# Patient Record
Sex: MALE | Race: WHITE | NOT HISPANIC OR LATINO
[De-identification: names, ages, dates, MRNs, and addresses within clinical notes are randomized per-mention and may not be internally consistent; named-entity substitution may affect disease eponyms.]

---

## 2017-01-03 ENCOUNTER — MESSAGE (OUTPATIENT)
Age: 82
End: 2017-01-03

## 2017-01-18 ENCOUNTER — APPOINTMENT (OUTPATIENT)
Dept: INTERNAL MEDICINE | Facility: CLINIC | Age: 82
End: 2017-01-18

## 2017-01-18 ENCOUNTER — APPOINTMENT (OUTPATIENT)
Dept: RADIOLOGY | Facility: CLINIC | Age: 82
End: 2017-01-18

## 2017-01-18 ENCOUNTER — OUTPATIENT (OUTPATIENT)
Dept: OUTPATIENT SERVICES | Facility: HOSPITAL | Age: 82
LOS: 1 days | End: 2017-01-18
Payer: MEDICARE

## 2017-01-18 VITALS
HEIGHT: 69 IN | OXYGEN SATURATION: 76 % | SYSTOLIC BLOOD PRESSURE: 128 MMHG | BODY MASS INDEX: 23.55 KG/M2 | TEMPERATURE: 97.4 F | DIASTOLIC BLOOD PRESSURE: 78 MMHG | WEIGHT: 159 LBS | HEART RATE: 66 BPM

## 2017-01-18 VITALS — HEART RATE: 70 BPM | OXYGEN SATURATION: 96 %

## 2017-01-18 DIAGNOSIS — Z98.89 OTHER SPECIFIED POSTPROCEDURAL STATES: Chronic | ICD-10-CM

## 2017-01-18 DIAGNOSIS — J44.9 CHRONIC OBSTRUCTIVE PULMONARY DISEASE, UNSPECIFIED: ICD-10-CM

## 2017-01-18 DIAGNOSIS — J40 BRONCHITIS, NOT SPECIFIED AS ACUTE OR CHRONIC: ICD-10-CM

## 2017-01-18 DIAGNOSIS — I71.4 ABDOMINAL AORTIC ANEURYSM, WITHOUT RUPTURE: Chronic | ICD-10-CM

## 2017-01-18 DIAGNOSIS — Z96.651 PRESENCE OF RIGHT ARTIFICIAL KNEE JOINT: Chronic | ICD-10-CM

## 2017-01-18 PROCEDURE — 71046 X-RAY EXAM CHEST 2 VIEWS: CPT

## 2017-01-23 ENCOUNTER — MEDICATION RENEWAL (OUTPATIENT)
Age: 82
End: 2017-01-23

## 2017-02-06 ENCOUNTER — APPOINTMENT (OUTPATIENT)
Dept: INTERNAL MEDICINE | Facility: CLINIC | Age: 82
End: 2017-02-06

## 2017-02-08 ENCOUNTER — NON-APPOINTMENT (OUTPATIENT)
Age: 82
End: 2017-02-08

## 2017-02-08 ENCOUNTER — APPOINTMENT (OUTPATIENT)
Dept: CARDIOLOGY | Facility: CLINIC | Age: 82
End: 2017-02-08

## 2017-02-08 VITALS
WEIGHT: 158 LBS | SYSTOLIC BLOOD PRESSURE: 91 MMHG | BODY MASS INDEX: 23.4 KG/M2 | OXYGEN SATURATION: 94 % | HEIGHT: 69 IN | DIASTOLIC BLOOD PRESSURE: 55 MMHG | HEART RATE: 74 BPM

## 2017-02-10 LAB
ALBUMIN SERPL ELPH-MCNC: 3.7 G/DL
ALP BLD-CCNC: 98 U/L
ALT SERPL-CCNC: 12 U/L
ANION GAP SERPL CALC-SCNC: 16 MMOL/L
AST SERPL-CCNC: 16 U/L
BASOPHILS # BLD AUTO: 0.07 K/UL
BASOPHILS NFR BLD AUTO: 1 %
BILIRUB SERPL-MCNC: 0.7 MG/DL
BUN SERPL-MCNC: 28 MG/DL
CALCIUM SERPL-MCNC: 9.2 MG/DL
CHLORIDE SERPL-SCNC: 103 MMOL/L
CO2 SERPL-SCNC: 23 MMOL/L
CREAT SERPL-MCNC: 1.23 MG/DL
DIGOXIN SERPL-MCNC: 1.3 NG/ML
EOSINOPHIL # BLD AUTO: 0.39 K/UL
EOSINOPHIL NFR BLD AUTO: 5.8 %
GLUCOSE SERPL-MCNC: 113 MG/DL
HCT VFR BLD CALC: 37.3 %
HGB BLD-MCNC: 11.7 G/DL
IMM GRANULOCYTES NFR BLD AUTO: 0.1 %
LYMPHOCYTES # BLD AUTO: 1.06 K/UL
LYMPHOCYTES NFR BLD AUTO: 15.7 %
MAN DIFF?: NORMAL
MCHC RBC-ENTMCNC: 30.6 PG
MCHC RBC-ENTMCNC: 31.4 GM/DL
MCV RBC AUTO: 97.6 FL
MONOCYTES # BLD AUTO: 0.52 K/UL
MONOCYTES NFR BLD AUTO: 7.7 %
NEUTROPHILS # BLD AUTO: 4.69 K/UL
NEUTROPHILS NFR BLD AUTO: 69.7 %
NT-PROBNP SERPL-MCNC: 4023 PG/ML
PLATELET # BLD AUTO: 128 K/UL
POTASSIUM SERPL-SCNC: 4.2 MMOL/L
PROT SERPL-MCNC: 6.6 G/DL
RBC # BLD: 3.82 M/UL
RBC # FLD: 18.9 %
SODIUM SERPL-SCNC: 142 MMOL/L
TSH SERPL-ACNC: 2.3 UIU/ML
WBC # FLD AUTO: 6.74 K/UL

## 2017-02-27 ENCOUNTER — MEDICATION RENEWAL (OUTPATIENT)
Age: 82
End: 2017-02-27

## 2017-03-06 ENCOUNTER — APPOINTMENT (OUTPATIENT)
Dept: INTERNAL MEDICINE | Facility: CLINIC | Age: 82
End: 2017-03-06

## 2017-03-06 VITALS
HEART RATE: 84 BPM | DIASTOLIC BLOOD PRESSURE: 70 MMHG | SYSTOLIC BLOOD PRESSURE: 120 MMHG | HEIGHT: 67.5 IN | TEMPERATURE: 97.8 F | BODY MASS INDEX: 25.29 KG/M2 | OXYGEN SATURATION: 98 % | WEIGHT: 163 LBS

## 2017-03-06 DIAGNOSIS — D47.2 MONOCLONAL GAMMOPATHY: ICD-10-CM

## 2017-03-06 LAB — HBA1C MFR BLD HPLC: 5.7

## 2017-03-06 RX ORDER — METHYLPREDNISOLONE 4 MG/1
4 TABLET ORAL
Qty: 1 | Refills: 1 | Status: DISCONTINUED | COMMUNITY
Start: 2017-01-18 | End: 2017-03-06

## 2017-03-07 LAB
APPEARANCE: CLEAR
BILIRUBIN URINE: NEGATIVE
BLOOD URINE: NEGATIVE
COLOR: YELLOW
GLUCOSE QUALITATIVE U: NORMAL MG/DL
KETONES URINE: NEGATIVE
LEUKOCYTE ESTERASE URINE: NEGATIVE
NITRITE URINE: NEGATIVE
PH URINE: 6
PROTEIN URINE: NEGATIVE MG/DL
SPECIFIC GRAVITY URINE: 1.01
UROBILINOGEN URINE: NORMAL MG/DL

## 2017-03-08 ENCOUNTER — APPOINTMENT (OUTPATIENT)
Dept: CARDIOLOGY | Facility: CLINIC | Age: 82
End: 2017-03-08

## 2017-03-13 ENCOUNTER — MEDICATION RENEWAL (OUTPATIENT)
Age: 82
End: 2017-03-13

## 2017-03-21 ENCOUNTER — MEDICATION RENEWAL (OUTPATIENT)
Age: 82
End: 2017-03-21

## 2017-03-23 DIAGNOSIS — M47.812 SPONDYLOSIS W/OUT MYELOPATHY OR RADICULOPATHY, CERVICAL REGION: ICD-10-CM

## 2017-04-04 ENCOUNTER — NON-APPOINTMENT (OUTPATIENT)
Age: 82
End: 2017-04-04

## 2017-04-04 ENCOUNTER — APPOINTMENT (OUTPATIENT)
Dept: CARDIOLOGY | Facility: CLINIC | Age: 82
End: 2017-04-04

## 2017-04-04 VITALS
HEIGHT: 67.5 IN | SYSTOLIC BLOOD PRESSURE: 107 MMHG | OXYGEN SATURATION: 98 % | WEIGHT: 155 LBS | HEART RATE: 75 BPM | DIASTOLIC BLOOD PRESSURE: 61 MMHG | BODY MASS INDEX: 24.05 KG/M2 | TEMPERATURE: 97.4 F

## 2017-04-06 LAB
ALBUMIN SERPL ELPH-MCNC: 4.3 G/DL
ALP BLD-CCNC: 83 U/L
ALT SERPL-CCNC: 9 U/L
ANION GAP SERPL CALC-SCNC: 21 MMOL/L
AST SERPL-CCNC: 20 U/L
BASOPHILS # BLD AUTO: 0.05 K/UL
BASOPHILS NFR BLD AUTO: 0.7 %
BILIRUB SERPL-MCNC: 0.6 MG/DL
BUN SERPL-MCNC: 34 MG/DL
CALCIUM SERPL-MCNC: 9.6 MG/DL
CHLORIDE SERPL-SCNC: 99 MMOL/L
CHOLEST SERPL-MCNC: 114 MG/DL
CHOLEST/HDLC SERPL: 2.4 RATIO
CO2 SERPL-SCNC: 23 MMOL/L
CREAT SERPL-MCNC: 1.38 MG/DL
DIGOXIN SERPL-MCNC: 1.3 NG/ML
EOSINOPHIL # BLD AUTO: 0.31 K/UL
EOSINOPHIL NFR BLD AUTO: 4.3 %
GLUCOSE SERPL-MCNC: 64 MG/DL
HCT VFR BLD CALC: 37.8 %
HDLC SERPL-MCNC: 47 MG/DL
HGB BLD-MCNC: 12.1 G/DL
IMM GRANULOCYTES NFR BLD AUTO: 0.3 %
LDLC SERPL CALC-MCNC: 51 MG/DL
LYMPHOCYTES # BLD AUTO: 1.61 K/UL
LYMPHOCYTES NFR BLD AUTO: 22.3 %
MAN DIFF?: NORMAL
MCHC RBC-ENTMCNC: 31.3 PG
MCHC RBC-ENTMCNC: 32 GM/DL
MCV RBC AUTO: 97.7 FL
MONOCYTES # BLD AUTO: 0.38 K/UL
MONOCYTES NFR BLD AUTO: 5.3 %
NEUTROPHILS # BLD AUTO: 4.84 K/UL
NEUTROPHILS NFR BLD AUTO: 67.1 %
NT-PROBNP SERPL-MCNC: 2609 PG/ML
PLATELET # BLD AUTO: 123 K/UL
POTASSIUM SERPL-SCNC: 4.6 MMOL/L
PROT SERPL-MCNC: 7.6 G/DL
RBC # BLD: 3.87 M/UL
RBC # FLD: 17.9 %
SODIUM SERPL-SCNC: 143 MMOL/L
TRIGL SERPL-MCNC: 78 MG/DL
TSH SERPL-ACNC: 1.62 UIU/ML
WBC # FLD AUTO: 7.21 K/UL

## 2017-04-19 ENCOUNTER — RX RENEWAL (OUTPATIENT)
Age: 82
End: 2017-04-19

## 2017-05-01 ENCOUNTER — MEDICATION RENEWAL (OUTPATIENT)
Age: 82
End: 2017-05-01

## 2017-05-09 ENCOUNTER — APPOINTMENT (OUTPATIENT)
Dept: VASCULAR SURGERY | Facility: CLINIC | Age: 82
End: 2017-05-09

## 2017-05-09 VITALS
TEMPERATURE: 97.6 F | WEIGHT: 160 LBS | SYSTOLIC BLOOD PRESSURE: 141 MMHG | DIASTOLIC BLOOD PRESSURE: 71 MMHG | BODY MASS INDEX: 24.82 KG/M2 | HEIGHT: 67.5 IN | HEART RATE: 81 BPM

## 2017-05-15 ENCOUNTER — RX RENEWAL (OUTPATIENT)
Age: 82
End: 2017-05-15

## 2017-05-18 ENCOUNTER — RX RENEWAL (OUTPATIENT)
Age: 82
End: 2017-05-18

## 2017-05-23 ENCOUNTER — MEDICATION RENEWAL (OUTPATIENT)
Age: 82
End: 2017-05-23

## 2017-06-09 ENCOUNTER — APPOINTMENT (OUTPATIENT)
Dept: INTERNAL MEDICINE | Facility: CLINIC | Age: 82
End: 2017-06-09

## 2017-06-09 VITALS
WEIGHT: 159 LBS | SYSTOLIC BLOOD PRESSURE: 124 MMHG | HEIGHT: 68 IN | RESPIRATION RATE: 20 BRPM | OXYGEN SATURATION: 96 % | TEMPERATURE: 97.8 F | HEART RATE: 59 BPM | DIASTOLIC BLOOD PRESSURE: 70 MMHG | BODY MASS INDEX: 24.1 KG/M2

## 2017-06-09 RX ORDER — METHYLPREDNISOLONE 4 MG/1
4 TABLET ORAL
Qty: 21 | Refills: 0 | Status: COMPLETED | COMMUNITY
Start: 2017-01-18

## 2017-06-12 ENCOUNTER — RESULT CHARGE (OUTPATIENT)
Age: 82
End: 2017-06-12

## 2017-06-13 ENCOUNTER — APPOINTMENT (OUTPATIENT)
Dept: INTERNAL MEDICINE | Facility: CLINIC | Age: 82
End: 2017-06-13

## 2017-06-13 ENCOUNTER — NON-APPOINTMENT (OUTPATIENT)
Age: 82
End: 2017-06-13

## 2017-06-13 ENCOUNTER — APPOINTMENT (OUTPATIENT)
Dept: CARDIOLOGY | Facility: CLINIC | Age: 82
End: 2017-06-13

## 2017-06-13 ENCOUNTER — OTHER (OUTPATIENT)
Age: 82
End: 2017-06-13

## 2017-06-13 VITALS
TEMPERATURE: 97.4 F | DIASTOLIC BLOOD PRESSURE: 71 MMHG | OXYGEN SATURATION: 98 % | BODY MASS INDEX: 24.1 KG/M2 | SYSTOLIC BLOOD PRESSURE: 122 MMHG | HEART RATE: 72 BPM | HEIGHT: 68 IN | WEIGHT: 159 LBS

## 2017-06-14 LAB
ALBUMIN SERPL ELPH-MCNC: 4 G/DL
ALP BLD-CCNC: 83 U/L
ALT SERPL-CCNC: 7 U/L
ANION GAP SERPL CALC-SCNC: 22 MMOL/L
AST SERPL-CCNC: 15 U/L
BASOPHILS # BLD AUTO: 0.04 K/UL
BASOPHILS NFR BLD AUTO: 0.7 %
BILIRUB SERPL-MCNC: 0.7 MG/DL
BUN SERPL-MCNC: 31 MG/DL
CALCIUM SERPL-MCNC: 9.5 MG/DL
CHLORIDE SERPL-SCNC: 95 MMOL/L
CHOLEST SERPL-MCNC: 104 MG/DL
CHOLEST/HDLC SERPL: 2.5 RATIO
CO2 SERPL-SCNC: 23 MMOL/L
CREAT SERPL-MCNC: 1.44 MG/DL
DIGOXIN SERPL-MCNC: 1.1 NG/ML
EOSINOPHIL # BLD AUTO: 0.2 K/UL
EOSINOPHIL NFR BLD AUTO: 3.3 %
GLUCOSE SERPL-MCNC: 85 MG/DL
HCT VFR BLD CALC: 35.8 %
HDLC SERPL-MCNC: 41 MG/DL
HGB BLD-MCNC: 11.6 G/DL
IMM GRANULOCYTES NFR BLD AUTO: 0.2 %
LDLC SERPL CALC-MCNC: 51 MG/DL
LYMPHOCYTES # BLD AUTO: 1.36 K/UL
LYMPHOCYTES NFR BLD AUTO: 22.2 %
MAN DIFF?: NORMAL
MCHC RBC-ENTMCNC: 30.9 PG
MCHC RBC-ENTMCNC: 32.4 GM/DL
MCV RBC AUTO: 95.5 FL
MONOCYTES # BLD AUTO: 0.52 K/UL
MONOCYTES NFR BLD AUTO: 8.5 %
NEUTROPHILS # BLD AUTO: 4 K/UL
NEUTROPHILS NFR BLD AUTO: 65.1 %
NT-PROBNP SERPL-MCNC: 2687 PG/ML
PLATELET # BLD AUTO: 134 K/UL
POTASSIUM SERPL-SCNC: 4.7 MMOL/L
PROT SERPL-MCNC: 7.2 G/DL
RBC # BLD: 3.75 M/UL
RBC # FLD: 17.1 %
SODIUM SERPL-SCNC: 140 MMOL/L
TRIGL SERPL-MCNC: 60 MG/DL
TSH SERPL-ACNC: 2.71 UIU/ML
WBC # FLD AUTO: 6.13 K/UL

## 2017-06-26 ENCOUNTER — APPOINTMENT (OUTPATIENT)
Dept: INTERNAL MEDICINE | Facility: CLINIC | Age: 82
End: 2017-06-26

## 2017-06-26 VITALS
HEIGHT: 68 IN | SYSTOLIC BLOOD PRESSURE: 120 MMHG | WEIGHT: 159 LBS | OXYGEN SATURATION: 91 % | HEART RATE: 75 BPM | TEMPERATURE: 97.9 F | DIASTOLIC BLOOD PRESSURE: 70 MMHG | BODY MASS INDEX: 24.1 KG/M2

## 2017-06-26 VITALS — OXYGEN SATURATION: 98 %

## 2017-06-26 RX ORDER — AMOXICILLIN AND CLAVULANATE POTASSIUM 875; 125 MG/1; MG/1
875-125 TABLET, COATED ORAL
Qty: 14 | Refills: 0 | Status: DISCONTINUED | COMMUNITY
Start: 2017-01-03 | End: 2017-06-26

## 2017-07-03 ENCOUNTER — MEDICATION RENEWAL (OUTPATIENT)
Age: 82
End: 2017-07-03

## 2017-07-11 ENCOUNTER — APPOINTMENT (OUTPATIENT)
Dept: INTERNAL MEDICINE | Facility: CLINIC | Age: 82
End: 2017-07-11

## 2017-07-11 VITALS
HEART RATE: 74 BPM | OXYGEN SATURATION: 96 % | DIASTOLIC BLOOD PRESSURE: 70 MMHG | HEIGHT: 68 IN | BODY MASS INDEX: 24.1 KG/M2 | TEMPERATURE: 97.3 F | SYSTOLIC BLOOD PRESSURE: 126 MMHG | WEIGHT: 159 LBS

## 2017-07-18 ENCOUNTER — APPOINTMENT (OUTPATIENT)
Dept: INTERNAL MEDICINE | Facility: CLINIC | Age: 82
End: 2017-07-18

## 2017-07-18 VITALS
SYSTOLIC BLOOD PRESSURE: 130 MMHG | WEIGHT: 161 LBS | DIASTOLIC BLOOD PRESSURE: 70 MMHG | TEMPERATURE: 97.4 F | BODY MASS INDEX: 24.4 KG/M2 | OXYGEN SATURATION: 95 % | HEIGHT: 68 IN | HEART RATE: 75 BPM

## 2017-08-04 ENCOUNTER — MEDICATION RENEWAL (OUTPATIENT)
Age: 82
End: 2017-08-04

## 2017-08-08 ENCOUNTER — APPOINTMENT (OUTPATIENT)
Dept: INTERNAL MEDICINE | Facility: CLINIC | Age: 82
End: 2017-08-08
Payer: MEDICARE

## 2017-08-08 VITALS
HEART RATE: 72 BPM | DIASTOLIC BLOOD PRESSURE: 64 MMHG | HEIGHT: 68 IN | BODY MASS INDEX: 24.4 KG/M2 | SYSTOLIC BLOOD PRESSURE: 108 MMHG | WEIGHT: 161 LBS | OXYGEN SATURATION: 98 % | TEMPERATURE: 97.4 F

## 2017-08-08 PROCEDURE — 99214 OFFICE O/P EST MOD 30 MIN: CPT

## 2017-08-14 ENCOUNTER — MEDICATION RENEWAL (OUTPATIENT)
Age: 82
End: 2017-08-14

## 2017-08-25 ENCOUNTER — CLINICAL ADVICE (OUTPATIENT)
Age: 82
End: 2017-08-25

## 2017-09-10 ENCOUNTER — RX RENEWAL (OUTPATIENT)
Age: 82
End: 2017-09-10

## 2017-09-12 ENCOUNTER — NON-APPOINTMENT (OUTPATIENT)
Age: 82
End: 2017-09-12

## 2017-09-12 ENCOUNTER — APPOINTMENT (OUTPATIENT)
Dept: CARDIOLOGY | Facility: CLINIC | Age: 82
End: 2017-09-12
Payer: MEDICARE

## 2017-09-12 VITALS
SYSTOLIC BLOOD PRESSURE: 120 MMHG | OXYGEN SATURATION: 96 % | HEART RATE: 69 BPM | WEIGHT: 163 LBS | DIASTOLIC BLOOD PRESSURE: 64 MMHG | BODY MASS INDEX: 24.71 KG/M2 | HEIGHT: 68 IN

## 2017-09-12 PROCEDURE — 93284 PRGRMG EVAL IMPLANTABLE DFB: CPT

## 2017-09-12 PROCEDURE — 36415 COLL VENOUS BLD VENIPUNCTURE: CPT

## 2017-09-12 PROCEDURE — 99215 OFFICE O/P EST HI 40 MIN: CPT

## 2017-09-12 PROCEDURE — 93000 ELECTROCARDIOGRAM COMPLETE: CPT

## 2017-09-13 LAB
ALBUMIN SERPL ELPH-MCNC: 3.6 G/DL
ALP BLD-CCNC: 90 U/L
ALT SERPL-CCNC: 8 U/L
ANION GAP SERPL CALC-SCNC: 16 MMOL/L
AST SERPL-CCNC: 11 U/L
BASOPHILS # BLD AUTO: 0.04 K/UL
BASOPHILS NFR BLD AUTO: 0.5 %
BILIRUB SERPL-MCNC: 0.3 MG/DL
BUN SERPL-MCNC: 29 MG/DL
CALCIUM SERPL-MCNC: 8.9 MG/DL
CHLORIDE SERPL-SCNC: 103 MMOL/L
CHOLEST SERPL-MCNC: 96 MG/DL
CHOLEST/HDLC SERPL: 2.8 RATIO
CO2 SERPL-SCNC: 23 MMOL/L
CREAT SERPL-MCNC: 1.4 MG/DL
DIGOXIN SERPL-MCNC: 1.2 NG/ML
EOSINOPHIL # BLD AUTO: 0.39 K/UL
EOSINOPHIL NFR BLD AUTO: 5.1 %
GLUCOSE SERPL-MCNC: 103 MG/DL
HBA1C MFR BLD HPLC: 5.6 %
HCT VFR BLD CALC: 33.6 %
HDLC SERPL-MCNC: 34 MG/DL
HGB BLD-MCNC: 11 G/DL
IMM GRANULOCYTES NFR BLD AUTO: 0.3 %
LDLC SERPL CALC-MCNC: 42 MG/DL
LYMPHOCYTES # BLD AUTO: 1.34 K/UL
LYMPHOCYTES NFR BLD AUTO: 17.6 %
MAN DIFF?: NORMAL
MCHC RBC-ENTMCNC: 31 PG
MCHC RBC-ENTMCNC: 32.7 GM/DL
MCV RBC AUTO: 94.6 FL
MONOCYTES # BLD AUTO: 0.46 K/UL
MONOCYTES NFR BLD AUTO: 6 %
NEUTROPHILS # BLD AUTO: 5.36 K/UL
NEUTROPHILS NFR BLD AUTO: 70.5 %
NT-PROBNP SERPL-MCNC: 1352 PG/ML
PLATELET # BLD AUTO: 119 K/UL
POTASSIUM SERPL-SCNC: 4.6 MMOL/L
PROT SERPL-MCNC: 7 G/DL
RBC # BLD: 3.55 M/UL
RBC # FLD: 17.5 %
SODIUM SERPL-SCNC: 142 MMOL/L
TRIGL SERPL-MCNC: 100 MG/DL
TSH SERPL-ACNC: 9.85 UIU/ML
WBC # FLD AUTO: 7.61 K/UL

## 2017-09-26 ENCOUNTER — RX RENEWAL (OUTPATIENT)
Age: 82
End: 2017-09-26

## 2017-10-03 ENCOUNTER — APPOINTMENT (OUTPATIENT)
Dept: INTERNAL MEDICINE | Facility: CLINIC | Age: 82
End: 2017-10-03
Payer: MEDICARE

## 2017-10-03 VITALS
OXYGEN SATURATION: 96 % | SYSTOLIC BLOOD PRESSURE: 104 MMHG | BODY MASS INDEX: 24.55 KG/M2 | TEMPERATURE: 97.4 F | HEIGHT: 68 IN | WEIGHT: 162 LBS | DIASTOLIC BLOOD PRESSURE: 60 MMHG | HEART RATE: 70 BPM

## 2017-10-03 PROCEDURE — 94010 BREATHING CAPACITY TEST: CPT

## 2017-10-03 PROCEDURE — 36415 COLL VENOUS BLD VENIPUNCTURE: CPT

## 2017-10-03 PROCEDURE — 99214 OFFICE O/P EST MOD 30 MIN: CPT | Mod: 25

## 2017-10-04 ENCOUNTER — TRANSCRIPTION ENCOUNTER (OUTPATIENT)
Age: 82
End: 2017-10-04

## 2017-10-06 ENCOUNTER — MEDICATION RENEWAL (OUTPATIENT)
Age: 82
End: 2017-10-06

## 2017-10-06 LAB
ALBUMIN SERPL ELPH-MCNC: 3.8 G/DL
ALP BLD-CCNC: 89 U/L
ALT SERPL-CCNC: 11 U/L
ANION GAP SERPL CALC-SCNC: 20 MMOL/L
AST SERPL-CCNC: 15 U/L
BASOPHILS # BLD AUTO: 0.05 K/UL
BASOPHILS NFR BLD AUTO: 0.5 %
BILIRUB SERPL-MCNC: 0.8 MG/DL
BUN SERPL-MCNC: 27 MG/DL
CALCIUM SERPL-MCNC: 9.6 MG/DL
CHLORIDE SERPL-SCNC: 100 MMOL/L
CO2 SERPL-SCNC: 22 MMOL/L
CREAT SERPL-MCNC: 1.51 MG/DL
EOSINOPHIL # BLD AUTO: 0.27 K/UL
EOSINOPHIL NFR BLD AUTO: 2.8 %
GLUCOSE SERPL-MCNC: 113 MG/DL
HCT VFR BLD CALC: 35.8 %
HGB BLD-MCNC: 11.2 G/DL
IMM GRANULOCYTES NFR BLD AUTO: 0.2 %
LYMPHOCYTES # BLD AUTO: 0.71 K/UL
LYMPHOCYTES NFR BLD AUTO: 7.5 %
MAN DIFF?: NORMAL
MCHC RBC-ENTMCNC: 30.6 PG
MCHC RBC-ENTMCNC: 31.3 GM/DL
MCV RBC AUTO: 97.8 FL
MONOCYTES # BLD AUTO: 0.75 K/UL
MONOCYTES NFR BLD AUTO: 7.9 %
NEUTROPHILS # BLD AUTO: 7.72 K/UL
NEUTROPHILS NFR BLD AUTO: 81.1 %
PLATELET # BLD AUTO: 116 K/UL
POTASSIUM SERPL-SCNC: 4.4 MMOL/L
PROT SERPL-MCNC: 7.3 G/DL
RBC # BLD: 3.66 M/UL
RBC # FLD: 18.2 %
SODIUM SERPL-SCNC: 142 MMOL/L
TSH SERPL-ACNC: 8.84 UIU/ML
WBC # FLD AUTO: 9.52 K/UL

## 2017-10-10 ENCOUNTER — APPOINTMENT (OUTPATIENT)
Dept: CARDIOLOGY | Facility: CLINIC | Age: 82
End: 2017-10-10

## 2017-10-10 ENCOUNTER — APPOINTMENT (OUTPATIENT)
Dept: INTERNAL MEDICINE | Facility: CLINIC | Age: 82
End: 2017-10-10
Payer: MEDICARE

## 2017-10-10 VITALS
OXYGEN SATURATION: 93 % | WEIGHT: 160 LBS | HEIGHT: 68 IN | TEMPERATURE: 97.2 F | HEART RATE: 65 BPM | SYSTOLIC BLOOD PRESSURE: 110 MMHG | DIASTOLIC BLOOD PRESSURE: 60 MMHG | BODY MASS INDEX: 24.25 KG/M2

## 2017-10-10 PROCEDURE — 99214 OFFICE O/P EST MOD 30 MIN: CPT

## 2017-10-12 ENCOUNTER — APPOINTMENT (OUTPATIENT)
Dept: WOUND CARE | Facility: CLINIC | Age: 82
End: 2017-10-12
Payer: MEDICARE

## 2017-10-12 VITALS
HEART RATE: 73 BPM | SYSTOLIC BLOOD PRESSURE: 134 MMHG | TEMPERATURE: 98.3 F | RESPIRATION RATE: 16 BRPM | DIASTOLIC BLOOD PRESSURE: 74 MMHG

## 2017-10-12 DIAGNOSIS — A40.0: ICD-10-CM
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
## 2017-10-12 DIAGNOSIS — J34.89 OTHER SPECIFIED DISORDERS OF NOSE AND NASAL SINUSES: ICD-10-CM

## 2017-10-12 DIAGNOSIS — Z84.1 FAMILY HISTORY OF DISORDERS OF KIDNEY AND URETER: ICD-10-CM

## 2017-10-12 PROCEDURE — 99203 OFFICE O/P NEW LOW 30 MIN: CPT

## 2017-10-17 ENCOUNTER — FORM ENCOUNTER (OUTPATIENT)
Age: 82
End: 2017-10-17

## 2017-10-17 ENCOUNTER — APPOINTMENT (OUTPATIENT)
Dept: INTERNAL MEDICINE | Facility: CLINIC | Age: 82
End: 2017-10-17
Payer: MEDICARE

## 2017-10-17 VITALS
DIASTOLIC BLOOD PRESSURE: 72 MMHG | HEART RATE: 83 BPM | BODY MASS INDEX: 24.25 KG/M2 | WEIGHT: 160 LBS | HEIGHT: 68 IN | TEMPERATURE: 97.4 F | OXYGEN SATURATION: 92 % | SYSTOLIC BLOOD PRESSURE: 122 MMHG

## 2017-10-17 DIAGNOSIS — S81.812A LACERATION W/OUT FOREIGN BODY, LEFT LOWER LEG, INITIAL ENCOUNTER: ICD-10-CM

## 2017-10-17 PROCEDURE — 36415 COLL VENOUS BLD VENIPUNCTURE: CPT

## 2017-10-17 PROCEDURE — 94010 BREATHING CAPACITY TEST: CPT

## 2017-10-17 PROCEDURE — 99214 OFFICE O/P EST MOD 30 MIN: CPT | Mod: 25

## 2017-10-17 RX ORDER — IPRATROPIUM BROMIDE AND ALBUTEROL 20; 100 UG/1; UG/1
20-100 SPRAY, METERED RESPIRATORY (INHALATION)
Qty: 3 | Refills: 3 | Status: DISCONTINUED | COMMUNITY
Start: 2017-10-03 | End: 2017-10-17

## 2017-10-17 RX ORDER — AZITHROMYCIN 250 MG/1
250 TABLET, FILM COATED ORAL
Qty: 1 | Refills: 0 | Status: DISCONTINUED | COMMUNITY
Start: 2017-10-03 | End: 2017-10-17

## 2017-10-17 RX ORDER — BENZONATATE 100 MG/1
100 CAPSULE ORAL
Qty: 15 | Refills: 1 | Status: DISCONTINUED | COMMUNITY
Start: 2017-06-26 | End: 2017-10-17

## 2017-10-17 RX ORDER — BUDESONIDE 0.5 MG/2ML
0.5 INHALANT ORAL TWICE DAILY
Qty: 60 | Refills: 11 | Status: DISCONTINUED | COMMUNITY
Start: 2017-10-10 | End: 2017-10-17

## 2017-10-17 RX ORDER — PREDNISONE 20 MG/1
20 TABLET ORAL
Qty: 10 | Refills: 0 | Status: DISCONTINUED | COMMUNITY
Start: 2017-10-03 | End: 2017-10-17

## 2017-10-17 RX ORDER — PREDNISONE 20 MG/1
20 TABLET ORAL
Qty: 5 | Refills: 0 | Status: DISCONTINUED | COMMUNITY
Start: 2017-06-26 | End: 2017-10-17

## 2017-10-18 ENCOUNTER — APPOINTMENT (OUTPATIENT)
Dept: RADIOLOGY | Facility: CLINIC | Age: 82
End: 2017-10-18
Payer: MEDICARE

## 2017-10-18 ENCOUNTER — OUTPATIENT (OUTPATIENT)
Dept: OUTPATIENT SERVICES | Facility: HOSPITAL | Age: 82
LOS: 1 days | End: 2017-10-18
Payer: MEDICARE

## 2017-10-18 DIAGNOSIS — Z96.651 PRESENCE OF RIGHT ARTIFICIAL KNEE JOINT: Chronic | ICD-10-CM

## 2017-10-18 DIAGNOSIS — Z98.89 OTHER SPECIFIED POSTPROCEDURAL STATES: Chronic | ICD-10-CM

## 2017-10-18 DIAGNOSIS — I71.4 ABDOMINAL AORTIC ANEURYSM, WITHOUT RUPTURE: Chronic | ICD-10-CM

## 2017-10-18 DIAGNOSIS — Z00.8 ENCOUNTER FOR OTHER GENERAL EXAMINATION: ICD-10-CM

## 2017-10-18 LAB
ALBUMIN SERPL ELPH-MCNC: 3.7 G/DL
ALP BLD-CCNC: 81 U/L
ALT SERPL-CCNC: 13 U/L
ANION GAP SERPL CALC-SCNC: 12 MMOL/L
AST SERPL-CCNC: 17 U/L
BASOPHILS # BLD AUTO: 0.02 K/UL
BASOPHILS NFR BLD AUTO: 0.2 %
BILIRUB SERPL-MCNC: 0.5 MG/DL
BUN SERPL-MCNC: 29 MG/DL
CALCIUM SERPL-MCNC: 9 MG/DL
CHLORIDE SERPL-SCNC: 100 MMOL/L
CO2 SERPL-SCNC: 28 MMOL/L
CREAT SERPL-MCNC: 1.39 MG/DL
EOSINOPHIL # BLD AUTO: 0.29 K/UL
EOSINOPHIL NFR BLD AUTO: 3.2 %
GLUCOSE SERPL-MCNC: 93 MG/DL
HCT VFR BLD CALC: 37.2 %
HGB BLD-MCNC: 12.1 G/DL
IMM GRANULOCYTES NFR BLD AUTO: 0.3 %
LYMPHOCYTES # BLD AUTO: 1.32 K/UL
LYMPHOCYTES NFR BLD AUTO: 14.4 %
MAN DIFF?: NORMAL
MCHC RBC-ENTMCNC: 31.2 PG
MCHC RBC-ENTMCNC: 32.5 GM/DL
MCV RBC AUTO: 95.9 FL
MONOCYTES # BLD AUTO: 0.5 K/UL
MONOCYTES NFR BLD AUTO: 5.4 %
NEUTROPHILS # BLD AUTO: 7.02 K/UL
NEUTROPHILS NFR BLD AUTO: 76.5 %
NT-PROBNP SERPL-MCNC: 2447 PG/ML
PLATELET # BLD AUTO: 136 K/UL
POTASSIUM SERPL-SCNC: 4.6 MMOL/L
PROT SERPL-MCNC: 7.5 G/DL
RBC # BLD: 3.88 M/UL
RBC # FLD: 17.3 %
SODIUM SERPL-SCNC: 140 MMOL/L
WBC # FLD AUTO: 9.18 K/UL

## 2017-10-18 PROCEDURE — 71020: CPT | Mod: 26

## 2017-10-18 PROCEDURE — 71046 X-RAY EXAM CHEST 2 VIEWS: CPT

## 2017-10-19 ENCOUNTER — APPOINTMENT (OUTPATIENT)
Dept: WOUND CARE | Facility: CLINIC | Age: 82
End: 2017-10-19
Payer: MEDICARE

## 2017-10-19 VITALS
DIASTOLIC BLOOD PRESSURE: 76 MMHG | HEART RATE: 74 BPM | SYSTOLIC BLOOD PRESSURE: 142 MMHG | TEMPERATURE: 97.3 F | HEIGHT: 68 IN | WEIGHT: 160 LBS | RESPIRATION RATE: 18 BRPM | BODY MASS INDEX: 24.25 KG/M2

## 2017-10-19 DIAGNOSIS — R63.4 ABNORMAL WEIGHT LOSS: ICD-10-CM

## 2017-10-19 PROBLEM — S81.812A SKIN TEAR OF LEFT LOWER LEG WITHOUT COMPLICATION: Status: ACTIVE | Noted: 2017-10-10

## 2017-10-19 PROCEDURE — 99213 OFFICE O/P EST LOW 20 MIN: CPT

## 2017-10-26 ENCOUNTER — APPOINTMENT (OUTPATIENT)
Dept: INTERNAL MEDICINE | Facility: CLINIC | Age: 82
End: 2017-10-26
Payer: MEDICARE

## 2017-10-26 VITALS
OXYGEN SATURATION: 95 % | HEIGHT: 68 IN | HEART RATE: 86 BPM | SYSTOLIC BLOOD PRESSURE: 124 MMHG | DIASTOLIC BLOOD PRESSURE: 70 MMHG | WEIGHT: 160 LBS | TEMPERATURE: 97.5 F | BODY MASS INDEX: 24.25 KG/M2

## 2017-10-26 PROCEDURE — 99214 OFFICE O/P EST MOD 30 MIN: CPT

## 2017-10-31 ENCOUNTER — RX RENEWAL (OUTPATIENT)
Age: 82
End: 2017-10-31

## 2017-11-14 ENCOUNTER — APPOINTMENT (OUTPATIENT)
Dept: WOUND CARE | Facility: CLINIC | Age: 82
End: 2017-11-14
Payer: MEDICARE

## 2017-11-14 DIAGNOSIS — S81.802S UNSPECIFIED OPEN WOUND, LEFT LOWER LEG, SEQUELA: ICD-10-CM

## 2017-11-14 DIAGNOSIS — L03.119 CELLULITIS OF UNSPECIFIED PART OF LIMB: ICD-10-CM

## 2017-11-14 DIAGNOSIS — I25.2 OLD MYOCARDIAL INFARCTION: ICD-10-CM

## 2017-11-14 DIAGNOSIS — Z87.891 PERSONAL HISTORY OF NICOTINE DEPENDENCE: ICD-10-CM

## 2017-11-14 DIAGNOSIS — Z96.651 PRESENCE OF RIGHT ARTIFICIAL KNEE JOINT: ICD-10-CM

## 2017-11-14 DIAGNOSIS — M17.10 UNILATERAL PRIMARY OSTEOARTHRITIS, UNSPECIFIED KNEE: ICD-10-CM

## 2017-11-14 PROCEDURE — 99213 OFFICE O/P EST LOW 20 MIN: CPT

## 2017-11-17 ENCOUNTER — RX RENEWAL (OUTPATIENT)
Age: 82
End: 2017-11-17

## 2017-11-21 ENCOUNTER — RX RENEWAL (OUTPATIENT)
Age: 82
End: 2017-11-21

## 2017-12-01 ENCOUNTER — APPOINTMENT (OUTPATIENT)
Dept: INTERNAL MEDICINE | Facility: CLINIC | Age: 82
End: 2017-12-01
Payer: MEDICARE

## 2017-12-01 PROCEDURE — G0008: CPT

## 2017-12-01 PROCEDURE — 90662 IIV NO PRSV INCREASED AG IM: CPT

## 2017-12-12 ENCOUNTER — APPOINTMENT (OUTPATIENT)
Dept: WOUND CARE | Facility: CLINIC | Age: 82
End: 2017-12-12
Payer: MEDICARE

## 2017-12-12 VITALS
HEIGHT: 68 IN | TEMPERATURE: 97 F | BODY MASS INDEX: 24.25 KG/M2 | SYSTOLIC BLOOD PRESSURE: 113 MMHG | HEART RATE: 73 BPM | RESPIRATION RATE: 18 BRPM | DIASTOLIC BLOOD PRESSURE: 61 MMHG | WEIGHT: 160 LBS

## 2017-12-12 PROCEDURE — 99213 OFFICE O/P EST LOW 20 MIN: CPT

## 2017-12-26 ENCOUNTER — APPOINTMENT (OUTPATIENT)
Dept: CARDIOLOGY | Facility: CLINIC | Age: 82
End: 2017-12-26
Payer: MEDICARE

## 2017-12-26 ENCOUNTER — NON-APPOINTMENT (OUTPATIENT)
Age: 82
End: 2017-12-26

## 2017-12-26 ENCOUNTER — LABORATORY RESULT (OUTPATIENT)
Age: 82
End: 2017-12-26

## 2017-12-26 VITALS
OXYGEN SATURATION: 94 % | HEART RATE: 76 BPM | BODY MASS INDEX: 24.55 KG/M2 | DIASTOLIC BLOOD PRESSURE: 63 MMHG | WEIGHT: 162 LBS | HEIGHT: 68 IN | TEMPERATURE: 97.3 F | SYSTOLIC BLOOD PRESSURE: 103 MMHG

## 2017-12-26 DIAGNOSIS — R73.09 OTHER ABNORMAL GLUCOSE: ICD-10-CM

## 2017-12-26 PROCEDURE — 93000 ELECTROCARDIOGRAM COMPLETE: CPT | Mod: 59

## 2017-12-26 PROCEDURE — 99215 OFFICE O/P EST HI 40 MIN: CPT

## 2017-12-26 PROCEDURE — 93284 PRGRMG EVAL IMPLANTABLE DFB: CPT

## 2017-12-26 PROCEDURE — 36415 COLL VENOUS BLD VENIPUNCTURE: CPT

## 2017-12-27 LAB
ALBUMIN SERPL ELPH-MCNC: 3.7 G/DL
ALP BLD-CCNC: 80 U/L
ALT SERPL-CCNC: 20 U/L
ANION GAP SERPL CALC-SCNC: 20 MMOL/L
AST SERPL-CCNC: 28 U/L
BASOPHILS # BLD AUTO: 0.04 K/UL
BASOPHILS NFR BLD AUTO: 0.5 %
BILIRUB SERPL-MCNC: 0.6 MG/DL
BUN SERPL-MCNC: 28 MG/DL
CALCIUM SERPL-MCNC: 9.3 MG/DL
CHLORIDE SERPL-SCNC: 97 MMOL/L
CHOLEST SERPL-MCNC: 91 MG/DL
CHOLEST/HDLC SERPL: 3 RATIO
CO2 SERPL-SCNC: 21 MMOL/L
CREAT SERPL-MCNC: 1.4 MG/DL
EOSINOPHIL # BLD AUTO: 0.28 K/UL
EOSINOPHIL NFR BLD AUTO: 3.4 %
GLUCOSE SERPL-MCNC: 104 MG/DL
HBA1C MFR BLD HPLC: 5.5 %
HCT VFR BLD CALC: 34.9 %
HDLC SERPL-MCNC: 30 MG/DL
HGB BLD-MCNC: 11.2 G/DL
IMM GRANULOCYTES NFR BLD AUTO: 0.5 %
LDLC SERPL CALC-MCNC: 45 MG/DL
LYMPHOCYTES # BLD AUTO: 1.43 K/UL
LYMPHOCYTES NFR BLD AUTO: 17.4 %
MAN DIFF?: NORMAL
MCHC RBC-ENTMCNC: 30.6 PG
MCHC RBC-ENTMCNC: 32.1 GM/DL
MCV RBC AUTO: 95.4 FL
MONOCYTES # BLD AUTO: 0.48 K/UL
MONOCYTES NFR BLD AUTO: 5.8 %
NEUTROPHILS # BLD AUTO: 5.95 K/UL
NEUTROPHILS NFR BLD AUTO: 72.4 %
NT-PROBNP SERPL-MCNC: 4795 PG/ML
PLATELET # BLD AUTO: 182 K/UL
POTASSIUM SERPL-SCNC: 4.7 MMOL/L
PROT SERPL-MCNC: 7.3 G/DL
RBC # BLD: 3.66 M/UL
RBC # FLD: 17.1 %
SODIUM SERPL-SCNC: 138 MMOL/L
TRIGL SERPL-MCNC: 82 MG/DL
TSH SERPL-ACNC: 6 UIU/ML
WBC # FLD AUTO: 8.22 K/UL

## 2017-12-28 ENCOUNTER — APPOINTMENT (OUTPATIENT)
Dept: WOUND CARE | Facility: CLINIC | Age: 82
End: 2017-12-28
Payer: MEDICARE

## 2017-12-28 VITALS
HEART RATE: 71 BPM | BODY MASS INDEX: 24.55 KG/M2 | RESPIRATION RATE: 16 BRPM | TEMPERATURE: 97.8 F | SYSTOLIC BLOOD PRESSURE: 135 MMHG | WEIGHT: 162 LBS | DIASTOLIC BLOOD PRESSURE: 72 MMHG | HEIGHT: 68 IN

## 2017-12-28 DIAGNOSIS — Z95.0 PRESENCE OF CARDIAC PACEMAKER: ICD-10-CM

## 2017-12-28 DIAGNOSIS — S89.92XA UNSPECIFIED INJURY OF LEFT LOWER LEG, INITIAL ENCOUNTER: ICD-10-CM

## 2017-12-28 PROCEDURE — 99213 OFFICE O/P EST LOW 20 MIN: CPT

## 2017-12-29 ENCOUNTER — RX RENEWAL (OUTPATIENT)
Age: 82
End: 2017-12-29

## 2018-01-08 ENCOUNTER — RX RENEWAL (OUTPATIENT)
Age: 83
End: 2018-01-08

## 2018-01-11 ENCOUNTER — APPOINTMENT (OUTPATIENT)
Dept: CARDIOLOGY | Facility: CLINIC | Age: 83
End: 2018-01-11
Payer: MEDICARE

## 2018-01-11 VITALS
OXYGEN SATURATION: 98 % | DIASTOLIC BLOOD PRESSURE: 74 MMHG | HEART RATE: 76 BPM | TEMPERATURE: 97.5 F | SYSTOLIC BLOOD PRESSURE: 125 MMHG | WEIGHT: 161.8 LBS | BODY MASS INDEX: 24.52 KG/M2 | HEIGHT: 68 IN

## 2018-01-11 PROCEDURE — 99214 OFFICE O/P EST MOD 30 MIN: CPT

## 2018-01-11 PROCEDURE — 36415 COLL VENOUS BLD VENIPUNCTURE: CPT

## 2018-01-16 ENCOUNTER — MEDICATION RENEWAL (OUTPATIENT)
Age: 83
End: 2018-01-16

## 2018-01-16 LAB
ALBUMIN SERPL ELPH-MCNC: 3.8 G/DL
ALP BLD-CCNC: 74 U/L
ALT SERPL-CCNC: 11 U/L
ANION GAP SERPL CALC-SCNC: 15 MMOL/L
AST SERPL-CCNC: 20 U/L
BASOPHILS # BLD AUTO: 0.06 K/UL
BASOPHILS NFR BLD AUTO: 0.9 %
BILIRUB SERPL-MCNC: 0.5 MG/DL
BUN SERPL-MCNC: 43 MG/DL
CALCIUM SERPL-MCNC: 9 MG/DL
CHLORIDE SERPL-SCNC: 105 MMOL/L
CO2 SERPL-SCNC: 24 MMOL/L
CREAT SERPL-MCNC: 1.55 MG/DL
DIGOXIN SERPL-MCNC: 1 NG/ML
EOSINOPHIL # BLD AUTO: 0.37 K/UL
EOSINOPHIL NFR BLD AUTO: 5.7 %
GLUCOSE SERPL-MCNC: 62 MG/DL
HCT VFR BLD CALC: 34.6 %
HGB BLD-MCNC: 11 G/DL
IMM GRANULOCYTES NFR BLD AUTO: 0.3 %
LYMPHOCYTES # BLD AUTO: 1.74 K/UL
LYMPHOCYTES NFR BLD AUTO: 27 %
MAN DIFF?: NORMAL
MCHC RBC-ENTMCNC: 30.8 PG
MCHC RBC-ENTMCNC: 31.8 GM/DL
MCV RBC AUTO: 96.9 FL
MONOCYTES # BLD AUTO: 0.56 K/UL
MONOCYTES NFR BLD AUTO: 8.7 %
NEUTROPHILS # BLD AUTO: 3.7 K/UL
NEUTROPHILS NFR BLD AUTO: 57.4 %
NT-PROBNP SERPL-MCNC: 1786 PG/ML
PLATELET # BLD AUTO: 145 K/UL
POTASSIUM SERPL-SCNC: 4.6 MMOL/L
PROT SERPL-MCNC: 7 G/DL
RBC # BLD: 3.57 M/UL
RBC # FLD: 18.3 %
SODIUM SERPL-SCNC: 144 MMOL/L
TSH SERPL-ACNC: 4.84 UIU/ML
WBC # FLD AUTO: 6.45 K/UL

## 2018-01-17 ENCOUNTER — RX RENEWAL (OUTPATIENT)
Age: 83
End: 2018-01-17

## 2018-01-22 DIAGNOSIS — M25.569 PAIN IN UNSPECIFIED KNEE: ICD-10-CM

## 2018-01-24 ENCOUNTER — APPOINTMENT (OUTPATIENT)
Dept: INTERNAL MEDICINE | Facility: CLINIC | Age: 83
End: 2018-01-24
Payer: MEDICARE

## 2018-01-24 PROCEDURE — 36415 COLL VENOUS BLD VENIPUNCTURE: CPT

## 2018-01-26 LAB
CRP SERPL-MCNC: 1.6 MG/DL
ERYTHROCYTE [SEDIMENTATION RATE] IN BLOOD BY WESTERGREN METHOD: 11 MM/HR

## 2018-02-20 ENCOUNTER — APPOINTMENT (OUTPATIENT)
Dept: INTERNAL MEDICINE | Facility: CLINIC | Age: 83
End: 2018-02-20
Payer: MEDICARE

## 2018-02-20 VITALS
WEIGHT: 169 LBS | HEART RATE: 75 BPM | TEMPERATURE: 97.2 F | BODY MASS INDEX: 26.22 KG/M2 | OXYGEN SATURATION: 92 % | DIASTOLIC BLOOD PRESSURE: 70 MMHG | SYSTOLIC BLOOD PRESSURE: 104 MMHG | HEIGHT: 67.5 IN

## 2018-02-20 PROCEDURE — 94010 BREATHING CAPACITY TEST: CPT

## 2018-02-20 PROCEDURE — 99214 OFFICE O/P EST MOD 30 MIN: CPT | Mod: 25

## 2018-02-26 ENCOUNTER — APPOINTMENT (OUTPATIENT)
Dept: INTERNAL MEDICINE | Facility: CLINIC | Age: 83
End: 2018-02-26
Payer: MEDICARE

## 2018-02-26 ENCOUNTER — RESULT CHARGE (OUTPATIENT)
Age: 83
End: 2018-02-26

## 2018-02-26 VITALS
DIASTOLIC BLOOD PRESSURE: 60 MMHG | WEIGHT: 172 LBS | HEIGHT: 67.5 IN | BODY MASS INDEX: 26.68 KG/M2 | TEMPERATURE: 97.3 F | HEART RATE: 77 BPM | OXYGEN SATURATION: 94 % | SYSTOLIC BLOOD PRESSURE: 90 MMHG

## 2018-02-26 PROCEDURE — 94010 BREATHING CAPACITY TEST: CPT

## 2018-02-26 PROCEDURE — 99214 OFFICE O/P EST MOD 30 MIN: CPT | Mod: 25

## 2018-03-04 ENCOUNTER — RX RENEWAL (OUTPATIENT)
Age: 83
End: 2018-03-04

## 2018-03-05 ENCOUNTER — RX RENEWAL (OUTPATIENT)
Age: 83
End: 2018-03-05

## 2018-03-19 ENCOUNTER — APPOINTMENT (OUTPATIENT)
Dept: INTERNAL MEDICINE | Facility: CLINIC | Age: 83
End: 2018-03-19
Payer: MEDICARE

## 2018-03-19 VITALS
DIASTOLIC BLOOD PRESSURE: 60 MMHG | HEIGHT: 66.5 IN | BODY MASS INDEX: 25.73 KG/M2 | SYSTOLIC BLOOD PRESSURE: 100 MMHG | WEIGHT: 162 LBS | TEMPERATURE: 97.4 F | OXYGEN SATURATION: 94 % | HEART RATE: 71 BPM

## 2018-03-19 DIAGNOSIS — Z87.09 PERSONAL HISTORY OF OTHER DISEASES OF THE RESPIRATORY SYSTEM: ICD-10-CM

## 2018-03-19 DIAGNOSIS — Z92.29 PERSONAL HISTORY OF OTHER DRUG THERAPY: ICD-10-CM

## 2018-03-19 DIAGNOSIS — R79.9 ABNORMAL FINDING OF BLOOD CHEMISTRY, UNSPECIFIED: ICD-10-CM

## 2018-03-19 DIAGNOSIS — Z78.9 OTHER SPECIFIED HEALTH STATUS: ICD-10-CM

## 2018-03-19 DIAGNOSIS — J06.9 ACUTE UPPER RESPIRATORY INFECTION, UNSPECIFIED: ICD-10-CM

## 2018-03-19 DIAGNOSIS — T14.8XXA OTHER INJURY OF UNSPECIFIED BODY REGION, INITIAL ENCOUNTER: ICD-10-CM

## 2018-03-19 PROCEDURE — 94010 BREATHING CAPACITY TEST: CPT

## 2018-03-19 PROCEDURE — G0439: CPT

## 2018-03-19 PROCEDURE — 36415 COLL VENOUS BLD VENIPUNCTURE: CPT

## 2018-03-19 RX ORDER — METHYLPREDNISOLONE 4 MG/1
4 TABLET ORAL
Qty: 1 | Refills: 1 | Status: DISCONTINUED | COMMUNITY
Start: 2017-10-17 | End: 2018-03-19

## 2018-03-19 RX ORDER — HYDROCODONE BITARTRATE AND HOMATROPINE METHYLBROMIDE 5; 1.5 MG/5ML; MG/5ML
5-1.5 SOLUTION ORAL EVERY 6 HOURS
Qty: 240 | Refills: 0 | Status: DISCONTINUED | COMMUNITY
Start: 2017-07-11 | End: 2018-03-19

## 2018-03-19 RX ORDER — PREDNISONE 20 MG/1
20 TABLET ORAL
Qty: 10 | Refills: 0 | Status: DISCONTINUED | COMMUNITY
Start: 2018-02-20 | End: 2018-03-19

## 2018-03-20 PROBLEM — Z92.29 HISTORY OF PNEUMOCOCCAL VACCINATION: Status: RESOLVED | Noted: 2017-03-06 | Resolved: 2018-03-20

## 2018-03-20 PROBLEM — Z87.09 HISTORY OF BRONCHITIS: Status: RESOLVED | Noted: 2017-01-03 | Resolved: 2018-03-20

## 2018-03-21 ENCOUNTER — MEDICATION RENEWAL (OUTPATIENT)
Age: 83
End: 2018-03-21

## 2018-03-21 LAB
25(OH)D3 SERPL-MCNC: 70.5 NG/ML
ALBUMIN SERPL ELPH-MCNC: 3.4 G/DL
ALP BLD-CCNC: 63 U/L
ALT SERPL-CCNC: 12 U/L
ANION GAP SERPL CALC-SCNC: 12 MMOL/L
APPEARANCE: CLEAR
AST SERPL-CCNC: 16 U/L
BACTERIA: NEGATIVE
BASOPHILS # BLD AUTO: 0.04 K/UL
BASOPHILS NFR BLD AUTO: 0.7 %
BILIRUB SERPL-MCNC: 0.4 MG/DL
BILIRUBIN URINE: NEGATIVE
BLOOD URINE: NEGATIVE
BUN SERPL-MCNC: 37 MG/DL
CALCIUM SERPL-MCNC: 9.2 MG/DL
CHLORIDE SERPL-SCNC: 102 MMOL/L
CHOLEST SERPL-MCNC: 93 MG/DL
CHOLEST/HDLC SERPL: 2.4 RATIO
CO2 SERPL-SCNC: 25 MMOL/L
COLOR: ABNORMAL
CREAT SERPL-MCNC: 1.55 MG/DL
EOSINOPHIL # BLD AUTO: 0.54 K/UL
EOSINOPHIL NFR BLD AUTO: 9 %
FOLATE SERPL-MCNC: 19.6 NG/ML
GLUCOSE QUALITATIVE U: NEGATIVE MG/DL
GLUCOSE SERPL-MCNC: 90 MG/DL
HBA1C MFR BLD HPLC: 5.6 %
HCT VFR BLD CALC: 32.6 %
HDLC SERPL-MCNC: 39 MG/DL
HGB BLD-MCNC: 10.4 G/DL
HYALINE CASTS: 1 /LPF
IMM GRANULOCYTES NFR BLD AUTO: 0.3 %
IRON SERPL-MCNC: 34 UG/DL
KETONES URINE: NEGATIVE
LDLC SERPL CALC-MCNC: 47 MG/DL
LEUKOCYTE ESTERASE URINE: NEGATIVE
LYMPHOCYTES # BLD AUTO: 0.96 K/UL
LYMPHOCYTES NFR BLD AUTO: 16 %
MAN DIFF?: NORMAL
MCHC RBC-ENTMCNC: 29.6 PG
MCHC RBC-ENTMCNC: 31.9 GM/DL
MCV RBC AUTO: 92.9 FL
MICROSCOPIC-UA: NORMAL
MONOCYTES # BLD AUTO: 0.53 K/UL
MONOCYTES NFR BLD AUTO: 8.8 %
NEUTROPHILS # BLD AUTO: 3.92 K/UL
NEUTROPHILS NFR BLD AUTO: 65.2 %
NITRITE URINE: NEGATIVE
NT-PROBNP SERPL-MCNC: 5361 PG/ML
PH URINE: 6.5
PLATELET # BLD AUTO: 143 K/UL
POTASSIUM SERPL-SCNC: 4 MMOL/L
PROT SERPL-MCNC: 7.2 G/DL
PROTEIN URINE: NEGATIVE MG/DL
PSA SERPL-MCNC: 4.46 NG/ML
RBC # BLD: 3.51 M/UL
RBC # FLD: 18.2 %
RED BLOOD CELLS URINE: 17 /HPF
SODIUM SERPL-SCNC: 139 MMOL/L
SPECIFIC GRAVITY URINE: 1.02
SQUAMOUS EPITHELIAL CELLS: 0 /HPF
TRIGL SERPL-MCNC: 37 MG/DL
TSH SERPL-ACNC: 10.95 UIU/ML
UROBILINOGEN URINE: 1 MG/DL
VIT B12 SERPL-MCNC: 461 PG/ML
WBC # FLD AUTO: 6.01 K/UL
WHITE BLOOD CELLS URINE: 3 /HPF

## 2018-03-22 ENCOUNTER — CLINICAL ADVICE (OUTPATIENT)
Age: 83
End: 2018-03-22

## 2018-03-22 ENCOUNTER — RX RENEWAL (OUTPATIENT)
Age: 83
End: 2018-03-22

## 2018-03-26 ENCOUNTER — APPOINTMENT (OUTPATIENT)
Dept: UROLOGY | Facility: CLINIC | Age: 83
End: 2018-03-26
Payer: MEDICARE

## 2018-03-26 VITALS — SYSTOLIC BLOOD PRESSURE: 98 MMHG | HEART RATE: 120 BPM | DIASTOLIC BLOOD PRESSURE: 60 MMHG | OXYGEN SATURATION: 91 %

## 2018-03-26 VITALS — HEIGHT: 66 IN | BODY MASS INDEX: 25.71 KG/M2 | WEIGHT: 160 LBS

## 2018-03-26 DIAGNOSIS — R31.29 OTHER MICROSCOPIC HEMATURIA: ICD-10-CM

## 2018-03-26 PROCEDURE — 99204 OFFICE O/P NEW MOD 45 MIN: CPT

## 2018-03-27 ENCOUNTER — APPOINTMENT (OUTPATIENT)
Dept: CARDIOLOGY | Facility: CLINIC | Age: 83
End: 2018-03-27
Payer: MEDICARE

## 2018-03-27 ENCOUNTER — NON-APPOINTMENT (OUTPATIENT)
Age: 83
End: 2018-03-27

## 2018-03-27 VITALS
WEIGHT: 164 LBS | HEART RATE: 74 BPM | DIASTOLIC BLOOD PRESSURE: 60 MMHG | SYSTOLIC BLOOD PRESSURE: 109 MMHG | BODY MASS INDEX: 26.47 KG/M2

## 2018-03-27 PROCEDURE — 93284 PRGRMG EVAL IMPLANTABLE DFB: CPT

## 2018-03-27 PROCEDURE — 93000 ELECTROCARDIOGRAM COMPLETE: CPT | Mod: 59

## 2018-03-27 PROCEDURE — 99214 OFFICE O/P EST MOD 30 MIN: CPT

## 2018-03-27 PROCEDURE — 36415 COLL VENOUS BLD VENIPUNCTURE: CPT

## 2018-03-28 LAB
ALBUMIN SERPL ELPH-MCNC: 3.5 G/DL
ALP BLD-CCNC: 66 U/L
ALT SERPL-CCNC: 10 U/L
ANION GAP SERPL CALC-SCNC: 15 MMOL/L
AST SERPL-CCNC: 12 U/L
BASOPHILS # BLD AUTO: 0.04 K/UL
BASOPHILS NFR BLD AUTO: 0.6 %
BILIRUB SERPL-MCNC: 0.3 MG/DL
BUN SERPL-MCNC: 29 MG/DL
CALCIUM SERPL-MCNC: 9.2 MG/DL
CHLORIDE SERPL-SCNC: 101 MMOL/L
CO2 SERPL-SCNC: 25 MMOL/L
CREAT SERPL-MCNC: 1.33 MG/DL
DIGOXIN SERPL-MCNC: 1 NG/ML
EOSINOPHIL # BLD AUTO: 0.34 K/UL
EOSINOPHIL NFR BLD AUTO: 5 %
GLUCOSE SERPL-MCNC: 88 MG/DL
HCT VFR BLD CALC: 31.8 %
HGB BLD-MCNC: 10.4 G/DL
IMM GRANULOCYTES NFR BLD AUTO: 0.3 %
LYMPHOCYTES # BLD AUTO: 1.01 K/UL
LYMPHOCYTES NFR BLD AUTO: 14.8 %
MAN DIFF?: NORMAL
MCHC RBC-ENTMCNC: 30.3 PG
MCHC RBC-ENTMCNC: 32.7 GM/DL
MCV RBC AUTO: 92.7 FL
MONOCYTES # BLD AUTO: 0.75 K/UL
MONOCYTES NFR BLD AUTO: 11 %
NEUTROPHILS # BLD AUTO: 4.68 K/UL
NEUTROPHILS NFR BLD AUTO: 68.3 %
NT-PROBNP SERPL-MCNC: 3065 PG/ML
PLATELET # BLD AUTO: 214 K/UL
POTASSIUM SERPL-SCNC: 4.6 MMOL/L
PROT SERPL-MCNC: 6.8 G/DL
RBC # BLD: 3.43 M/UL
RBC # FLD: 18.7 %
SODIUM SERPL-SCNC: 141 MMOL/L
WBC # FLD AUTO: 6.84 K/UL

## 2018-04-02 LAB — HEMOCCULT STL QL IA: NEGATIVE

## 2018-04-04 ENCOUNTER — APPOINTMENT (OUTPATIENT)
Dept: ULTRASOUND IMAGING | Facility: CLINIC | Age: 83
End: 2018-04-04
Payer: MEDICARE

## 2018-04-04 ENCOUNTER — OUTPATIENT (OUTPATIENT)
Dept: OUTPATIENT SERVICES | Facility: HOSPITAL | Age: 83
LOS: 1 days | End: 2018-04-04
Payer: MEDICARE

## 2018-04-04 DIAGNOSIS — Z98.89 OTHER SPECIFIED POSTPROCEDURAL STATES: Chronic | ICD-10-CM

## 2018-04-04 DIAGNOSIS — I71.4 ABDOMINAL AORTIC ANEURYSM, WITHOUT RUPTURE: Chronic | ICD-10-CM

## 2018-04-04 DIAGNOSIS — R31.29 OTHER MICROSCOPIC HEMATURIA: ICD-10-CM

## 2018-04-04 DIAGNOSIS — Z96.651 PRESENCE OF RIGHT ARTIFICIAL KNEE JOINT: Chronic | ICD-10-CM

## 2018-04-04 PROCEDURE — 76770 US EXAM ABDO BACK WALL COMP: CPT

## 2018-04-04 PROCEDURE — 76770 US EXAM ABDO BACK WALL COMP: CPT | Mod: 26

## 2018-04-09 ENCOUNTER — RESULT REVIEW (OUTPATIENT)
Age: 83
End: 2018-04-09

## 2018-04-09 ENCOUNTER — RX RENEWAL (OUTPATIENT)
Age: 83
End: 2018-04-09

## 2018-05-14 ENCOUNTER — INPATIENT (INPATIENT)
Facility: HOSPITAL | Age: 83
LOS: 1 days | Discharge: HOME | DRG: 190 | End: 2018-05-16
Attending: STUDENT IN AN ORGANIZED HEALTH CARE EDUCATION/TRAINING PROGRAM | Admitting: INTERNAL MEDICINE
Payer: MEDICARE

## 2018-05-14 VITALS
DIASTOLIC BLOOD PRESSURE: 85 MMHG | SYSTOLIC BLOOD PRESSURE: 131 MMHG | HEART RATE: 78 BPM | RESPIRATION RATE: 16 BRPM | OXYGEN SATURATION: 91 %

## 2018-05-14 DIAGNOSIS — R55 SYNCOPE AND COLLAPSE: ICD-10-CM

## 2018-05-14 DIAGNOSIS — I71.4 ABDOMINAL AORTIC ANEURYSM, WITHOUT RUPTURE: Chronic | ICD-10-CM

## 2018-05-14 DIAGNOSIS — Z96.651 PRESENCE OF RIGHT ARTIFICIAL KNEE JOINT: Chronic | ICD-10-CM

## 2018-05-14 DIAGNOSIS — Z98.89 OTHER SPECIFIED POSTPROCEDURAL STATES: Chronic | ICD-10-CM

## 2018-05-14 LAB
ALBUMIN SERPL ELPH-MCNC: 3.8 G/DL — SIGNIFICANT CHANGE UP (ref 3.3–5)
ALP SERPL-CCNC: 90 U/L — SIGNIFICANT CHANGE UP (ref 40–120)
ALT FLD-CCNC: 56 U/L — HIGH (ref 10–45)
ANION GAP SERPL CALC-SCNC: 14 MMOL/L — SIGNIFICANT CHANGE UP (ref 5–17)
ANISOCYTOSIS BLD QL: SIGNIFICANT CHANGE UP
APTT BLD: 29.2 SEC — SIGNIFICANT CHANGE UP (ref 27.5–37.4)
AST SERPL-CCNC: 56 U/L — HIGH (ref 10–40)
BASE EXCESS BLDV CALC-SCNC: 1.6 MMOL/L — SIGNIFICANT CHANGE UP (ref -2–2)
BASOPHILS # BLD AUTO: 0 K/UL — SIGNIFICANT CHANGE UP (ref 0–0.2)
BASOPHILS NFR BLD AUTO: 0.4 % — SIGNIFICANT CHANGE UP (ref 0–2)
BILIRUB SERPL-MCNC: 0.6 MG/DL — SIGNIFICANT CHANGE UP (ref 0.2–1.2)
BUN SERPL-MCNC: 30 MG/DL — HIGH (ref 7–23)
CA-I SERPL-SCNC: 1.13 MMOL/L — SIGNIFICANT CHANGE UP (ref 1.12–1.3)
CALCIUM SERPL-MCNC: 8.7 MG/DL — SIGNIFICANT CHANGE UP (ref 8.4–10.5)
CHLORIDE BLDV-SCNC: 104 MMOL/L — SIGNIFICANT CHANGE UP (ref 96–108)
CHLORIDE SERPL-SCNC: 99 MMOL/L — SIGNIFICANT CHANGE UP (ref 96–108)
CK MB CFR SERPL CALC: 4.5 NG/ML — SIGNIFICANT CHANGE UP (ref 0–6.7)
CK SERPL-CCNC: 81 U/L — SIGNIFICANT CHANGE UP (ref 30–200)
CO2 BLDV-SCNC: 29 MMOL/L — SIGNIFICANT CHANGE UP (ref 22–30)
CO2 SERPL-SCNC: 22 MMOL/L — SIGNIFICANT CHANGE UP (ref 22–31)
CREAT SERPL-MCNC: 1.49 MG/DL — HIGH (ref 0.5–1.3)
EOSINOPHIL # BLD AUTO: 0.1 K/UL — SIGNIFICANT CHANGE UP (ref 0–0.5)
EOSINOPHIL NFR BLD AUTO: 2.6 % — SIGNIFICANT CHANGE UP (ref 0–6)
GAS PNL BLDV: 133 MMOL/L — LOW (ref 136–145)
GAS PNL BLDV: SIGNIFICANT CHANGE UP
GLUCOSE BLDV-MCNC: 111 MG/DL — HIGH (ref 70–99)
GLUCOSE SERPL-MCNC: 103 MG/DL — HIGH (ref 70–99)
HCO3 BLDV-SCNC: 28 MMOL/L — SIGNIFICANT CHANGE UP (ref 21–29)
HCT VFR BLD CALC: 34.5 % — LOW (ref 39–50)
HCT VFR BLDA CALC: 34 % — LOW (ref 39–50)
HGB BLD CALC-MCNC: 10.9 G/DL — LOW (ref 13–17)
HGB BLD-MCNC: 11.3 G/DL — LOW (ref 13–17)
INR BLD: 1.22 RATIO — HIGH (ref 0.88–1.16)
LACTATE BLDV-MCNC: 1.2 MMOL/L — SIGNIFICANT CHANGE UP (ref 0.7–2)
LYMPHOCYTES # BLD AUTO: 0.8 K/UL — LOW (ref 1–3.3)
LYMPHOCYTES # BLD AUTO: 15 % — SIGNIFICANT CHANGE UP (ref 13–44)
MACROCYTES BLD QL: SLIGHT — SIGNIFICANT CHANGE UP
MCHC RBC-ENTMCNC: 32.7 GM/DL — SIGNIFICANT CHANGE UP (ref 32–36)
MCHC RBC-ENTMCNC: 32.9 PG — SIGNIFICANT CHANGE UP (ref 27–34)
MCV RBC AUTO: 101 FL — HIGH (ref 80–100)
MONOCYTES # BLD AUTO: 0.5 K/UL — SIGNIFICANT CHANGE UP (ref 0–0.9)
MONOCYTES NFR BLD AUTO: 9 % — SIGNIFICANT CHANGE UP (ref 2–14)
NEUTROPHILS # BLD AUTO: 3.9 K/UL — SIGNIFICANT CHANGE UP (ref 1.8–7.4)
NEUTROPHILS NFR BLD AUTO: 73 % — SIGNIFICANT CHANGE UP (ref 43–77)
NT-PROBNP SERPL-SCNC: 8473 PG/ML — HIGH (ref 0–300)
OVALOCYTES BLD QL SMEAR: SLIGHT — SIGNIFICANT CHANGE UP
PCO2 BLDV: 54 MMHG — HIGH (ref 35–50)
PH BLDV: 7.33 — LOW (ref 7.35–7.45)
PLAT MORPH BLD: NORMAL — SIGNIFICANT CHANGE UP
PLATELET # BLD AUTO: 101 K/UL — LOW (ref 150–400)
PO2 BLDV: 20 MMHG — LOW (ref 25–45)
POIKILOCYTOSIS BLD QL AUTO: SLIGHT — SIGNIFICANT CHANGE UP
POLYCHROMASIA BLD QL SMEAR: SLIGHT — SIGNIFICANT CHANGE UP
POTASSIUM BLDV-SCNC: 4.6 MMOL/L — SIGNIFICANT CHANGE UP (ref 3.5–5)
POTASSIUM SERPL-MCNC: 4.8 MMOL/L — SIGNIFICANT CHANGE UP (ref 3.5–5.3)
POTASSIUM SERPL-SCNC: 4.8 MMOL/L — SIGNIFICANT CHANGE UP (ref 3.5–5.3)
PROT SERPL-MCNC: 7.4 G/DL — SIGNIFICANT CHANGE UP (ref 6–8.3)
PROTHROM AB SERPL-ACNC: 13.3 SEC — HIGH (ref 9.8–12.7)
RBC # BLD: 3.42 M/UL — LOW (ref 4.2–5.8)
RBC # FLD: 18.7 % — HIGH (ref 10.3–14.5)
RBC BLD AUTO: ABNORMAL
SAO2 % BLDV: 28 % — LOW (ref 67–88)
SODIUM SERPL-SCNC: 135 MMOL/L — SIGNIFICANT CHANGE UP (ref 135–145)
TROPONIN T SERPL-MCNC: 0.03 NG/ML — SIGNIFICANT CHANGE UP (ref 0–0.06)
WBC # BLD: 5.4 K/UL — SIGNIFICANT CHANGE UP (ref 3.8–10.5)
WBC # FLD AUTO: 5.4 K/UL — SIGNIFICANT CHANGE UP (ref 3.8–10.5)

## 2018-05-14 PROCEDURE — 71045 X-RAY EXAM CHEST 1 VIEW: CPT | Mod: 26

## 2018-05-14 PROCEDURE — 99285 EMERGENCY DEPT VISIT HI MDM: CPT | Mod: GC

## 2018-05-14 RX ORDER — IPRATROPIUM/ALBUTEROL SULFATE 18-103MCG
3 AEROSOL WITH ADAPTER (GRAM) INHALATION ONCE
Qty: 0 | Refills: 0 | Status: COMPLETED | OUTPATIENT
Start: 2018-05-14 | End: 2018-05-14

## 2018-05-14 RX ORDER — CALCIUM GLUCONATE 100 MG/ML
1 VIAL (ML) INTRAVENOUS ONCE
Qty: 0 | Refills: 0 | Status: DISCONTINUED | OUTPATIENT
Start: 2018-05-14 | End: 2018-05-14

## 2018-05-14 RX ADMIN — Medication 60 MILLIGRAM(S): at 19:56

## 2018-05-14 RX ADMIN — Medication 3 MILLILITER(S): at 19:56

## 2018-05-14 RX ADMIN — Medication 3 MILLILITER(S): at 19:58

## 2018-05-14 RX ADMIN — Medication 3 MILLILITER(S): at 20:02

## 2018-05-14 NOTE — ED PROVIDER NOTE - PSH
AAA (abdominal aortic aneurysm)    Achilles rupture    Cataract    H/O repair of rotator cuff    Hernia, inguinal, bilateral    S/P angioplasty    S/P knee replacement, right

## 2018-05-14 NOTE — ED PROVIDER NOTE - PMH
BPH (Benign Prostatic Hyperplasia)    CAD (coronary artery disease)  S/P angioplasty 2000  CHF (congestive heart failure)    COPD (chronic obstructive pulmonary disease)    GERD (gastroesophageal reflux disease)    Gout    HLD (hyperlipidemia)    HTN (hypertension)    Hypothyroid    MI (myocardial infarction)  2000

## 2018-05-14 NOTE — ED ADULT NURSE NOTE - OBJECTIVE STATEMENT
89 y/o male with PMH of CHF, COPD, atrial pacemaker, HTN, and HDL presenting to ED complaining of syncopal episode lasting 5 minutes, SOB x 3 days, increased lower extremity swelling. "We were about 3 hours into the flight from [place], he went to the bathroom, didn't answer me, I called for help, they took him out of the seat and laid him down. They gave him oxygen. He was out for at least 5 minutes. He was SOB this morning when he got up" as per wife. Pt has not taken lasix for 2 days while on vacation. Denies head trauma. Upon exam, pt has 2+ pitting edema, audible wheezing, wheezing heard bilateral lobes. Pt is breathing spontaneously, abdominal breathing. Pt is AOx3. EKG done & given to MD. Placed on cardiac monitor. Seen by MD. Labs drawn & sent. 89 y/o male with PMH of CHF, COPD, atrial pacemaker, HTN, and HDL presenting to ED complaining of syncopal episode lasting 5 minutes, SOB x 3 days, increased lower extremity swelling. "We were about 3 hours into the flight from [place], he went to the bathroom, didn't answer me, I called for help, they took him out of the seat and laid him down. They gave him oxygen. He was out for at least 5 minutes. He was SOB this morning when he got up" as per wife. Pt has not taken lasix for 2 days while on vacation. Denies head trauma. Upon exam, pt has 2+ pitting edema, audible wheezing, wheezing heard bilateral lobes. Pt is breathing spontaneously, abdominal breathing. Pt is AOx3. Pt has productive cough with yellow sputum. EKG done & given to MD. Placed on cardiac monitor.  Seen by MD. Labs drawn & sent.

## 2018-05-14 NOTE — ED PROVIDER NOTE - OBJECTIVE STATEMENT
89yo male history of PPM/defib p/w sob and syncope. PMD: Dr. Wolf Paiz told patient to come to ED for shortness of breath for 3 days. Pt. recently on cruise for 27 days. Pt. reports increasing lower extremity edema. Flight from [place] to [place] today after going to bathroom, syncopized for few minutes while sitting in his seat, given oxygen on flight. Pt. was incontinent, no postictal or confusion upon waking. Pt. reports cough, productive of white sputum. Denies fevers, cp, n/v, dysuria/hematuria, abdominal pain, diarrhea. PMD: Hanny; Cards: Wolf Paiz. Pt. takin furosemide, but intermittently noncompliant while on cruise. Pt. received 2 20mg lasix on plane.

## 2018-05-14 NOTE — ED PROVIDER NOTE - CARE PLAN
Principal Discharge DX:	Syncope  Secondary Diagnosis:	COPD (chronic obstructive pulmonary disease)  Secondary Diagnosis:	CHF (congestive heart failure)

## 2018-05-14 NOTE — ED ADULT NURSE REASSESSMENT NOTE - NS ED NURSE REASSESS COMMENT FT1
Received report from ED RN Melissa; patient reassessed- A&Ox3- 19 g IV in L AC patent and site WNL. Diffuse wheezes on ascultation- duoneb treatments started immediately. SPO2 now 97%. Cardiac monitoring initiated and HR 70 and AV paced. Will continue to monitor and patient safety maintained.

## 2018-05-14 NOTE — ED ADULT NURSE REASSESSMENT NOTE - NS ED NURSE REASSESS COMMENT FT1
Patient reassessed after duonebs- wheezing still audible on ascultation but improved. Dr Godinez aware. Patient spo2 95% on RA.

## 2018-05-15 DIAGNOSIS — I50.23 ACUTE ON CHRONIC SYSTOLIC (CONGESTIVE) HEART FAILURE: ICD-10-CM

## 2018-05-15 DIAGNOSIS — J44.9 CHRONIC OBSTRUCTIVE PULMONARY DISEASE, UNSPECIFIED: ICD-10-CM

## 2018-05-15 DIAGNOSIS — E03.9 HYPOTHYROIDISM, UNSPECIFIED: ICD-10-CM

## 2018-05-15 DIAGNOSIS — N40.0 BENIGN PROSTATIC HYPERPLASIA WITHOUT LOWER URINARY TRACT SYMPTOMS: ICD-10-CM

## 2018-05-15 DIAGNOSIS — I25.10 ATHEROSCLEROTIC HEART DISEASE OF NATIVE CORONARY ARTERY WITHOUT ANGINA PECTORIS: ICD-10-CM

## 2018-05-15 DIAGNOSIS — E78.5 HYPERLIPIDEMIA, UNSPECIFIED: ICD-10-CM

## 2018-05-15 DIAGNOSIS — Z29.9 ENCOUNTER FOR PROPHYLACTIC MEASURES, UNSPECIFIED: ICD-10-CM

## 2018-05-15 DIAGNOSIS — R55 SYNCOPE AND COLLAPSE: ICD-10-CM

## 2018-05-15 LAB
ALBUMIN SERPL ELPH-MCNC: 3.8 G/DL — SIGNIFICANT CHANGE UP (ref 3.3–5)
ALP SERPL-CCNC: 87 U/L — SIGNIFICANT CHANGE UP (ref 40–120)
ALT FLD-CCNC: 51 U/L — HIGH (ref 10–45)
ANION GAP SERPL CALC-SCNC: 15 MMOL/L — SIGNIFICANT CHANGE UP (ref 5–17)
AST SERPL-CCNC: 37 U/L — SIGNIFICANT CHANGE UP (ref 10–40)
BILIRUB SERPL-MCNC: 0.5 MG/DL — SIGNIFICANT CHANGE UP (ref 0.2–1.2)
BUN SERPL-MCNC: 29 MG/DL — HIGH (ref 7–23)
CALCIUM SERPL-MCNC: 8.6 MG/DL — SIGNIFICANT CHANGE UP (ref 8.4–10.5)
CHLORIDE SERPL-SCNC: 100 MMOL/L — SIGNIFICANT CHANGE UP (ref 96–108)
CK MB CFR SERPL CALC: 3.8 NG/ML — SIGNIFICANT CHANGE UP (ref 0–6.7)
CK SERPL-CCNC: 56 U/L — SIGNIFICANT CHANGE UP (ref 30–200)
CO2 SERPL-SCNC: 23 MMOL/L — SIGNIFICANT CHANGE UP (ref 22–31)
CREAT SERPL-MCNC: 1.42 MG/DL — HIGH (ref 0.5–1.3)
GLUCOSE SERPL-MCNC: 177 MG/DL — HIGH (ref 70–99)
HBA1C BLD-MCNC: 5.2 % — SIGNIFICANT CHANGE UP (ref 4–5.6)
HCT VFR BLD CALC: 36.3 % — LOW (ref 39–50)
HGB BLD-MCNC: 12.1 G/DL — LOW (ref 13–17)
MAGNESIUM SERPL-MCNC: 2.1 MG/DL — SIGNIFICANT CHANGE UP (ref 1.6–2.6)
MCHC RBC-ENTMCNC: 33.4 GM/DL — SIGNIFICANT CHANGE UP (ref 32–36)
MCHC RBC-ENTMCNC: 33.6 PG — SIGNIFICANT CHANGE UP (ref 27–34)
MCV RBC AUTO: 101 FL — HIGH (ref 80–100)
PHOSPHATE SERPL-MCNC: 3.2 MG/DL — SIGNIFICANT CHANGE UP (ref 2.5–4.5)
PLATELET # BLD AUTO: 88 K/UL — LOW (ref 150–400)
POTASSIUM SERPL-MCNC: 4.3 MMOL/L — SIGNIFICANT CHANGE UP (ref 3.5–5.3)
POTASSIUM SERPL-SCNC: 4.3 MMOL/L — SIGNIFICANT CHANGE UP (ref 3.5–5.3)
PROT SERPL-MCNC: 7.1 G/DL — SIGNIFICANT CHANGE UP (ref 6–8.3)
RBC # BLD: 3.61 M/UL — LOW (ref 4.2–5.8)
RBC # FLD: 18.9 % — HIGH (ref 10.3–14.5)
SODIUM SERPL-SCNC: 138 MMOL/L — SIGNIFICANT CHANGE UP (ref 135–145)
TROPONIN T SERPL-MCNC: 0.02 NG/ML — SIGNIFICANT CHANGE UP (ref 0–0.06)
TSH SERPL-MCNC: 1.66 UIU/ML — SIGNIFICANT CHANGE UP (ref 0.27–4.2)
WBC # BLD: 3.8 K/UL — SIGNIFICANT CHANGE UP (ref 3.8–10.5)
WBC # FLD AUTO: 3.8 K/UL — SIGNIFICANT CHANGE UP (ref 3.8–10.5)

## 2018-05-15 PROCEDURE — 12345: CPT | Mod: NC

## 2018-05-15 PROCEDURE — 99223 1ST HOSP IP/OBS HIGH 75: CPT | Mod: GC

## 2018-05-15 PROCEDURE — 99223 1ST HOSP IP/OBS HIGH 75: CPT

## 2018-05-15 PROCEDURE — 93306 TTE W/DOPPLER COMPLETE: CPT | Mod: 26

## 2018-05-15 RX ORDER — DEXTROSE 50 % IN WATER 50 %
25 SYRINGE (ML) INTRAVENOUS ONCE
Qty: 0 | Refills: 0 | Status: DISCONTINUED | OUTPATIENT
Start: 2018-05-15 | End: 2018-05-16

## 2018-05-15 RX ORDER — TAMSULOSIN HYDROCHLORIDE 0.4 MG/1
0.8 CAPSULE ORAL DAILY
Qty: 0 | Refills: 0 | Status: DISCONTINUED | OUTPATIENT
Start: 2018-05-15 | End: 2018-05-16

## 2018-05-15 RX ORDER — CEFTRIAXONE 500 MG/1
1 INJECTION, POWDER, FOR SOLUTION INTRAMUSCULAR; INTRAVENOUS ONCE
Qty: 0 | Refills: 0 | Status: COMPLETED | OUTPATIENT
Start: 2018-05-15 | End: 2018-05-15

## 2018-05-15 RX ORDER — AZITHROMYCIN 500 MG/1
500 TABLET, FILM COATED ORAL DAILY
Qty: 0 | Refills: 0 | Status: DISCONTINUED | OUTPATIENT
Start: 2018-05-15 | End: 2018-05-16

## 2018-05-15 RX ORDER — LEVOTHYROXINE SODIUM 125 MCG
125 TABLET ORAL DAILY
Qty: 0 | Refills: 0 | Status: DISCONTINUED | OUTPATIENT
Start: 2018-05-15 | End: 2018-05-16

## 2018-05-15 RX ORDER — SACUBITRIL AND VALSARTAN 24; 26 MG/1; MG/1
1 TABLET, FILM COATED ORAL
Qty: 0 | Refills: 0 | Status: DISCONTINUED | OUTPATIENT
Start: 2018-05-15 | End: 2018-05-16

## 2018-05-15 RX ORDER — FUROSEMIDE 40 MG
20 TABLET ORAL DAILY
Qty: 0 | Refills: 0 | Status: DISCONTINUED | OUTPATIENT
Start: 2018-05-15 | End: 2018-05-16

## 2018-05-15 RX ORDER — ALLOPURINOL 300 MG
300 TABLET ORAL DAILY
Qty: 0 | Refills: 0 | Status: DISCONTINUED | OUTPATIENT
Start: 2018-05-15 | End: 2018-05-16

## 2018-05-15 RX ORDER — IPRATROPIUM/ALBUTEROL SULFATE 18-103MCG
3 AEROSOL WITH ADAPTER (GRAM) INHALATION EVERY 6 HOURS
Qty: 0 | Refills: 0 | Status: DISCONTINUED | OUTPATIENT
Start: 2018-05-15 | End: 2018-05-16

## 2018-05-15 RX ORDER — FUROSEMIDE 40 MG
40 TABLET ORAL DAILY
Qty: 0 | Refills: 0 | Status: DISCONTINUED | OUTPATIENT
Start: 2018-05-15 | End: 2018-05-15

## 2018-05-15 RX ORDER — COLCHICINE 0.6 MG
0.6 TABLET ORAL DAILY
Qty: 0 | Refills: 0 | Status: DISCONTINUED | OUTPATIENT
Start: 2018-05-15 | End: 2018-05-16

## 2018-05-15 RX ORDER — CEFTRIAXONE 500 MG/1
1 INJECTION, POWDER, FOR SOLUTION INTRAMUSCULAR; INTRAVENOUS EVERY 24 HOURS
Qty: 0 | Refills: 0 | Status: DISCONTINUED | OUTPATIENT
Start: 2018-05-16 | End: 2018-05-16

## 2018-05-15 RX ORDER — DEXTROSE 50 % IN WATER 50 %
15 SYRINGE (ML) INTRAVENOUS ONCE
Qty: 0 | Refills: 0 | Status: DISCONTINUED | OUTPATIENT
Start: 2018-05-15 | End: 2018-05-16

## 2018-05-15 RX ORDER — GLUCAGON INJECTION, SOLUTION 0.5 MG/.1ML
1 INJECTION, SOLUTION SUBCUTANEOUS ONCE
Qty: 0 | Refills: 0 | Status: DISCONTINUED | OUTPATIENT
Start: 2018-05-15 | End: 2018-05-16

## 2018-05-15 RX ORDER — DEXTROSE 50 % IN WATER 50 %
12.5 SYRINGE (ML) INTRAVENOUS ONCE
Qty: 0 | Refills: 0 | Status: DISCONTINUED | OUTPATIENT
Start: 2018-05-15 | End: 2018-05-16

## 2018-05-15 RX ORDER — CHOLECALCIFEROL (VITAMIN D3) 125 MCG
1000 CAPSULE ORAL DAILY
Qty: 0 | Refills: 0 | Status: DISCONTINUED | OUTPATIENT
Start: 2018-05-15 | End: 2018-05-16

## 2018-05-15 RX ORDER — PANTOPRAZOLE SODIUM 20 MG/1
40 TABLET, DELAYED RELEASE ORAL
Qty: 0 | Refills: 0 | Status: DISCONTINUED | OUTPATIENT
Start: 2018-05-15 | End: 2018-05-16

## 2018-05-15 RX ORDER — SIMVASTATIN 20 MG/1
10 TABLET, FILM COATED ORAL AT BEDTIME
Qty: 0 | Refills: 0 | Status: DISCONTINUED | OUTPATIENT
Start: 2018-05-15 | End: 2018-05-16

## 2018-05-15 RX ORDER — LACTOBACILLUS ACIDOPHILUS 100MM CELL
1 CAPSULE ORAL DAILY
Qty: 0 | Refills: 0 | Status: DISCONTINUED | OUTPATIENT
Start: 2018-05-15 | End: 2018-05-16

## 2018-05-15 RX ORDER — INSULIN LISPRO 100/ML
VIAL (ML) SUBCUTANEOUS AT BEDTIME
Qty: 0 | Refills: 0 | Status: DISCONTINUED | OUTPATIENT
Start: 2018-05-15 | End: 2018-05-16

## 2018-05-15 RX ORDER — CARVEDILOL PHOSPHATE 80 MG/1
12.5 CAPSULE, EXTENDED RELEASE ORAL EVERY 12 HOURS
Qty: 0 | Refills: 0 | Status: DISCONTINUED | OUTPATIENT
Start: 2018-05-15 | End: 2018-05-16

## 2018-05-15 RX ORDER — CEFTRIAXONE 500 MG/1
INJECTION, POWDER, FOR SOLUTION INTRAMUSCULAR; INTRAVENOUS
Qty: 0 | Refills: 0 | Status: DISCONTINUED | OUTPATIENT
Start: 2018-05-15 | End: 2018-05-15

## 2018-05-15 RX ORDER — LEVOTHYROXINE SODIUM 125 MCG
150 TABLET ORAL DAILY
Qty: 0 | Refills: 0 | Status: DISCONTINUED | OUTPATIENT
Start: 2018-05-15 | End: 2018-05-15

## 2018-05-15 RX ORDER — HEPARIN SODIUM 5000 [USP'U]/ML
5000 INJECTION INTRAVENOUS; SUBCUTANEOUS EVERY 8 HOURS
Qty: 0 | Refills: 0 | Status: DISCONTINUED | OUTPATIENT
Start: 2018-05-15 | End: 2018-05-16

## 2018-05-15 RX ORDER — SODIUM CHLORIDE 9 MG/ML
1000 INJECTION, SOLUTION INTRAVENOUS
Qty: 0 | Refills: 0 | Status: DISCONTINUED | OUTPATIENT
Start: 2018-05-15 | End: 2018-05-16

## 2018-05-15 RX ORDER — INSULIN LISPRO 100/ML
VIAL (ML) SUBCUTANEOUS
Qty: 0 | Refills: 0 | Status: DISCONTINUED | OUTPATIENT
Start: 2018-05-15 | End: 2018-05-16

## 2018-05-15 RX ADMIN — AZITHROMYCIN 500 MILLIGRAM(S): 500 TABLET, FILM COATED ORAL at 18:09

## 2018-05-15 RX ADMIN — Medication 1000 UNIT(S): at 11:46

## 2018-05-15 RX ADMIN — Medication 3 MILLILITER(S): at 11:45

## 2018-05-15 RX ADMIN — CARVEDILOL PHOSPHATE 12.5 MILLIGRAM(S): 80 CAPSULE, EXTENDED RELEASE ORAL at 18:09

## 2018-05-15 RX ADMIN — Medication 3 MILLILITER(S): at 23:52

## 2018-05-15 RX ADMIN — Medication 20 MILLIGRAM(S): at 05:03

## 2018-05-15 RX ADMIN — SACUBITRIL AND VALSARTAN 1 TABLET(S): 24; 26 TABLET, FILM COATED ORAL at 18:09

## 2018-05-15 RX ADMIN — SIMVASTATIN 10 MILLIGRAM(S): 20 TABLET, FILM COATED ORAL at 21:23

## 2018-05-15 RX ADMIN — Medication 3 MILLILITER(S): at 18:08

## 2018-05-15 RX ADMIN — Medication 0.6 MILLIGRAM(S): at 11:46

## 2018-05-15 RX ADMIN — Medication 125 MICROGRAM(S): at 05:03

## 2018-05-15 RX ADMIN — HEPARIN SODIUM 5000 UNIT(S): 5000 INJECTION INTRAVENOUS; SUBCUTANEOUS at 13:26

## 2018-05-15 RX ADMIN — PANTOPRAZOLE SODIUM 40 MILLIGRAM(S): 20 TABLET, DELAYED RELEASE ORAL at 05:03

## 2018-05-15 RX ADMIN — Medication 2: at 12:07

## 2018-05-15 RX ADMIN — CARVEDILOL PHOSPHATE 12.5 MILLIGRAM(S): 80 CAPSULE, EXTENDED RELEASE ORAL at 05:03

## 2018-05-15 RX ADMIN — SACUBITRIL AND VALSARTAN 1 TABLET(S): 24; 26 TABLET, FILM COATED ORAL at 05:03

## 2018-05-15 RX ADMIN — HEPARIN SODIUM 5000 UNIT(S): 5000 INJECTION INTRAVENOUS; SUBCUTANEOUS at 05:03

## 2018-05-15 RX ADMIN — Medication 1 TABLET(S): at 11:46

## 2018-05-15 RX ADMIN — CEFTRIAXONE 100 GRAM(S): 500 INJECTION, POWDER, FOR SOLUTION INTRAMUSCULAR; INTRAVENOUS at 05:03

## 2018-05-15 RX ADMIN — TAMSULOSIN HYDROCHLORIDE 0.8 MILLIGRAM(S): 0.4 CAPSULE ORAL at 11:46

## 2018-05-15 RX ADMIN — Medication 300 MILLIGRAM(S): at 11:45

## 2018-05-15 RX ADMIN — Medication 1: at 18:09

## 2018-05-15 RX ADMIN — Medication 3 MILLILITER(S): at 05:03

## 2018-05-15 RX ADMIN — Medication 60 MILLIGRAM(S): at 05:03

## 2018-05-15 RX ADMIN — HEPARIN SODIUM 5000 UNIT(S): 5000 INJECTION INTRAVENOUS; SUBCUTANEOUS at 21:23

## 2018-05-15 NOTE — CHART NOTE - NSCHARTNOTEFT_GEN_A_CORE
Pt with syncopal episode in the airplane with LOC 5 minutes as well as dyspnea and LE swelling found to be in acute on chronic sCHF exacerbation as well as copd exacerbation  Even though pt with recent airplane ride, no tachypnea, tachycardia, hypoxia, no s/s LE tenderness - unlikely PE at this time  - appreciate cardiology recs  - continue lasix 20mg IV daily  - continue prednisone 60mg and ceftriaxone/azithro for copd exacerbation  - plan for PPM interrogation  - DVT ppx      Radha Weldon MD  Division of Hospital Medicine  Pager: [phone]  Office: [phone]

## 2018-05-15 NOTE — H&P ADULT - HISTORY OF PRESENT ILLNESS
89 yo M PMH COPD (not O2 dependent), CAD s/p stent 2015, AAA repair with stent, HTN, HLD, Chronic systolic HF (EF 20%), GERD, hypothyroidism who presents for syncope, ANA and SOB. The patient states he was on a cruise for the last month Originated in [place], went to [place] and ended in [place], then flew back to [place]) and during the last 2.5 weeks of the cruise he noticed b/l ANA, L>R. The patient states he was intermittently compliant with his medications while on [facility] cruise, because he would go on excursions and not take his medicines for a couple days, but would then double up on the lasix the following day. The patient also states he was having SOB which was waxing and waning while on the cruise, and having a productive cough that is yellow for the last 3 days. While returning to [place] on 5/14, the patient was sitting in his chair in the plane and apparently LOC for 5 minutes. The patient then awoke, without any confusion on waking. Denies any seizure like activity or history. The patient is unsure if he urinated himself or if the water on the trash fell on him. The patient was seen by a doctor on the plane who advised he take 40 mg PO lasix, and go to the ED. The patient denies any F NVD, chills, rhinorrhea, dysuria, abd pain, CP, HA, neurological changes. SOB has now resolved.

## 2018-05-15 NOTE — H&P ADULT - NSHPLABSRESULTS_GEN_ALL_CORE
11.3   5.4   )-----------( 101      ( 14 May 2018 19:48 )             34.5       05-14    135  |  99  |  30<H>  ----------------------------<  103<H>  4.8   |  22  |  1.49<H>    Ca    8.7      14 May 2018 19:48    TPro  7.4  /  Alb  3.8  /  TBili  0.6  /  DBili  x   /  AST  56<H>  /  ALT  56<H>  /  AlkPhos  90  05-14      < from: Xray Chest 1 View AP/PA (05.14.18 @ 22:29) >    INTERPRETATION:  no emergent findings    < end of copied text >    Serum Pro-Brain Natriuretic Peptide (05.14.18 @ 19:48)    Serum Pro-Brain Natriuretic Peptide: 8473 pg/mL      EKG unchanged from previous, eelctronic PPM

## 2018-05-15 NOTE — H&P ADULT - NSHPREVIEWOFSYSTEMS_GEN_ALL_CORE
REVIEW OF SYSTEMS    CONSTITUTIONAL:  Denies f nvd chills  HEENT:  Eyes:  Denies visual loss, blurred vision, double vision or scleral icterus. Ears, Nose, Throat:  Denies hearing loss, sneezing, congestion, runny nose or sore throat.  SKIN:  Denies rash or itching.  CARDIOVASCULAR:  see hpi  RESPIRATORY:  see hpi  GASTROINTESTINAL:  Denies anorexia, nausea, vomiting or diarrhea. No abdominal pain or blood.  GENITOURINARY:  Denies any hematuria, dysuria  NEUROLOGICAL:  Denies headache, dizziness, paralysis, ataxia, numbness or tingling in the extremities.   MUSCULOSKELETAL:  Denies muscle, back pain, joint pain or stiffness.  HEMATOLOGIC:  Denies anemia, bleeding or bruising.  LYMPHATICS:  Denies enlarged nodes.   PSYCHIATRIC:  Denies history of depression or anxiety.  ENDOCRINOLOGIC:  Denies reports of sweating, cold or heat intolerance. No polyuria or polydipsia.  ALLERGIES:  Denies history of asthma, hives, eczema or rhinitis.

## 2018-05-15 NOTE — H&P ADULT - PROBLEM SELECTOR PLAN 3
Strict IO  TTE  Cards c/s in AM  Increase LAsix 40 to daily dosing instead of every other day. Strict IO  TTE  Cards c/s in AM  Increase Lasix 40 (20 IV lasix) to daily dosing instead of every other day. Avoid PO for possible gut edema. Strict IO  TTE  Cards c/s in AM  Increase Lasix w/20mg IV lasix (home 40mg po) to daily dosing instead of every other day. Avoid PO for possible gut edema.

## 2018-05-15 NOTE — CONSULT NOTE ADULT - ASSESSMENT
Suspect VT. Patient to have AICD interrogated by EPS. Discussed with Dr. Russell Suspect VT. Patient to have AICD interrogated by EPS. Discussed with Dr. Russell. Suspect VT. Patient to have AICD interrogated by EPS. Discussed with Dr. Russell.    Addendum: No arrhythmia. Possibly over diuresed, and was hypotensive for a brief period on the plane. Echocardiogram done and noted. We'll review the findings with patient and wife as an outpatient, but feel he is okay to be discharged this evening

## 2018-05-15 NOTE — H&P ADULT - PROBLEM SELECTOR PLAN 2
C/w duonebs standing.  No leukocytosis or fever.  No need for ABx at this time. C/w duonebs standing.  Prednisone 60 daily.  Ceftriaxone  Lactobacillus for prophylaxis

## 2018-05-15 NOTE — CONSULT NOTE ADULT - SUBJECTIVE AND OBJECTIVE BOX
Patient seen and evaluated @   Chief Complaint: Patient is a 89y old  Male who presents with a chief complaint of SOB, Syncope (15 May 2018 03:17)      HPI:  89 yo M PMH COPD (not O2 dependent), CAD s/p stent , AAA repair with stent, HTN, HLD, Chronic systolic HF (EF 20%), GERD, hypothyroidism who presents for syncope, ANA and SOB. The patient states he was on a cruise for the last month Originated in [place], went to [place] and ended in [place], then flew back to [place]) and during the last 2.5 weeks of the cruise he noticed b/l ANA, L>R. The patient states he was intermittently compliant with his medications while on [facility] cruise, because he would go on excursions and not take his medicines for a couple days, but would then double up on the lasix the following day. The patient also states he was having SOB which was waxing and waning while on the cruise, and having a productive cough that is yellow for the last 3 days. While returning to [place] on , the patient was sitting in his chair in the plane and apparently LOC for 5 minutes. The patient then awoke, without any confusion on waking. Denies any seizure like activity or history. The patient is unsure if he urinated himself or if the water on the trash fell on him. The patient was seen by a doctor on the plane who advised he take 40 mg PO lasix, and go to the ED. The patient denies any F NVD, chills, rhinorrhea, dysuria, abd pain, CP, HA, neurological changes. SOB has now resolved. (15 May 2018 03:17)    PMH:   MI (myocardial infarction)  COPD (chronic obstructive pulmonary disease)  Gout  GERD (gastroesophageal reflux disease)  Hypothyroid  CHF (congestive heart failure)  BPH (Benign Prostatic Hyperplasia)  CAD (coronary artery disease)  HLD (hyperlipidemia)  HTN (hypertension)    PSH:   S/P angioplasty  H/O repair of rotator cuff  AAA (abdominal aortic aneurysm)  S/P knee replacement, right  Achilles rupture  Hernia, inguinal, bilateral  Cataract    Medications:   ALBUTerol/ipratropium for Nebulization 3 milliLiter(s) Nebulizer every 6 hours  allopurinol 300 milliGRAM(s) Oral daily  carvedilol 12.5 milliGRAM(s) Oral every 12 hours  cefTRIAXone   IVPB      cholecalciferol 1000 Unit(s) Oral daily  colchicine 0.6 milliGRAM(s) Oral daily  furosemide   Injectable 20 milliGRAM(s) IV Push daily  heparin  Injectable 5000 Unit(s) SubCutaneous every 8 hours  lactobacillus acidophilus 1 Tablet(s) Oral daily  levothyroxine 125 MICROGram(s) Oral daily  pantoprazole    Tablet 40 milliGRAM(s) Oral before breakfast  predniSONE   Tablet 60 milliGRAM(s) Oral daily  sacubitril 97 mG/valsartan 103 mG 1 Tablet(s) Oral two times a day  simvastatin 10 milliGRAM(s) Oral at bedtime  tamsulosin 0.8 milliGRAM(s) Oral daily    Allergies:  No Known Allergies    FAMILY HISTORY:  Family history of CHF (congestive heart failure) (Father)  Family history of hemolytic uremic syndrome (Father)    Social History:  Smoking:  Alcohol:  Drugs:    Review of Systems:  Constitutional: no recent weight loss  HEENT: no scleral icterus. Normal mucosa.  Respiratory: no shortness of breath. No history of asthma. No COPD  Cardiovascular: No Chest Pain, no Palpitations, no PERRIN, PND, or Orthopnea. no Syncope. No history of rheumatic fever.   Gastrointestinal: No Abdominal Pain, no Diarrhea, no Constipation, no Nausea or Vomiting  Genitourinary: No Nocturia,Dysuria, or Incontinence. No hematuria  Extremities: no edema. No cyanosis.  Neurologic: No Focal deficit. No Paresthesias. No Syncope. No seizures  Lymphatic: No Swelling. No Lymphadenopathy   Skin: No Rash,  Ecchymoses, Wounds, or Lesions  Psychiatry: No Depression. No Anxiety.    10 point review of systems is otherwise negative except as mentioned above            [ ]Unable to obtain    Physical Exam:  T(C): 36.4 (05-15-18 @ 05:04), Max: 36.5 (18 @ 18:58)  HR: 73 (05-15-18 @ 05:04) (69 - 78)  BP: 143/97 (05-15-18 @ 05:04) (124/76 - 156/83)  RR: 17 (05-15-18 @ 05:04) (16 - 24)  SpO2: 100% (05-15-18 @ 05:04) (91% - 100%)  Wt(kg): --     @ 07:01  -  05-15 @ 07:00  --------------------------------------------------------  IN: 0 mL / OUT: 300 mL / NET: -300 mL    05-15 @ 07:01  -  05-15 @ 08:45  --------------------------------------------------------  IN: 210 mL / OUT: 0 mL / NET: 210 mL      Daily Height in cm: 175.26 (15 May 2018 01:35)    Daily Weight in k.8 (15 May 2018 02:30)    Appearance: WD, WN, NAD  Eyes:  No scleral icterus. PERRL, EOMI  HENT: Normal oral mucosa.]NC/AT  Neck: No JVD at 90 degrees. Normal carotid upstrokes without bruits or murmurs.  Cardiovascular: Normal PMI. Normal S1, S2 physiologically split. No S3 or S4. No murmurs.  Respiratory: Clear to auscultation bilaterally. No wheezes. No rales.  Gastrointestinal: Soft.  Non-tender. No hepatosplenomegally.  Extremities: No clubbing. No edema. Pulses 2+ bilaterally symmetric including pedal.  Musculoskeletal:  No joint deformity   Neurologic: Non-focal  Lymphatic:  No lymphadenopathy  Psychiatry: [+ ] AAOx3 [ +] Mood & affect appropriate  Skin: No rashes. No ecchymoses. No cyanosis    Cardiovascular Diagnostic Testing:  ECG:    Echo:    Stress Testing:    Cath:    Interpretation of Telemetry:    Imaging:    Labs:                        12.1   3.8   )-----------( 88       ( 15 May 2018 05:11 )             36.3     05-15    138  |  100  |  29<H>  ----------------------------<  177<H>  4.3   |  23  |  1.42<H>    Ca    8.6      15 May 2018 05:11  Phos  3.2     05-15  Mg     2.1     05-15    TPro  7.1  /  Alb  3.8  /  TBili  0.5  /  DBili  x   /  AST  37  /  ALT  51<H>  /  AlkPhos  87  05-15    PT/INR - ( 14 May 2018 19:48 )   PT: 13.3 sec;   INR: 1.22 ratio         PTT - ( 14 May 2018 19:48 )  PTT:29.2 sec  CARDIAC MARKERS ( 15 May 2018 05:11 )  x     / 0.02 ng/mL / 56 U/L / x     / 3.8 ng/mL  CARDIAC MARKERS ( 14 May 2018 19:48 )  x     / 0.03 ng/mL / 81 U/L / x     / 4.5 ng/mL      Serum Pro-Brain Natriuretic Peptide: 8473 pg/mL ( @ 19:48) Patient seen and evaluated @ 815  Chief Complaint: Patient is a 89y old  Male who presents with a chief complaint of SOB, Syncope (15 May 2018 03:17)      HPI:  89 yo M PMH COPD (not O2 dependent), CAD s/p stent , AAA repair with stent, HTN, HLD, Chronic systolic HF (EF 20%), GERD, hypothyroidism who presents for syncope, ANA and SOB. The patient states he was on a cruise for the last month Originated in [place], went to [place] and ended in [place], then flew back to [place]) and during the last 2.5 weeks of the cruise he noticed b/l ANA, L>R. The patient states he was intermittently compliant with his medications while on [facility] cruise, because he would go on excursions and not take his medicines for a couple days, but would then double up on the lasix the following day. The patient also states he was having SOB which was waxing and waning while on the cruise, and having a productive cough that is yellow for the last 3 days. While returning to [place] on , the patient was sitting in his chair in the plane and apparently LOC for 5 minutes. The patient then awoke, without any confusion on waking. Denies any seizure like activity or history. The patient is unsure if he urinated himself or if the water on the trash fell on him. The patient was seen by a doctor on the plane who advised he take 40 mg PO lasix, and go to the ED. The patient denies any F NVD, chills, rhinorrhea, dysuria, abd pain, CP, HA, neurological changes. SOB has now resolved. (15 May 2018 03:17)    Cardiology history: Patient with a dilated cardiomyopathy and implantable defibrillator with one previous episode of syncope from VT and a defibrillator shock on 2017. He was returning from vacation and while on the plane was unresponsive for 5 minutes. AICD was interrogated this morning    PMH:   MI (myocardial infarction)  COPD (chronic obstructive pulmonary disease)  Gout  GERD (gastroesophageal reflux disease)  Hypothyroid  CHF (congestive heart failure)  BPH (Benign Prostatic Hyperplasia)  CAD (coronary artery disease)  HLD (hyperlipidemia)  HTN (hypertension)    PSH:   S/P angioplasty  H/O repair of rotator cuff  AAA (abdominal aortic aneurysm)  S/P knee replacement, right  Achilles rupture  Hernia, inguinal, bilateral  Cataract    Medications: OUTPATIENT MEDS: ALLOPURINOL 300. ASPIRIN 81. CARVEDILOL 12.5 B.I.D. COLCHICINE 0.6, DAILY. DIGOXIN 0.125 Q.D. ENTRESTO  Q.12 H. FUROSEMIDE 80 Q.D. SYNTHROID 0.125 Q.D. SIMVASTATIN 10. OMEPERZOLE 20. TAMSULOSIN 0.8  Meds: ALBUTerol/ipratropium for Nebulization 3 milliLiter(s) Nebulizer every 6 hours  allopurinol 300 milliGRAM(s) Oral daily  carvedilol 12.5 milliGRAM(s) Oral every 12 hours  cefTRIAXone   IVPB      cholecalciferol 1000 Unit(s) Oral daily  colchicine 0.6 milliGRAM(s) Oral daily  furosemide   Injectable 20 milliGRAM(s) IV Push daily  heparin  Injectable 5000 Unit(s) SubCutaneous every 8 hours  lactobacillus acidophilus 1 Tablet(s) Oral daily  levothyroxine 125 MICROGram(s) Oral daily  pantoprazole    Tablet 40 milliGRAM(s) Oral before breakfast  predniSONE   Tablet 60 milliGRAM(s) Oral daily  sacubitril 97 mG/valsartan 103 mG 1 Tablet(s) Oral two times a day  simvastatin 10 milliGRAM(s) Oral at bedtime  tamsulosin 0.8 milliGRAM(s) Oral daily    Allergies:  No Known Allergies    FAMILY HISTORY:  Family history of CHF (congestive heart failure) (Father)  Family history of hemolytic uremic syndrome (Father)    Social History:   Smoking: None  Alcohol:  Drugs:    Review of Systems:  Constitutional:  recent weight gain  HEENT: no scleral icterus. Normal mucosa.  Respiratory: mild shortness of breath. No history of asthma. No COPD. (+) cough  Cardiovascular: No Chest Pain, no Palpitations, (+) PERRIN, No PND, or Orthopnea. (+) Syncope. No history of rheumatic fever.   Gastrointestinal: No Abdominal Pain, no Diarrhea, no Constipation, no Nausea or Vomiting  Genitourinary: No Nocturia, Dysuria, or Incontinence. No hematuria  Extremities: no edema. No cyanosis.  Neurologic: No Focal deficit. No Paresthesias. No Syncope. No seizures  Lymphatic: No Swelling. No Lymphadenopathy   Skin: No Rash,  Ecchymoses, Wounds, or Lesions  Psychiatry: No Depression. No Anxiety.    10 point review of systems is otherwise negative except as mentioned above            [ ]Unable to obtain    Physical Exam:  T(C): 36.4 (05-15-18 @ 05:04), Max: 36.5 (18 @ 18:58)  HR: 73 (05-15-18 @ 05:04) (69 - 78)  BP: 143/97 (05-15-18 @ 05:04) (124/76 - 156/83)  RR: 17 (05-15-18 @ 05:04) (16 - 24)  SpO2: 100% (05-15-18 @ 05:04) (91% - 100%)  Wt(kg): --     @ 07:01  -  05-15 @ 07:00  --------------------------------------------------------  IN: 0 mL / OUT: 300 mL / NET: -300 mL    05-15 @ 07:01  -  05-15 @ 08:45  --------------------------------------------------------  IN: 210 mL / OUT: 0 mL / NET: 210 mL      Daily Height in cm: 175.26 (15 May 2018 01:35)    Daily Weight in k.8 (15 May 2018 02:30)    Appearance: WD, WN, NAD  Eyes:  No scleral icterus. PERRL, EOMI  HENT: Normal oral mucosa.]NC/AT  Neck: Mild JVD at 90 degrees. Normal carotid upstrokes without bruits or murmurs.  Cardiovascular: PMI 5th ICS, AAL. Normal S1, S2 physiologically split. No S3  (+)S4. 2/6 harsh systolic murmur left sternal border. 2-3/6 aortic insufficiency murmur at the pulmonic area.  Respiratory: Clear to auscultation bilaterally. No wheezes. No rales.  Gastrointestinal: Soft.  Non-tender. No hepatosplenomegally.  Extremities: No clubbing. (+) edema. Pulses 2+ bilaterally symmetric including right pedal. ? left.  Musculoskeletal:  No joint deformity   Neurologic: Non-focal  Lymphatic:  No lymphadenopathy  Psychiatry: [+ ] AAOx3 [ +] Mood & affect appropriate  Skin: No rashes. No ecchymoses. No cyanosis    Cardiovascular Diagnostic Testing:  ECG: AV sequential biventricular pacing    Echo: 2015 at [facility]. LVEF 20%. Akinesis, inferior, inferoseptal, inferolateral. Severe lateral wall hypokinesis. Hypokinesis remaining segments. Severe diastolic dysfunction. RVE with decreased RV systolic function. Moderate TR with RVSP 55. Moderate MR, mild AS, mild AI    Stress Testing:    Cath: 2015 Normal LAD and circumflex. 95% RCA treated with stent. LVEF 35%    Interpretation of Telemetry: AV pacing    Imaging:< from: Xray Chest 1 View AP/PA (18 @ 22:29) >  PROCEDURE DATE:  2018            INTERPRETATION:  CLINICAL INFORMATION: CHF, COPD.    TECHNIQUE: AP view of the chest.    COMPARISON: Chest x-ray 10/18/2017.    FINDINGS:     The lungs are clear.  A left chest pacemaker is in place.  Suture anchors noted at the humeral heads.    IMPRESSION:   Clear lungs.    < end of copied text >      Labs:                        12.1   3.8   )-----------( 88       ( 15 May 2018 05:11 )             36.3     05-15    138  |  100  |  29<H>  ----------------------------<  177<H>  4.3   |  23  |  1.42<H>    Ca    8.6      15 May 2018 05:11  Phos  3.2     05-15  Mg     2.1     05-15    TPro  7.1  /  Alb  3.8  /  TBili  0.5  /  DBili  x   /  AST  37  /  ALT  51<H>  /  AlkPhos  87  05-15    PT/INR - ( 14 May 2018 19:48 )   PT: 13.3 sec;   INR: 1.22 ratio         PTT - ( 14 May 2018 19:48 )  PTT:29.2 sec  CARDIAC MARKERS ( 15 May 2018 05:11 )  x     / 0.02 ng/mL / 56 U/L / x     / 3.8 ng/mL  CARDIAC MARKERS ( 14 May 2018 19:48 )  x     / 0.03 ng/mL / 81 U/L / x     / 4.5 ng/mL      Serum Pro-Brain Natriuretic Peptide: 8473 pg/mL ( @ 19:48) Patient seen and evaluated @ 815  Chief Complaint: Patient is a 89y old  Male who presents with a chief complaint of SOB, Syncope (15 May 2018 03:17)      HPI:  89 yo M PMH COPD (not O2 dependent), CAD s/p stent , AAA repair with stent, HTN, HLD, Chronic systolic HF (EF 20%), GERD, hypothyroidism who presents for syncope, ANA and SOB. The patient states he was on a cruise for the last month Originated in [place], went to [place] and ended in [place], then flew back to [place]) and during the last 2.5 weeks of the cruise he noticed b/l ANA, L>R. The patient states he was intermittently compliant with his medications while on [facility] cruise, because he would go on excursions and not take his medicines for a couple days, but would then double up on the lasix the following day. The patient also states he was having SOB which was waxing and waning while on the cruise, and having a productive cough that is yellow for the last 3 days. While returning to [place] on , the patient was sitting in his chair in the plane and apparently LOC for 5 minutes. The patient then awoke, without any confusion on waking. Denies any seizure like activity or history. The patient is unsure if he urinated himself or if the water on the trash fell on him. The patient was seen by a doctor on the plane who advised he take 40 mg PO lasix, and go to the ED. The patient denies any F NVD, chills, rhinorrhea, dysuria, abd pain, CP, HA, neurological changes. SOB has now resolved. (15 May 2018 03:17)    Cardiology history: Patient with a dilated cardiomyopathy and implantable defibrillator with one previous episode of syncope from VT and a defibrillator shock on 2017. He was returning from vacation and while on the plane was unresponsive for 5 minutes. AICD was to be interrogated this morning    PMH:   MI (myocardial infarction)  COPD (chronic obstructive pulmonary disease)  Gout  GERD (gastroesophageal reflux disease)  Hypothyroid  CHF (congestive heart failure)  BPH (Benign Prostatic Hyperplasia)  CAD (coronary artery disease)  HLD (hyperlipidemia)  HTN (hypertension)    PSH:   S/P angioplasty  H/O repair of rotator cuff  AAA (abdominal aortic aneurysm)  S/P knee replacement, right  Achilles rupture  Hernia, inguinal, bilateral  Cataract    Medications: OUTPATIENT MEDS: ALLOPURINOL 300. ASPIRIN 81. CARVEDILOL 12.5 B.I.D. COLCHICINE 0.6, DAILY. DIGOXIN 0.125 Q.D. ENTRESTO  Q.12 H. FUROSEMIDE 80 Q.D. SYNTHROID 0.125 Q.D. SIMVASTATIN 10. OMEPERZOLE 20. TAMSULOSIN 0.8  Meds: ALBUTerol/ipratropium for Nebulization 3 milliLiter(s) Nebulizer every 6 hours  allopurinol 300 milliGRAM(s) Oral daily  carvedilol 12.5 milliGRAM(s) Oral every 12 hours  cefTRIAXone   IVPB      cholecalciferol 1000 Unit(s) Oral daily  colchicine 0.6 milliGRAM(s) Oral daily  furosemide   Injectable 20 milliGRAM(s) IV Push daily  heparin  Injectable 5000 Unit(s) SubCutaneous every 8 hours  lactobacillus acidophilus 1 Tablet(s) Oral daily  levothyroxine 125 MICROGram(s) Oral daily  pantoprazole    Tablet 40 milliGRAM(s) Oral before breakfast  predniSONE   Tablet 60 milliGRAM(s) Oral daily  sacubitril 97 mG/valsartan 103 mG 1 Tablet(s) Oral two times a day  simvastatin 10 milliGRAM(s) Oral at bedtime  tamsulosin 0.8 milliGRAM(s) Oral daily    Allergies:  No Known Allergies    FAMILY HISTORY:  Family history of CHF (congestive heart failure) (Father)  Family history of hemolytic uremic syndrome (Father)    Social History:   Smoking: None  Alcohol:  Drugs:    Review of Systems:  Constitutional:  recent weight gain  HEENT: no scleral icterus. Normal mucosa.  Respiratory: mild shortness of breath. No history of asthma. No COPD. (+) cough  Cardiovascular: No Chest Pain, no Palpitations, (+) PERRIN, No PND, or Orthopnea. (+) Syncope. No history of rheumatic fever.   Gastrointestinal: No Abdominal Pain, no Diarrhea, no Constipation, no Nausea or Vomiting  Genitourinary: No Nocturia, Dysuria, or Incontinence. No hematuria  Extremities: no edema. No cyanosis.  Neurologic: No Focal deficit. No Paresthesias. No Syncope. No seizures  Lymphatic: No Swelling. No Lymphadenopathy   Skin: No Rash,  Ecchymoses, Wounds, or Lesions  Psychiatry: No Depression. No Anxiety.    10 point review of systems is otherwise negative except as mentioned above            [ ]Unable to obtain    Physical Exam:  T(C): 36.4 (05-15-18 @ 05:04), Max: 36.5 (18 @ 18:58)  HR: 73 (05-15-18 @ 05:04) (69 - 78)  BP: 143/97 (05-15-18 @ 05:04) (124/76 - 156/83)  RR: 17 (05-15-18 @ 05:04) (16 - 24)  SpO2: 100% (05-15-18 @ 05:04) (91% - 100%)  Wt(kg): --     @ 07:01  -  05-15 @ 07:00  --------------------------------------------------------  IN: 0 mL / OUT: 300 mL / NET: -300 mL    05-15 @ 07:01  -  05-15 @ 08:45  --------------------------------------------------------  IN: 210 mL / OUT: 0 mL / NET: 210 mL      Daily Height in cm: 175.26 (15 May 2018 01:35)    Daily Weight in k.8 (15 May 2018 02:30)    Appearance: WD, WN, NAD  Eyes:  No scleral icterus. PERRL, EOMI  HENT: Normal oral mucosa.]NC/AT  Neck: Mild JVD at 90 degrees. Normal carotid upstrokes without bruits or murmurs.  Cardiovascular: PMI 5th ICS, AAL. Normal S1, S2 physiologically split. No S3  (+)S4. 2/6 harsh systolic murmur left sternal border. 2-3/6 aortic insufficiency murmur at the pulmonic area.  Respiratory: Clear to auscultation bilaterally. No wheezes. No rales.  Gastrointestinal: Soft.  Non-tender. No hepatosplenomegally.  Extremities: No clubbing. (+) edema. Pulses 2+ bilaterally symmetric including right pedal. ? left.  Musculoskeletal:  No joint deformity   Neurologic: Non-focal  Lymphatic:  No lymphadenopathy  Psychiatry: [+ ] AAOx3 [ +] Mood & affect appropriate  Skin: No rashes. No ecchymoses. No cyanosis    Cardiovascular Diagnostic Testing:  ECG: AV sequential biventricular pacing    Echo: 2015 at [facility]. LVEF 20%. Akinesis, inferior, inferoseptal, inferolateral. Severe lateral wall hypokinesis. Hypokinesis remaining segments. Severe diastolic dysfunction. RVE with decreased RV systolic function. Moderate TR with RVSP 55. Moderate MR, mild AS, mild AI    Stress Testing:    Cath: 2015 Normal LAD and circumflex. 95% RCA treated with stent. LVEF 35%    Interpretation of Telemetry: AV pacing    Imaging:< from: Xray Chest 1 View AP/PA (18 @ 22:29) >  PROCEDURE DATE:  2018            INTERPRETATION:  CLINICAL INFORMATION: CHF, COPD.    TECHNIQUE: AP view of the chest.    COMPARISON: Chest x-ray 10/18/2017.    FINDINGS:     The lungs are clear.  A left chest pacemaker is in place.  Suture anchors noted at the humeral heads.    IMPRESSION:   Clear lungs.    < end of copied text >      Labs:                        12.1   3.8   )-----------( 88       ( 15 May 2018 05:11 )             36.3     -15    138  |  100  |  29<H>  ----------------------------<  177<H>  4.3   |  23  |  1.42<H>    Ca    8.6      15 May 2018 05:11  Phos  3.2     -15  Mg     2.1     05-15    TPro  7.1  /  Alb  3.8  /  TBili  0.5  /  DBili  x   /  AST  37  /  ALT  51<H>  /  AlkPhos  87  05-15    PT/INR - ( 14 May 2018 19:48 )   PT: 13.3 sec;   INR: 1.22 ratio         PTT - ( 14 May 2018 19:48 )  PTT:29.2 sec  CARDIAC MARKERS ( 15 May 2018 05:11 )  x     / 0.02 ng/mL / 56 U/L / x     / 3.8 ng/mL  CARDIAC MARKERS ( 14 May 2018 19:48 )  x     / 0.03 ng/mL / 81 U/L / x     / 4.5 ng/mL      Serum Pro-Brain Natriuretic Peptide: 8473 pg/mL ( @ 19:48)

## 2018-05-15 NOTE — H&P ADULT - NSHPPHYSICALEXAM_GEN_ALL_CORE
Vital Signs Last 24 Hrs  T(C): 36.4 (15 May 2018 02:30), Max: 36.5 (14 May 2018 18:58)  T(F): 97.6 (15 May 2018 02:30), Max: 97.7 (14 May 2018 18:58)  HR: 77 (15 May 2018 02:30) (69 - 78)  BP: 156/83 (15 May 2018 02:30) (124/76 - 156/83)  BP(mean): --  RR: 17 (15 May 2018 02:30) (16 - 24)  SpO2: 100% (15 May 2018 02:30) (91% - 100%)    PHYSICAL EXAM:    GENERAL: Comfortable, no acute distress   HEAD:  Normocephalic, atraumatic  EYES: EOMI, PERRLA  HEENT: Moist mucous membranes  NECK: Supple, No JVD  NERVOUS SYSTEM:  CN 2-12 intact b/l. Alert & Oriented X3, Motor Strength 5/5 B/L upper and lower extremities. Sensation intact B/L  CHEST/LUNG: wheezes b/l, coughing.  HEART: Regular rate and rhythm, no murmur   ABDOMEN: Soft, Nontender, Nondistended, Bowel sounds present  EXTREMITIES:   +2 pitting edema b/l, L>R.   MUSCULOSKELETAL: No muscle tenderness, no joint tenderness  SKIN: warm and dry, no rash

## 2018-05-15 NOTE — H&P ADULT - PROBLEM SELECTOR PLAN 1
Unclear etiology, possibly vasovagal vs PE?  No neurological findings or head trauma.  Elderly patient with CKD. Can consider US DVT study and VQ scan.  Check TSH, A1c Unclear etiology, possibly vasovagal vs PE? vs hypoxia  No neurological findings or head trauma.  Elderly patient with CKD. Can consider US DVT study and VQ scan.  Check TSH, A1c Unclear etiology, possibly vasovagal vs PE? vs hypoxia vs arrythmia  No neurological findings or head trauma.  Elderly patient with CKD. US DVT study and VQ scan.  Check TSH, A1c

## 2018-05-15 NOTE — H&P ADULT - FAMILY HISTORY
Father  Still living? Unknown  Family history of hemolytic uremic syndrome, Age at diagnosis: Age Unknown  Family history of CHF (congestive heart failure), Age at diagnosis: Age Unknown

## 2018-05-16 ENCOUNTER — TRANSCRIPTION ENCOUNTER (OUTPATIENT)
Age: 83
End: 2018-05-16

## 2018-05-16 VITALS
OXYGEN SATURATION: 95 % | DIASTOLIC BLOOD PRESSURE: 76 MMHG | HEART RATE: 74 BPM | TEMPERATURE: 98 F | SYSTOLIC BLOOD PRESSURE: 125 MMHG | RESPIRATION RATE: 19 BRPM

## 2018-05-16 DIAGNOSIS — I35.0 NONRHEUMATIC AORTIC (VALVE) STENOSIS: ICD-10-CM

## 2018-05-16 DIAGNOSIS — I10 ESSENTIAL (PRIMARY) HYPERTENSION: ICD-10-CM

## 2018-05-16 DIAGNOSIS — J44.1 CHRONIC OBSTRUCTIVE PULMONARY DISEASE WITH (ACUTE) EXACERBATION: ICD-10-CM

## 2018-05-16 LAB
ANION GAP SERPL CALC-SCNC: 14 MMOL/L — SIGNIFICANT CHANGE UP (ref 5–17)
BUN SERPL-MCNC: 36 MG/DL — HIGH (ref 7–23)
CALCIUM SERPL-MCNC: 8.6 MG/DL — SIGNIFICANT CHANGE UP (ref 8.4–10.5)
CHLORIDE SERPL-SCNC: 99 MMOL/L — SIGNIFICANT CHANGE UP (ref 96–108)
CO2 SERPL-SCNC: 28 MMOL/L — SIGNIFICANT CHANGE UP (ref 22–31)
CREAT SERPL-MCNC: 1.54 MG/DL — HIGH (ref 0.5–1.3)
GLUCOSE SERPL-MCNC: 146 MG/DL — HIGH (ref 70–99)
HCT VFR BLD CALC: 34 % — LOW (ref 39–50)
HGB BLD-MCNC: 11.2 G/DL — LOW (ref 13–17)
MCHC RBC-ENTMCNC: 32.9 GM/DL — SIGNIFICANT CHANGE UP (ref 32–36)
MCHC RBC-ENTMCNC: 33 PG — SIGNIFICANT CHANGE UP (ref 27–34)
MCV RBC AUTO: 100 FL — SIGNIFICANT CHANGE UP (ref 80–100)
PLATELET # BLD AUTO: 104 K/UL — LOW (ref 150–400)
POTASSIUM SERPL-MCNC: 4.4 MMOL/L — SIGNIFICANT CHANGE UP (ref 3.5–5.3)
POTASSIUM SERPL-SCNC: 4.4 MMOL/L — SIGNIFICANT CHANGE UP (ref 3.5–5.3)
RBC # BLD: 3.38 M/UL — LOW (ref 4.2–5.8)
RBC # FLD: 19 % — HIGH (ref 10.3–14.5)
SODIUM SERPL-SCNC: 141 MMOL/L — SIGNIFICANT CHANGE UP (ref 135–145)
WBC # BLD: 7.4 K/UL — SIGNIFICANT CHANGE UP (ref 3.8–10.5)
WBC # FLD AUTO: 7.4 K/UL — SIGNIFICANT CHANGE UP (ref 3.8–10.5)

## 2018-05-16 PROCEDURE — 93970 EXTREMITY STUDY: CPT | Mod: 26

## 2018-05-16 PROCEDURE — 99233 SBSQ HOSP IP/OBS HIGH 50: CPT

## 2018-05-16 RX ORDER — BUDESONIDE, MICRONIZED 100 %
2 POWDER (GRAM) MISCELLANEOUS
Qty: 50 | Refills: 0 | OUTPATIENT
Start: 2018-05-16 | End: 2018-05-30

## 2018-05-16 RX ORDER — IPRATROPIUM/ALBUTEROL SULFATE 18-103MCG
3 AEROSOL WITH ADAPTER (GRAM) INHALATION
Qty: 360 | Refills: 0 | OUTPATIENT
Start: 2018-05-16 | End: 2018-06-14

## 2018-05-16 RX ORDER — AZITHROMYCIN 500 MG/1
1 TABLET, FILM COATED ORAL
Qty: 3 | Refills: 0 | OUTPATIENT
Start: 2018-05-16 | End: 2018-05-18

## 2018-05-16 RX ORDER — IPRATROPIUM/ALBUTEROL SULFATE 18-103MCG
3 AEROSOL WITH ADAPTER (GRAM) INHALATION
Qty: 3 | Refills: 0 | OUTPATIENT
Start: 2018-05-16 | End: 2018-06-14

## 2018-05-16 RX ORDER — IPRATROPIUM/ALBUTEROL SULFATE 18-103MCG
3 AEROSOL WITH ADAPTER (GRAM) INHALATION
Qty: 0 | Refills: 0 | COMMUNITY
Start: 2018-05-16

## 2018-05-16 RX ADMIN — Medication 300 MILLIGRAM(S): at 11:39

## 2018-05-16 RX ADMIN — Medication 60 MILLIGRAM(S): at 05:08

## 2018-05-16 RX ADMIN — TAMSULOSIN HYDROCHLORIDE 0.8 MILLIGRAM(S): 0.4 CAPSULE ORAL at 11:39

## 2018-05-16 RX ADMIN — PANTOPRAZOLE SODIUM 40 MILLIGRAM(S): 20 TABLET, DELAYED RELEASE ORAL at 05:08

## 2018-05-16 RX ADMIN — Medication 1000 UNIT(S): at 11:39

## 2018-05-16 RX ADMIN — Medication 20 MILLIGRAM(S): at 05:08

## 2018-05-16 RX ADMIN — AZITHROMYCIN 500 MILLIGRAM(S): 500 TABLET, FILM COATED ORAL at 11:39

## 2018-05-16 RX ADMIN — Medication 3 MILLILITER(S): at 11:38

## 2018-05-16 RX ADMIN — Medication 125 MICROGRAM(S): at 05:08

## 2018-05-16 RX ADMIN — CEFTRIAXONE 100 GRAM(S): 500 INJECTION, POWDER, FOR SOLUTION INTRAMUSCULAR; INTRAVENOUS at 05:07

## 2018-05-16 RX ADMIN — Medication 0.6 MILLIGRAM(S): at 11:39

## 2018-05-16 RX ADMIN — Medication 3 MILLILITER(S): at 05:08

## 2018-05-16 RX ADMIN — CARVEDILOL PHOSPHATE 12.5 MILLIGRAM(S): 80 CAPSULE, EXTENDED RELEASE ORAL at 05:08

## 2018-05-16 RX ADMIN — HEPARIN SODIUM 5000 UNIT(S): 5000 INJECTION INTRAVENOUS; SUBCUTANEOUS at 05:08

## 2018-05-16 RX ADMIN — Medication 1 TABLET(S): at 11:39

## 2018-05-16 RX ADMIN — SACUBITRIL AND VALSARTAN 1 TABLET(S): 24; 26 TABLET, FILM COATED ORAL at 05:08

## 2018-05-16 RX ADMIN — Medication 1: at 11:38

## 2018-05-16 NOTE — PROGRESS NOTE ADULT - ASSESSMENT
1. Unresponsiveness: No arrhythmia. Possibly over diuresed, and was hypotensive for a brief period on the plane. Echocardiogram done and noted. Reviewed the findings with patient.  Until the airplane ride home the patient was doing great feeling much better than usual, so I am not convinced his aortic stenosis is symptomatic yet. Will probably reassess as an outpatient and eventually have him evaluated by the structural heart team.  2. COPD.  Followed by Dr. Morris as an outpatient. Coughing a lot since admission despite clear Chest x-ray, and lungs sound rhonchorous. Have asked pulmonary to see him this morning to help determine if he needs a change in medication and if it is okay for him to be discharged.  3. Congestive cardiomyopathy, no change in patient's outpatient regimen

## 2018-05-16 NOTE — DISCHARGE NOTE ADULT - HOSPITAL COURSE
89 yo M PMH COPD (not O2 dependent), CAD s/p stent 2015, AAA repair with stent, HTN, HLD, Chronic systolic HF (EF 20%), GERD, hypothyroidism who presents for syncope, ANA and SOB. Patient has a dilated cardiomyopathy and implantable defibrillator with one previous episode of syncope from VT and a defibrillator shock on August 25, 2017. He was returning from vacation and while on the plane was unresponsive for 5 minutes. AICD was interrogated and no arrhythmia. TTE with severe aortic stenosis. He was also treated for COPD exacerbation with prednisone and course of antibiotics. PT recommended home PT.

## 2018-05-16 NOTE — PROGRESS NOTE ADULT - SUBJECTIVE AND OBJECTIVE BOX
Patient is a 89y old  Male who presents with a chief complaint of SOB, Syncope (16 May 2018 11:01)      SUBJECTIVE / OVERNIGHT EVENTS:  no acute events overnight. vss, afebrile  Pt reports that he has been coughing more since last night  Feels weak, participating in PT this morning    ROS:  14 point ROS negative in detail except stated as above    MEDICATIONS  (STANDING):  ALBUTerol/ipratropium for Nebulization 3 milliLiter(s) Nebulizer every 6 hours  allopurinol 300 milliGRAM(s) Oral daily  azithromycin   Tablet 500 milliGRAM(s) Oral daily  carvedilol 12.5 milliGRAM(s) Oral every 12 hours  cefTRIAXone   IVPB 1 Gram(s) IV Intermittent every 24 hours  cholecalciferol 1000 Unit(s) Oral daily  colchicine 0.6 milliGRAM(s) Oral daily  dextrose 5%. 1000 milliLiter(s) (50 mL/Hr) IV Continuous <Continuous>  dextrose 50% Injectable 12.5 Gram(s) IV Push once  dextrose 50% Injectable 25 Gram(s) IV Push once  dextrose 50% Injectable 25 Gram(s) IV Push once  furosemide   Injectable 20 milliGRAM(s) IV Push daily  heparin  Injectable 5000 Unit(s) SubCutaneous every 8 hours  insulin lispro (HumaLOG) corrective regimen sliding scale   SubCutaneous three times a day before meals  insulin lispro (HumaLOG) corrective regimen sliding scale   SubCutaneous at bedtime  lactobacillus acidophilus 1 Tablet(s) Oral daily  levothyroxine 125 MICROGram(s) Oral daily  pantoprazole    Tablet 40 milliGRAM(s) Oral before breakfast  predniSONE   Tablet 60 milliGRAM(s) Oral daily  sacubitril 97 mG/valsartan 103 mG 1 Tablet(s) Oral two times a day  simvastatin 10 milliGRAM(s) Oral at bedtime  tamsulosin 0.8 milliGRAM(s) Oral daily    MEDICATIONS  (PRN):  dextrose 40% Gel 15 Gram(s) Oral once PRN Blood Glucose LESS THAN 70 milliGRAM(s)/deciliter  glucagon  Injectable 1 milliGRAM(s) IntraMuscular once PRN Glucose LESS THAN 70 milligrams/deciliter      CAPILLARY BLOOD GLUCOSE      POCT Blood Glucose.: 162 mg/dL (16 May 2018 10:51)  POCT Blood Glucose.: 114 mg/dL (16 May 2018 07:26)  POCT Blood Glucose.: 142 mg/dL (15 May 2018 20:40)  POCT Blood Glucose.: 162 mg/dL (15 May 2018 18:08)    I&O's Summary    15 May 2018 07:01  -  16 May 2018 07:00  --------------------------------------------------------  IN: 965 mL / OUT: 1000 mL / NET: -35 mL    16 May 2018 07:01  -  16 May 2018 12:00  --------------------------------------------------------  IN: 0 mL / OUT: 200 mL / NET: -200 mL        PHYSICAL EXAM:  Vital Signs Last 24 Hrs  T(C): 36.4 (16 May 2018 05:42), Max: 36.6 (15 May 2018 16:53)  T(F): 97.6 (16 May 2018 05:42), Max: 97.9 (15 May 2018 20:54)  HR: 72 (16 May 2018 05:42) (72 - 94)  BP: 111/60 (16 May 2018 05:42) (111/60 - 119/65)  BP(mean): --  RR: 18 (16 May 2018 05:42) (18 - 18)  SpO2: 96% (16 May 2018 05:42) (95% - 96%)    GENERAL: NAD, well-developed  HEAD:  Atraumatic, Normocephalic  EYES: EOMI, PERRLA, conjunctiva and sclera clear  NECK: Supple, No JVD  CHEST/LUNG: diffuse rhonchi  HEART: Regular rate and rhythm; No murmurs, rubs, or gallops  ABDOMEN: Soft, Nontender, Nondistended; Bowel sounds present  EXTREMITIES:  2+ Peripheral Pulses, No clubbing, cyanosis, or edema  NEUROLOGY: AAOx3; non-focal  SKIN: No rashes or lesions    LABS:  personally reviewed                        11.2   7.4   )-----------( 104      ( 16 May 2018 02:26 )             34.0     05-16    141  |  99  |  36<H>  ----------------------------<  146<H>  4.4   |  28  |  1.54<H>    Ca    8.6      16 May 2018 02:26  Phos  3.2     05-15  Mg     2.1     05-15    TPro  7.1  /  Alb  3.8  /  TBili  0.5  /  DBili  x   /  AST  37  /  ALT  51<H>  /  AlkPhos  87  05-15    PT/INR - ( 14 May 2018 19:48 )   PT: 13.3 sec;   INR: 1.22 ratio         PTT - ( 14 May 2018 19:48 )  PTT:29.2 sec  CARDIAC MARKERS ( 15 May 2018 05:11 )  x     / 0.02 ng/mL / 56 U/L / x     / 3.8 ng/mL  CARDIAC MARKERS ( 14 May 2018 19:48 )  x     / 0.03 ng/mL / 81 U/L / x     / 4.5 ng/mL          RADIOLOGY & ADDITIONAL TESTS:    Imaging Personally Reviewed:  - tele: JOANN, HENRRY 4.6 secs  - LE doppler: No evidence of bilateral lower extremity deep venous thrombosis  - TTE:   1. Calcified aortic valve with decreased opening. Peak  transaortic valve gradient equals 31 mm Hg, mean  transaortic valve gradient equals 17 mm Hg, estimated  aortic valve area equals 0.8 sqcm (by continuity equation),  aortic valve velocity time integral equals 56 cm,  consistent with severe aortic stenosis. Mild aortic  regurgitation.  2. Severe segmental left ventricular systolic dysfunction.  The mid anterior septum, the apical inferior wall, the  apical anterior wall, the apical septum, the apical lateral  wall, the basal anterior wall, the basal anterior septum,  the basal inferior wall, the basal anterolateral wall, the  mid anterior wall, the mid inferoseptum, and the apical cap  are hypokinetic. The mid inferolateral wall, the basal  inferolateral wall, the mid inferior wall, the mid  anterolateral wall, and the basal inferoseptum are  akinetic.  3. Moderate diastolic dysfunction (Stage II).  4. Right ventricular enlargement with normal right  ventricular systolic function. A device wire is noted in  the right heart.      Consultant(s) Notes Reviewed:  cardiology, EP, pulm    Care Discussed with Consultants/Other Providers:

## 2018-05-16 NOTE — PHYSICAL THERAPY INITIAL EVALUATION ADULT - ADDITIONAL COMMENTS
as per pt, resides in a PH with spouse, 1 step to enter, +chairlift to 2nd floor, PTA, pt (I) with amb with RW, required some assist for ADL from spouse.

## 2018-05-16 NOTE — DISCHARGE NOTE ADULT - ADDITIONAL INSTRUCTIONS
continue on oral antibiotics for 7 more days, prednisone 7 days and nebulizers till you see your pulmonologist in 1 week.    Use Pulmicort (budesonide) nebs after Albuterol/atrovent (duonebs) and rinse your mouth with water after use.

## 2018-05-16 NOTE — PROGRESS NOTE ADULT - SUBJECTIVE AND OBJECTIVE BOX
ELECTROPHYSIOLOGY  Device Interrogation Performed          Biventricular ICD                      Date/Time: 5/15/16        :      Alkymos                   Model:                                    Mode:                             Rate:               Atrial Lead:  P wave amplitude:                          mv          Impedence:                   Ohms      Threshold:                V@             ms          Ventricular Lead(s):  RV Lead: R wave amplitude:                          mv          Impedence:                   Ohms      Threshold:                V@             ms   LV Lead:  R wave amplitude:                          mv          Impedence:                   Ohms      Threshold:                V@             ms         Battery Status:                     Good                BONNIE                     EOL          Underlying Rhythm:   SR        Events/Observation:    NO VT or SVT     Impression/Plan:  Normal Biventricular  ICD function. Normal sensing and pacing testing. Good battery status. Excellent threshold capture.  No reprogramming.

## 2018-05-16 NOTE — DISCHARGE NOTE ADULT - PATIENT PORTAL LINK FT
You can access the [facility] Portal, offered by [facility], by registering with the following website: [email or web address]

## 2018-05-16 NOTE — PROGRESS NOTE ADULT - ATTENDING COMMENTS
Radha Weldon MD  Division of Hospital Medicine  Pager: [phone]  Office: [phone] Pt hemodynamically stable to be discharged home. PT declines [facility] placement. Pt to complete a course of steroids/abx for copd exacerbation    51 minutes spent on discharge process    Radha Weldon MD  Division of Hospital Medicine  Pager: [phone]  Office: [phone]

## 2018-05-16 NOTE — PROGRESS NOTE ADULT - SUBJECTIVE AND OBJECTIVE BOX
Patient seen and evaluated @ 815  Chief Complaint: Patient is a 89y old  Male who presents with a chief complaint of SOB, Syncope (15 May 2018 03:17)      HPI:  89 yo M PMH COPD (not O2 dependent), CAD s/p stent , AAA repair with stent, HTN, HLD, Chronic systolic HF (EF 20%), GERD, hypothyroidism who presents for syncope, ANA and SOB. The patient states he was on a cruise for the last month Originated in [place], went to [place] and ended in [place], then flew back to [place]) and during the last 2.5 weeks of the cruise he noticed b/l ANA, L>R. The patient states he was intermittently compliant with his medications while on [facility] cruise, because he would go on excursions and not take his medicines for a couple days, but would then double up on the lasix the following day. The patient also states he was having SOB which was waxing and waning while on the cruise, and having a productive cough that is yellow for the last 3 days. While returning to [place] on , the patient was sitting in his chair in the plane and apparently LOC for 5 minutes. The patient then awoke, without any confusion on waking. Denies any seizure like activity or history. The patient is unsure if he urinated himself or if the water on the trash fell on him. The patient was seen by a doctor on the plane who advised he take 40 mg PO lasix, and go to the ED. The patient denies any F NVD, chills, rhinorrhea, dysuria, abd pain, CP, HA, neurological changes. SOB has now resolved. (15 May 2018 03:17)    Cardiology history: Patient with a dilated cardiomyopathy and implantable defibrillator with one previous episode of syncope from VT and a defibrillator shock on 2017. He was returning from vacation and while on the plane was unresponsive for 5 minutes. AICD was interrogated and no arrhythmia. Echo with aortic stenosis.    PMH:   MI (myocardial infarction)  COPD (chronic obstructive pulmonary disease)  Gout  GERD (gastroesophageal reflux disease)  Hypothyroid  CHF (congestive heart failure)  BPH (Benign Prostatic Hyperplasia)  CAD (coronary artery disease)  HLD (hyperlipidemia)  HTN (hypertension)    PSH:   S/P angioplasty  H/O repair of rotator cuff  AAA (abdominal aortic aneurysm)  S/P knee replacement, right  Achilles rupture  Hernia, inguinal, bilateral  Cataract    Medications: OUTPATIENT MEDS: ALLOPURINOL 300. ASPIRIN 81. CARVEDILOL 12.5 B.I.D. COLCHICINE 0.6, DAILY. DIGOXIN 0.125 Q.D. ENTRESTO  Q.12 H. FUROSEMIDE 80 Q.D. SYNTHROID 0.125 Q.D. SIMVASTATIN 10. OMEPERZOLE 20. TAMSULOSIN 0.8  Meds: ALBUTerol/ipratropium for Nebulization 3 milliLiter(s) Nebulizer every 6 hours  allopurinol 300 milliGRAM(s) Oral daily  azithromycin   Tablet 500 milliGRAM(s) Oral daily  carvedilol 12.5 milliGRAM(s) Oral every 12 hours  cefTRIAXone   IVPB 1 Gram(s) IV Intermittent every 24 hours  cholecalciferol 1000 Unit(s) Oral daily  colchicine 0.6 milliGRAM(s) Oral daily  dextrose 40% Gel 15 Gram(s) Oral once PRN  dextrose 5%. 1000 milliLiter(s) IV Continuous <Continuous>  dextrose 50% Injectable 12.5 Gram(s) IV Push once  dextrose 50% Injectable 25 Gram(s) IV Push once  dextrose 50% Injectable 25 Gram(s) IV Push once  furosemide   Injectable 20 milliGRAM(s) IV Push daily  glucagon  Injectable 1 milliGRAM(s) IntraMuscular once PRN  heparin  Injectable 5000 Unit(s) SubCutaneous every 8 hours  insulin lispro (HumaLOG) corrective regimen sliding scale   SubCutaneous three times a day before meals  insulin lispro (HumaLOG) corrective regimen sliding scale   SubCutaneous at bedtime  lactobacillus acidophilus 1 Tablet(s) Oral daily  levothyroxine 125 MICROGram(s) Oral daily  pantoprazole    Tablet 40 milliGRAM(s) Oral before breakfast  predniSONE   Tablet 60 milliGRAM(s) Oral daily  sacubitril 97 mG/valsartan 103 mG 1 Tablet(s) Oral two times a day  simvastatin 10 milliGRAM(s) Oral at bedtime  tamsulosin 0.8 milliGRAM(s) Oral daily    Allergies:  No Known Allergies    FAMILY HISTORY:  Family history of CHF (congestive heart failure) (Father)  Family history of hemolytic uremic syndrome (Father)    Social History:   Smoking: None  Alcohol:  Drugs:    Physical Exam:  Vital Signs Last 24 Hrs  T(C): 36.4 (16 May 2018 05:42), Max: 36.6 (15 May 2018 16:53)  T(F): 97.6 (16 May 2018 05:42), Max: 97.9 (15 May 2018 20:54)  HR: 72 (16 May 2018 05:42) (72 - 94)  BP: 111/60 (16 May 2018 05:42) (111/60 - 119/65)  BP(mean): --  RR: 18 (16 May 2018 05:42) (18 - 18)  SpO2: 96% (16 May 2018 05:42) (95% - 96%)    Daily Height in cm: 175.26 (15 May 2018 01:35)    Daily Weight in k.8 (15 May 2018 02:30)    Appearance: WD, WN, NAD  Eyes:  No scleral icterus. PERRL, EOMI  HENT: Normal oral mucosa.]NC/AT  Neck: Mild JVD at 90 degrees. Normal carotid upstrokes without bruits or murmurs.  Cardiovascular: PMI 5th ICS, AAL. Normal S1, S2 physiologically split. No S3  (+)S4. 2/6 harsh systolic murmur left sternal border. 2/6 aortic insufficiency murmur at the pulmonic area.  Respiratory: Coarse ronchi bilaterally  Gastrointestinal: Soft.  Non-tender. No hepatosplenomegally.  Extremities: No clubbing. (+) edema. Pulses 2+ bilaterally symmetric including right pedal. ? left.  Musculoskeletal:  No joint deformity   Neurologic: Non-focal  Lymphatic:  No lymphadenopathy  Psychiatry: [+ ] AAOx3 [ +] Mood & affect appropriate  Skin: No rashes. No ecchymoses. No cyanosis    Cardiovascular Diagnostic Testing:  ECG: AV sequential biventricular pacing    Echo: 2015 at [facility]. LVEF 20%. Akinesis, inferior, inferoseptal, inferolateral. Severe lateral wall hypokinesis. Hypokinesis remaining segments. Severe diastolic dysfunction. RVE with decreased RV systolic function. Moderate TR with RVSP 55. Moderate MR, mild AS, mild AI  < from: Transthoracic Echocardiogram (05.15.18 @ 13:24) >  Patient name: LUBNA VALLE  YOB: 1925   Age: 89 (M)   MR#: [id number]  Study Date: 5/15/2018  [facility] RDCS  Study quality: Technically good  Referring Physician: Daniel M Paget, MD  Blood Pressure: 127/72 mmHg  Height: 175 cm  Weight: 73 kg  BSA: 1.9 m2  Heart Rate: 67 mmHg  ------------------------------------------------------------------------  PROCEDURE: Transthoracic echocardiogram with 2-D, M-Mode  and complete spectral and color flow Doppler.  INDICATION: Syncope and collapse (R55)  ------------------------------------------------------------------------  Dimensions:    Normal Values:  LA:     4.2    2.0 - 4.0 cm  Ao:     3.6    2.0 - 3.8 cm  SEPTUM: 1.0    0.6 - 1.2 cm  PWT:   0.9    0.6 - 1.1 cm  LVIDd:  5.3    3.0 - 5.6 cm  LVIDs:  4.6    1.8 - 4.0 cm  Derived variables:  LVMI: 99 g/m2  RWT: 0.33  Fractional short: 13 %  EF (Visual Estimate): 25-30 %  Doppler Peak Velocity (m/sec): AoV=2.8  ------------------------------------------------------------------------  Observations:  Mitral Valve: Mitral annular calcification and calcified  mitral leaflets with decreased diastolic opening. Mild  mitral regurgitation.  Aortic Valve/Aorta: Calcified aortic valve with decreased  opening. Peak transaortic valve gradient equals 31 mm Hg,  mean transaortic valve gradient equals 17 mm Hg, estimated  aortic valve area equals 0.8 sqcm (by continuity equation),  aortic valve velocity time integral equals 56 cm,  consistent with severe aortic stenosis. Mild aortic  regurgitation.  Peak left ventricular outflow tract  gradient equals 2 mm Hg, mean gradient is equal to 1 mm Hg,  LVOT velocity time integral equals 12 cm.  Aortic Root: 3.6 cm.  Left Atrium: Normal left atrium. LA volume index = 32  cc/m2.  Left Ventricle: Severe segmental left ventricular systolic  dysfunction. The mid anterior septum, the apical inferior  wall, the apical anterior wall, the apical septum, the  apical lateral wall, the basal anterior wall, the basal  anterior septum, the basal inferior wall, the basal  anterolateral wall, the mid anterior wall, the mid  inferoseptum, and the apical cap are hypokinetic. The mid  inferolateral wall, the basal  inferolateral wall, the mid  inferior wall,the mid anterolateral wall, and the basal  inferoseptum are akinetic.  Normal left ventricular  internal dimensions and wall thicknesses. Moderate  diastolic dysfunction (Stage II).  Right Heart: Mild right atrial enlargement. Right  ventricular enlargement with normal right ventricular  systolic function. A device wire is noted in the right  heart. Normal tricuspid valve. Mild tricuspid  regurgitation. Pulmonic valve not well visualized, probably  normal.  Pericardium/Pleura: Normal pericardium with no pericardial  effusion.  Hemodynamic: Estimated right atrial pressure is 10 - 15 mm  Hg. Estimated right ventricular systolic pressure equals  33.04 - 38.04 mm Hg, assuming right atrial pressure equals  10 - 15 mm Hg, consistent with normal pulmonary  hypertension.  ------------------------------------------------------------------------  Conclusions:  1. Calcified aortic valve with decreased opening. Peak  transaortic valve gradient equals 31 mm Hg, mean  transaortic valve gradient equals 17 mm Hg, estimated  aortic valve area equals 0.8 sqcm (by continuity equation),  aortic valve velocity time integral equals 56 cm,  consistent with severe aortic stenosis. Mild aortic  regurgitation.  2. Severe segmental left ventricular systolic dysfunction.  The mid anterior septum, the apical inferior wall, the  apical anterior wall, the apical septum, the apical lateral  wall, the basal anterior wall, the basal anterior septum,  the basal inferior wall, the basal anterolateral wall, the  mid anterior wall, the mid inferoseptum, and the apical cap  are hypokinetic. The mid inferolateral wall, the basal  inferolateral wall, the mid inferior wall, the mid  anterolateral wall, and the basal inferoseptum are  akinetic.  3. Moderate diastolic dysfunction (Stage II).  4. Right ventricular enlargement with normal right  ventricular systolic function. A device wire is noted in  the right heart.  *** Compared with echocardiogram of 2015, there has  been an interval increase in gradients and velocities  accross the aortic valve.  ------------------------------------------------------------------------  Confirmed on  5/15/2018 - 15:55:06 by Sunil De León M.D.  ---------------    < end of copied text >    Stress Testing:    Cath: 2015 Normal LAD and circumflex. 95% RCA treated with stent. LVEF 35%    Interpretation of Telemetry: AV pacing    Imaging:< from: Xray Chest 1 View AP/PA (18 @ 22:29) >  PROCEDURE DATE:  2018            INTERPRETATION:  CLINICAL INFORMATION: CHF, COPD.    TECHNIQUE: AP view of the chest.    COMPARISON: Chest x-ray 10/18/2017.    FINDINGS:     The lungs are clear.  A left chest pacemaker is in place.  Suture anchors noted at the humeral heads.    IMPRESSION:   Clear lungs.    < end of copied text >      Labs:                      11.2   7.4   )-----------( 104      ( 16 May 2018 02:26 )             34.0         141  |  99  |  36<H>  ----------------------------<  146<H>  4.4   |  28  |  1.54<H>    Ca    8.6      16 May 2018 02:26  Phos  3.2     05-15  Mg     2.1     -15    TPro  7.1  /  Alb  3.8  /  TBili  0.5  /  DBili  x   /  AST  37  /  ALT  51<H>  /  AlkPhos  87  05-15    CARDIAC MARKERS ( 15 May 2018 05:11 )  x     / 0.02 ng/mL / 56 U/L / x     / 3.8 ng/mL  CARDIAC MARKERS ( 14 May 2018 19:48 )  x     / 0.03 ng/mL / 81 U/L / x     / 4.5 ng/mL      PT/INR - ( 14 May 2018 19:48 )   PT: 13.3 sec;   INR: 1.22 ratio         PTT - ( 14 May 2018 19:48 )  PTT:29.2 sec                          12.1 CARDIAC MARKERS ( 15 May 2018 05:11 )  x     / 0.02 ng/mL / 56 U/L / x     / 3.8 ng/mL  CARDIAC MARKERS ( 14 May 2018 19:48 )  x     / 0.03 ng/mL / 81 U/L / x     / 4.5 ng/mL      Serum Pro-Brain Natriuretic Peptide: 8473 pg/mL ( @ 19:48)

## 2018-05-16 NOTE — PROGRESS NOTE ADULT - ASSESSMENT
89 yo M PMH COPD (not O2 dependent), CAD s/p stent 2015, AAA repair with stent, HTN, HLD, Chronic systolic HF (EF 20%), GERD, hypothyroidism who presents for syncope, ANA and SOB in the setting of COPD exacerbation as well as sCHF exacerbation

## 2018-05-16 NOTE — CONSULT NOTE ADULT - ASSESSMENT
ASSESSMENT:     cough due to a COPD exacerbation likely related to a viral infection "picked up" on his recent cruise - no evidence of pulmonary edema, pleural effusions or pneumonia on CXR    syncope likely related to dehydration with hypotension - no evidence of arrhythmia, seizure, etc    ischemic cardiomyopathy with history of syncope/VT s/p AICD implantation    valvular heart disease - severe aortic stenosis/mild aortic regurgitation    moderate diastolic dysfunction    PLAN/RECOMMENDATIONS:    stable oxygenation on room air  prednisone 50mg daily x 7 days - need to follow glucose on steroids  albuterol/atrovent nebs 4 times daily  pulmicort 0.5mg nebs q12h - use after duoneb and rinse mouth after use  augmentin 875mg 2 times daily for "bronchitis"  cardiac meds: coreg/lasix/entresto/zocor  GI/DVT prophylaxis - protonix/SQ heparin    Thank you for the courtesy of this referral  No pulmonary objection to discharge. He should follow-up with Dr. Morris early next week  Plan of care discussed with the patient and his wife at bedside and the [facility] NP    Hema Reyes MD, [facility] - [phone]  Pulmonary Medicine

## 2018-05-16 NOTE — PROGRESS NOTE ADULT - PROBLEM SELECTOR PLAN 4
Likely in setting of dehydration vs. arrythmia vs. infection  - tele with [facility] briefly but pt remains asymptomatic

## 2018-05-16 NOTE — PHYSICAL THERAPY INITIAL EVALUATION ADULT - DISCHARGE DISPOSITION, PT EVAL
rehabilitation facility/Subacute Rehab, pt refused and preferred home, if home, Home with Home PT for strengthening, bed mob, transfer, gait and balance training, home with assist for IADLs, amb with RW

## 2018-05-16 NOTE — PROGRESS NOTE ADULT - PROBLEM SELECTOR PLAN 2
Diffuse rhonchi/wheeze on exam. Currently on prednisone and ceftriaxone/azithromycin.   - appreciate Dr. Reyes recommendation  - continue prednisone and augmentin

## 2018-05-16 NOTE — DISCHARGE NOTE ADULT - CARE PROVIDER_API CALL
Ning Morris), Critical Care Medicine; Internal Medicine; Pulmonary Disease  [address]  [place] [address]  Phone: [phone]  Fax: [phone]    Wolf Paiz), Cardiovascular Disease; Internal Medicine  [address]  [place] [address]  Phone: [phone]  Fax: [phone]

## 2018-05-16 NOTE — DISCHARGE NOTE ADULT - MEDICATION SUMMARY - MEDICATIONS TO TAKE
I will START or STAY ON the medications listed below when I get home from the hospital:    calcium  -- 1 tab(s) by mouth once a day  -- Indication: For Supplement    Lutein  -- 1 tab(s) by mouth 2 times a day  -- Indication: For Supplement    predniSONE 50 mg oral tablet  -- 1 tab(s) by mouth once a day  -- Indication: For Bronchitis    budesonide 0.5 mg/2 mL inhalation suspension  -- 2 milliliter(s) by nebulizer every 12 hours MDD:4ml  -- For inhalation only.  Rinse mouth thoroughly after use.  Shake well before use.    -- Indication: For Bronchitis    acetaminophen 325 mg oral tablet  -- 2 tab(s) by mouth every 6 hours, As needed, Mild Pain  -- Indication: For pain    aspirin 81 mg oral delayed release tablet  -- 1 tab(s) by mouth once a day  -- Indication: For CAD (coronary artery disease)    Entresto 97 mg-103 mg oral tablet  -- 1 tab(s) by mouth 2 times a day  -- Indication: For CHF (congestive heart failure)    Flomax 0.4 mg oral capsule  -- 2 cap(s) by mouth once a day  -- Indication: For BPH (Benign Prostatic Hyperplasia)    allopurinol 300 mg oral tablet  -- 1 tab(s) by mouth once a day  -- Indication: For gout    colchicine 0.6 mg oral tablet  -- 1 tab(s) by mouth once a day  -- Indication: For gout    simvastatin 10 mg oral tablet  -- 1 tab(s) by mouth once a day (at bedtime)  -- Indication: For HLD (hyperlipidemia)    carvedilol 12.5 mg oral tablet  -- 1 tab(s) by mouth 2 times a day  -- Indication: For CAD (coronary artery disease)    albuterol 90 mcg/inh inhalation aerosol  -- 2 puff(s) inhaled 4 times a day, As Needed  -- Indication: For COPD (chronic obstructive pulmonary disease)    ipratropium-albuterol 0.5 mg-2.5 mg/3 mLinhalation solution  -- 3 milliliter(s) inhaled every 6 hours MDD:12ml  -- Indication: For COPD (chronic obstructive pulmonary disease)    Lasix 40 mg oral tablet  -- 1 tab(s) by mouth every other day  -- Indication: For diuresis    ferrous sulfate  -- 1 tab(s) by mouth once a day  -- Indication: For Supplement    azithromycin 500 mg oral tablet  -- 1 tab(s) by mouth once a day  -- Indication: For Bronchitis    amoxicillin-clavulanate 875 mg-125 mg oral tablet  -- 1 tab(s) by mouth 2 times a day MDD:2  -- Finish all this medication unless otherwise directed by prescriber.  Take with food or milk.    -- Indication: For Bronchitis    omeprazole 20 mg oral delayed release capsule  -- 1 cap(s) by mouth once a day  -- Indication: For stomach ulcer prevention    levothyroxine 125 mcg (0.125 mg) oral tablet  -- 1 tab(s) by mouth once a day  -- Indication: For Hypothyroidism    Vitamin D3  -- 3000 unit(s) by mouth once a day  -- Indication: For Supplement

## 2018-05-16 NOTE — PROGRESS NOTE ADULT - PROBLEM SELECTOR PLAN 3
TTE with Peak transaortic valve gradient equals 31 mm Hg, mean transaortic valve gradient equals 17 mm Hg, estimated aortic valve area equals 0.8 sqcm. Unclear if this as an etiology for his symptoms.   - appreciate cardiology recs: will have evaluation by structural heart team as outpatient

## 2018-05-16 NOTE — DISCHARGE NOTE ADULT - CARE PLAN
Principal Discharge DX:	Syncope  Goal:	you will no longer have more syncopal episodes on current cardiac medications  Assessment and plan of treatment:	no events of arrhythmia noted on the interrogation of your AICD  continue on current cardiac medications  Secondary Diagnosis:	COPD (chronic obstructive pulmonary disease)  Goal:	your breathing will be improved on the medications prescribed.  You are being treated for bronchitis.  Assessment and plan of treatment:	continue on antibiotics, prednisone and nebulizers that were started in the hospital and follow up with your pulmonologist in 1 week.  Use Pulmicort (budesonide) nebs after Albuterol/atrovent (duonebs) and rinse mouth with water after use.  Secondary Diagnosis:	CHF (congestive heart failure)  Goal:	Low salt, low fat diet.   Weight management.   Take medications as prescribed.    No smoking.  Follow up appointments with your doctor(s)  as instructed.

## 2018-05-16 NOTE — CONSULT NOTE ADULT - SUBJECTIVE AND OBJECTIVE BOX
[facility] - [facility]  CONSULT NOTE    HPI: 89 year old gentleman, former smoker, with history of COPD followed by Dr. Morris of [facility] who is unavailable to see the patient. The patient is followed by Dr. Wolf Paiz for a severe ischemic cardiomyopathy with LVEF ~ 25 - 30% complicated by syncope due to VT requiring placement of an AICD. The patient has been on a cruise for the last month sailing from [place] to [place] during which time he noted shortness of breath associated with a productive cough and lower extremity swelling. He was only intermittently compliant with his medication regimen due to excursions off the ship. While flying back to [place], the patient lost consciousness on the plane for ~ 5 minutes. There was no seizure activity and the patient awoke without confusion. He was advised to take lasix and to the ER upon landing. The patient has no fevers, chills or sweats. No chest pain/pressure or palpitations. Interrogation of the ICD revealed no arrhythmias. Syncope has been attributed to dehydration with hypotension on the plane. Asked to evaluate.    PMHX:  MI (myocardial infarction)  COPD (chronic obstructive pulmonary disease)  Gout  GERD (gastroesophageal reflux disease)  Hypothyroid  CHF (congestive heart failure)  BPH (Benign Prostatic Hyperplasia)  CAD (coronary artery disease)  HLD (hyperlipidemia)  HTN (hypertension)      PSHX:  S/P angioplasty  H/O repair of rotator cuff  AAA (abdominal aortic aneurysm)  S/P knee replacement, right  Achilles rupture  Hernia, inguinal, bilateral  Cataract      FAMILY HISTORY:  Family history of CHF (congestive heart failure) (Father)  Family history of hemolytic uremic syndrome (Father)      SOCIAL HISTORY:    Pulmonary Medications:   ALBUTerol/ipratropium for Nebulization 3 milliLiter(s) Nebulizer every 6 hours      Antimicrobials:  azithromycin   Tablet 500 milliGRAM(s) Oral daily  cefTRIAXone   IVPB 1 Gram(s) IV Intermittent every 24 hours      Cardiology:  carvedilol 12.5 milliGRAM(s) Oral every 12 hours  furosemide   Injectable 20 milliGRAM(s) IV Push daily  sacubitril 97 mG/valsartan 103 mG 1 Tablet(s) Oral two times a day  tamsulosin 0.8 milliGRAM(s) Oral daily      Other:  allopurinol 300 milliGRAM(s) Oral daily  cholecalciferol 1000 Unit(s) Oral daily  colchicine 0.6 milliGRAM(s) Oral daily  dextrose 40% Gel 15 Gram(s) Oral once PRN  dextrose 5%. 1000 milliLiter(s) IV Continuous <Continuous>  dextrose 50% Injectable 12.5 Gram(s) IV Push once  dextrose 50% Injectable 25 Gram(s) IV Push once  dextrose 50% Injectable 25 Gram(s) IV Push once  glucagon  Injectable 1 milliGRAM(s) IntraMuscular once PRN  heparin  Injectable 5000 Unit(s) SubCutaneous every 8 hours  insulin lispro (HumaLOG) corrective regimen sliding scale   SubCutaneous three times a day before meals  insulin lispro (HumaLOG) corrective regimen sliding scale   SubCutaneous at bedtime  lactobacillus acidophilus 1 Tablet(s) Oral daily  levothyroxine 125 MICROGram(s) Oral daily  pantoprazole    Tablet 40 milliGRAM(s) Oral before breakfast  predniSONE   Tablet 60 milliGRAM(s) Oral daily  simvastatin 10 milliGRAM(s) Oral at bedtime      Allergies    No Known Allergies    Intolerances        HOME MEDICATIONS: see  H & P    REVIEW OF SYSTEMS:  Constitutional: As per HPI  HEENT: Within normal limits  CV: As per HPI  Resp: As per HPI  GI: Within normal limits   : Within normal limits  Musculoskeletal: Within normal limits  Skin: Within normal limits  Neurological: Within normal limits  Psychiatric: Within normal limits  Endocrine: Within normal limits  Hematologic/Lymphatic: Within normal limits  Allergic/Immunologic: Within normal limits    [x] All other systems negative                                                                                                                                                                  OBJECTIVE:      I&O's Detail    15 May 2018 07:  -  16 May 2018 07:00  --------------------------------------------------------  IN:    Oral Fluid: 965 mL  Total IN: 965 mL    OUT:    Voided: 1000 mL  Total OUT: 1000 mL    Total NET: -35 mL      16 May 2018 07:  -  16 May 2018 10:42  --------------------------------------------------------  IN:  Total IN: 0 mL    OUT:    Voided: 200 mL  Total OUT: 200 mL    Total NET: -200 mL    Daily Weight in k.3 (16 May 2018 05:42)    POCT Blood Glucose.: 114 mg/dL (16 May 2018 07:26)  POCT Blood Glucose.: 142 mg/dL (15 May 2018 20:40)  POCT Blood Glucose.: 162 mg/dL (15 May 2018 18:08)  POCT Blood Glucose.: 222 mg/dL (15 May 2018 11:17)    PHYSICAL EXAM:  ICU Vital Signs Last 24 Hrs  T(C): 36.4 (16 May 2018 05:42), Max: 36.6 (15 May 2018 16:53)  T(F): 97.6 (16 May 2018 05:42), Max: 97.9 (15 May 2018 20:54)  HR: 72 (16 May 2018 05:42) (72 - 94)  BP: 111/60 (16 May 2018 05:42) (111/60 - 119/65)  BP(mean): --  ABP: --  ABP(mean): --  RR: 18 (16 May 2018 05:42) (18 - 18)  SpO2: 96% (16 May 2018 05:42) (95% - 96%)    General: Awake. Alert. Cooperative. No distress. Appears stated age 	  HEENT:   Atraumatic. Normocephalic. Anicteric. Normal oral mucosa, PERRL, EOMI  Neck: Supple. Trachea midline. Thyroid without enlargement/tenderness/nodules. No carotid bruit. No JVD	  Cardiovascular: Regular rate and rhythm. S1 S2 normal. No murmurs, rubs or gallops  Respiratory: Respirations unlabored. Clear to auscultation and percussion bilaterally  Abdomen: Soft. Non-tender. Non-distended. No organomegaly. No masses. Normal bowel sounds	  Extremities: Warm to touch. No clubbing or cyanosis. No pedal edema.  Pulses: 2+ peripheral pulses all extremities	  Skin: Normal skin color. No rashes or lesions. No ecchymoses, No cyanosis. Warm to touch  Lymph Nodes: Cervical, supraclavicular and axillary nodes normal  Neurological: Motor and sensory examination equal and normal. A and O x 3  Psychiatry: Appropriate mood and affect.      LABS:                          11.2   7.4   )-----------( 104      ( 16 May 2018 02:26 )             34.0                         12.1   3.8   )-----------( 88       ( 15 May 2018 05:11 )             36.3     05-16    141  |  99  |  36<H>  ----------------------------<  146<H>  4.4   |  28  |  1.54<H>  05-15    138  |  100  |  29<H>  ----------------------------<  177<H>  4.3   |  23  |  1.42<H>    Ca      8.6          Ca      8.6      05-15    Phos    3.2     05-15      Mg       2.1     05-15    TPro  7.1  /  Alb  3.8  /  TBili  0.5  /  DBili  x   /  AST  37  /  ALT  51<H>  /  AlkPhos  87  05-15    TPro  7.4  /  Alb  3.8  /  TBili  0.6  /  DBili  x   /  AST  56<H>  /  ALT  56<H>  /  AlkPhos  90      PT/INR - ( 14 May 2018 19:48 )   PT: 13.3 sec;   INR: 1.22 ratio       PTT - ( 14 May 2018 19:48 )  PTT:29.2 sec    Venous Blood Gas:   @ 19:39  7.33/54/20/28/28  VBG Lactate: 1.2    Serum Pro-Brain Natriuretic Peptide: 8473 pg/mL ( @ 19:48)    CARDIAC MARKERS ( 15 May 2018 05:11 )  x     / 0.02 ng/mL / 56 U/L / x     / 3.8 ng/mL  CARDIAC MARKERS ( 14 May 2018 19:48 )  x     / 0.03 ng/mL / 81 U/L / x     / 4.5 ng/mL    < from: Transthoracic Echocardiogram (05.15.18 @ 13:24) >    Patient name: LUBNA VALLE  YOB: 1925   Age: 89 (M)   MR#: [id number]  Study Date: 5/15/2018  [facility] RDCS  Study quality: Technically good  Referring Physician: Daniel M Paget, MD  Blood Pressure: 127/72 mmHg  Height: 175 cm  Weight: 73 kg  BSA: 1.9 m2  Heart Rate: 67 mmHg  ------------------------------------------------------------------------  PROCEDURE: Transthoracic echocardiogram with 2-D, M-Mode  and complete spectral and color flow Doppler.  INDICATION: Syncope and collapse (R55)  ------------------------------------------------------------------------  Dimensions:    Normal Values:  LA:     4.2    2.0 - 4.0 cm  Ao:     3.6    2.0 - 3.8 cm  SEPTUM: 1.0    0.6 - 1.2 cm  PWT:   0.9    0.6 - 1.1 cm  LVIDd:  5.3    3.0 - 5.6 cm  LVIDs:  4.6    1.8 - 4.0 cm  Derived variables:  LVMI: 99 g/m2  RWT: 0.33  Fractional short: 13 %  EF (Visual Estimate): 25-30 %  Doppler Peak Velocity (m/sec): AoV=2.8  ------------------------------------------------------------------------  Observations:  Mitral Valve: Mitral annular calcification and calcified  mitral leaflets with decreased diastolic opening. Mild  mitral regurgitation.  Aortic Valve/Aorta: Calcified aortic valve with decreased  opening. Peak transaortic valve gradient equals 31 mm Hg,  mean transaortic valve gradient equals 17 mm Hg, estimated  aortic valve area equals 0.8 sqcm (by continuity equation),  aortic valve velocity time integral equals 56 cm,  consistent with severe aortic stenosis. Mild aortic  regurgitation.  Peak left ventricular outflow tract  gradient equals 2 mm Hg, mean gradient is equal to 1 mm Hg,  LVOT velocity time integral equals 12 cm.  Aortic Root: 3.6 cm.  Left Atrium: Normal left atrium. LA volume index = 32  cc/m2.  Left Ventricle: Severe segmental left ventricular systolic  dysfunction. The mid anterior septum, the apical inferior  wall, the apical anterior wall, the apical septum, the  apical lateral wall, the basal anterior wall, the basal  anterior septum, the basal inferior wall, the basal  anterolateral wall, the mid anterior wall, the mid  inferoseptum, and the apical cap are hypokinetic. The mid  inferolateral wall, the basal  inferolateral wall, the mid  inferior wall,the mid anterolateral wall, and the basal  inferoseptum are akinetic.  Normal left ventricular  internal dimensions and wall thicknesses. Moderate  diastolic dysfunction (Stage II).  Right Heart: Mild right atrial enlargement. Right  ventricular enlargement with normal right ventricular  systolic function. A device wire is noted in the right  heart. Normal tricuspid valve. Mild tricuspid  regurgitation. Pulmonic valve not well visualized, probably  normal.  Pericardium/Pleura: Normal pericardium with no pericardial  effusion.  Hemodynamic: Estimated right atrial pressure is 10 - 15 mm  Hg. Estimated right ventricular systolic pressure equals  33.04 - 38.04 mm Hg, assuming right atrial pressure equals  10 - 15 mm Hg, consistent with normal pulmonary  hypertension.  ------------------------------------------------------------------------  Conclusions:  1. Calcified aortic valve with decreased opening. Peak  transaortic valve gradient equals 31 mm Hg, mean  transaortic valve gradient equals 17 mm Hg, estimated  aortic valve area equals 0.8 sqcm (by continuity equation),  aortic valve velocity time integral equals 56 cm,  consistent with severe aortic stenosis. Mild aortic  regurgitation.  2. Severe segmental left ventricular systolic dysfunction.  The mid anterior septum, the apical inferior wall, the  apical anterior wall, the apical septum, the apical lateral  wall, the basal anterior wall, the basal anterior septum,  the basal inferior wall, the basal anterolateral wall, the  mid anterior wall, the mid inferoseptum, and the apical cap  are hypokinetic. The mid inferolateral wall, the basal  inferolateral wall, the mid inferior wall, the mid  anterolateral wall, and the basal inferoseptum are  akinetic.  3. Moderate diastolic dysfunction (Stage II).  4. Right ventricular enlargement with normal right  ventricular systolic function. A device wire is noted in  the right heart.  *** Compared with echocardiogram of 2015, there has  been an interval increase in gradients and velocities  across the aortic valve.  ------------------------------------------------------------------------  Confirmed on  5/15/2018 - 15:55:06 by Sunil De León M.D.  ------------------------------------------------------------------------    < end of copied text >  ---------------------------------------------------------------------------------------------------------    MICROBIOLOGY:       RADIOLOGY:  [x ] Chest radiographs reviewed and interpreted by me    < from: Xray Chest 1 View AP/PA (18 @ 22:29) >    EXAM:  XR CHEST AP OR PA 1V                            PROCEDURE DATE:  2018      INTERPRETATION:  CLINICAL INFORMATION: CHF, COPD.    TECHNIQUE: AP view of the chest.    COMPARISON: Chest x-ray 10/18/2017.    FINDINGS:     The lungs are clear.  A left chest pacemaker is in place.  Suture anchors noted at the humeral heads.    IMPRESSION:   Clear lungs.    KENDRICK BLISS M.D., RADIOLOGY RESIDENT  This document has been electronically signed.  SAM HALEY M.D., ATTENDINGRADIOLOGIST  This document has been electronically signed. May 15 2018  9:56AM     < end of copied text >  ---------------------------------------------------------------------------------------------------------  < from: [facility] Duplex Lower Ext Vein Scan, Chandana (05.16.18 @ 08:14) >    EXAM:  DUPLEX SCAN EXT VEINS LOWER BI                            PROCEDURE DATE:  2018      INTERPRETATION:  CLINICAL INFORMATION: Lower extremity swelling, rule out   DVT.    COMPARISON: A prior examination, dated 2013, showed no DVT..    TECHNIQUE: Duplex sonography of the BILATERAL LOWER extremities with   color and spectral Doppler, with and without compression.      FINDINGS: There is edema affecting the right and left lower extremities,   more severe on the left.    There is normal compressibility of the bilateral common femoral, femoral   and popliteal veins. No calf vein thrombosis is detected.    Doppler examination shows normal spontaneous and phasic flow.    IMPRESSION:     No evidence of bilateral lower extremitydeep venous thrombosis.    JUAN JOSE PACK M.D., ATTENDING RADIOLOGIST  This document has been electronically signed. May 15 2018  4:31PM     < end of copied text >  --------------------------------------------------------------------------------------------------------- [facility]  CONSULT NOTE    HPI: 89 year old gentleman, former smoker, with history of COPD followed by Dr. Morris of [facility] who is unavailable to see the patient. The patient is also followed by Dr. Wolf Paiz for a severe ischemic cardiomyopathy with LVEF ~ 25 - 30% complicated by syncope due to VT requiring placement of an AICD. The patient had been on a cruise over the last month sailing from [place] to [place]. Multiple passengers on the ship developed cold like symptoms with severe cough. At the end of the trip, the patient developed shortness of breath associated with a non-productive cough, chest congestion and wheeze. He developed lower extremity swelling as he was only intermittently compliant with his medication regimen (including diuretics) due to excursions off the ship. While flying back to [place], the patient lost consciousness on the plane for ~ 5 minutes. There was no seizure activity and the patient awoke without confusion. He was advised to take lasix and to go to the ER upon landing. The patient has no fevers, chills or sweats. No chest pain/pressure or palpitations. Interrogation of the ICD revealed no arrhythmias. Syncope has been attributed to dehydration with hypotension on the plane. Asked to evaluate.    PMHX:  CAD (coronary artery disease)/MI (myocardial infarction)/ischemic cardiomyopathy/CHF (congestive heart failure)  COPD (chronic obstructive pulmonary disease)  Gout  GERD (gastroesophageal reflux disease)  Hypothyroid  BPH (Benign Prostatic Hyperplasia)  HLD (hyperlipidemia)  HTN (hypertension)  VT    PSHX:  AICD implantation  S/P angioplasty  H/O repair of rotator cuff  AAA (abdominal aortic aneurysm)  S/P knee replacement, right  Achilles rupture  Hernia, inguinal, bilateral  Cataract      FAMILY HISTORY:  Family history of CHF (congestive heart failure) (Father)  Family history of hemolytic uremic syndrome (Father)      SOCIAL HISTORY:  ; retired; lives with wife; social EtOH; remote smoker    Pulmonary Medications:   ALBUTerol/ipratropium for Nebulization 3 milliLiter(s) Nebulizer every 6 hours      Antimicrobials:  azithromycin   Tablet 500 milliGRAM(s) Oral daily  cefTRIAXone   IVPB 1 Gram(s) IV Intermittent every 24 hours      Cardiology:  carvedilol 12.5 milliGRAM(s) Oral every 12 hours  furosemide   Injectable 20 milliGRAM(s) IV Push daily  sacubitril 97 mG/valsartan 103 mG 1 Tablet(s) Oral two times a day  tamsulosin 0.8 milliGRAM(s) Oral daily      Other:  allopurinol 300 milliGRAM(s) Oral daily  cholecalciferol 1000 Unit(s) Oral daily  colchicine 0.6 milliGRAM(s) Oral daily  dextrose 40% Gel 15 Gram(s) Oral once PRN  dextrose 5%. 1000 milliLiter(s) IV Continuous <Continuous>  dextrose 50% Injectable 12.5 Gram(s) IV Push once  dextrose 50% Injectable 25 Gram(s) IV Push once  dextrose 50% Injectable 25 Gram(s) IV Push once  glucagon  Injectable 1 milliGRAM(s) IntraMuscular once PRN  heparin  Injectable 5000 Unit(s) SubCutaneous every 8 hours  insulin lispro (HumaLOG) corrective regimen sliding scale   SubCutaneous three times a day before meals  insulin lispro (HumaLOG) corrective regimen sliding scale   SubCutaneous at bedtime  lactobacillus acidophilus 1 Tablet(s) Oral daily  levothyroxine 125 MICROGram(s) Oral daily  pantoprazole    Tablet 40 milliGRAM(s) Oral before breakfast  predniSONE   Tablet 60 milliGRAM(s) Oral daily  simvastatin 10 milliGRAM(s) Oral at bedtime      Allergies    No Known Allergies    HOME MEDICATIONS: see  H & P    REVIEW OF SYSTEMS:  Constitutional: As per HPI  HEENT: Within normal limits  CV: As per HPI  Resp: As per HPI  GI: Within normal limits   : Within normal limits  Musculoskeletal: Within normal limits  Skin: Within normal limits  Neurological: Within normal limits  Psychiatric: Within normal limits  Endocrine: Within normal limits  Hematologic/Lymphatic: Within normal limits  Allergic/Immunologic: Within normal limits    [x] All other systems negative                                                                                                                                                                  OBJECTIVE:      I&O's Detail    15 May 2018 07:  -  16 May 2018 07:00  --------------------------------------------------------  IN:    Oral Fluid: 965 mL  Total IN: 965 mL    OUT:    Voided: 1000 mL  Total OUT: 1000 mL    Total NET: -35 mL      16 May 2018 07:  -  16 May 2018 10:42  --------------------------------------------------------  IN:  Total IN: 0 mL    OUT:    Voided: 200 mL  Total OUT: 200 mL    Total NET: -200 mL    Daily Weight in k.3 (16 May 2018 05:42)    POCT Blood Glucose.: 114 mg/dL (16 May 2018 07:26)  POCT Blood Glucose.: 142 mg/dL (15 May 2018 20:40)  POCT Blood Glucose.: 162 mg/dL (15 May 2018 18:08)  POCT Blood Glucose.: 222 mg/dL (15 May 2018 11:17)    PHYSICAL EXAM:  ICU Vital Signs Last 24 Hrs  T(C): 36.4 (16 May 2018 05:42), Max: 36.6 (15 May 2018 16:53)  T(F): 97.6 (16 May 2018 05:42), Max: 97.9 (15 May 2018 20:54)  HR: 72 (16 May 2018 05:42) (72 - 94)  BP: 111/60 (16 May 2018 05:42) (111/60 - 119/65)  BP(mean): --  ABP: --  ABP(mean): --  RR: 18 (16 May 2018 05:42) (18 - 18)  SpO2: 96% (16 May 2018 05:42) (95% - 96%)    General: Awake. Alert. Cooperative. No distress. Appears stated age 	  HEENT:   Atraumatic. Normocephalic. Anicteric. Normal oral mucosa, PERRL, EOMI  Neck: Supple. Trachea midline. Thyroid without enlargement/tenderness/nodules. No carotid bruit. (+) JVD  Cardiovascular: Regular rate and rhythm. S1 S2 normal. II/VI harsh systolic murmur; II/VI diastolic murmur c/w AI  Respiratory: Respirations unlabored. Cough with deep inspiration. Diffuse rhonchi and wheeze  Abdomen: Soft. Non-tender. Non-distended. No organomegaly. No masses. Normal bowel sounds	  Extremities: Warm to touch. No clubbing or cyanosis. Decreased lower extremity edema  Pulses: 2+ peripheral pulses all extremities	  Skin: Normal skin color. No rashes or lesions. No ecchymoses, No cyanosis. Warm to touch  Lymph Nodes: Cervical, supraclavicular and axillary nodes normal  Neurological: Motor and sensory examination equal and normal. A and O x 3  Psychiatry: Appropriate mood and affect.      LABS:                          11.2   7.4   )-----------( 104      ( 16 May 2018 02:26 )             34.0                         12.1   3.8   )-----------( 88       ( 15 May 2018 05:11 )             36.3         141  |  99  |  36<H>  ----------------------------<  146<H>  4.4   |  28  |  1.54<H>    05-15    138  |  100  |  29<H>  ----------------------------<  177<H>  4.3   |  23  |  1.42<H>    Ca      8.6          Ca      8.6      05-15    Phos    3.2     05-15      Mg       2.1     05-15    TPro  7.1  /  Alb  3.8  /  TBili  0.5  /  DBili  x   /  AST  37  /  ALT  51<H>  /  AlkPhos  87  05-15    TPro  7.4  /  Alb  3.8  /  TBili  0.6  /  DBili  x   /  AST  56<H>  /  ALT  56<H>  /  AlkPhos  90      PT/INR - ( 14 May 2018 19:48 )   PT: 13.3 sec;   INR: 1.22 ratio       PTT - ( 14 May 2018 19:48 )  PTT:29.2 sec    Venous Blood Gas:   @ 19:39  7.33/54/20//28  VBG Lactate: 1.2    Serum Pro-Brain Natriuretic Peptide: 8473 pg/mL ( @ 19:48)    CARDIAC MARKERS ( 15 May 2018 05:11 )  x     / 0.02 ng/mL / 56 U/L / x     / 3.8 ng/mL  CARDIAC MARKERS ( 14 May 2018 19:48 )  x     / 0.03 ng/mL / 81 U/L / x     / 4.5 ng/mL    < from: Transthoracic Echocardiogram (05.15.18 @ 13:24) >    Patient name: LUBNA VALLE  YOB: 1925   Age: 89 (M)   MR#: [id number]  Study Date: 5/15/2018  [facility]  Study quality: Technically good  Referring Physician: Daniel M Paget, MD  Blood Pressure: 127/72 mmHg  Height: 175 cm  Weight: 73 kg  BSA: 1.9 m2  Heart Rate: 67 mmHg  ------------------------------------------------------------------------  PROCEDURE: Transthoracic echocardiogram with 2-D, M-Mode  and complete spectral and color flow Doppler.  INDICATION: Syncope and collapse (R55)  ------------------------------------------------------------------------  Dimensions:    Normal Values:  LA:     4.2    2.0 - 4.0 cm  Ao:     3.6    2.0 - 3.8 cm  SEPTUM: 1.0    0.6 - 1.2 cm  PWT:   0.9    0.6 - 1.1 cm  LVIDd:  5.3    3.0 - 5.6 cm  LVIDs:  4.6    1.8 - 4.0 cm  Derived variables:  LVMI: 99 g/m2  RWT: 0.33  Fractional short: 13 %  EF (Visual Estimate): 25-30 %  Doppler Peak Velocity (m/sec): AoV=2.8  ------------------------------------------------------------------------  Observations:  Mitral Valve: Mitral annular calcification and calcified  mitral leaflets with decreased diastolic opening. Mild  mitral regurgitation.  Aortic Valve/Aorta: Calcified aortic valve with decreased  opening. Peak transaortic valve gradient equals 31 mm Hg,  mean transaortic valve gradient equals 17 mm Hg, estimated  aortic valve area equals 0.8 sqcm (by continuity equation),  aortic valve velocity time integral equals 56 cm,  consistent with severe aortic stenosis. Mild aortic  regurgitation.  Peak left ventricular outflow tract  gradient equals 2 mm Hg, mean gradient is equal to 1 mm Hg,  LVOT velocity time integral equals 12 cm.  Aortic Root: 3.6 cm.  Left Atrium: Normal left atrium. LA volume index = 32  cc/m2.  Left Ventricle: Severe segmental left ventricular systolic  dysfunction. The mid anterior septum, the apical inferior  wall, the apical anterior wall, the apical septum, the  apical lateral wall, the basal anterior wall, the basal  anterior septum, the basal inferior wall, the basal  anterolateral wall, the mid anterior wall, the mid  inferoseptum, and the apical cap are hypokinetic. The mid  inferolateral wall, the basal  inferolateral wall, the mid  inferior wall,the mid anterolateral wall, and the basal  inferoseptum are akinetic.  Normal left ventricular  internal dimensions and wall thicknesses. Moderate  diastolic dysfunction (Stage II).  Right Heart: Mild right atrial enlargement. Right  ventricular enlargement with normal right ventricular  systolic function. A device wire is noted in the right  heart. Normal tricuspid valve. Mild tricuspid  regurgitation. Pulmonic valve not well visualized, probably  normal.  Pericardium/Pleura: Normal pericardium with no pericardial  effusion.  Hemodynamic: Estimated right atrial pressure is 10 - 15 mm  Hg. Estimated right ventricular systolic pressure equals  33.04 - 38.04 mm Hg, assuming right atrial pressure equals  10 - 15 mm Hg, consistent with normal pulmonary  hypertension.  ------------------------------------------------------------------------  Conclusions:  1. Calcified aortic valve with decreased opening. Peak  transaortic valve gradient equals 31 mm Hg, mean  transaortic valve gradient equals 17 mm Hg, estimated  aortic valve area equals 0.8 sqcm (by continuity equation),  aortic valve velocity time integral equals 56 cm,  consistent with severe aortic stenosis. Mild aortic  regurgitation.  2. Severe segmental left ventricular systolic dysfunction.  The mid anterior septum, the apical inferior wall, the  apical anterior wall, the apical septum, the apical lateral  wall, the basal anterior wall, the basal anterior septum,  the basal inferior wall, the basal anterolateral wall, the  mid anterior wall, the mid inferoseptum, and the apical cap  are hypokinetic. The mid inferolateral wall, the basal  inferolateral wall, the mid inferior wall, the mid  anterolateral wall, and the basal inferoseptum are  akinetic.  3. Moderate diastolic dysfunction (Stage II).  4. Right ventricular enlargement with normal right  ventricular systolic function. A device wire is noted in  the right heart.  *** Compared with echocardiogram of 2015, there has  been an interval increase in gradients and velocities  across the aortic valve.  ------------------------------------------------------------------------  Confirmed on  5/15/2018 - 15:55:06 by Sunil De León M.D.  ------------------------------------------------------------------------    < end of copied text >  ---------------------------------------------------------------------------------------------------------    MICROBIOLOGY:       RADIOLOGY:  [x ] Chest radiographs reviewed and interpreted by me    < from: Xray Chest 1 View AP/PA (18 @ 22:29) >    EXAM:  XR CHEST AP OR PA 1V                            PROCEDURE DATE:  2018      INTERPRETATION:  CLINICAL INFORMATION: CHF, COPD.    TECHNIQUE: AP view of the chest.    COMPARISON: Chest x-ray 10/18/2017.    FINDINGS:     The lungs are clear.  A left chest pacemaker is in place.  Suture anchors noted at the humeral heads.    IMPRESSION:   Clear lungs.    KENDRICK BLISS M.D., RADIOLOGY RESIDENT  This document has been electronically signed.  SAM HALEY M.D., ATTENDINGRADIOLOGIST  This document has been electronically signed. May 15 2018  9:56AM     < end of copied text >  ---------------------------------------------------------------------------------------------------------  < from: [facility] Duplex Lower Ext Vein Scan, Chandana (.18 @ 08:14) >    EXAM:  DUPLEX SCAN EXT VEINS LOWER BI                            PROCEDURE DATE:  2018      INTERPRETATION:  CLINICAL INFORMATION: Lower extremity swelling, rule out   DVT.    COMPARISON: A prior examination, dated 2013, showed no DVT..    TECHNIQUE: Duplex sonography of the BILATERAL LOWER extremities with   color and spectral Doppler, with and without compression.      FINDINGS: There is edema affecting the right and left lower extremities,   more severe on the left.    There is normal compressibility of the bilateral common femoral, femoral   and popliteal veins. No calf vein thrombosis is detected.    Doppler examination shows normal spontaneous and phasic flow.    IMPRESSION:     No evidence of bilateral lower extremitydeep venous thrombosis.    JUAN JOSE PACK M.D., ATTENDING RADIOLOGIST  This document has been electronically signed. May 15 2018  4:31PM     < end of copied text >  ---------------------------------------------------------------------------------------------------------

## 2018-05-16 NOTE — PHYSICAL THERAPY INITIAL EVALUATION ADULT - PERTINENT HX OF CURRENT PROBLEM, REHAB EVAL
89yoM with COPD, CAD, AAA, HTN, CHF, GERD, p/w syncope and SOB, (-) DVT for b/l lEs, CXR (-), ECG nonsignificant.

## 2018-05-16 NOTE — DISCHARGE NOTE ADULT - PLAN OF CARE
no events of arrhythmia noted on the interrogation of your AICD  continue on current cardiac medications your breathing will be improved on the medications prescribed.  You are being treated for bronchitis. continue on antibiotics, prednisone and nebulizers that were started in the hospital and follow up with your pulmonologist in 1 week.  Use Pulmicort (budesonide) nebs after Albuterol/atrovent (duonebs) and rinse mouth with water after use. Low salt, low fat diet.   Weight management.   Take medications as prescribed.    No smoking.  Follow up appointments with your doctor(s)  as instructed. you will no longer have more syncopal episodes on current cardiac medications

## 2018-05-16 NOTE — PROGRESS NOTE ADULT - PROBLEM SELECTOR PLAN 1
Pt with dyspnea, LE swelling in setting of inconsistent lasix intake on cruise ship. s/p IV lasix with clinical improvement. TTE demonstrated severely reduced LV function (EF 25-30%). Pt currently euvolemic s/p IV lasix  - resume home lasix 40mg regimen  - daily weight, I/O's  - appreciate Dr. Paiz's recs

## 2018-05-22 ENCOUNTER — LABORATORY RESULT (OUTPATIENT)
Age: 83
End: 2018-05-22

## 2018-05-22 ENCOUNTER — APPOINTMENT (OUTPATIENT)
Dept: CARDIOLOGY | Facility: CLINIC | Age: 83
End: 2018-05-22
Payer: MEDICARE

## 2018-05-22 ENCOUNTER — APPOINTMENT (OUTPATIENT)
Dept: INTERNAL MEDICINE | Facility: CLINIC | Age: 83
End: 2018-05-22
Payer: MEDICARE

## 2018-05-22 VITALS
TEMPERATURE: 97.8 F | BODY MASS INDEX: 28.12 KG/M2 | HEIGHT: 66 IN | SYSTOLIC BLOOD PRESSURE: 128 MMHG | HEART RATE: 79 BPM | DIASTOLIC BLOOD PRESSURE: 74 MMHG | WEIGHT: 175 LBS

## 2018-05-22 VITALS
TEMPERATURE: 97.4 F | BODY MASS INDEX: 28.25 KG/M2 | HEART RATE: 73 BPM | OXYGEN SATURATION: 92 % | SYSTOLIC BLOOD PRESSURE: 124 MMHG | WEIGHT: 175 LBS | DIASTOLIC BLOOD PRESSURE: 70 MMHG

## 2018-05-22 DIAGNOSIS — R55 SYNCOPE AND COLLAPSE: ICD-10-CM

## 2018-05-22 PROCEDURE — 94010 BREATHING CAPACITY TEST: CPT

## 2018-05-22 PROCEDURE — 99495 TRANSJ CARE MGMT MOD F2F 14D: CPT | Mod: 25

## 2018-05-22 PROCEDURE — 99215 OFFICE O/P EST HI 40 MIN: CPT

## 2018-05-22 PROCEDURE — 36415 COLL VENOUS BLD VENIPUNCTURE: CPT

## 2018-05-22 RX ORDER — SACUBITRIL AND VALSARTAN 49; 51 MG/1; MG/1
49-51 TABLET, FILM COATED ORAL
Qty: 60 | Refills: 1 | Status: DISCONTINUED | COMMUNITY
Start: 2018-01-16 | End: 2018-05-22

## 2018-05-22 NOTE — ASSESSMENT
[FreeTextEntry1] : Cough\par Likely multifactorial-\par ? related to fluid overload\par ? related to GERD\par ? related to postnasal drip with allergic rhinitis\par ? related to recent URI triggering acute bronchitis and COPD exacerbation\par ? related to drug effect\par R/O tracheomalacia\par \par He refused EKG\par Seeing Dr. Paiz today\par Diuretics as per Cardiology\par Low salt diet\par Daily weight\par Fluid restriction\par CBC, CMP, BNP sent\par Can discuss if cardiac meds triggering cough with Dr. Paiz\par GERD diet/precautions\par Daily PPI\par GI f/u\par Flonase and Allegra daily\par Allergy/ENT f/u\par See scanned FVL report - reviewed with patient/wife\par Complete course of prednisone\par On budesonide nebs bid and Duoneb nebs qid\par Complete course of Augmentin and Zithromax\par Add Benzonatate 100 mg tid to control cough\par Can use Hydromet at hs for severe cough\par To do dynamic CT of chest \par \par RTC 1-2 weeks and as needed\par To call for any medical issues\par To call if worse or if not improving\par RX's renewed at his request\par D/W patient and wife in detail and all questions answered

## 2018-05-22 NOTE — HISTORY OF PRESENT ILLNESS
[Post-hospitalization from ___ Hospital] : Post-hospitalization from [unfilled] Hospital [Admitted on: ___] : The patient was admitted on [unfilled] [Discharged on ___] : discharged on [unfilled] [Discharge Summary] : discharge summary [Pertinent Labs] : pertinent labs [Radiology Findings] : radiology findings [Discharge Med List] : discharge medication list [Med Reconciliation] : medication reconciliation has been completed [Patient Contacted By: ____] : and contacted by [unfilled] [FreeTextEntry2] : Admitted with syncope.\par Seen by cardiology.\par Had cough and sputum.\par Seen by pulmonary and treated with nebs and steroids and antibiotics for COPD and AECB.\par Feels lousy.\par Weight up 10 pounds.\par Has edema.\par No chest pain.\par Chest congestion.\par Mild SOB.\par No f/c/s.\par No palpitations, diaphoresis, calf pain, n/v.\par Cough - mostly dry with no hemoptysis.

## 2018-05-22 NOTE — HEALTH RISK ASSESSMENT
[Intercurrent ED visits] : went to ED [Intercurrent hospitalizations] : was admitted to the hospital  [No falls in past year] : Patient reported no falls in the past year [0] : 2) Feeling down, depressed, or hopeless: Not at all (0) [Patient refused colonoscopy] : Patient refused colonoscopy [Reviewed and Updated] :  reviewed and updated [None] : None [With Family] : lives with family [# of Members in Household ___] :  household currently consist of [unfilled] member(s) [Retired] : retired [College] : College [] :  [Feels Safe at Home] : Feels safe at home [Fully functional (bathing, dressing, toileting, transferring, walking, feeding)] : Fully functional (bathing, dressing, toileting, transferring, walking, feeding) [Fully functional (using the telephone, shopping, preparing meals, housekeeping, doing laundry, using] : Fully functional and needs no help or supervision to perform IADLs (using the telephone, shopping, preparing meals, housekeeping, doing laundry, using transportation, managing medications and managing finances) [Smoke Detector] : smoke detector [Carbon Monoxide Detector] : carbon monoxide detector [Seat Belt] :  uses seat belt [Sunscreen] : uses sunscreen [Discussed at today's visit] : Advance Directives Discussed at today's visit [No changes since last discussed ___] : No changes since last discussed  [unfilled] [Designated Healthcare Proxy] : Designated healthcare proxy [Name: ___] : Health Care Proxy's Name: [unfilled]  [Relationship: ___] : Relationship: [unfilled] [] : No [de-identified] : urology/cardiology [[id number]] : 0 [Change in mental status noted] : No change in mental status noted [Language] : denies difficulty with language [Behavior] : denies difficulty with behavior [Learning/Retaining New Information] : denies difficulty learning/retaining new information [Handling Complex Tasks] : denies difficulty handling complex tasks [Reasoning] : denies difficulty with reasoning [Spatial Ability and Orientation] : denies difficulty with spatial ability and orientation [Sexually Active] : not sexually active [Reports changes in hearing] : Reports no changes in hearing [Reports changes in vision] : Reports no changes in vision [Reports changes in dental health] : Reports no changes in dental health [Guns at Home] : no guns at home [Travel to Developing Areas] : does not  travel to developing areas [TB Exposure] : is not being exposed to tuberculosis [Caregiver Concerns] : does not have caregiver concerns [MammogramDate] : NA [PapSmearDate] : NA [BoneDensityDate] : NA

## 2018-05-22 NOTE — PHYSICAL EXAM
[No Acute Distress] : no acute distress [Well Nourished] : well nourished [Well Developed] : well developed [Well-Appearing] : well-appearing [Normal Voice/Communication] : normal voice/communication [Normal Sclera/Conjunctiva] : normal sclera/conjunctiva [PERRL] : pupils equal round and reactive to light [EOMI] : extraocular movements intact [Normal Outer Ear/Nose] : the outer ears and nose were normal in appearance [Normal Oropharynx] : the oropharynx was normal [No JVD] : no jugular venous distention [Supple] : supple [No Respiratory Distress] : no respiratory distress  [No Accessory Muscle Use] : no accessory muscle use [Normal Rate] : normal rate  [Regular Rhythm] : with a regular rhythm [Normal S1, S2] : normal S1 and S2 [No Carotid Bruits] : no carotid bruits [No Extremity Clubbing/Cyanosis] : no extremity clubbing/cyanosis [Soft] : abdomen soft [Non Tender] : non-tender [Normal Bowel Sounds] : normal bowel sounds [No CVA Tenderness] : no CVA  tenderness [No Spinal Tenderness] : no spinal tenderness [No Rash] : no rash [Normal Gait] : normal gait [Coordination Grossly Intact] : coordination grossly intact [No Focal Deficits] : no focal deficits [Speech Grossly Normal] : speech grossly normal [Normal Affect] : the affect was normal [Alert and Oriented x3] : oriented to person, place, and time [Moderate Complexity requires multiple possible diagnoses and/or the management options, moderate complexity of the medical data (tests, etc.) to be reviewed, and moderate risk of significant complications, morbidity, and/or mortality as well as co-morbidi] : Moderate Complexity [de-identified] : No ST; wears right HA [de-identified] : No TM; no stridor [de-identified] : R=16; good air entry; no wheezing [de-identified] : murmur unchanged [de-identified] : normal radial pulses; b/l LE pitting edema; no cords

## 2018-05-22 NOTE — REVIEW OF SYSTEMS
[Recent Change In Weight] : ~T recent weight change [Vision Problems] : vision problems [Hearing Loss] : hearing loss [Lower Ext Edema] : lower extremity edema [Cough] : cough [Dyspnea on Exertion] : dyspnea on exertion [Joint Pain] : joint pain [Back Pain] : back pain [Memory Loss] : memory loss [Negative] : Heme/Lymph

## 2018-05-22 NOTE — COUNSELING
[Weight management counseling provided] : Weight management [Healthy eating counseling provided] : healthy eating [Activity counseling provided] : activity [Weight Self Once Weekly] : Weight self once weekly [Low Fat Diet] : Low fat diet [Low Salt Diet] : Low salt diet [Walking] : Walking [None] : None [Good understanding] : Patient has a good understanding of lifestyle changes and the steps needed to achieve self management goals

## 2018-05-23 PROCEDURE — 85027 COMPLETE CBC AUTOMATED: CPT

## 2018-05-23 PROCEDURE — 85014 HEMATOCRIT: CPT

## 2018-05-23 PROCEDURE — 82803 BLOOD GASES ANY COMBINATION: CPT

## 2018-05-23 PROCEDURE — 82330 ASSAY OF CALCIUM: CPT

## 2018-05-23 PROCEDURE — 85610 PROTHROMBIN TIME: CPT

## 2018-05-23 PROCEDURE — 83036 HEMOGLOBIN GLYCOSYLATED A1C: CPT

## 2018-05-23 PROCEDURE — 84484 ASSAY OF TROPONIN QUANT: CPT

## 2018-05-23 PROCEDURE — 93306 TTE W/DOPPLER COMPLETE: CPT

## 2018-05-23 PROCEDURE — 85730 THROMBOPLASTIN TIME PARTIAL: CPT

## 2018-05-23 PROCEDURE — 82553 CREATINE MB FRACTION: CPT

## 2018-05-23 PROCEDURE — 96374 THER/PROPH/DIAG INJ IV PUSH: CPT

## 2018-05-23 PROCEDURE — 84132 ASSAY OF SERUM POTASSIUM: CPT

## 2018-05-23 PROCEDURE — 82962 GLUCOSE BLOOD TEST: CPT

## 2018-05-23 PROCEDURE — 83735 ASSAY OF MAGNESIUM: CPT

## 2018-05-23 PROCEDURE — 97162 PT EVAL MOD COMPLEX 30 MIN: CPT

## 2018-05-23 PROCEDURE — 83605 ASSAY OF LACTIC ACID: CPT

## 2018-05-23 PROCEDURE — 93970 EXTREMITY STUDY: CPT

## 2018-05-23 PROCEDURE — 94640 AIRWAY INHALATION TREATMENT: CPT

## 2018-05-23 PROCEDURE — 82550 ASSAY OF CK (CPK): CPT

## 2018-05-23 PROCEDURE — 99285 EMERGENCY DEPT VISIT HI MDM: CPT | Mod: 25

## 2018-05-23 PROCEDURE — 83880 ASSAY OF NATRIURETIC PEPTIDE: CPT

## 2018-05-23 PROCEDURE — 93005 ELECTROCARDIOGRAM TRACING: CPT

## 2018-05-23 PROCEDURE — 82435 ASSAY OF BLOOD CHLORIDE: CPT

## 2018-05-23 PROCEDURE — 80053 COMPREHEN METABOLIC PANEL: CPT

## 2018-05-23 PROCEDURE — 84295 ASSAY OF SERUM SODIUM: CPT

## 2018-05-23 PROCEDURE — 80048 BASIC METABOLIC PNL TOTAL CA: CPT

## 2018-05-23 PROCEDURE — 82947 ASSAY GLUCOSE BLOOD QUANT: CPT

## 2018-05-23 PROCEDURE — 71045 X-RAY EXAM CHEST 1 VIEW: CPT

## 2018-05-23 PROCEDURE — 84100 ASSAY OF PHOSPHORUS: CPT

## 2018-05-23 PROCEDURE — 84443 ASSAY THYROID STIM HORMONE: CPT

## 2018-05-25 ENCOUNTER — CLINICAL ADVICE (OUTPATIENT)
Age: 83
End: 2018-05-25

## 2018-05-25 LAB
ALBUMIN SERPL ELPH-MCNC: 3.9 G/DL
ALP BLD-CCNC: 66 U/L
ALT SERPL-CCNC: 29 U/L
ANION GAP SERPL CALC-SCNC: 12 MMOL/L
AST SERPL-CCNC: 18 U/L
BASOPHILS # BLD AUTO: 0 K/UL
BASOPHILS NFR BLD AUTO: 0 %
BILIRUB SERPL-MCNC: 0.4 MG/DL
BUN SERPL-MCNC: 45 MG/DL
CALCIUM SERPL-MCNC: 8.6 MG/DL
CHLORIDE SERPL-SCNC: 95 MMOL/L
CO2 SERPL-SCNC: 31 MMOL/L
CREAT SERPL-MCNC: 1.48 MG/DL
EOSINOPHIL # BLD AUTO: 0.02 K/UL
EOSINOPHIL NFR BLD AUTO: 0.2 %
GLUCOSE SERPL-MCNC: 83 MG/DL
HCT VFR BLD CALC: 37.9 %
HGB BLD-MCNC: 11.9 G/DL
IMM GRANULOCYTES NFR BLD AUTO: 0.5 %
LYMPHOCYTES # BLD AUTO: 1.22 K/UL
LYMPHOCYTES NFR BLD AUTO: 11 %
MAN DIFF?: NORMAL
MCHC RBC-ENTMCNC: 30.6 PG
MCHC RBC-ENTMCNC: 31.4 GM/DL
MCV RBC AUTO: 97.4 FL
MONOCYTES # BLD AUTO: 1.18 K/UL
MONOCYTES NFR BLD AUTO: 10.6 %
NEUTROPHILS # BLD AUTO: 8.66 K/UL
NEUTROPHILS NFR BLD AUTO: 77.7 %
NT-PROBNP SERPL-MCNC: 9720 PG/ML
PLATELET # BLD AUTO: 136 K/UL
POTASSIUM SERPL-SCNC: 4.2 MMOL/L
PROT SERPL-MCNC: 6.7 G/DL
RBC # BLD: 3.89 M/UL
RBC # FLD: 20.7 %
SODIUM SERPL-SCNC: 138 MMOL/L
WBC # FLD AUTO: 11.14 K/UL

## 2018-05-30 ENCOUNTER — CLINICAL ADVICE (OUTPATIENT)
Age: 83
End: 2018-05-30

## 2018-06-05 ENCOUNTER — APPOINTMENT (OUTPATIENT)
Dept: INTERNAL MEDICINE | Facility: CLINIC | Age: 83
End: 2018-06-05
Payer: MEDICARE

## 2018-06-05 VITALS
DIASTOLIC BLOOD PRESSURE: 62 MMHG | HEART RATE: 77 BPM | SYSTOLIC BLOOD PRESSURE: 110 MMHG | TEMPERATURE: 97.4 F | OXYGEN SATURATION: 92 % | BODY MASS INDEX: 28.08 KG/M2 | WEIGHT: 174 LBS

## 2018-06-05 PROCEDURE — 99214 OFFICE O/P EST MOD 30 MIN: CPT | Mod: 25

## 2018-06-05 PROCEDURE — 94010 BREATHING CAPACITY TEST: CPT

## 2018-06-07 NOTE — ASSESSMENT
[FreeTextEntry1] : Cough\par Likely multifactorial-\par ? related to fluid overload\par ? related to GERD\par ? related to postnasal drip with allergic rhinitis\par ? related to recent URI triggering acute bronchitis and COPD exacerbation\par ? related to drug effect\par R/O tracheomalacia\par \par Cardiology f/u with Dr. Paiz - patient being monitored for possible TAVR\par Diuretics as per Cardiology\par Low salt diet\par Daily weight\par Fluid restriction\par Can discuss if cardiac meds triggering cough with Dr. Paiz\par GERD diet/precautions\par Daily PPI\par GI f/u\par Flonase and Allegra daily\par Allergy/ENT f/u\par See scanned FVL report - reviewed with patient/wife\par Completed course of prednisone\par Can use budesonide nebs &  Duoneb nebs bid\par Completed course of Augmentin and Zithromax\par Benzonatate 100 mg tid as needed to control cough\par Can use Hydromet at hs for severe cough\par To do dynamic CT of chest \par \par RTC 4-6 weeks and as needed\par To call for any medical issues\par To call if worse or if not improving\par RX's renewed at his request\par RX for compression hose given\par D/W patient and wife in detail and all questions answered

## 2018-06-07 NOTE — PHYSICAL EXAM
[No Acute Distress] : no acute distress [Well Nourished] : well nourished [Well Developed] : well developed [Well-Appearing] : well-appearing [Normal Voice/Communication] : normal voice/communication [Normal Sclera/Conjunctiva] : normal sclera/conjunctiva [PERRL] : pupils equal round and reactive to light [EOMI] : extraocular movements intact [Normal Outer Ear/Nose] : the outer ears and nose were normal in appearance [Normal Oropharynx] : the oropharynx was normal [No JVD] : no jugular venous distention [Supple] : supple [No Respiratory Distress] : no respiratory distress  [No Accessory Muscle Use] : no accessory muscle use [Normal Rate] : normal rate  [Regular Rhythm] : with a regular rhythm [Normal S1, S2] : normal S1 and S2 [No Carotid Bruits] : no carotid bruits [No Extremity Clubbing/Cyanosis] : no extremity clubbing/cyanosis [Soft] : abdomen soft [Non Tender] : non-tender [Normal Bowel Sounds] : normal bowel sounds [No CVA Tenderness] : no CVA  tenderness [No Spinal Tenderness] : no spinal tenderness [No Rash] : no rash [Normal Gait] : normal gait [Coordination Grossly Intact] : coordination grossly intact [No Focal Deficits] : no focal deficits [Speech Grossly Normal] : speech grossly normal [Normal Affect] : the affect was normal [Alert and Oriented x3] : oriented to person, place, and time [de-identified] : No ST; wears right HA [de-identified] : No TM; no stridor [de-identified] : R=16; good air entry; no wheezing; b/l rales [de-identified] : murmur unchanged [de-identified] : normal radial pulses; b/l LE pitting edema; no cords

## 2018-06-07 NOTE — REVIEW OF SYSTEMS
[Vision Problems] : vision problems [Hearing Loss] : hearing loss [Lower Ext Edema] : lower extremity edema [Cough] : cough [Dyspnea on Exertion] : dyspnea on exertion [Joint Pain] : joint pain [Back Pain] : back pain [Memory Loss] : memory loss [Easy Bruising] : easy bruising [Negative] : Heme/Lymph

## 2018-06-07 NOTE — HISTORY OF PRESENT ILLNESS
[Spouse] : spouse [FreeTextEntry1] : Comes in for f/u medical visit. [de-identified] : Doing a bit better.\par Weight stable.\par BP better.\par Still with b/l LE/pedal edema.\par Moving bowels.\par Good UO.\par No abdominal pain or n/v/d.\par Good appetite.\par Denies chest pain.\par Cough much improved.\par No wheezing.\par Has chronic PERRIN.\par No fevers or hemoptysis.\par No syncope.

## 2018-06-07 NOTE — HEALTH RISK ASSESSMENT
[No falls in past year] : Patient reported no falls in the past year [0] : 2) Feeling down, depressed, or hopeless: Not at all (0) [] : No [de-identified] : none [de-identified] : cardiology [[id number]] : 0

## 2018-06-18 ENCOUNTER — APPOINTMENT (OUTPATIENT)
Dept: UROLOGY | Facility: CLINIC | Age: 83
End: 2018-06-18
Payer: MEDICARE

## 2018-06-18 VITALS
HEART RATE: 76 BPM | OXYGEN SATURATION: 91 % | SYSTOLIC BLOOD PRESSURE: 112 MMHG | TEMPERATURE: 97.1 F | WEIGHT: 165 LBS | DIASTOLIC BLOOD PRESSURE: 70 MMHG | HEIGHT: 69 IN | RESPIRATION RATE: 16 BRPM | BODY MASS INDEX: 24.44 KG/M2

## 2018-06-18 DIAGNOSIS — R35.0 FREQUENCY OF MICTURITION: ICD-10-CM

## 2018-06-18 DIAGNOSIS — R97.20 ELEVATED PROSTATE, SPECIFIC ANTIGEN [PSA]: ICD-10-CM

## 2018-06-18 DIAGNOSIS — N40.1 BENIGN PROSTATIC HYPERPLASIA WITH LOWER URINARY TRACT SYMPMS: ICD-10-CM

## 2018-06-18 DIAGNOSIS — N13.8 BENIGN PROSTATIC HYPERPLASIA WITH LOWER URINARY TRACT SYMPMS: ICD-10-CM

## 2018-06-18 PROCEDURE — 99213 OFFICE O/P EST LOW 20 MIN: CPT

## 2018-06-25 PROBLEM — R35.0 URINE FREQUENCY: Status: ACTIVE | Noted: 2018-06-25

## 2018-06-27 ENCOUNTER — APPOINTMENT (OUTPATIENT)
Dept: CARDIOLOGY | Facility: CLINIC | Age: 83
End: 2018-06-27
Payer: MEDICARE

## 2018-06-27 VITALS
HEART RATE: 75 BPM | WEIGHT: 70 LBS | BODY MASS INDEX: 25.1 KG/M2 | DIASTOLIC BLOOD PRESSURE: 70 MMHG | SYSTOLIC BLOOD PRESSURE: 128 MMHG | OXYGEN SATURATION: 95 %

## 2018-06-27 DIAGNOSIS — R60.0 LOCALIZED EDEMA: ICD-10-CM

## 2018-06-27 PROCEDURE — 99214 OFFICE O/P EST MOD 30 MIN: CPT

## 2018-06-27 PROCEDURE — 36415 COLL VENOUS BLD VENIPUNCTURE: CPT

## 2018-06-28 PROBLEM — R60.0 BILATERAL LOWER EXTREMITY EDEMA: Status: ACTIVE | Noted: 2018-06-05

## 2018-07-08 ENCOUNTER — RX RENEWAL (OUTPATIENT)
Age: 83
End: 2018-07-08

## 2018-07-10 ENCOUNTER — NON-APPOINTMENT (OUTPATIENT)
Age: 83
End: 2018-07-10

## 2018-07-10 ENCOUNTER — APPOINTMENT (OUTPATIENT)
Dept: CARDIOLOGY | Facility: CLINIC | Age: 83
End: 2018-07-10
Payer: MEDICARE

## 2018-07-10 VITALS — HEART RATE: 61 BPM | DIASTOLIC BLOOD PRESSURE: 70 MMHG | OXYGEN SATURATION: 99 % | SYSTOLIC BLOOD PRESSURE: 121 MMHG

## 2018-07-10 VITALS — BODY MASS INDEX: 23.92 KG/M2 | WEIGHT: 162 LBS

## 2018-07-10 DIAGNOSIS — H57.8 OTHER SPECIFIED DISORDERS OF EYE AND ADNEXA: ICD-10-CM

## 2018-07-10 PROCEDURE — 36415 COLL VENOUS BLD VENIPUNCTURE: CPT

## 2018-07-10 PROCEDURE — 93000 ELECTROCARDIOGRAM COMPLETE: CPT

## 2018-07-11 LAB
ALBUMIN SERPL ELPH-MCNC: 4 G/DL
ALP BLD-CCNC: 72 U/L
ALT SERPL-CCNC: 11 U/L
ANION GAP SERPL CALC-SCNC: 21 MMOL/L
AST SERPL-CCNC: 20 U/L
BASOPHILS # BLD AUTO: 0.09 K/UL
BASOPHILS NFR BLD AUTO: 1.5 %
BILIRUB SERPL-MCNC: 0.8 MG/DL
BUN SERPL-MCNC: 36 MG/DL
CALCIUM SERPL-MCNC: 9.1 MG/DL
CHLORIDE SERPL-SCNC: 96 MMOL/L
CO2 SERPL-SCNC: 22 MMOL/L
CREAT SERPL-MCNC: 1.48 MG/DL
DIGOXIN SERPL-MCNC: 1.3 NG/ML
EOSINOPHIL # BLD AUTO: 0.24 K/UL
EOSINOPHIL NFR BLD AUTO: 4.1 %
GLUCOSE SERPL-MCNC: 83 MG/DL
HCT VFR BLD CALC: 33 %
HGB BLD-MCNC: 10.7 G/DL
IMM GRANULOCYTES NFR BLD AUTO: 0.2 %
LYMPHOCYTES # BLD AUTO: 1.36 K/UL
LYMPHOCYTES NFR BLD AUTO: 23.2 %
MAN DIFF?: NORMAL
MCHC RBC-ENTMCNC: 32.4 GM/DL
MCHC RBC-ENTMCNC: 32.5 PG
MCV RBC AUTO: 100.3 FL
MONOCYTES # BLD AUTO: 0.35 K/UL
MONOCYTES NFR BLD AUTO: 6 %
NEUTROPHILS # BLD AUTO: 3.8 K/UL
NEUTROPHILS NFR BLD AUTO: 65 %
NT-PROBNP SERPL-MCNC: 5119 PG/ML
PLATELET # BLD AUTO: 125 K/UL
POTASSIUM SERPL-SCNC: 4.9 MMOL/L
PROT SERPL-MCNC: 7.1 G/DL
RBC # BLD: 3.29 M/UL
RBC # FLD: 17.7 %
SODIUM SERPL-SCNC: 139 MMOL/L
WBC # FLD AUTO: 5.85 K/UL

## 2018-07-17 ENCOUNTER — APPOINTMENT (OUTPATIENT)
Dept: INTERNAL MEDICINE | Facility: CLINIC | Age: 83
End: 2018-07-17
Payer: MEDICARE

## 2018-07-17 VITALS
WEIGHT: 161 LBS | HEIGHT: 68.25 IN | BODY MASS INDEX: 24.4 KG/M2 | SYSTOLIC BLOOD PRESSURE: 110 MMHG | RESPIRATION RATE: 16 BRPM | DIASTOLIC BLOOD PRESSURE: 70 MMHG | TEMPERATURE: 97.3 F | HEART RATE: 72 BPM | OXYGEN SATURATION: 95 %

## 2018-07-17 DIAGNOSIS — K21.9 GASTRO-ESOPHAGEAL REFLUX DISEASE W/OUT ESOPHAGITIS: ICD-10-CM

## 2018-07-17 DIAGNOSIS — R26.89 OTHER ABNORMALITIES OF GAIT AND MOBILITY: ICD-10-CM

## 2018-07-17 DIAGNOSIS — R09.82 POSTNASAL DRIP: ICD-10-CM

## 2018-07-17 PROCEDURE — 99214 OFFICE O/P EST MOD 30 MIN: CPT | Mod: 25

## 2018-07-17 PROCEDURE — 36415 COLL VENOUS BLD VENIPUNCTURE: CPT

## 2018-07-17 RX ORDER — BENZONATATE 100 MG/1
100 CAPSULE ORAL EVERY 8 HOURS
Qty: 30 | Refills: 2 | Status: DISCONTINUED | COMMUNITY
Start: 2018-05-22 | End: 2018-07-17

## 2018-07-17 RX ORDER — IPRATROPIUM BROMIDE AND ALBUTEROL SULFATE 2.5; .5 MG/3ML; MG/3ML
0.5-2.5 (3) SOLUTION RESPIRATORY (INHALATION) TWICE DAILY
Qty: 2 | Refills: 1 | Status: DISCONTINUED | COMMUNITY
Start: 2017-10-10 | End: 2018-07-17

## 2018-07-17 RX ORDER — HYDROCODONE BITARTRATE AND HOMATROPINE METHYLBROMIDE 5; 1.5 MG/5ML; MG/5ML
5-1.5 SOLUTION ORAL EVERY 6 HOURS
Qty: 240 | Refills: 0 | Status: DISCONTINUED | COMMUNITY
Start: 2018-05-22 | End: 2018-07-17

## 2018-07-17 RX ORDER — BUDESONIDE 0.5 MG/2ML
0.5 INHALANT ORAL TWICE DAILY
Qty: 60 | Refills: 11 | Status: DISCONTINUED | COMMUNITY
Start: 2017-07-11 | End: 2018-07-17

## 2018-07-17 RX ORDER — LEVOTHYROXINE SODIUM 0.11 MG/1
112 TABLET ORAL
Qty: 90 | Refills: 0 | Status: DISCONTINUED | COMMUNITY
Start: 2017-10-06 | End: 2018-07-17

## 2018-07-17 NOTE — REVIEW OF SYSTEMS
[Vision Problems] : vision problems [Hearing Loss] : hearing loss [Lower Ext Edema] : lower extremity edema [Dyspnea on Exertion] : dyspnea on exertion [Joint Pain] : joint pain [Back Pain] : back pain [Memory Loss] : memory loss [Easy Bruising] : easy bruising [Negative] : Heme/Lymph

## 2018-07-18 LAB — TSH SERPL-ACNC: 1.25 UIU/ML

## 2018-07-19 PROBLEM — R09.82 POSTNASAL DRIP: Status: ACTIVE | Noted: 2017-06-26

## 2018-07-19 NOTE — PHYSICAL EXAM
[No Acute Distress] : no acute distress [Well Nourished] : well nourished [Well Developed] : well developed [Well-Appearing] : well-appearing [Normal Voice/Communication] : normal voice/communication [Normal Sclera/Conjunctiva] : normal sclera/conjunctiva [PERRL] : pupils equal round and reactive to light [EOMI] : extraocular movements intact [Normal Outer Ear/Nose] : the outer ears and nose were normal in appearance [Normal Oropharynx] : the oropharynx was normal [No JVD] : no jugular venous distention [Supple] : supple [No Respiratory Distress] : no respiratory distress  [No Accessory Muscle Use] : no accessory muscle use [Normal Rate] : normal rate  [Regular Rhythm] : with a regular rhythm [Normal S1, S2] : normal S1 and S2 [No Carotid Bruits] : no carotid bruits [No Extremity Clubbing/Cyanosis] : no extremity clubbing/cyanosis [Soft] : abdomen soft [Non Tender] : non-tender [Normal Bowel Sounds] : normal bowel sounds [No CVA Tenderness] : no CVA  tenderness [No Spinal Tenderness] : no spinal tenderness [No Rash] : no rash [Normal Gait] : normal gait [Coordination Grossly Intact] : coordination grossly intact [No Focal Deficits] : no focal deficits [Speech Grossly Normal] : speech grossly normal [Normal Affect] : the affect was normal [Alert and Oriented x3] : oriented to person, place, and time [Clear to Auscultation] : lungs were clear to auscultation bilaterally [de-identified] : No ST; wears right HA [de-identified] : No stridor [de-identified] : R=16; good air entry; no wheezing [de-identified] : murmur unchanged [de-identified] : normal radial pulses; much less edema; no cords

## 2018-07-19 NOTE — HISTORY OF PRESENT ILLNESS
[Spouse] : spouse [FreeTextEntry1] : Comes in for f/u medical visit. [de-identified] : Doing a bit better.\par Weight down and stable.\par BP better.\par Edema improved.\par Moving bowels.\par Good UO.\par No abdominal pain or n/v/d.\par Good appetite.\par Denies chest pain.\par Cough resolved.\par No wheezing.\par Has chronic PERRIN.\par No fevers or hemoptysis.\par No syncope.

## 2018-07-19 NOTE — HEALTH RISK ASSESSMENT
[No falls in past year] : Patient reported no falls in the past year [0] : 2) Feeling down, depressed, or hopeless: Not at all (0) [] : No [de-identified] : none [de-identified] : cardiology [[id number]] : 0

## 2018-07-24 ENCOUNTER — RX RENEWAL (OUTPATIENT)
Age: 83
End: 2018-07-24

## 2018-08-20 ENCOUNTER — RX RENEWAL (OUTPATIENT)
Age: 83
End: 2018-08-20

## 2018-08-27 ENCOUNTER — RX RENEWAL (OUTPATIENT)
Age: 83
End: 2018-08-27

## 2018-08-27 RX ORDER — LEVOTHYROXINE SODIUM 0.12 MG/1
125 TABLET ORAL DAILY
Qty: 90 | Refills: 0 | Status: ACTIVE | COMMUNITY
Start: 2018-03-21 | End: 1900-01-01

## 2018-09-12 ENCOUNTER — NON-APPOINTMENT (OUTPATIENT)
Age: 83
End: 2018-09-12

## 2018-09-12 ENCOUNTER — APPOINTMENT (OUTPATIENT)
Dept: CARDIOLOGY | Facility: CLINIC | Age: 83
End: 2018-09-12
Payer: MEDICARE

## 2018-09-12 VITALS
SYSTOLIC BLOOD PRESSURE: 95 MMHG | OXYGEN SATURATION: 95 % | HEART RATE: 75 BPM | BODY MASS INDEX: 24.15 KG/M2 | DIASTOLIC BLOOD PRESSURE: 58 MMHG | WEIGHT: 160 LBS

## 2018-09-12 PROCEDURE — 99214 OFFICE O/P EST MOD 30 MIN: CPT | Mod: 25

## 2018-09-12 PROCEDURE — 36415 COLL VENOUS BLD VENIPUNCTURE: CPT

## 2018-09-12 PROCEDURE — 93306 TTE W/DOPPLER COMPLETE: CPT

## 2018-09-12 PROCEDURE — 93284 PRGRMG EVAL IMPLANTABLE DFB: CPT

## 2018-09-12 PROCEDURE — 93000 ELECTROCARDIOGRAM COMPLETE: CPT | Mod: 59

## 2018-09-13 LAB
ALBUMIN SERPL ELPH-MCNC: 4 G/DL
ALP BLD-CCNC: 72 U/L
ALT SERPL-CCNC: 9 U/L
ANION GAP SERPL CALC-SCNC: 17 MMOL/L
AST SERPL-CCNC: 16 U/L
BASOPHILS # BLD AUTO: 0.05 K/UL
BASOPHILS NFR BLD AUTO: 0.8 %
BILIRUB SERPL-MCNC: 0.6 MG/DL
BUN SERPL-MCNC: 43 MG/DL
CALCIUM SERPL-MCNC: 9.3 MG/DL
CHLORIDE SERPL-SCNC: 100 MMOL/L
CHOLEST SERPL-MCNC: 104 MG/DL
CHOLEST/HDLC SERPL: 2.1 RATIO
CO2 SERPL-SCNC: 24 MMOL/L
CREAT SERPL-MCNC: 1.46 MG/DL
DIGOXIN SERPL-MCNC: 0.7 NG/ML
EOSINOPHIL # BLD AUTO: 0.21 K/UL
EOSINOPHIL NFR BLD AUTO: 3.6 %
GLUCOSE SERPL-MCNC: 88 MG/DL
HBA1C MFR BLD HPLC: 5.5 %
HCT VFR BLD CALC: 35.2 %
HDLC SERPL-MCNC: 49 MG/DL
HGB BLD-MCNC: 11.3 G/DL
IMM GRANULOCYTES NFR BLD AUTO: 0.2 %
LDLC SERPL CALC-MCNC: 47 MG/DL
LYMPHOCYTES # BLD AUTO: 1.31 K/UL
LYMPHOCYTES NFR BLD AUTO: 22.2 %
MAN DIFF?: NORMAL
MCHC RBC-ENTMCNC: 32.1 GM/DL
MCHC RBC-ENTMCNC: 32.2 PG
MCV RBC AUTO: 100.3 FL
MONOCYTES # BLD AUTO: 0.52 K/UL
MONOCYTES NFR BLD AUTO: 8.8 %
NEUTROPHILS # BLD AUTO: 3.79 K/UL
NEUTROPHILS NFR BLD AUTO: 64.4 %
NT-PROBNP SERPL-MCNC: 3261 PG/ML
PLATELET # BLD AUTO: 110 K/UL
POTASSIUM SERPL-SCNC: 4.2 MMOL/L
PROT SERPL-MCNC: 7.1 G/DL
RBC # BLD: 3.51 M/UL
RBC # FLD: 17 %
SODIUM SERPL-SCNC: 141 MMOL/L
TRIGL SERPL-MCNC: 40 MG/DL
TSH SERPL-ACNC: 1.43 UIU/ML
WBC # FLD AUTO: 5.89 K/UL

## 2018-09-24 ENCOUNTER — RX RENEWAL (OUTPATIENT)
Age: 83
End: 2018-09-24

## 2018-10-02 ENCOUNTER — APPOINTMENT (OUTPATIENT)
Dept: CARDIOTHORACIC SURGERY | Facility: CLINIC | Age: 83
End: 2018-10-02
Payer: MEDICARE

## 2018-10-02 ENCOUNTER — RX RENEWAL (OUTPATIENT)
Age: 83
End: 2018-10-02

## 2018-10-02 ENCOUNTER — NON-APPOINTMENT (OUTPATIENT)
Age: 83
End: 2018-10-02

## 2018-10-02 VITALS
DIASTOLIC BLOOD PRESSURE: 68 MMHG | BODY MASS INDEX: 23.7 KG/M2 | SYSTOLIC BLOOD PRESSURE: 108 MMHG | OXYGEN SATURATION: 100 % | TEMPERATURE: 97.5 F | WEIGHT: 160 LBS | HEART RATE: 68 BPM | HEIGHT: 69 IN | RESPIRATION RATE: 15 BRPM

## 2018-10-02 VITALS — DIASTOLIC BLOOD PRESSURE: 71 MMHG | SYSTOLIC BLOOD PRESSURE: 108 MMHG

## 2018-10-02 PROCEDURE — 94010 BREATHING CAPACITY TEST: CPT

## 2018-10-02 PROCEDURE — 99205 OFFICE O/P NEW HI 60 MIN: CPT

## 2018-10-02 PROCEDURE — 99204 OFFICE O/P NEW MOD 45 MIN: CPT

## 2018-10-02 PROCEDURE — 93000 ELECTROCARDIOGRAM COMPLETE: CPT

## 2018-10-03 ENCOUNTER — RX RENEWAL (OUTPATIENT)
Age: 83
End: 2018-10-03

## 2018-10-08 ENCOUNTER — OUTPATIENT (OUTPATIENT)
Dept: OUTPATIENT SERVICES | Facility: HOSPITAL | Age: 83
LOS: 1 days | End: 2018-10-08
Payer: MEDICARE

## 2018-10-08 VITALS
DIASTOLIC BLOOD PRESSURE: 62 MMHG | HEIGHT: 69 IN | RESPIRATION RATE: 16 BRPM | TEMPERATURE: 98 F | SYSTOLIC BLOOD PRESSURE: 102 MMHG | WEIGHT: 160.06 LBS | OXYGEN SATURATION: 98 % | HEART RATE: 73 BPM

## 2018-10-08 DIAGNOSIS — Z96.651 PRESENCE OF RIGHT ARTIFICIAL KNEE JOINT: Chronic | ICD-10-CM

## 2018-10-08 DIAGNOSIS — Z98.89 OTHER SPECIFIED POSTPROCEDURAL STATES: Chronic | ICD-10-CM

## 2018-10-08 DIAGNOSIS — I35.9 NONRHEUMATIC AORTIC VALVE DISORDER, UNSPECIFIED: ICD-10-CM

## 2018-10-08 DIAGNOSIS — I71.4 ABDOMINAL AORTIC ANEURYSM, WITHOUT RUPTURE: Chronic | ICD-10-CM

## 2018-10-08 LAB
ALBUMIN SERPL ELPH-MCNC: 3.9 G/DL — SIGNIFICANT CHANGE UP (ref 3.3–5)
ALP SERPL-CCNC: 76 U/L — SIGNIFICANT CHANGE UP (ref 40–120)
ALT FLD-CCNC: 13 U/L — SIGNIFICANT CHANGE UP (ref 10–45)
ANION GAP SERPL CALC-SCNC: 8 MMOL/L — SIGNIFICANT CHANGE UP (ref 5–17)
AST SERPL-CCNC: 12 U/L — SIGNIFICANT CHANGE UP (ref 10–40)
BILIRUB SERPL-MCNC: 0.6 MG/DL — SIGNIFICANT CHANGE UP (ref 0.2–1.2)
BUN SERPL-MCNC: 40 MG/DL — HIGH (ref 7–23)
CALCIUM SERPL-MCNC: 8.4 MG/DL — SIGNIFICANT CHANGE UP (ref 8.4–10.5)
CHLORIDE SERPL-SCNC: 99 MMOL/L — SIGNIFICANT CHANGE UP (ref 96–108)
CO2 SERPL-SCNC: 29 MMOL/L — SIGNIFICANT CHANGE UP (ref 22–31)
CREAT SERPL-MCNC: 1.68 MG/DL — HIGH (ref 0.5–1.3)
GLUCOSE SERPL-MCNC: 95 MG/DL — SIGNIFICANT CHANGE UP (ref 70–99)
HCT VFR BLD CALC: 34 % — LOW (ref 39–50)
HGB BLD-MCNC: 11.3 G/DL — LOW (ref 13–17)
MCHC RBC-ENTMCNC: 33.3 GM/DL — SIGNIFICANT CHANGE UP (ref 32–36)
MCHC RBC-ENTMCNC: 33.5 PG — SIGNIFICANT CHANGE UP (ref 27–34)
MCV RBC AUTO: 100 FL — SIGNIFICANT CHANGE UP (ref 80–100)
PLATELET # BLD AUTO: 94 K/UL — LOW (ref 150–400)
POTASSIUM SERPL-MCNC: 4.2 MMOL/L — SIGNIFICANT CHANGE UP (ref 3.5–5.3)
POTASSIUM SERPL-SCNC: 4.2 MMOL/L — SIGNIFICANT CHANGE UP (ref 3.5–5.3)
PROT SERPL-MCNC: 7.2 G/DL — SIGNIFICANT CHANGE UP (ref 6–8.3)
RBC # BLD: 3.39 M/UL — LOW (ref 4.2–5.8)
RBC # FLD: 15.9 % — HIGH (ref 10.3–14.5)
SODIUM SERPL-SCNC: 136 MMOL/L — SIGNIFICANT CHANGE UP (ref 135–145)
WBC # BLD: 5.9 K/UL — SIGNIFICANT CHANGE UP (ref 3.8–10.5)
WBC # FLD AUTO: 5.9 K/UL — SIGNIFICANT CHANGE UP (ref 3.8–10.5)

## 2018-10-08 RX ORDER — PANTOPRAZOLE SODIUM 20 MG/1
40 TABLET, DELAYED RELEASE ORAL
Qty: 0 | Refills: 0 | Status: DISCONTINUED | OUTPATIENT
Start: 2018-10-08 | End: 2018-10-09

## 2018-10-08 RX ORDER — ALLOPURINOL 300 MG
300 TABLET ORAL DAILY
Qty: 0 | Refills: 0 | Status: DISCONTINUED | OUTPATIENT
Start: 2018-10-08 | End: 2018-10-09

## 2018-10-08 RX ORDER — FUROSEMIDE 40 MG
1 TABLET ORAL
Qty: 0 | Refills: 0 | COMMUNITY

## 2018-10-08 RX ORDER — SACUBITRIL AND VALSARTAN 24; 26 MG/1; MG/1
1 TABLET, FILM COATED ORAL
Qty: 0 | Refills: 0 | Status: DISCONTINUED | OUTPATIENT
Start: 2018-10-08 | End: 2018-10-09

## 2018-10-08 RX ORDER — SIMVASTATIN 20 MG/1
10 TABLET, FILM COATED ORAL AT BEDTIME
Qty: 0 | Refills: 0 | Status: DISCONTINUED | OUTPATIENT
Start: 2018-10-08 | End: 2018-10-09

## 2018-10-08 RX ORDER — LEVOTHYROXINE SODIUM 125 MCG
150 TABLET ORAL DAILY
Qty: 0 | Refills: 0 | Status: DISCONTINUED | OUTPATIENT
Start: 2018-10-08 | End: 2018-10-09

## 2018-10-08 RX ORDER — TAMSULOSIN HYDROCHLORIDE 0.4 MG/1
0.8 CAPSULE ORAL AT BEDTIME
Qty: 0 | Refills: 0 | Status: DISCONTINUED | OUTPATIENT
Start: 2018-10-08 | End: 2018-10-09

## 2018-10-08 RX ORDER — FUROSEMIDE 40 MG
40 TABLET ORAL DAILY
Qty: 0 | Refills: 0 | Status: DISCONTINUED | OUTPATIENT
Start: 2018-10-08 | End: 2018-10-09

## 2018-10-08 RX ORDER — LEVOTHYROXINE SODIUM 125 MCG
1 TABLET ORAL
Qty: 0 | Refills: 0 | COMMUNITY

## 2018-10-08 RX ORDER — INFLUENZA VIRUS VACCINE 15; 15; 15; 15 UG/.5ML; UG/.5ML; UG/.5ML; UG/.5ML
0.5 SUSPENSION INTRAMUSCULAR ONCE
Qty: 0 | Refills: 0 | Status: DISCONTINUED | OUTPATIENT
Start: 2018-10-08 | End: 2018-10-09

## 2018-10-08 RX ORDER — ALBUTEROL 90 UG/1
2 AEROSOL, METERED ORAL EVERY 6 HOURS
Qty: 0 | Refills: 0 | Status: DISCONTINUED | OUTPATIENT
Start: 2018-10-08 | End: 2018-10-09

## 2018-10-08 RX ORDER — COLCHICINE 0.6 MG
0.6 TABLET ORAL DAILY
Qty: 0 | Refills: 0 | Status: DISCONTINUED | OUTPATIENT
Start: 2018-10-08 | End: 2018-10-09

## 2018-10-08 RX ORDER — ACETAMINOPHEN 500 MG
650 TABLET ORAL EVERY 6 HOURS
Qty: 0 | Refills: 0 | Status: DISCONTINUED | OUTPATIENT
Start: 2018-10-08 | End: 2018-10-09

## 2018-10-08 RX ORDER — ASPIRIN/CALCIUM CARB/MAGNESIUM 324 MG
1 TABLET ORAL
Qty: 0 | Refills: 0 | COMMUNITY

## 2018-10-08 RX ORDER — FERROUS SULFATE 325(65) MG
325 TABLET ORAL DAILY
Qty: 0 | Refills: 0 | Status: DISCONTINUED | OUTPATIENT
Start: 2018-10-08 | End: 2018-10-09

## 2018-10-08 RX ORDER — CARVEDILOL PHOSPHATE 80 MG/1
12.5 CAPSULE, EXTENDED RELEASE ORAL EVERY 12 HOURS
Qty: 0 | Refills: 0 | Status: DISCONTINUED | OUTPATIENT
Start: 2018-10-08 | End: 2018-10-09

## 2018-10-08 RX ORDER — ASPIRIN/CALCIUM CARB/MAGNESIUM 324 MG
81 TABLET ORAL DAILY
Qty: 0 | Refills: 0 | Status: DISCONTINUED | OUTPATIENT
Start: 2018-10-08 | End: 2018-10-09

## 2018-10-08 RX ADMIN — SACUBITRIL AND VALSARTAN 1 TABLET(S): 24; 26 TABLET, FILM COATED ORAL at 11:44

## 2018-10-08 RX ADMIN — CARVEDILOL PHOSPHATE 12.5 MILLIGRAM(S): 80 CAPSULE, EXTENDED RELEASE ORAL at 21:43

## 2018-10-08 RX ADMIN — TAMSULOSIN HYDROCHLORIDE 0.8 MILLIGRAM(S): 0.4 CAPSULE ORAL at 21:42

## 2018-10-08 RX ADMIN — Medication 650 MILLIGRAM(S): at 21:30

## 2018-10-08 RX ADMIN — SACUBITRIL AND VALSARTAN 1 TABLET(S): 24; 26 TABLET, FILM COATED ORAL at 23:35

## 2018-10-08 RX ADMIN — Medication 650 MILLIGRAM(S): at 20:20

## 2018-10-08 RX ADMIN — SIMVASTATIN 10 MILLIGRAM(S): 20 TABLET, FILM COATED ORAL at 21:42

## 2018-10-08 NOTE — H&P CARDIOLOGY - HISTORY OF PRESENT ILLNESS
89 yo M PMH COPD (not O2 dependent), CAD s/p stent 2015, AAA repair with stent, HTN, HLD, Chronic systolic HF (EF 20%), GERD, hypothyroidism who presents for syncope, ANA and SOB. The patient states he was on a cruise for the last month Originated in [place], went to [place] and ended in [place], then flew back to [place]) and during the last 2.5 weeks of the cruise he noticed b/l ANA, L>R. The patient states he was intermittently compliant with his medications while on [facility] cruise, because he would go on excursions and not take his medicines for a couple days, but would then double up on the lasix the following day. The patient also states he was having SOB which was waxing and waning while on the cruise, and having a productive cough that is yellow for the last 3 days. While returning to [place] on 5/14, the patient was sitting in his chair in the plane and apparently LOC for 5 minutes. The patient then awoke, without any confusion on waking. Denies any seizure like activity or history. The patient is unsure if he urinated himself or if the water on the trash fell on him. The patient was seen by a doctor on the plane who advised he take 40 mg PO lasix, and go to the ED. The patient denies any F NVD, chills, rhinorrhea, dysuria, abd pain, CP, HA, neurological changes. SOB has now resolved. 89 yo M PMH COPD (not O2 dependent), CAD s/p stent 2015, AAA repair with stent, HTN, HLD, Chronic systolic HF (EF 20%), GERD, hypothyroidism who presents for syncope, ANA and SOB. Pt had recent hospitalization after a brief syncopal episode and dyspnea and worsening LE edema. ICD interrogated without events at that time, symptoms attributed to aortic stenosis revealed on TTE and was referred for left and right heart cath by his cardiologist.  Pt reports feeling less dyspneic but remains with LE pitting edema  L>R.        < from: Transthoracic Echocardiogram (05.15.18 @ 13:24) >  PROCEDURE: Transthoracic echocardiogram with 2-D, M-Mode  and complete spectral and color flow Doppler.  INDICATION: Syncope and collapse (R55)  ------------------------------------------------------------------------  Dimensions:    Normal Values:  LA:     4.2    2.0 - 4.0 cm  Ao:     3.6    2.0 - 3.8 cm  SEPTUM: 1.0    0.6 - 1.2 cm  PWT:   0.9    0.6 - 1.1 cm  LVIDd:  5.3    3.0 - 5.6 cm  LVIDs:  4.6    1.8 - 4.0 cm  Derived variables:  LVMI: 99 g/m2  RWT: 0.33  Fractional short: 13 %  EF (Visual Estimate): 25-30 %  Doppler Peak Velocity (m/sec): AoV=2.8  ------------------------------------------------------------------------  Observations:  Mitral Valve: Mitral annular calcification and calcified  mitral leaflets with decreased diastolic opening. Mild  mitral regurgitation.  Aortic Valve/Aorta: Calcified aortic valve with decreased  opening. Peak transaortic valve gradient equals 31 mm Hg,  mean transaortic valve gradient equals 17 mm Hg, estimated  aortic valve area equals 0.8 sqcm (by continuity equation),  aortic valve velocity time integral equals 56 cm,  consistent with severe aortic stenosis. Mild aortic  regurgitation.  Peak left ventricular outflow tract  gradient equals 2 mm Hg, mean gradient is equal to 1 mm Hg,  LVOT velocity time integral equals 12 cm.  Aortic Root: 3.6 cm.  Left Atrium: Normal left atrium. LA volume index = 32  cc/m2.  Left Ventricle: Severe segmental left ventricular systolic  dysfunction. The mid anterior septum, the apical inferior  wall, the apical anterior wall, the apical septum, the  apical lateral wall, the basal anterior wall, the basal  anterior septum, the basal inferior wall, the basal  anterolateral wall, the mid anterior wall, the mid  inferoseptum, and the apical cap are hypokinetic. The mid  inferolateral wall, the basal  inferolateral wall, the mid  inferior wall,the mid anterolateral wall, and the basal  inferoseptum are akinetic.  Normal left ventricular  internal dimensions and wall thicknesses. Moderate  diastolic dysfunction (Stage II).  Right Heart: Mild right atrial enlargement. Right  ventricular enlargement with normal right ventricular  systolic function. A device wire is noted in the right  heart. Normal tricuspid valve. Mild tricuspid  regurgitation. Pulmonic valve not well visualized, probably  normal.  Pericardium/Pleura: Normal pericardium with no pericardial  effusion.  Hemodynamic: Estimated right atrial pressure is 10 - 15 mm  Hg. Estimated right ventricular systolic pressure equals  33.04 - 38.04 mm Hg, assuming right atrial pressure equals  10 - 15 mm Hg, consistent with normal pulmonary  hypertension.  ------------------------------------------------------------------------  Conclusions:  1. Calcified aortic valve with decreased opening. Peak  transaortic valve gradient equals 31 mm Hg, mean  transaortic valve gradient equals 17 mm Hg, estimated  aortic valve area equals 0.8 sqcm (by continuity equation),  aortic valve velocity time integral equals 56 cm,  consistent with severe aortic stenosis. Mild aortic  regurgitation.  2. Severe segmental left ventricular systolic dysfunction.  The mid anterior septum, the apical inferior wall, the  apical anterior wall, the apical septum, the apical lateral  wall, the basal anterior wall, the basal anterior septum,  the basal inferior wall, the basal anterolateral wall, the  mid anterior wall, the mid inferoseptum, and the apical cap  are hypokinetic. The mid inferolateral wall, the basal  inferolateral wall, the mid inferior wall, the mid  anterolateral wall, and the basal inferoseptum are  akinetic.  3. Moderate diastolic dysfunction (Stage II).  4. Right ventricular enlargement with normal right  ventricular systolic function. A device wire is noted in  the right heart.  *** Compared with echocardiogram of 12/30/2015, there has  been an interval increase in gradients and velocities  accross the aortic valve.  ------------------------------------------------------------------------  Confirmed on  5/15/2018 - 15:55:06 by Sunil De León M.D.  ------------------------------------------------------------------------    < end of copied text >

## 2018-10-09 ENCOUNTER — TRANSCRIPTION ENCOUNTER (OUTPATIENT)
Age: 83
End: 2018-10-09

## 2018-10-09 VITALS — WEIGHT: 161.6 LBS

## 2018-10-09 DIAGNOSIS — N18.9 CHRONIC KIDNEY DISEASE, UNSPECIFIED: ICD-10-CM

## 2018-10-09 DIAGNOSIS — I10 ESSENTIAL (PRIMARY) HYPERTENSION: ICD-10-CM

## 2018-10-09 LAB
ANION GAP SERPL CALC-SCNC: 10 MMOL/L — SIGNIFICANT CHANGE UP (ref 5–17)
APPEARANCE UR: CLEAR — SIGNIFICANT CHANGE UP
BACTERIA # UR AUTO: NEGATIVE — SIGNIFICANT CHANGE UP
BILIRUB UR-MCNC: NEGATIVE — SIGNIFICANT CHANGE UP
BUN SERPL-MCNC: 32 MG/DL — HIGH (ref 7–23)
CALCIUM SERPL-MCNC: 8.4 MG/DL — SIGNIFICANT CHANGE UP (ref 8.4–10.5)
CHLORIDE SERPL-SCNC: 104 MMOL/L — SIGNIFICANT CHANGE UP (ref 96–108)
CO2 SERPL-SCNC: 25 MMOL/L — SIGNIFICANT CHANGE UP (ref 22–31)
COLOR SPEC: YELLOW — SIGNIFICANT CHANGE UP
CREAT SERPL-MCNC: 1.49 MG/DL — HIGH (ref 0.5–1.3)
DIFF PNL FLD: ABNORMAL
EPI CELLS # UR: 0 /HPF — SIGNIFICANT CHANGE UP (ref 0–5)
GLUCOSE SERPL-MCNC: 104 MG/DL — HIGH (ref 70–99)
GLUCOSE UR QL: NEGATIVE MG/DL — SIGNIFICANT CHANGE UP
HCT VFR BLD CALC: 32.7 % — LOW (ref 39–50)
HGB BLD-MCNC: 10.9 G/DL — LOW (ref 13–17)
HYALINE CASTS # UR AUTO: 1 /LPF — SIGNIFICANT CHANGE UP (ref 0–7)
KETONES UR-MCNC: NEGATIVE — SIGNIFICANT CHANGE UP
LEUKOCYTE ESTERASE UR-ACNC: NEGATIVE — SIGNIFICANT CHANGE UP
MCHC RBC-ENTMCNC: 33.5 GM/DL — SIGNIFICANT CHANGE UP (ref 32–36)
MCHC RBC-ENTMCNC: 33.6 PG — SIGNIFICANT CHANGE UP (ref 27–34)
MCV RBC AUTO: 100 FL — SIGNIFICANT CHANGE UP (ref 80–100)
NITRITE UR-MCNC: NEGATIVE — SIGNIFICANT CHANGE UP
PH UR: 6 — SIGNIFICANT CHANGE UP (ref 5–8)
PLATELET # BLD AUTO: 76 K/UL — LOW (ref 150–400)
POTASSIUM SERPL-MCNC: 3.8 MMOL/L — SIGNIFICANT CHANGE UP (ref 3.5–5.3)
POTASSIUM SERPL-SCNC: 3.8 MMOL/L — SIGNIFICANT CHANGE UP (ref 3.5–5.3)
PROT UR-MCNC: NEGATIVE MG/DL — SIGNIFICANT CHANGE UP
RBC # BLD: 3.26 M/UL — LOW (ref 4.2–5.8)
RBC # FLD: 16.1 % — HIGH (ref 10.3–14.5)
RBC CASTS # UR COMP ASSIST: 2 /HPF — SIGNIFICANT CHANGE UP (ref 0–4)
SODIUM SERPL-SCNC: 139 MMOL/L — SIGNIFICANT CHANGE UP (ref 135–145)
SP GR SPEC: 1.01 — SIGNIFICANT CHANGE UP (ref 1.01–1.02)
UROBILINOGEN FLD QL: NEGATIVE MG/DL — SIGNIFICANT CHANGE UP
WBC # BLD: 4.9 K/UL — SIGNIFICANT CHANGE UP (ref 3.8–10.5)
WBC # FLD AUTO: 4.9 K/UL — SIGNIFICANT CHANGE UP (ref 3.8–10.5)
WBC UR QL: 0 /HPF — SIGNIFICANT CHANGE UP (ref 0–5)

## 2018-10-09 PROCEDURE — 93320 DOPPLER ECHO COMPLETE: CPT | Mod: 26

## 2018-10-09 PROCEDURE — 93325 DOPPLER ECHO COLOR FLOW MAPG: CPT | Mod: 26

## 2018-10-09 PROCEDURE — 85027 COMPLETE CBC AUTOMATED: CPT

## 2018-10-09 PROCEDURE — 93456 R HRT CORONARY ARTERY ANGIO: CPT

## 2018-10-09 PROCEDURE — 93005 ELECTROCARDIOGRAM TRACING: CPT

## 2018-10-09 PROCEDURE — C1894: CPT

## 2018-10-09 PROCEDURE — C1887: CPT

## 2018-10-09 PROCEDURE — 93016 CV STRESS TEST SUPVJ ONLY: CPT

## 2018-10-09 PROCEDURE — 93320 DOPPLER ECHO COMPLETE: CPT

## 2018-10-09 PROCEDURE — 80053 COMPREHEN METABOLIC PANEL: CPT

## 2018-10-09 PROCEDURE — C1769: CPT

## 2018-10-09 PROCEDURE — 76937 US GUIDE VASCULAR ACCESS: CPT

## 2018-10-09 PROCEDURE — 93018 CV STRESS TEST I&R ONLY: CPT

## 2018-10-09 PROCEDURE — 93350 STRESS TTE ONLY: CPT | Mod: 26

## 2018-10-09 PROCEDURE — 80048 BASIC METABOLIC PNL TOTAL CA: CPT

## 2018-10-09 PROCEDURE — 93351 STRESS TTE COMPLETE: CPT

## 2018-10-09 PROCEDURE — 93325 DOPPLER ECHO COLOR FLOW MAPG: CPT

## 2018-10-09 PROCEDURE — 81001 URINALYSIS AUTO W/SCOPE: CPT

## 2018-10-09 RX ORDER — FERROUS SULFATE 325(65) MG
1 TABLET ORAL
Qty: 0 | Refills: 0 | COMMUNITY

## 2018-10-09 RX ADMIN — CARVEDILOL PHOSPHATE 12.5 MILLIGRAM(S): 80 CAPSULE, EXTENDED RELEASE ORAL at 05:21

## 2018-10-09 RX ADMIN — Medication 325 MILLIGRAM(S): at 12:14

## 2018-10-09 RX ADMIN — CARVEDILOL PHOSPHATE 12.5 MILLIGRAM(S): 80 CAPSULE, EXTENDED RELEASE ORAL at 17:18

## 2018-10-09 RX ADMIN — Medication 40 MILLIGRAM(S): at 05:22

## 2018-10-09 RX ADMIN — Medication 0.6 MILLIGRAM(S): at 12:13

## 2018-10-09 RX ADMIN — PANTOPRAZOLE SODIUM 40 MILLIGRAM(S): 20 TABLET, DELAYED RELEASE ORAL at 05:22

## 2018-10-09 RX ADMIN — Medication 150 MICROGRAM(S): at 05:21

## 2018-10-09 RX ADMIN — SACUBITRIL AND VALSARTAN 1 TABLET(S): 24; 26 TABLET, FILM COATED ORAL at 12:14

## 2018-10-09 RX ADMIN — Medication 300 MILLIGRAM(S): at 12:13

## 2018-10-09 NOTE — DISCHARGE NOTE ADULT - CARE PLAN
Principal Discharge DX:	Aortic valve stenosis, etiology of cardiac valve disease unspecified  Goal:	Free of SOB  Assessment and plan of treatment:	Continue medications as recommended  Follow up with Dr. Paiz in 2 weeks  Repeat Echocardiogram per Dr. Paiz  Secondary Diagnosis:	Chronic systolic (congestive) heart failure  Assessment and plan of treatment:	Weigh yourself daily.  If you gain 3lbs in 3 days, or 5lbs in a week call your Health Care Provider.  Do not eat or drink foods containing more than 2000mg of salt (sodium) in your diet every day.  Call your Health Care Provider if you have any swelling or increased swelling in your feet, ankles, and/or stomach.  Take all of your medication as directed.  If you become dizzy call your Health Care Provider.  Secondary Diagnosis:	Coronary artery disease involving native coronary artery of native heart without angina pectoris  Assessment and plan of treatment:	Coronary artery disease is a condition where the arteries the supply the heart muscle get clogged with fatty deposits & puts you at risk for a heart attack  Call your doctor if you have any new pain, pressure, or discomfort in the center of your chest, pain, tingling or discomfort in arms, back, neck, jaw, or stomach, shortness of breath, nausea, vomiting, burping or heartburn, sweating, cold and clammy skin, racing or abnormal heartbeat for more than 10 minutes or if they keep coming & going.  Call 911 and do not tr to get to hospital by care  You can help yourself with lifestyle changes eat lots of fruits & vegetables & low fat dairy products, not a lot of meat & fatty foods, walk or some form of physical activity most days of the week, lose weight if you are overweight  Take your cardiac medication as prescribed to lower cholesterol, to lower blood pressure, aspirin to prevent blood clots, and diabetes control  Make sure to keep appointments with doctor for cardiac follow up care  Secondary Diagnosis:	Hypertension, unspecified type  Assessment and plan of treatment:	Take medications for your blood pressure as recommended.  Eat a heart healthy diet that is low in saturated fats and salt, and includes whole grains, fruits, vegetables and lean protein   Exercise regularly (consult with your physician or cardiologist first); maintain a heart healthy weight.   Continue to follow with your primary physician or cardiologist.   Seek medical help for dizziness, Lightheadedness, Blurry vision, Headache, Chest pain, Shortness of breath  Follow up with your medical doctor to establish long term blood pressure treatment goals.  Secondary Diagnosis:	Hyperlipidemia, unspecified hyperlipidemia type  Assessment and plan of treatment:	Continue with your cholesterol medications. Eat a heart healthy diet that is low in saturated fats and salt, and includes whole grains, fruits, vegetables and lean protein; exercise regularly (consult with your physician or cardiologist first); maintain a heart healthy weight;   Continue to follow with your primary physician or cardiologist.  Seek medical help for dizziness, Lightheadedness, Blurry vision, Headache, Chest pain, Shortness of breath  Secondary Diagnosis:	Chronic obstructive pulmonary disease, unspecified COPD type  Assessment and plan of treatment:	Call your Health Care provider upon arrival home to make a follow up appointment within one week.  Take all inhalers as prescribed by your Health Care Provider.  If your cough increases infrequency and severity and/or you have shortness of breath or increased shortness of breath call your Health Care Provider.  If you develop fever, chills, night sweats, malaise, and/or change in mental status call your Health care Provider.  Nutrition is very important.  Eat small frequent meals.  Increase your activity as tolerated.  Do not stay in bed all day Principal Discharge DX:	Aortic valve stenosis, etiology of cardiac valve disease unspecified  Goal:	Free of SOB  Assessment and plan of treatment:	Continue medications as recommended  Follow up with Dr. Paiz in 2 weeks  Repeat Echocardiogram per Dr. Paiz  Secondary Diagnosis:	Chronic systolic (congestive) heart failure  Assessment and plan of treatment:	Weigh yourself daily.  If you gain 3lbs in 3 days, or 5lbs in a week call your Health Care Provider.  Do not eat or drink foods containing more than 2000mg of salt (sodium) in your diet every day.  Call your Health Care Provider if you have any swelling or increased swelling in your feet, ankles, and/or stomach.  Take all of your medication as directed.  If you become dizzy call your Health Care Provider.  Secondary Diagnosis:	Coronary artery disease involving native coronary artery of native heart without angina pectoris  Assessment and plan of treatment:	Coronary artery disease is a condition where the arteries the supply the heart muscle get clogged with fatty deposits & puts you at risk for a heart attack  Call your doctor if you have any new pain, pressure, or discomfort in the center of your chest, pain, tingling or discomfort in arms, back, neck, jaw, or stomach, shortness of breath, nausea, vomiting, burping or heartburn, sweating, cold and clammy skin, racing or abnormal heartbeat for more than 10 minutes or if they keep coming & going.  Call 911 and do not tr to get to hospital by care  You can help yourself with lifestyle changes eat lots of fruits & vegetables & low fat dairy products, not a lot of meat & fatty foods, walk or some form of physical activity most days of the week, lose weight if you are overweight  Take your cardiac medication as prescribed to lower cholesterol, to lower blood pressure, aspirin to prevent blood clots, and diabetes control  Make sure to keep appointments with doctor for cardiac follow up care  Secondary Diagnosis:	Hypertension, unspecified type  Assessment and plan of treatment:	Take medications for your blood pressure as recommended.  Eat a heart healthy diet that is low in saturated fats and salt, and includes whole grains, fruits, vegetables and lean protein   Exercise regularly (consult with your physician or cardiologist first); maintain a heart healthy weight.   Continue to follow with your primary physician or cardiologist.   Seek medical help for dizziness, Lightheadedness, Blurry vision, Headache, Chest pain, Shortness of breath  Follow up with your medical doctor to establish long term blood pressure treatment goals.  Secondary Diagnosis:	Hyperlipidemia, unspecified hyperlipidemia type  Assessment and plan of treatment:	Continue with your cholesterol medications. Eat a heart healthy diet that is low in saturated fats and salt, and includes whole grains, fruits, vegetables and lean protein; exercise regularly (consult with your physician or cardiologist first); maintain a heart healthy weight;   Continue to follow with your primary physician or cardiologist.  Seek medical help for dizziness, Lightheadedness, Blurry vision, Headache, Chest pain, Shortness of breath  Secondary Diagnosis:	Chronic obstructive pulmonary disease, unspecified COPD type  Assessment and plan of treatment:	Call your Health Care provider upon arrival home to make a follow up appointment within one week.  Take all inhalers as prescribed by your Health Care Provider.  If your cough increases infrequency and severity and/or you have shortness of breath or increased shortness of breath call your Health Care Provider.  If you develop fever, chills, night sweats, malaise, and/or change in mental status call your Health care Provider.  Nutrition is very important.  Eat small frequent meals.  Increase your activity as tolerated.  Do not stay in bed all day  Secondary Diagnosis:	Chronic kidney disease, unspecified CKD stage  Assessment and plan of treatment:	Avoid taking (NSAIDs) - (ex: Ibuprofen, Advil, Celebrex, Naprosyn)  Avoid taking any nephrotoxic agents (can harm kidneys) - Intravenous contrast for diagnostic testing, combination cold medications.  Have all medications adjusted for your renal function by your Health Care Provider.  Blood pressure control is important.  Take all medication as prescribed.

## 2018-10-09 NOTE — DISCHARGE NOTE ADULT - PLAN OF CARE
Free of SOB Continue medications as recommended  Follow up with Dr. Paiz in 2 weeks  Repeat Echocardiogram per Dr. Paiz Weigh yourself daily.  If you gain 3lbs in 3 days, or 5lbs in a week call your Health Care Provider.  Do not eat or drink foods containing more than 2000mg of salt (sodium) in your diet every day.  Call your Health Care Provider if you have any swelling or increased swelling in your feet, ankles, and/or stomach.  Take all of your medication as directed.  If you become dizzy call your Health Care Provider. Coronary artery disease is a condition where the arteries the supply the heart muscle get clogged with fatty deposits & puts you at risk for a heart attack  Call your doctor if you have any new pain, pressure, or discomfort in the center of your chest, pain, tingling or discomfort in arms, back, neck, jaw, or stomach, shortness of breath, nausea, vomiting, burping or heartburn, sweating, cold and clammy skin, racing or abnormal heartbeat for more than 10 minutes or if they keep coming & going.  Call 911 and do not tr to get to hospital by care  You can help yourself with lifestyle changes eat lots of fruits & vegetables & low fat dairy products, not a lot of meat & fatty foods, walk or some form of physical activity most days of the week, lose weight if you are overweight  Take your cardiac medication as prescribed to lower cholesterol, to lower blood pressure, aspirin to prevent blood clots, and diabetes control  Make sure to keep appointments with doctor for cardiac follow up care Take medications for your blood pressure as recommended.  Eat a heart healthy diet that is low in saturated fats and salt, and includes whole grains, fruits, vegetables and lean protein   Exercise regularly (consult with your physician or cardiologist first); maintain a heart healthy weight.   Continue to follow with your primary physician or cardiologist.   Seek medical help for dizziness, Lightheadedness, Blurry vision, Headache, Chest pain, Shortness of breath  Follow up with your medical doctor to establish long term blood pressure treatment goals. Continue with your cholesterol medications. Eat a heart healthy diet that is low in saturated fats and salt, and includes whole grains, fruits, vegetables and lean protein; exercise regularly (consult with your physician or cardiologist first); maintain a heart healthy weight;   Continue to follow with your primary physician or cardiologist.  Seek medical help for dizziness, Lightheadedness, Blurry vision, Headache, Chest pain, Shortness of breath Call your Health Care provider upon arrival home to make a follow up appointment within one week.  Take all inhalers as prescribed by your Health Care Provider.  If your cough increases infrequency and severity and/or you have shortness of breath or increased shortness of breath call your Health Care Provider.  If you develop fever, chills, night sweats, malaise, and/or change in mental status call your Health care Provider.  Nutrition is very important.  Eat small frequent meals.  Increase your activity as tolerated.  Do not stay in bed all day Avoid taking (NSAIDs) - (ex: Ibuprofen, Advil, Celebrex, Naprosyn)  Avoid taking any nephrotoxic agents (can harm kidneys) - Intravenous contrast for diagnostic testing, combination cold medications.  Have all medications adjusted for your renal function by your Health Care Provider.  Blood pressure control is important.  Take all medication as prescribed.

## 2018-10-09 NOTE — CONSULT NOTE ADULT - ASSESSMENT
89 yo M PMH COPD (not O2 dependent), CAD s/p stent 2015, AAA repair with stent, HTN, HLD, Chronic systolic HF (EF 20%), GERD, hypothyroidism who presents for syncope, ANA and SOB.
Patient with prior infarct, CHF, and COPD, AICD, with one shock for ventricular tachycardia that caused syncope, biventricular pacer on a heart failure regimen. Progressive calcification and stenosis of aortic valve. Only low gradient on pullback at cardiac catheterization so patient scheduled for dobutamine stress echo to help determine if true severe aortic stenosis or not.    Discussed in detail with Dr. Kay of structural heart. Appreciate renal consult for advice regarding dye procedures if necessary.    Wolf Paiz MD, FACC  (O) [phone]  (C) [phone]

## 2018-10-09 NOTE — CONSULT NOTE ADULT - SUBJECTIVE AND OBJECTIVE BOX
[facility] [place], [place] NEPHROLOGY PRACTICE   MD RADHA BUTLER MD RUORU WONG, PA    TEL:  OFFICE: [phone]  DR CESPEDES CELL: [phone]  MARLYN BONE CELL: [phone]  DR. JUARES CELL: [phone]    -- INITIAL RENAL CONSULT NOTE  --------------------------------------------------------------------------------  HPI:    89 yo M PMH COPD (not O2 dependent), CAD s/p stent 2015, AAA repair with stent, HTN, HLD, Chronic systolic HF (EF 20%), GERD, hypothyroidism who presents for syncope, ANA and SOB.  Underwent cardiac cath on 10/8 and admitted for further evaluation of Aortic stenosis.   Nephrology consulted for elevated serum creatinine and optimization prior to contrast procedure.   Upon review of sunrise, pt with Scr 1.4 in 2016 and 1.5 in May 2018  Pt with Scr 1.68 on admission on 10/8 and improved to 1.4 today.     PAST HISTORY  --------------------------------------------------------------------------------  PAST MEDICAL & SURGICAL HISTORY:  MI (myocardial infarction): 2000  COPD (chronic obstructive pulmonary disease)  Gout  GERD (gastroesophageal reflux disease)  Hypothyroid  CHF (congestive heart failure)  BPH (Benign Prostatic Hyperplasia)  CAD (coronary artery disease): S/P angioplasty 2000  HLD (hyperlipidemia)  HTN (hypertension)  S/P angioplasty  H/O repair of rotator cuff  AAA (abdominal aortic aneurysm)  S/P knee replacement, right  Achilles rupture  Hernia, inguinal, bilateral  Cataract    FAMILY HISTORY:  Family history of CHF (congestive heart failure)  Family history of hemolytic uremic syndrome    PAST SOCIAL HISTORY:    ALLERGIES & MEDICATIONS  --------------------------------------------------------------------------------  Allergies    No Known Allergies    Intolerances      Standing Inpatient Medications  allopurinol 300 milliGRAM(s) Oral daily  aspirin enteric coated 81 milliGRAM(s) Oral daily  carvedilol 12.5 milliGRAM(s) Oral every 12 hours  colchicine 0.6 milliGRAM(s) Oral daily  ferrous    sulfate 325 milliGRAM(s) Oral daily  furosemide    Tablet 40 milliGRAM(s) Oral daily  influenza   Vaccine 0.5 milliLiter(s) IntraMuscular once  levothyroxine 150 MICROGram(s) Oral daily  pantoprazole    Tablet 40 milliGRAM(s) Oral before breakfast  sacubitril 97 mG/valsartan 103 mG 1 Tablet(s) Oral two times a day  simvastatin 10 milliGRAM(s) Oral at bedtime  tamsulosin 0.8 milliGRAM(s) Oral at bedtime    PRN Inpatient Medications  acetaminophen   Tablet .. 650 milliGRAM(s) Oral every 6 hours PRN  ALBUTerol    90 MICROgram(s) HFA Inhaler 2 Puff(s) Inhalation every 6 hours PRN      REVIEW OF SYSTEMS  --------------------------------------------------------------------------------  Gen: No fevers/chills  Skin: No rashes  Head/Eyes/Ears: Normal hearing,  Normal vision   Respiratory: No dyspnea, cough  CV: No chest pain  GI: No abdominal pain, diarrhea, constipation, nausea, vomiting  : No dysuria, hematuria  MSK: No  edema  Heme: No easy bruising or bleeding  Psych: No significant depression    All other systems were reviewed and are negative, except as noted.    VITALS/PHYSICAL EXAM  --------------------------------------------------------------------------------  T(C): 36.6 (10-09-18 @ 03:54), Max: 36.6 (10-09-18 @ 03:54)  HR: 71 (10-09-18 @ 03:54) (64 - 74)  BP: 112/69 (10-09-18 @ 03:54) (112/69 - 143/86)  RR: 18 (10-09-18 @ 03:54) (17 - 18)  SpO2: 96% (10-09-18 @ 03:54) (94% - 98%)  Wt(kg): --  Height (cm): 175.26 (10-08-18 @ 11:01)  Weight (kg): 72.6 (10-08-18 @ 11:01)  BMI (kg/m2): 23.6 (10-08-18 @ 11:01)  BSA (m2): 1.88 (10-08-18 @ 11:01)      10-08-18 @ 07:01  -  10-09-18 @ 07:00  --------------------------------------------------------  IN: 480 mL / OUT: 100 mL / NET: 380 mL    10-09-18 @ 07:01  -  10-09-18 @ 10:50  --------------------------------------------------------  IN: 0 mL / OUT: 300 mL / NET: -300 mL      Physical Exam:  	Gen: NAD  	HEENT: MMM  	Pulm: CTA B/L  	CV: S1S2  	Abd: Soft, +BS   	Ext: No LE edema B/L  	Neuro: Awake  	Skin: Warm and dry  	Vascular access:    LABS/STUDIES  --------------------------------------------------------------------------------              10.9   4.9   >-----------<  76       [10-09-18 @ 05:10]              32.7     139  |  104  |  32  ----------------------------<  104      [10-09-18 @ 05:10]  3.8   |  25  |  1.49        Ca     8.4     [10-09-18 @ 05:10]    TPro  7.2  /  Alb  3.9  /  TBili  0.6  /  DBili  x   /  AST  12  /  ALT  13  /  AlkPhos  76  [10-08-18 @ 10:24]          Creatinine Trend:  SCr 1.49 [10-09 @ 05:10]  SCr 1.68 [10-08 @ 10:24]    Urinalysis - [05-17-16 @ 14:03]      Color Yellow / Appearance Clear / SG 1.014 / pH 7.0      Gluc Negative / Ketone Negative  / Bili Negative / Urobili Negative       Blood Negative / Protein Negative / Leuk Est Negative / Nitrite Negative      RBC  / WBC  / Hyaline  / Gran  / Sq Epi  / Non Sq Epi OCC / Bacteria       HbA1c 5.2      [05-15-18 @ 07:37]  TSH 1.66      [05-15-18 @ 09:35] [facility] [place] NEPHROLOGY PRACTICE   MD RADHA BUTLER MD RUORU WONG, PA    TEL:  OFFICE: [phone]  DR CESPEDES CELL: [phone]  MARLYN BONE CELL: [phone]  DR. JUARES CELL: [phone]    -- INITIAL RENAL CONSULT NOTE  --------------------------------------------------------------------------------  HPI:    89 yo M PMH COPD (not O2 dependent), CAD s/p stent 2015, AAA repair with stent, HTN, HLD, Chronic systolic HF (EF 20%), GERD, hypothyroidism who presents for syncope, ANA and SOB.  Underwent cardiac cath on 10/8 and admitted for further evaluation of Aortic stenosis.   Nephrology consulted for elevated serum creatinine and optimization prior to contrast procedure.   Upon review of sunrise, pt with Scr 1.4 in 2016 and 1.5 in May 2018  Pt with Scr 1.68 on admission on 10/8 and improved to 1.4 today.   Pt reports HTn >10yrs . No DM    PAST HISTORY  --------------------------------------------------------------------------------  PAST MEDICAL & SURGICAL HISTORY:  MI (myocardial infarction): 2000  COPD (chronic obstructive pulmonary disease)  Gout  GERD (gastroesophageal reflux disease)  Hypothyroid  CHF (congestive heart failure)  BPH (Benign Prostatic Hyperplasia)  CAD (coronary artery disease): S/P angioplasty 2000  HLD (hyperlipidemia)  HTN (hypertension)  S/P angioplasty  H/O repair of rotator cuff  AAA (abdominal aortic aneurysm)  S/P knee replacement, right  Achilles rupture  Hernia, inguinal, bilateral  Cataract    FAMILY HISTORY:  Family history of CHF (congestive heart failure)  Family history of hemolytic uremic syndrome    PAST SOCIAL HISTORY:    ALLERGIES & MEDICATIONS  --------------------------------------------------------------------------------  Allergies    No Known Allergies    Intolerances      Standing Inpatient Medications  allopurinol 300 milliGRAM(s) Oral daily  aspirin enteric coated 81 milliGRAM(s) Oral daily  carvedilol 12.5 milliGRAM(s) Oral every 12 hours  colchicine 0.6 milliGRAM(s) Oral daily  ferrous    sulfate 325 milliGRAM(s) Oral daily  furosemide    Tablet 40 milliGRAM(s) Oral daily  influenza   Vaccine 0.5 milliLiter(s) IntraMuscular once  levothyroxine 150 MICROGram(s) Oral daily  pantoprazole    Tablet 40 milliGRAM(s) Oral before breakfast  sacubitril 97 mG/valsartan 103 mG 1 Tablet(s) Oral two times a day  simvastatin 10 milliGRAM(s) Oral at bedtime  tamsulosin 0.8 milliGRAM(s) Oral at bedtime    PRN Inpatient Medications  acetaminophen   Tablet .. 650 milliGRAM(s) Oral every 6 hours PRN  ALBUTerol    90 MICROgram(s) HFA Inhaler 2 Puff(s) Inhalation every 6 hours PRN      REVIEW OF SYSTEMS  --------------------------------------------------------------------------------  Gen: No fevers/chills  Skin: No rashes  Head/Eyes/Ears: Normal hearing,  Normal vision   Respiratory: No dyspnea, cough  CV: No chest pain  GI: No abdominal pain, diarrhea, constipation, nausea, vomiting  : No dysuria, hematuria  MSK: + edema  Heme: No easy bruising or bleeding  Psych: No significant depression    All other systems were reviewed and are negative, except as noted.    VITALS/PHYSICAL EXAM  --------------------------------------------------------------------------------  T(C): 36.6 (10-09-18 @ 03:54), Max: 36.6 (10-09-18 @ 03:54)  HR: 71 (10-09-18 @ 03:54) (64 - 74)  BP: 112/69 (10-09-18 @ 03:54) (112/69 - 143/86)  RR: 18 (10-09-18 @ 03:54) (17 - 18)  SpO2: 96% (10-09-18 @ 03:54) (94% - 98%)  Wt(kg): --  Height (cm): 175.26 (10-08-18 @ 11:01)  Weight (kg): 72.6 (10-08-18 @ 11:01)  BMI (kg/m2): 23.6 (10-08-18 @ 11:01)  BSA (m2): 1.88 (10-08-18 @ 11:01)      10-08-18 @ 07:01  -  10-09-18 @ 07:00  --------------------------------------------------------  IN: 480 mL / OUT: 100 mL / NET: 380 mL    10-09-18 @ 07:01  -  10-09-18 @ 10:50  --------------------------------------------------------  IN: 0 mL / OUT: 300 mL / NET: -300 mL      Physical Exam:  	Gen: NAD  	HEENT: MMM  	Pulm: CTA B/L  	CV: S1S2  	Abd: Soft, +BS   	Ext: + LE edema B/L  	Neuro: Awake  	Skin: Warm and dry  	SANGEETA rios    LABS/STUDIES  --------------------------------------------------------------------------------              10.9   4.9   >-----------<  76       [10-09-18 @ 05:10]              32.7     139  |  104  |  32  ----------------------------<  104      [10-09-18 @ 05:10]  3.8   |  25  |  1.49        Ca     8.4     [10-09-18 @ 05:10]    TPro  7.2  /  Alb  3.9  /  TBili  0.6  /  DBili  x   /  AST  12  /  ALT  13  /  AlkPhos  76  [10-08-18 @ 10:24]          Creatinine Trend:  SCr 1.49 [10-09 @ 05:10]  SCr 1.68 [10-08 @ 10:24]    Urinalysis - [05-17-16 @ 14:03]      Color Yellow / Appearance Clear / SG 1.014 / pH 7.0      Gluc Negative / Ketone Negative  / Bili Negative / Urobili Negative       Blood Negative / Protein Negative / Leuk Est Negative / Nitrite Negative      RBC  / WBC  / Hyaline  / Gran  / Sq Epi  / Non Sq Epi OCC / Bacteria       HbA1c 5.2      [05-15-18 @ 07:37]  TSH 1.66      [05-15-18 @ 09:35]

## 2018-10-09 NOTE — CONSULT NOTE ADULT - PROBLEM SELECTOR RECOMMENDATION 2
BP stable  Continue current meds   Hold lasix and entresto ( has valsartan) prior to contrast procedure  Monitor BP  Low salt diet Review of Systems:  	•	CONSTITUTIONAL : no fever or weight change  	•	SKIN : no rash  	•	HEMATOLOGIC : no petechia, no bruising  	•	EYES : no eye pain, no blurred vision  	•	ENT : no change in hearing, no sore throat  	•	RESPIRATORY : no shortness of breath, no cough  	•	CARDIAC : no chest pain, no palpitations  	•	GI : no abd pain, no nausea, no vomiting, no diarrhea, no constipation, no bleeding   	•	MUSCULOSKELETAL : no joint paint, no swelling, no redness  	•	NEUROLOGIC : no weakness, no headache, no anesthesia, no paresthesias

## 2018-10-09 NOTE — DISCHARGE NOTE ADULT - MEDICATION SUMMARY - MEDICATIONS TO TAKE
I will START or STAY ON the medications listed below when I get home from the hospital:    acetaminophen 325 mg oral tablet  -- 2 tab(s) by mouth every 6 hours, As needed, Mild Pain  -- Indication: For Pain    aspirin 81 mg oral tablet  -- 1 tab(s) by mouth 3 times a week  -- Indication: For Cardiac protection    Entresto 97 mg-103 mg oral tablet  -- 1 tab(s) by mouth 2 times a day  -- Indication: For Heart failure - blood pressure control    Flomax 0.4 mg oral capsule  -- 2 cap(s) by mouth once a day  -- Indication: For BPH    digoxin 125 mcg (0.125 mg) oral tablet  -- 1 tab(s) by mouth once a day  -- Indication: For Heart rate rhythm    allopurinol 300 mg oral tablet  -- 1 tab(s) by mouth once a day  -- Indication: For Gout    simvastatin 10 mg oral tablet  -- 1 tab(s) by mouth once a day (at bedtime)  -- Indication: For Cholesterol    carvedilol 12.5 mg oral tablet  -- 1 tab(s) by mouth 2 times a day  -- Indication: For Heart failure    albuterol 90 mcg/inh inhalation aerosol  -- 2 puff(s) inhaled 4 times a day, As Needed  -- Indication: For shortness of breath    Lasix 40 mg oral tablet  -- 1 tab(s) by mouth once a day   -- Indication: For Systolic heart failure    ferrous sulfate 325 mg (65 mg elemental iron) oral delayed release tablet  -- 1 tab(s) by mouth once a day  -- Indication: For Anemia    Lutein  -- 1 tab(s) by mouth 2 times a day  -- Indication: For supplement    omeprazole 20 mg oral delayed release capsule  -- 1 cap(s) by mouth once a day  -- Indication: For stomach protection    Synthroid 125 mcg (0.125 mg) oral tablet  -- 1 tab(s) by mouth once a day  -- Indication: For Hypothyroid    Vitamin D3  -- 3000 unit(s) by mouth once a day  -- Indication: For supplement

## 2018-10-09 NOTE — DISCHARGE NOTE ADULT - HOSPITAL COURSE
89 yo M PMH COPD (not O2 dependent), MI 1994, CAD s/p stent RCA 2015 (inferior akinesis), AAA repair with stent, HTN, HLD, Chronic systolic HF (EF 20%), GERD, hypothyroidism, AICD shock August, 2017,  who presents for syncope, ANA and SOB. Pt had recent hospitalization after a brief syncopal episode and dyspnea and worsening LE edema. ICD interrogated without events at that time, symptoms attributed to aortic stenosis revealed on TTE and was referred for left and right heart cath by his cardiologist.   Cardiac cath showed Patent RCA stents. The transvalvular AV gradient (on a pullback measurement--the soft J wire inadvertently crossed the AV)was approximately 15mmHg. A dobutamine stress echo was performed and structural heart consulted. Aortic Stenosis is moderately severe and is not a candidate for TAVR at this time. Pt to repeat Echo as outpatient   Medically cleared for discharge with follow up with cardiologist in 2 week

## 2018-10-09 NOTE — PHARMACOTHERAPY INTERVENTION NOTE - COMMENTS
Medication reconciliation was completed by pharmacist. Patient's pharmacy was contacted for updated medications.

## 2018-10-09 NOTE — DISCHARGE NOTE ADULT - MEDICATION SUMMARY - MEDICATIONS TO CHANGE
I will SWITCH the dose or number of times a day I take the medications listed below when I get home from the hospital:    Synthroid 150 mcg (0.15 mg) oral tablet  -- 1 tab(s) by mouth once a day

## 2018-10-09 NOTE — CHART NOTE - NSCHARTNOTEFT_GEN_A_CORE
Request from Dr. Mckoy to facilitate patient discharge. Per Dr. Paiz pt to continue same home meds. Medication reconciliation reviewed, revised, and resolved with Dr. Mckoy who had medically cleared patient for discharge with follow-up as advised. Please refer to discharge note for detailed hospital course. Patient is currently stable for discharge to home at this time.    Contact # [phone]

## 2018-10-09 NOTE — CONSULT NOTE ADULT - SUBJECTIVE AND OBJECTIVE BOX
Patient seen and evaluated @   Chief Complaint: Patient is a 89y old  Male who presents with a chief complaint of     HPI:  89 yo M PMH COPD (not O2 dependent), CAD s/p stent , AAA repair with stent, HTN, HLD, Chronic systolic HF (EF 20%), GERD, hypothyroidism who presents for syncope, ANA and SOB. Pt had recent hospitalization after a brief syncopal episode and dyspnea and worsening LE edema. ICD interrogated without events at that time, symptoms attributed to aortic stenosis revealed on TTE and was referred for left and right heart cath by his cardiologist.  Pt reports feeling less dyspneic but remains with LE pitting edema  L>R.        < from: Transthoracic Echocardiogram (05.15.18 @ 13:24) >  PROCEDURE: Transthoracic echocardiogram with 2-D, M-Mode  and complete spectral and color flow Doppler.  INDICATION: Syncope and collapse (R55)  ------------------------------------------------------------------------  Dimensions:    Normal Values:  LA:     4.2    2.0 - 4.0 cm  Ao:     3.6    2.0 - 3.8 cm  SEPTUM: 1.0    0.6 - 1.2 cm  PWT:   0.9    0.6 - 1.1 cm  LVIDd:  5.3    3.0 - 5.6 cm  LVIDs:  4.6    1.8 - 4.0 cm  Derived variables:  LVMI: 99 g/m2  RWT: 0.33  Fractional short: 13 %  EF (Visual Estimate): 25-30 %  Doppler Peak Velocity (m/sec): AoV=2.8  ------------------------------------------------------------------------  Observations:  Mitral Valve: Mitral annular calcification and calcified  mitral leaflets with decreased diastolic opening. Mild  mitral regurgitation.  Aortic Valve/Aorta: Calcified aortic valve with decreased  opening. Peak transaortic valve gradient equals 31 mm Hg,  mean transaortic valve gradient equals 17 mm Hg, estimated  aortic valve area equals 0.8 sqcm (by continuity equation),  aortic valve velocity time integral equals 56 cm,  consistent with severe aortic stenosis. Mild aortic  regurgitation.  Peak left ventricular outflow tract  gradient equals 2 mm Hg, mean gradient is equal to 1 mm Hg,  LVOT velocity time integral equals 12 cm.  Aortic Root: 3.6 cm.  Left Atrium: Normal left atrium. LA volume index = 32  cc/m2.  Left Ventricle: Severe segmental left ventricular systolic  dysfunction. The mid anterior septum, the apical inferior  wall, the apical anterior wall, the apical septum, the  apical lateral wall, the basal anterior wall, the basal  anterior septum, the basal inferior wall, the basal  anterolateral wall, the mid anterior wall, the mid  inferoseptum, and the apical cap are hypokinetic. The mid  inferolateral wall, the basal  inferolateral wall, the mid  inferior wall,the mid anterolateral wall, and the basal  inferoseptum are akinetic.  Normal left ventricular  internal dimensions and wall thicknesses. Moderate  diastolic dysfunction (Stage II).  Right Heart: Mild right atrial enlargement. Right  ventricular enlargement with normal right ventricular  systolic function. A device wire is noted in the right  heart. Normal tricuspid valve. Mild tricuspid  regurgitation. Pulmonic valve not well visualized, probably  normal.  Pericardium/Pleura: Normal pericardium with no pericardial  effusion.  Hemodynamic: Estimated right atrial pressure is 10 - 15 mm  Hg. Estimated right ventricular systolic pressure equals  33.04 - 38.04 mm Hg, assuming right atrial pressure equals  10 - 15 mm Hg, consistent with normal pulmonary  hypertension.  ------------------------------------------------------------------------  Conclusions:  1. Calcified aortic valve with decreased opening. Peak  transaortic valve gradient equals 31 mm Hg, mean  transaortic valve gradient equals 17 mm Hg, estimated  aortic valve area equals 0.8 sqcm (by continuity equation),  aortic valve velocity time integral equals 56 cm,  consistent with severe aortic stenosis. Mild aortic  regurgitation.  2. Severe segmental left ventricular systolic dysfunction.  The mid anterior septum, the apical inferior wall, the  apical anterior wall, the apical septum, the apical lateral  wall, the basal anterior wall, the basal anterior septum,  the basal inferior wall, the basal anterolateral wall, the  mid anterior wall, the mid inferoseptum, and the apical cap  are hypokinetic. The mid inferolateral wall, the basal  inferolateral wall, the mid inferior wall, the mid  anterolateral wall, and the basal inferoseptum are  akinetic.  3. Moderate diastolic dysfunction (Stage II).  4. Right ventricular enlargement with normal right  ventricular systolic function. A device wire is noted in  the right heart.  *** Compared with echocardiogram of 2015, there has  been an interval increase in gradients and velocities  accross the aortic valve.  ------------------------------------------------------------------------  Confirmed on  5/15/2018 - 15:55:06 by Sunil De León M.D.  ------------------------------------------------------------------------    < end of copied text > (08 Oct 2018 09:58)    PMH:   MI (myocardial infarction)  COPD (chronic obstructive pulmonary disease)  Gout  GERD (gastroesophageal reflux disease)  Hypothyroid  CHF (congestive heart failure)  BPH (Benign Prostatic Hyperplasia)  CAD (coronary artery disease)  HLD (hyperlipidemia)  HTN (hypertension)    PSH:   S/P angioplasty  H/O repair of rotator cuff  AAA (abdominal aortic aneurysm)  S/P knee replacement, right  Achilles rupture  Hernia, inguinal, bilateral  Cataract    Medications:   acetaminophen   Tablet .. 650 milliGRAM(s) Oral every 6 hours PRN  ALBUTerol    90 MICROgram(s) HFA Inhaler 2 Puff(s) Inhalation every 6 hours PRN  allopurinol 300 milliGRAM(s) Oral daily  aspirin enteric coated 81 milliGRAM(s) Oral daily  carvedilol 12.5 milliGRAM(s) Oral every 12 hours  colchicine 0.6 milliGRAM(s) Oral daily  ferrous    sulfate 325 milliGRAM(s) Oral daily  furosemide    Tablet 40 milliGRAM(s) Oral daily  influenza   Vaccine 0.5 milliLiter(s) IntraMuscular once  levothyroxine 150 MICROGram(s) Oral daily  pantoprazole    Tablet 40 milliGRAM(s) Oral before breakfast  sacubitril 97 mG/valsartan 103 mG 1 Tablet(s) Oral two times a day  simvastatin 10 milliGRAM(s) Oral at bedtime  tamsulosin 0.8 milliGRAM(s) Oral at bedtime    Allergies:  No Known Allergies    FAMILY HISTORY:  Family history of CHF (congestive heart failure)  Family history of hemolytic uremic syndrome    Social History:  Smoking:  Alcohol:  Drugs:    Review of Systems:  Constitutional: no recent weight loss  HEENT: no scleral icterus. Normal mucosa.  Respiratory: no shortness of breath. No history of asthma. No COPD  Cardiovascular: No Chest Pain, no Palpitations, no PERRIN, PND, or Orthopnea. no Syncope. No history of rheumatic fever.   Gastrointestinal: No Abdominal Pain, no Diarrhea, no Constipation, no Nausea or Vomiting  Genitourinary: No Nocturia,Dysuria, or Incontinence. No hematuria  Extremities: no edema. No cyanosis.  Neurologic: No Focal deficit. No Paresthesias. No Syncope. No seizures  Lymphatic: No Swelling. No Lymphadenopathy   Skin: No Rash,  Ecchymoses, Wounds, or Lesions  Psychiatry: No Depression. No Anxiety.    10 point review of systems is otherwise negative except as mentioned above            [ ]Unable to obtain    Physical Exam:  T(C): 36.6 (10-09-18 @ 03:54), Max: 36.7 (10-08-18 @ 09:58)  HR: 71 (10-09-18 @ 03:54) (64 - 74)  BP: 112/69 (10-09-18 @ 03:54) (102/62 - 143/86)  RR: 18 (10-09-18 @ 03:54) (16 - 18)  SpO2: 96% (10-09-18 @ 03:54) (94% - 98%)  Wt(kg): --    10-08 @ 07:01  -  10-09 @ 07:00  --------------------------------------------------------  IN: 480 mL / OUT: 100 mL / NET: 380 mL    10-09 @ 07:01  -  10-09 @ 08:26  --------------------------------------------------------  IN: 0 mL / OUT: 300 mL / NET: -300 mL      Daily Height in cm: 175.26 (08 Oct 2018 09:58)    Daily Weight in k.3 (09 Oct 2018 07:25)    Appearance: WD, WN, NAD  Eyes:  No scleral icterus. PERRL, EOMI  HENT: Normal oral mucosa.]NC/AT  Neck: No JVD at 90 degrees. Normal carotid upstrokes without bruits or murmurs.  Cardiovascular: Normal PMI. Normal S1, S2 physiologically split. No S3 or S4. No murmurs.  Respiratory: Clear to auscultation bilaterally. No wheezes. No rales.  Gastrointestinal: Soft.  Non-tender. No hepatosplenomegally.  Extremities: No clubbing. No edema. Pulses 2+ bilaterally symmetric including pedal.  Musculoskeletal:  No joint deformity   Neurologic: Non-focal  Lymphatic:  No lymphadenopathy  Psychiatry: [+ ] AAOx3 [ +] Mood & affect appropriate  Skin: No rashes. No ecchymoses. No cyanosis    Cardiovascular Diagnostic Testing:  ECG:    Echo:    Stress Testing:    Cath:    Interpretation of Telemetry:    Imaging:    Labs:                        10.9   4.9   )-----------( 76       ( 09 Oct 2018 05:10 )             32.7     10-09    139  |  104  |  32<H>  ----------------------------<  104<H>  3.8   |  25  |  1.49<H>    Ca    8.4      09 Oct 2018 05:10    TPro  7.2  /  Alb  3.9  /  TBili  0.6  /  DBili  x   /  AST  12  /  ALT  13  /  AlkPhos  76  10-08 Patient seen and evaluated @ 845  Chief Complaint: Patient is a 89y old  Male who presents with a chief complaint of evaluation for TAVR    HPI:  89 yo M PMH COPD (not O2 dependent), MI , CAD s/p stent RCA  (inferior akinesis), AAA repair with stent, HTN, HLD, Chronic systolic HF (EF 20%), GERD, hypothyroidism, AICD shock ,  who presents for syncope, ANA and SOB. Pt had recent hospitalization after a brief syncopal episode and dyspnea and worsening LE edema. ICD interrogated without events at that time, symptoms attributed to aortic stenosis revealed on TTE and was referred for left and right heart cath by his cardiologist.  Pt reports feeling less dyspneic but remains with LE pitting edema  L>R.        < from: Transthoracic Echocardiogram (05.15.18 @ 13:24) >  PROCEDURE: Transthoracic echocardiogram with 2-D, M-Mode  and complete spectral and color flow Doppler.  INDICATION: Syncope and collapse (R55)  ------------------------------------------------------------------------  Dimensions:    Normal Values:  LA:     4.2    2.0 - 4.0 cm  Ao:     3.6    2.0 - 3.8 cm  SEPTUM: 1.0    0.6 - 1.2 cm  PWT:   0.9    0.6 - 1.1 cm  LVIDd:  5.3    3.0 - 5.6 cm  LVIDs:  4.6    1.8 - 4.0 cm  Derived variables:  LVMI: 99 g/m2  RWT: 0.33  Fractional short: 13 %  EF (Visual Estimate): 25-30 %  Doppler Peak Velocity (m/sec): AoV=2.8  ------------------------------------------------------------------------  Observations:  Mitral Valve: Mitral annular calcification and calcified  mitral leaflets with decreased diastolic opening. Mild  mitral regurgitation.  Aortic Valve/Aorta: Calcified aortic valve with decreased  opening. Peak transaortic valve gradient equals 31 mm Hg,  mean transaortic valve gradient equals 17 mm Hg, estimated  aortic valve area equals 0.8 sqcm (by continuity equation),  aortic valve velocity time integral equals 56 cm,  consistent with severe aortic stenosis. Mild aortic  regurgitation.  Peak left ventricular outflow tract  gradient equals 2 mm Hg, mean gradient is equal to 1 mm Hg,  LVOT velocity time integral equals 12 cm.  Aortic Root: 3.6 cm.  Left Atrium: Normal left atrium. LA volume index = 32  cc/m2.  Left Ventricle: Severe segmental left ventricular systolic  dysfunction. The mid anterior septum, the apical inferior  wall, the apical anterior wall, the apical septum, the  apical lateral wall, the basal anterior wall, the basal  anterior septum, the basal inferior wall, the basal  anterolateral wall, the mid anterior wall, the mid  inferoseptum, and the apical cap are hypokinetic. The mid  inferolateral wall, the basal  inferolateral wall, the mid  inferior wall,the mid anterolateral wall, and the basal  inferoseptum are akinetic.  Normal left ventricular  internal dimensions and wall thicknesses. Moderate  diastolic dysfunction (Stage II).  Right Heart: Mild right atrial enlargement. Right  ventricular enlargement with normal right ventricular  systolic function. A device wire is noted in the right  heart. Normal tricuspid valve. Mild tricuspid  regurgitation. Pulmonic valve not well visualized, probably  normal.  Pericardium/Pleura: Normal pericardium with no pericardial  effusion.  Hemodynamic: Estimated right atrial pressure is 10 - 15 mm  Hg. Estimated right ventricular systolic pressure equals  33.04 - 38.04 mm Hg, assuming right atrial pressure equals  10 - 15 mm Hg, consistent with normal pulmonary  hypertension.  ------------------------------------------------------------------------  Conclusions:  1. Calcified aortic valve with decreased opening. Peak  transaortic valve gradient equals 31 mm Hg, mean  transaortic valve gradient equals 17 mm Hg, estimated  aortic valve area equals 0.8 sqcm (by continuity equation),  aortic valve velocity time integral equals 56 cm,  consistent with severe aortic stenosis. Mild aortic  regurgitation.  2. Severe segmental left ventricular systolic dysfunction.  The mid anterior septum, the apical inferior wall, the  apical anterior wall, the apical septum, the apical lateral  wall, the basal anterior wall, the basal anterior septum,  the basal inferior wall, the basal anterolateral wall, the  mid anterior wall, the mid inferoseptum, and the apical cap  are hypokinetic. The mid inferolateral wall, the basal  inferolateral wall, the mid inferior wall, the mid  anterolateral wall, and the basal inferoseptum are  akinetic.  3. Moderate diastolic dysfunction (Stage II).  4. Right ventricular enlargement with normal right  ventricular systolic function. A device wire is noted in  the right heart.  *** Compared with echocardiogram of 2015, there has  been an interval increase in gradients and velocities  accross the aortic valve.  ------------------------------------------------------------------------  Confirmed on  5/15/2018 - 15:55:06 by Sunil De León M.D.  ------------------------------------------------------------------------    < end of copied text > (08 Oct 2018 09:58)    PMH:   MI (myocardial infarction)  COPD (chronic obstructive pulmonary disease)  Gout  GERD (gastroesophageal reflux disease)  Hypothyroid  CHF (congestive heart failure)  BPH (Benign Prostatic Hyperplasia)  CAD (coronary artery disease)  HLD (hyperlipidemia)  HTN (hypertension)    PSH:   S/P angioplasty  H/O repair of rotator cuff  AAA (abdominal aortic aneurysm)  S/P knee replacement, right  Achilles rupture  Hernia, inguinal, bilateral  Cataract  AICD, BiV-pacer    OUTPATIENT MEDS: Carvedilol 12.5 bid, digoxin 0.125., Entresto , b.i.d., furosemide 80, simvastatin 10 q.d. Synthroid 0.12 Allopurinol 300 q.d. Colchicine 0.6, p.r.n., tamlosin 0.8, q.h.s.    Medications:   acetaminophen   Tablet .. 650 milliGRAM(s) Oral every 6 hours PRN  ALBUTerol    90 MICROgram(s) HFA Inhaler 2 Puff(s) Inhalation every 6 hours PRN  allopurinol 300 milliGRAM(s) Oral daily  aspirin enteric coated 81 milliGRAM(s) Oral daily  carvedilol 12.5 milliGRAM(s) Oral every 12 hours  colchicine 0.6 milliGRAM(s) Oral daily  ferrous    sulfate 325 milliGRAM(s) Oral daily  furosemide    Tablet 40 milliGRAM(s) Oral daily  influenza   Vaccine 0.5 milliLiter(s) IntraMuscular once  levothyroxine 150 MICROGram(s) Oral daily  pantoprazole    Tablet 40 milliGRAM(s) Oral before breakfast  sacubitril 97 mG/valsartan 103 mG 1 Tablet(s) Oral two times a day  simvastatin 10 milliGRAM(s) Oral at bedtime  tamsulosin 0.8 milliGRAM(s) Oral at bedtime    Allergies:  No Known Allergies    FAMILY HISTORY:  Family history of CHF (congestive heart failure)  Family history of hemolytic uremic syndrome    Social History: , active  Smoking: no  Alcohol: no  Drugs: no    Review of Systems:  Constitutional: no recent weight loss  HEENT: no scleral icterus. Normal mucosa.  Respiratory: (+) shortness of breath. (+) COPD  Cardiovascular: No Chest Pain, no Palpitations, no PERRIN, PND, or Orthopnea. (+) Syncope with AICD shock 2017. Another syncope, ?orthostatic on airplane in August. No history of rheumatic fever.   Gastrointestinal: No Abdominal Pain, no Diarrhea, no Constipation, no Nausea or Vomiting  Genitourinary: No Nocturia,Dysuria, or Incontinence. No hematuria  Extremities: no edema. No cyanosis.  Neurologic: No Focal deficit. No Paresthesias. No Syncope. No seizures  Lymphatic: No Swelling. No Lymphadenopathy   Skin: No Rash,  Ecchymoses, Wounds, or Lesions  Psychiatry: No Depression. No Anxiety.    10 point review of systems is otherwise negative except as mentioned above            [ ]Unable to obtain    Physical Exam:  T(C): 36.6 (10-09-18 @ 03:54), Max: 36.7 (10-08-18 @ 09:58)  HR: 71 (10-09-18 @ 03:54) (64 - 74)  BP: 112/69 (10-09-18 @ 03:54) (102/62 - 143/86)  RR: 18 (10-09-18 @ 03:54) (16 - 18)  SpO2: 96% (10-09-18 @ 03:54) (94% - 98%)  Wt(kg): --    10-08 @ 07:01  -  10-09 @ 07:00  --------------------------------------------------------  IN: 480 mL / OUT: 100 mL / NET: 380 mL    10-09 @ 07:01  -  10-09 @ 08:26  --------------------------------------------------------  IN: 0 mL / OUT: 300 mL / NET: -300 mL      Daily Height in cm: 175.26 (08 Oct 2018 09:58)    Daily Weight in k.3 (09 Oct 2018 07:25)    Appearance: Thin, elderly,  NAD  Eyes:  No scleral icterus. PERRL, EOMI  HENT: Normal oral mucosa.]NC/AT  Neck: No JVD at 90 degrees. Normal carotid upstrokes without bruits or murmurs.  Cardiovascular: Normal PMI. Normal S1, S2 physiologically split. No S3 or S4. No murmurs.  Respiratory: Clear to auscultation bilaterally. No wheezes. No rales.  Gastrointestinal: Soft.  Non-tender. No hepatosplenomegally.  Extremities: No clubbing. No edema. Pulses 2+ bilaterally symmetric including pedal.  Musculoskeletal:  No joint deformity   Neurologic: Non-focal  Lymphatic:  No lymphadenopathy  Psychiatry: [+ ] AAOx3 [ +] Mood & affect appropriate  Skin: No rashes. No ecchymoses. No cyanosis    Cardiovascular Diagnostic Testing:  ECG:    Echo:    Stress Testing:    Cath:    Interpretation of Telemetry:    Imaging:    Labs:                        10.9   4.9   )-----------( 76       ( 09 Oct 2018 05:10 )             32.7     10-09    139  |  104  |  32<H>  ----------------------------<  104<H>  3.8   |  25  |  1.49<H>    Ca    8.4      09 Oct 2018 05:10    TPro  7.2  /  Alb  3.9  /  TBili  0.6  /  DBili  x   /  AST  12  /  ALT  13  /  AlkPhos  76  10-08 Patient seen and evaluated @ 845  Chief Complaint: Patient is a 89y old  Male who presents with a chief complaint of evaluation for TAVR    HPI:  89 yo M PMH COPD (not O2 dependent), MI , CAD s/p stent RCA  (inferior akinesis), AAA repair with stent, HTN, HLD, Chronic systolic HF (EF 20%), GERD, hypothyroidism, AICD shock ,  who presents for syncope, ANA and SOB. Pt had recent hospitalization after a brief syncopal episode and dyspnea and worsening LE edema. ICD interrogated without events at that time, symptoms attributed to aortic stenosis revealed on TTE and was referred for left and right heart cath by his cardiologist.  Pt reports feeling less dyspneic but remains with LE pitting edema  L>R.        < from: Transthoracic Echocardiogram (05.15.18 @ 13:24) >  PROCEDURE: Transthoracic echocardiogram with 2-D, M-Mode  and complete spectral and color flow Doppler.  INDICATION: Syncope and collapse (R55)  ------------------------------------------------------------------------  Dimensions:    Normal Values:  LA:     4.2    2.0 - 4.0 cm  Ao:     3.6    2.0 - 3.8 cm  SEPTUM: 1.0    0.6 - 1.2 cm  PWT:   0.9    0.6 - 1.1 cm  LVIDd:  5.3    3.0 - 5.6 cm  LVIDs:  4.6    1.8 - 4.0 cm  Derived variables:  LVMI: 99 g/m2  RWT: 0.33  Fractional short: 13 %  EF (Visual Estimate): 25-30 %  Doppler Peak Velocity (m/sec): AoV=2.8  ------------------------------------------------------------------------  Observations:  Mitral Valve: Mitral annular calcification and calcified  mitral leaflets with decreased diastolic opening. Mild  mitral regurgitation.  Aortic Valve/Aorta: Calcified aortic valve with decreased  opening. Peak transaortic valve gradient equals 31 mm Hg,  mean transaortic valve gradient equals 17 mm Hg, estimated  aortic valve area equals 0.8 sqcm (by continuity equation),  aortic valve velocity time integral equals 56 cm,  consistent with severe aortic stenosis. Mild aortic  regurgitation.  Peak left ventricular outflow tract  gradient equals 2 mm Hg, mean gradient is equal to 1 mm Hg,  LVOT velocity time integral equals 12 cm.  Aortic Root: 3.6 cm.  Left Atrium: Normal left atrium. LA volume index = 32  cc/m2.  Left Ventricle: Severe segmental left ventricular systolic  dysfunction. The mid anterior septum, the apical inferior  wall, the apical anterior wall, the apical septum, the  apical lateral wall, the basal anterior wall, the basal  anterior septum, the basal inferior wall, the basal  anterolateral wall, the mid anterior wall, the mid  inferoseptum, and the apical cap are hypokinetic. The mid  inferolateral wall, the basal  inferolateral wall, the mid  inferior wall,the mid anterolateral wall, and the basal  inferoseptum are akinetic.  Normal left ventricular  internal dimensions and wall thicknesses. Moderate  diastolic dysfunction (Stage II).  Right Heart: Mild right atrial enlargement. Right  ventricular enlargement with normal right ventricular  systolic function. A device wire is noted in the right  heart. Normal tricuspid valve. Mild tricuspid  regurgitation. Pulmonic valve not well visualized, probably  normal.  Pericardium/Pleura: Normal pericardium with no pericardial  effusion.  Hemodynamic: Estimated right atrial pressure is 10 - 15 mm  Hg. Estimated right ventricular systolic pressure equals  33.04 - 38.04 mm Hg, assuming right atrial pressure equals  10 - 15 mm Hg, consistent with normal pulmonary  hypertension.  ------------------------------------------------------------------------  Conclusions:  1. Calcified aortic valve with decreased opening. Peak  transaortic valve gradient equals 31 mm Hg, mean  transaortic valve gradient equals 17 mm Hg, estimated  aortic valve area equals 0.8 sqcm (by continuity equation),  aortic valve velocity time integral equals 56 cm,  consistent with severe aortic stenosis. Mild aortic  regurgitation.  2. Severe segmental left ventricular systolic dysfunction.  The mid anterior septum, the apical inferior wall, the  apical anterior wall, the apical septum, the apical lateral  wall, the basal anterior wall, the basal anterior septum,  the basal inferior wall, the basal anterolateral wall, the  mid anterior wall, the mid inferoseptum, and the apical cap  are hypokinetic. The mid inferolateral wall, the basal  inferolateral wall, the mid inferior wall, the mid  anterolateral wall, and the basal inferoseptum are  akinetic.  3. Moderate diastolic dysfunction (Stage II).  4. Right ventricular enlargement with normal right  ventricular systolic function. A device wire is noted in  the right heart.  *** Compared with echocardiogram of 2015, there has  been an interval increase in gradients and velocities  accross the aortic valve.  ------------------------------------------------------------------------  Confirmed on  5/15/2018 - 15:55:06 by Sunil De León M.D.  ------------------------------------------------------------------------    < end of copied text > (08 Oct 2018 09:58)    PMH:   MI (myocardial infarction)  COPD (chronic obstructive pulmonary disease)  Gout  GERD (gastroesophageal reflux disease)  Hypothyroid  CHF (congestive heart failure)  BPH (Benign Prostatic Hyperplasia)  CAD (coronary artery disease)  HLD (hyperlipidemia)  HTN (hypertension)    PSH:   S/P angioplasty  H/O repair of rotator cuff  AAA (abdominal aortic aneurysm)  S/P knee replacement, right  Achilles rupture  Hernia, inguinal, bilateral  Cataract  AICD, BiV-pacer    OUTPATIENT MEDS: Carvedilol 12.5 bid, digoxin 0.125., Entresto , b.i.d., furosemide 80, simvastatin 10 q.d. Synthroid 0.12 Allopurinol 300 q.d. Colchicine 0.6, p.r.n., tamlosin 0.8, q.h.s.    Medications:   acetaminophen   Tablet .. 650 milliGRAM(s) Oral every 6 hours PRN  ALBUTerol    90 MICROgram(s) HFA Inhaler 2 Puff(s) Inhalation every 6 hours PRN  allopurinol 300 milliGRAM(s) Oral daily  aspirin enteric coated 81 milliGRAM(s) Oral daily  carvedilol 12.5 milliGRAM(s) Oral every 12 hours  colchicine 0.6 milliGRAM(s) Oral daily  ferrous    sulfate 325 milliGRAM(s) Oral daily  furosemide    Tablet 40 milliGRAM(s) Oral daily  influenza   Vaccine 0.5 milliLiter(s) IntraMuscular once  levothyroxine 150 MICROGram(s) Oral daily  pantoprazole    Tablet 40 milliGRAM(s) Oral before breakfast  sacubitril 97 mG/valsartan 103 mG 1 Tablet(s) Oral two times a day  simvastatin 10 milliGRAM(s) Oral at bedtime  tamsulosin 0.8 milliGRAM(s) Oral at bedtime    Allergies:  No Known Allergies    FAMILY HISTORY:  Family history of CHF (congestive heart failure)  Family history of hemolytic uremic syndrome    Social History: , active  Smoking: no  Alcohol: no  Drugs: no    Review of Systems:  Constitutional: no recent weight loss  HEENT: no scleral icterus. Normal mucosa.  Respiratory: (+) shortness of breath. (+) COPD  Cardiovascular: No Chest Pain, no Palpitations, no PERRIN, PND, or Orthopnea. (+) Syncope with AICD shock 2017. Another syncope, ?orthostatic on airplane in August. No history of rheumatic fever.   Gastrointestinal: No Abdominal Pain, no Diarrhea, no Constipation, no Nausea or Vomiting  Genitourinary: No Nocturia,Dysuria, or Incontinence. No hematuria  Extremities: no edema. No cyanosis.  Neurologic: No Focal deficit. No Paresthesias. No Syncope. No seizures  Lymphatic: No Swelling. No Lymphadenopathy   Skin: No Rash,  Ecchymoses, Wounds, or Lesions  Psychiatry: No Depression. No Anxiety.    10 point review of systems is otherwise negative except as mentioned above            [ ]Unable to obtain    Physical Exam:  T(C): 36.6 (10-09-18 @ 03:54), Max: 36.7 (10-08-18 @ 09:58)  HR: 71 (10-09-18 @ 03:54) (64 - 74)  BP: 112/69 (10-09-18 @ 03:54) (102/62 - 143/86)  RR: 18 (10-09-18 @ 03:54) (16 - 18)  SpO2: 96% (10-09-18 @ 03:54) (94% - 98%)  Wt(kg): --    10-08 @ 07:01  -  10-09 @ 07:00  --------------------------------------------------------  IN: 480 mL / OUT: 100 mL / NET: 380 mL    10-09 @ 07:01  -  10-09 @ 08:26  --------------------------------------------------------  IN: 0 mL / OUT: 300 mL / NET: -300 mL      Daily Height in cm: 175.26 (08 Oct 2018 09:58)    Daily Weight in k.3 (09 Oct 2018 07:25)    Appearance: Thin, elderly,  NAD  Eyes:  No scleral icterus. PERRL, EOMI  HENT: Normal oral mucosa.]NC/AT  Neck: Mild JVD at 90 degrees. Normal carotid upstrokes with ? bruits or murmurs.  Cardiovascular: PMI 5th ICS, AAL. Normal S1, S2 physiologically split. ? S3 (+) S4. 2/6 harsh systolic murmur LSB and apex, ? to axilla.  2-3/6 AI pulmonic area..  Respiratory: Clear to auscultation bilaterally. No wheezes. No rales.  Gastrointestinal: Soft.  Non-tender. No hepatosplenomegally.  Extremities: No clubbing. 1+ edema 1/4 up bilaterally. Pulses 2+ bilaterally symmetric including right pedal, ? left pedal..  Musculoskeletal:  No joint deformity   Neurologic: Non-focal  Lymphatic:  No lymphadenopathy  Psychiatry: [+ ] AAOx3 [ +] Mood & affect appropriate  Skin: No rashes. No ecchymoses. No cyanosis    Cardiovascular Diagnostic Testing:  ECG:< from: 12 Lead ECG (10.08.18 @ 10:01) >  Ventricular Rate 71 BPM    Atrial Rate 71 BPM    P-R Interval 146 ms    QRS Duration 154 ms    Q-T Interval 472 ms    QTC Calculation(Bezet) 512 ms    P Axis 77 degrees    R Axis -62 degrees    T Axis 118 degrees    Diagnosis Line AV dual-paced rhythm  Biventricular pacemaker detected  ABNORMAL ECG    Confirmed by JACQUELINE HAYDEN MD ([id number]) on 10/9/2018 7:20:38 AM    < end of copied text >      Echo: outpatient 2018 Thethered mitral leaflets with mild to moderate MR. Heavily calcified, trileaflet aortic valve with decreased opening. Peak gradient 34, mean 24, AV area 0.7 cm² by continuity equation, dimensionless index of 0.23. Mild AI. Severe LAE. Severe global LV systolic dysfunction with LVEF 26%. RVE, with normal RV systolic function. Moderate TR, with mild to moderate PI. RVSP 54    Stress Testing:    Cath:< from: Cardiac Cath Lab - Adult (10.08.18 @ 15:55) >  VENTRICLES: No left ventriculogram was performed.  CORONARY VESSELS: The coronary circulation is right dominant.  LM:   --  Proximal left main: There was a 40 % stenosis. 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  --  Distal left main: There was a stenosis. The lesion was eccentric.  LAD:   --  LAD: Angiography showed moderate atherosclerosis.  CX:   --  Circumflex: Angiography showed moderate atherosclerosis.  RCA:   --  RCA: Angiography showed moderate atherosclerosis.  --  Proximal RCA: There was a diffuse 30 % stenosis at the site of a prior  stent.  LEFT LOWER EXTREMITY VESSELS: Left external iliac: Angiography showed  aneurysmal dilatation. Left common femoral: The vessel was tortuous.  RIGHT LOWER EXTREMITY VESSELS: Right external iliac: Angiography showed  aneurysmal dilatation. Right common femoral: The vessel was excessively  tortuous.  COMPLICATIONS: There were no complications.  DIAGNOSTIC IMPRESSIONS: Patent RCA stents. The transvalvular AV gradient  (on a pullbackmeasurement--the soft J wire inadvertently crossed the AV)  was approximately 15mmHg.  DIAGNOSTIC RECOMMENDATIONS: Admit for further evaluation and management in  the setting of low output / low gradient aortic stenosis. A dobutamine  stress echocardiogram should be considered to distinguish severe from  pseudo-severe AS and to assess for cardiac contractile reserve. If  reasonable to proceed with TAVR, he will need a cardiac structural CT  pending stability of renal function. Nephrology consultation for renal  optimization prior to the cardiac CT. The patient is not a candidate for  transfemoral TAVR, but may be an alternative access candidate. I have  reviewed my findings and recommendations with the patient and his family  (wife and daughter) in extensive detail.  Prepared and signed by  Damián Kay M.D.  Signed 10/08/2018 18:27:55  HEMODYNAMIC TABLES  Pressures:  Baseline  Pressures:  - HR: 69  Pressures:  - Rhythm:  Pressures:  -- Aortic Pressure (S/D/M): 134/75/99  Pressures:  -- Left Ventricle (s/edp): 149/22/--  Pressures:  -- Right Atrium (a/v/M): 21/18/16  O2 Sats:  Baseline  O2 Sats:  - HR: 69  O2 Sats:  - Rhythm:  O2 Sats:  -- AO: 10.4/96.4/13.63  Outputs:  Baseline  Outputs:  -- CALCULATIONS[age]  Outputs:  -- CALCULATIONS: Body Surface Area: 1.88  Outputs:  -- CALCULATIONS: Height in cm: 175.00  Outputs:  -- CALCULATIONS: Sex: Male  Outputs:  -- CALCULATIONS: Weight in k.60  Outputs:  -- OUTPUTS: O2 consumption: 252.00  Outputs:  -- OUTPUTS: Vo2 Indexed: 134.26    < end of copied text >      Interpretation of Telemetry:    Imaging:    Labs:                        10.9   4.9   )-----------( 76       ( 09 Oct 2018 05:10 )             32.7     10-09    139  |  104  |  32<H>  ----------------------------<  104<H>  3.8   |  25  |  1.49<H>    Ca    8.4      09 Oct 2018 05:10    TPro  7.2  /  Alb  3.9  /  TBili  0.6  /  DBili  x   /  AST  12  /  ALT  13  /  AlkPhos  76  10-08

## 2018-10-09 NOTE — DISCHARGE NOTE ADULT - PATIENT PORTAL LINK FT
You can access the [facility], offered by [facility], by registering with the following website: [email or web address]

## 2018-10-09 NOTE — DISCHARGE NOTE ADULT - MEDICATION SUMMARY - MEDICATIONS TO STOP TAKING
I will STOP taking the medications listed below when I get home from the hospital:    colchicine 0.6 mg oral tablet  -- 1 tab(s) by mouth once a day    Lasix 40 mg oral tablet  -- 2 tab(s) by mouth once a day    colchicine 0.6 mg oral tablet  -- use as directed

## 2018-10-09 NOTE — DISCHARGE NOTE ADULT - SECONDARY DIAGNOSIS.
Chronic systolic (congestive) heart failure Coronary artery disease involving native coronary artery of native heart without angina pectoris Hypertension, unspecified type Hyperlipidemia, unspecified hyperlipidemia type Chronic obstructive pulmonary disease, unspecified COPD type Chronic kidney disease, unspecified CKD stage

## 2018-10-09 NOTE — DISCHARGE NOTE ADULT - CARE PROVIDER_API CALL
Wolf Paiz (MD), Cardiovascular Disease; Internal Medicine  [address] [address]  Phone: [phone]  Fax: [phone]

## 2018-10-09 NOTE — CONSULT NOTE ADULT - PROBLEM SELECTOR RECOMMENDATION 9
Pt with CKD   baseline Scr 1.4   Check UA, Renal sonogram   Pt planned for contrast procedure, Pt at 26.1% risk of contrast induced nephropathy and 1.09% risk of needing dialysis   Would avoid IVF in setting of HF (EF 25%- 30% )  Recommend to hold lasix one day prior to contrast procedure  Recommend to hold ENTRESTO ( combination pill with valsartan ) prior to contrast procedure   Monitor renal function closely   Strict I/O  Avoid nephrotoxics, NSAIDS RCA

## 2018-10-10 ENCOUNTER — INBOUND DOCUMENT (OUTPATIENT)
Age: 83
End: 2018-10-10

## 2018-10-11 RX ORDER — COLCHICINE 0.6 MG
0 TABLET ORAL
Qty: 0 | Refills: 0 | COMMUNITY

## 2018-10-11 RX ORDER — SACUBITRIL AND VALSARTAN 24; 26 MG/1; MG/1
1 TABLET, FILM COATED ORAL
Qty: 0 | Refills: 0 | COMMUNITY

## 2018-10-11 RX ORDER — LEVOTHYROXINE SODIUM 125 MCG
1 TABLET ORAL
Qty: 0 | Refills: 0 | COMMUNITY

## 2018-10-11 RX ORDER — FUROSEMIDE 40 MG
1 TABLET ORAL
Qty: 0 | Refills: 0 | COMMUNITY

## 2018-10-11 RX ORDER — FUROSEMIDE 40 MG
2 TABLET ORAL
Qty: 0 | Refills: 0 | COMMUNITY

## 2018-10-23 ENCOUNTER — APPOINTMENT (OUTPATIENT)
Dept: INTERNAL MEDICINE | Facility: CLINIC | Age: 83
End: 2018-10-23
Payer: MEDICARE

## 2018-10-23 VITALS
BODY MASS INDEX: 24.13 KG/M2 | WEIGHT: 163.4 LBS | DIASTOLIC BLOOD PRESSURE: 60 MMHG | OXYGEN SATURATION: 95 % | HEART RATE: 71 BPM | TEMPERATURE: 97.5 F | SYSTOLIC BLOOD PRESSURE: 100 MMHG

## 2018-10-23 DIAGNOSIS — Z23 ENCOUNTER FOR IMMUNIZATION: ICD-10-CM

## 2018-10-23 DIAGNOSIS — Z91.81 HISTORY OF FALLING: ICD-10-CM

## 2018-10-23 PROCEDURE — G0008: CPT

## 2018-10-23 PROCEDURE — 94729 DIFFUSING CAPACITY: CPT

## 2018-10-23 PROCEDURE — 99495 TRANSJ CARE MGMT MOD F2F 14D: CPT | Mod: 25

## 2018-10-23 PROCEDURE — 94726 PLETHYSMOGRAPHY LUNG VOLUMES: CPT

## 2018-10-23 PROCEDURE — 90662 IIV NO PRSV INCREASED AG IM: CPT

## 2018-10-23 PROCEDURE — 94060 EVALUATION OF WHEEZING: CPT

## 2018-10-24 ENCOUNTER — APPOINTMENT (OUTPATIENT)
Dept: CARDIOLOGY | Facility: CLINIC | Age: 83
End: 2018-10-24
Payer: MEDICARE

## 2018-10-24 VITALS
HEART RATE: 80 BPM | OXYGEN SATURATION: 99 % | TEMPERATURE: 97.8 F | BODY MASS INDEX: 24.29 KG/M2 | DIASTOLIC BLOOD PRESSURE: 70 MMHG | WEIGHT: 164 LBS | HEIGHT: 69 IN | SYSTOLIC BLOOD PRESSURE: 111 MMHG

## 2018-10-24 PROCEDURE — 99215 OFFICE O/P EST HI 40 MIN: CPT

## 2018-10-24 PROCEDURE — 36415 COLL VENOUS BLD VENIPUNCTURE: CPT

## 2018-10-25 LAB
ALBUMIN SERPL ELPH-MCNC: 3.9 G/DL
ALP BLD-CCNC: 88 U/L
ALT SERPL-CCNC: 15 U/L
ANION GAP SERPL CALC-SCNC: 15 MMOL/L
AST SERPL-CCNC: 17 U/L
BASOPHILS # BLD AUTO: 0.03 K/UL
BASOPHILS NFR BLD AUTO: 0.5 %
BILIRUB SERPL-MCNC: 0.7 MG/DL
BUN SERPL-MCNC: 40 MG/DL
CALCIUM SERPL-MCNC: 8.9 MG/DL
CHLORIDE SERPL-SCNC: 104 MMOL/L
CHOLEST SERPL-MCNC: 88 MG/DL
CHOLEST/HDLC SERPL: 2 RATIO
CO2 SERPL-SCNC: 24 MMOL/L
CREAT SERPL-MCNC: 1.59 MG/DL
DIGOXIN SERPL-MCNC: <0.3 NG/ML
EOSINOPHIL # BLD AUTO: 0.21 K/UL
EOSINOPHIL NFR BLD AUTO: 3.4 %
GLUCOSE SERPL-MCNC: 79 MG/DL
HCT VFR BLD CALC: 32.9 %
HDLC SERPL-MCNC: 45 MG/DL
HGB BLD-MCNC: 10.6 G/DL
IMM GRANULOCYTES NFR BLD AUTO: 0.2 %
LDLC SERPL CALC-MCNC: 37 MG/DL
LYMPHOCYTES # BLD AUTO: 0.99 K/UL
LYMPHOCYTES NFR BLD AUTO: 16.2 %
MAN DIFF?: NORMAL
MCHC RBC-ENTMCNC: 32.2 GM/DL
MCHC RBC-ENTMCNC: 32.8 PG
MCV RBC AUTO: 101.9 FL
MONOCYTES # BLD AUTO: 0.43 K/UL
MONOCYTES NFR BLD AUTO: 7 %
NEUTROPHILS # BLD AUTO: 4.46 K/UL
NEUTROPHILS NFR BLD AUTO: 72.7 %
NT-PROBNP SERPL-MCNC: 7557 PG/ML
PLATELET # BLD AUTO: 139 K/UL
POTASSIUM SERPL-SCNC: 4.2 MMOL/L
PROT SERPL-MCNC: 7.3 G/DL
RBC # BLD: 3.23 M/UL
RBC # FLD: 17.5 %
SODIUM SERPL-SCNC: 143 MMOL/L
TRIGL SERPL-MCNC: 31 MG/DL
WBC # FLD AUTO: 6.13 K/UL

## 2018-10-27 PROBLEM — Z91.81 STATUS POST FALL: Status: ACTIVE | Noted: 2018-10-27

## 2018-10-27 NOTE — COUNSELING
[Activity counseling provided] : activity [Low Fat Diet] : Low fat diet [Low Salt Diet] : Low salt diet [Walking] : Walking [None] : None [Good understanding] : Patient has a good understanding of lifestyle changes and the steps needed to achieve self management goals [Healthy eating counseling provided] : healthy eating

## 2018-10-27 NOTE — ASSESSMENT
[FreeTextEntry1] : CHF\par Acute on chronic\par AS not as severe as thought after cath\par Cardiology f/u with Dr. Paiz \par Wishes to do labs with Dr. Paiz tomorrow\par Echo being closely monitored\par Diuretics/meds as per Cardiology\par Low salt diet\par Daily weight\par Fluid restriction\par \par COPD\par PFT's reviewed with patient = see scanned report= moderate COPD\par Flu vaccine given\par No smoking\par Consider ME\par Anoro Ellipta 1 puff daily\par \par S/P fall\par Avoid alcohol intake\par Monitor sacral area\par Apply heat\par To call if worse or if not improving\par \par Renal insufficiency - acute\par Monitor CR and lytes\par Renal f/u\par On low potassium diet for now as per Renal\par \par RTC 6-8 weeks and as needed\par To call for any medical issues\par To call if worse \par D/W patient and wife in detail and all questions answered\par Continue meds, diet, exercise as outlined and to f/u with all consulting MD's\par

## 2018-10-27 NOTE — HISTORY OF PRESENT ILLNESS
[Post-hospitalization from ___ Hospital] : Post-hospitalization from [unfilled] Hospital [Admitted on: ___] : The patient was admitted on [unfilled] [Discharged on ___] : discharged on [unfilled] [Discharge Med List] : discharge medication list [Patient Contacted By: ____] : and contacted by [unfilled] [FreeTextEntry2] : H/O AS\par Has had syncope and CHF.\par Seen by Dr. Kay for ?TAVR\par Referred to CTS.\par Admitted for cardiac cath.\par Report reviewed.\par Hospital events reviewed.\par D/C summary and meds reviewed.\par D/W patient and wife.\par He reports that his weight remains fairly stable.\par He denies any calf pain. He has noticed increased lower extremity edema.\par He reports normal bowel movement and urination.\par He denies any abdominal pain. He denies any nausea or vomiting. His appetite is good.\par He denies any chest pain, hemoptysis, palpitations, increased cough, or increased shortness of breath.\par He recently was out to lunch and had a martini. Upon standing to leave the restaurant he fell backwards. He had no head trauma. He did hit his lower back just above his buttocks and now has bruising there.

## 2018-10-27 NOTE — PHYSICAL EXAM
[No Acute Distress] : no acute distress [Well Nourished] : well nourished [Well Developed] : well developed [Well-Appearing] : well-appearing [Normal Voice/Communication] : normal voice/communication [Normal Sclera/Conjunctiva] : normal sclera/conjunctiva [PERRL] : pupils equal round and reactive to light [EOMI] : extraocular movements intact [Normal Outer Ear/Nose] : the outer ears and nose were normal in appearance [Normal Oropharynx] : the oropharynx was normal [No JVD] : no jugular venous distention [Supple] : supple [No Respiratory Distress] : no respiratory distress  [Clear to Auscultation] : lungs were clear to auscultation bilaterally [No Accessory Muscle Use] : no accessory muscle use [Normal Rate] : normal rate  [Regular Rhythm] : with a regular rhythm [Normal S1, S2] : normal S1 and S2 [No Carotid Bruits] : no carotid bruits [No Extremity Clubbing/Cyanosis] : no extremity clubbing/cyanosis [Soft] : abdomen soft [Non Tender] : non-tender [Normal Bowel Sounds] : normal bowel sounds [No CVA Tenderness] : no CVA  tenderness [No Spinal Tenderness] : no spinal tenderness [No Rash] : no rash [Normal Gait] : normal gait [Coordination Grossly Intact] : coordination grossly intact [No Focal Deficits] : no focal deficits [Speech Grossly Normal] : speech grossly normal [Normal Affect] : the affect was normal [Alert and Oriented x3] : oriented to person, place, and time [Moderate Complexity requires multiple possible diagnoses and/or the management options, moderate complexity of the medical data (tests, etc.) to be reviewed, and moderate risk of significant complications, morbidity, and/or mortality as well as co-morbidi] : Moderate Complexity [de-identified] : No ST; wears right HA [de-identified] : No stridor [de-identified] : R=16; good air entry; no wheezing [de-identified] : murmur unchanged [de-identified] : normal radial pulses; much less edema; no cords

## 2018-10-27 NOTE — HEALTH RISK ASSESSMENT
[Intercurrent hospitalizations] : was admitted to the hospital  [Any fall with injury in past year] : Patient reported fall with injury in the past year [0] : 2) Feeling down, depressed, or hopeless: Not at all (0) [Patient declined colonoscopy] : Patient declined colonoscopy [HIV test declined] : HIV test declined [Hepatitis C test declined] : Hepatitis C test declined [None] : None [With Family] : lives with family [# of Members in Household ___] :  household currently consist of [unfilled] member(s) [Retired] : retired [College] : College [] :  [Feels Safe at Home] : Feels safe at home [Fully functional (bathing, dressing, toileting, transferring, walking, feeding)] : Fully functional (bathing, dressing, toileting, transferring, walking, feeding) [Fully functional (using the telephone, shopping, preparing meals, housekeeping, doing laundry, using] : Fully functional and needs no help or supervision to perform IADLs (using the telephone, shopping, preparing meals, housekeeping, doing laundry, using transportation, managing medications and managing finances) [Smoke Detector] : smoke detector [Carbon Monoxide Detector] : carbon monoxide detector [Seat Belt] :  uses seat belt [Sunscreen] : uses sunscreen [Discussed at today's visit] : Advance Directives Discussed at today's visit [Designated Healthcare Proxy] : Designated healthcare proxy [Name: ___] : Health Care Proxy's Name: [unfilled]  [Relationship: ___] : Relationship: [unfilled] [] : No [de-identified] : cardiology, [facility] [[id number]] : 0 [Change in mental status noted] : No change in mental status noted [Sexually Active] : not sexually active [Reports changes in hearing] : Reports no changes in hearing [Reports changes in vision] : Reports no changes in vision [Reports changes in dental health] : Reports no changes in dental health [Guns at Home] : no guns at home [Travel to Developing Areas] : does not  travel to developing areas [TB Exposure] : is not being exposed to tuberculosis [Caregiver Concerns] : does not have caregiver concerns [MammogramDate] : NA [PapSmearDate] : NA [BoneDensityDate] : NA

## 2018-12-03 ENCOUNTER — RX RENEWAL (OUTPATIENT)
Age: 83
End: 2018-12-03

## 2019-01-13 DIAGNOSIS — H91.90 UNSPECIFIED HEARING LOSS, UNSPECIFIED EAR: ICD-10-CM

## 2019-01-23 ENCOUNTER — NON-APPOINTMENT (OUTPATIENT)
Age: 84
End: 2019-01-23

## 2019-01-23 ENCOUNTER — APPOINTMENT (OUTPATIENT)
Dept: CARDIOLOGY | Facility: CLINIC | Age: 84
End: 2019-01-23
Payer: MEDICARE

## 2019-01-23 VITALS
BODY MASS INDEX: 24.29 KG/M2 | WEIGHT: 164 LBS | OXYGEN SATURATION: 97 % | SYSTOLIC BLOOD PRESSURE: 82 MMHG | HEIGHT: 69 IN | DIASTOLIC BLOOD PRESSURE: 44 MMHG | HEART RATE: 71 BPM

## 2019-01-23 VITALS — SYSTOLIC BLOOD PRESSURE: 92 MMHG | HEART RATE: 70 BPM | DIASTOLIC BLOOD PRESSURE: 50 MMHG

## 2019-01-23 PROCEDURE — 99215 OFFICE O/P EST HI 40 MIN: CPT

## 2019-01-23 PROCEDURE — 36415 COLL VENOUS BLD VENIPUNCTURE: CPT

## 2019-01-23 PROCEDURE — 93000 ELECTROCARDIOGRAM COMPLETE: CPT

## 2019-01-23 NOTE — REVIEW OF SYSTEMS
[Dyspnea on exertion] : dyspnea during exertion [Lower Ext Edema] : lower extremity edema [Joint Pain] : joint pain [see HPI] : see HPI [Negative] : Heme/Lymph [Fever] : no fever [Recent Weight Gain (___ Lbs)] : no recent weight gain [Chills] : no chills [Feeling Fatigued] : not feeling fatigued [Recent Weight Loss (___ Lbs)] : no recent weight loss [Shortness Of Breath] : no shortness of breath [Chest  Pressure] : no chest pressure [Chest Pain] : no chest pain [Palpitations] : no palpitations [Cough] : no cough [Abdominal Pain] : no abdominal pain [Change in Appetite] : no change in appetite [Change In The Stool] : no change in stool [Dizziness] : no dizziness [Confusion] : no confusion was observed [Anxiety] : no anxiety [Excessive Thirst] : no polydipsia

## 2019-01-23 NOTE — REASON FOR VISIT
[FreeTextEntry1] : Follow up visit for patient with known LV dysfunction and CAD along with COPD after angioplasty and after AICD and biventricular pacemaker on August 7, 2017 was on Entresto but not tolerating it because of persisting cough; it was stopped and cough resolved. Issue of weight loss but good appetite found to be hyperthyroid on Synthroid therapy. On digoxin. Had syncopal episode at home with VT- VF and defibrillator shock on August 25, 2017. No recurrence of syncope, with only one near syncopal episode that was clearly orthostatic after a movie. AICD interrogation shows no further episodes since August. Then brief hospitalization upon return from his vacation in August. Last visit f/u after structural heart evaluation including dobutamine echo stress.

## 2019-01-23 NOTE — END OF VISIT
[Time Spent: ___ minutes] : I have spent [unfilled] minutes of face to face time with the patient [>50% of Time Spent on Coordination of Care for  ___] : Greater than 50% of the encounter time was spent on coordination of care for [unfilled]

## 2019-01-23 NOTE — PHYSICAL EXAM
[General Appearance - Well Developed] : well developed [Normal Appearance] : normal appearance [Well Groomed] : well groomed [No Deformities] : no deformities [General Appearance - In No Acute Distress] : no acute distress [Normal Conjunctiva] : the conjunctiva exhibited no abnormalities [Eyelids - No Xanthelasma] : the eyelids demonstrated no xanthelasmas [Normal Oral Mucosa] : normal oral mucosa [No Oral Pallor] : no oral pallor [No Oral Cyanosis] : no oral cyanosis [Normal Jugular Venous A Waves Present] : normal jugular venous A waves present [Normal Jugular Venous V Waves Present] : normal jugular venous V waves present [No Jugular Venous Coley A Waves] : no jugular venous coley A waves [Respiration, Rhythm And Depth] : normal respiratory rhythm and effort [Exaggerated Use Of Accessory Muscles For Inspiration] : no accessory muscle use [Auscultation Breath Sounds / Voice Sounds] : lungs were clear to auscultation bilaterally [Heart Rate And Rhythm] : heart rate and rhythm were normal [Heart Sounds] : normal S1 and S2 [Murmurs] : no murmurs present [Abdomen Soft] : soft [Abdomen Tenderness] : non-tender [Abdomen Mass (___ Cm)] : no abdominal mass palpated [Abnormal Walk] : normal gait [Gait - Sufficient For Exercise Testing] : the gait was sufficient for exercise testing [Nail Clubbing] : no clubbing of the fingernails [Cyanosis, Localized] : no localized cyanosis [Petechial Hemorrhages (___cm)] : no petechial hemorrhages [Skin Color & Pigmentation] : normal skin color and pigmentation [Skin Turgor] : normal skin turgor [] : no rash [No Venous Stasis] : no venous stasis [Skin Lesions] : no skin lesions [No Skin Ulcers] : no skin ulcer [No Xanthoma] : no  xanthoma was observed [Oriented To Time, Place, And Person] : oriented to person, place, and time [Affect] : the affect was normal [Mood] : the mood was normal [FreeTextEntry1] : Pretty good spirits

## 2019-01-23 NOTE — HISTORY OF PRESENT ILLNESS
[FreeTextEntry1] : (MED HX) The patient is now 88 years old. I first saw him in 2006. At that time he had a history of a myocardial infarction and angioplasty back in 1994. He had done well for years although his activity level had diminished with age and he began complaining of fatigue. In December of 2011 he had a stress echocardiogram that was abnormal possibly with very abnormal blood pressure response and possibly with new wall motion abnormalities. He underwent a CTA and this was followed up by cardiac catheterization at [facility] in December of 2011. 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 The patient did well after that which is a minor adjustment of his medications. He has a history of hyperlipidemia and had a past history of hypertension. He stopped smoking 43 years ago, no diabetes, no family history of coronary artery disease.\par April 30, 2013 he was admitted to [facility] with left lower extremity cellulitis and altered mental status. He had positive blood cultures for strep pyogenes group A. An echocardiogram showed a possible small echodensity on the ventricular side of the noncoronary cusp. A joint aspiration of his left ankle was negative. He was treated with vancomycin and Zosyn, switched to cefepime. Followed by plastic surgery for wound care. No evidence of endocarditis clinically. A CAT scan of his abdomen and pelvis showed a 3.7 cm infrarenal abdominal aortic aneurysm. There was also bilateral inguinal hernias. 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 He was followed by Dr. Damián Lovelace of infectious disease. There is still a possibility he may need a skin graft for his left ankle. He was in the hospital for a little over 2 weeks and then in rehabilitation for 5 weeks. \par July 3, 2013. Here for followup visit. He had been home now for about 10 days and had already seen Dr. Lovelace and Dr. Jones his internist.  He denies chest pain or shortness of breath. There has been no syncope or near syncope or palpitations. Upon review of his medications the only change is that his Avapro is at a half of a 150 mg pill daily due to low blood pressure in the hospital and his simvastatin has been cut down from 20 mg to 10 mg. He did lose weight during this hospitalization as well. There have been no episodes of fevers, chills, sweats, etc.\par September 3, 2013. Here for followup. He is preparing to go on a trip to [place] in October. He does note shortness of breath with exertion but only with a lot of exertion, no problem going up stairs, and he thinks no different than even before his cellulitis episode. He denies exertional chest pain. We elected to see how he did with increased exercise and if his shortness of breath was not severe he would be okay to go on the trip to [place], obviously using commonsense as we discussed.\par January 6, 2014. The patient is here for followup. He did very well on the trip to [place] with some limitation from his knees but no significant shortness of breath or chest pain. He gets swelling on his left leg where he had the cellulitis but not on the right side. He only had one episode of slight lightheadedness when he got up too fast but otherwise has been tolerating the current medications. He is planning on going to [place] at the end of January. He has a mild cough, nonproductive, no fever chills. He claims he was unable to get an appointment with his internist. Her workup was unrevealing.\par May 5, 2014. The patient is here for followup. He saw Dr. Jones in the interim and had lab work with an excellent lipid profile but a high BNP. He saw vascular surgery because of the abdominal aortic aneurysm which measured 3.8 cm and that will be followed. He had an episode where his balance was off for about one hour. It only happened when he tried to walk around. He denies feeling lightheaded like he might pass out. If he was sitting and not moving he felt perfectly fine. There was no vertigo. He claims it happened him once before and it resolved as well. I advised him to discuss with Dr. Jones and perhaps a neurologist. In addition he brought up again with his wife his symptoms he's had for 40 years where he suddenly for no reason will get chest pain and pressure that radiates up to his jaw. It will actually go away if he does nothing but if he takes Maalox it will go away faster. I explained to him that this is probably esophageal reflux with esophageal spasm but it so infrequent that it is not worth for him to take a PPI on a daily basis. He denies having exertional chest pain or shortness of breath. He is considering possible knee replacement in the near future. He returned in July for preop clearance for knee replacement which he had in the middle of July with no complications Other than needing to be transfused 3 units and having been sent in to [facility] in the [facility] to have that done on 2 occasions.\par October 7, 2014. The patient is here for followup. He is feeling better and is more active but is also noting more shortness of breath with exertion or at least others with him have noticed it. He thinks he needs the other knee replacement done but his wife said she will divorce him if he does it. His TSH was elevated on labs with Dr. Jones last month so his Synthroid was increased from 6 days a week to 7 days a week. His BNP was elevated and he asked "should I worry about heart failure?". In addition Dr. Jones cut back his Avapro 300 to 150 because blood pressure was 110. The patient denies lightheadedness dizziness etc. His blood pressure today on 2 different occasions was 146/70. He also has an abdominal aortic aneurysm measuring 3.8 cm that is being followed. He'll be seeing Dr. Temple tomorrow.\par October 28, 2014. Dr. Jacobs called yesterday that the patient's abdominal aorta had increased in size by 0.9 cm and the patient is to be scheduled for an endovascular repair of his abdominal aortic aneurysm. He came here for his echocardiogram and discussion. The echo for the most part is unchanged from May of 2013 with left ventricular ejection fraction around 40%, mild to moderate MR, mild to moderate AI, segmental LV dysfunction with akinetic inferoposterior wall, and hypokinetic to akinetic inferolateral and mid lateral walls, normal anterior wall. LV size upper limit of normal. Normal RV size and function with an RVSP estimated at 41 mm of mercury.\par April 29, 2015. The patient did well with his endovascular repair of his abdominal aortic aneurysm. He went down to [place] and at one point was found to be short of breath with that was felt to be a pleural effusion. He was set up for a thoracentesis but after taking a diuretic for a few days they found there was no fluid there. His cough did not go away until he took a course of prednisone.The diuretic was stopped and he did well returning home. He took a tour of [place] and noted that any kind of incline made him very short of breath but no chest pressure. He saw Dr. Morris of pulmonary who found his proBNP to be 3400 and in the past it was only 1100. However his chest x-ray was totally clear. He has no PND or orthopnea and no real edema. She did give him another inhaler (Symbicort) and referred him here. His weight has not been up and if anything has gone down a little. His last echo was in October as above and was unchanged for the most part.  There is no chest pain with exertion. \par April 30, 2015. The patient returned for a stress echocardiogram. His baseline LV function looked worse than previously with left ventricular ejection fraction around 27%. With exercise he dropped his blood pressure and there were new wall motion abnormalities. He was scheduled for cardiac catheterization and probable defibrillator with possible biventricular pacemaker.\par May 6, 2015. Cardiac catheterization was done and revealed for the most part unchanged anatomy.  LVEF by V-gram was 35% . Given these findings this time Dr. Reyes did angioplasty and stent the right coronary artery. Electrophysiology felt he should wait at least 2 weeks and come back for another ejection fraction and clinical evaluation to see if things had improved before deciding on a  possible  AICD/biventricular pacemaker.\par May 21, 2015. The patient returns for followup and echocardiogram. He has felt much better over the 2 weeks but he has not been doing his usual activities because he was told not to do it. His echocardiogram to my eye looks unchanged. His exam is unchanged but there is no signs of congestive heart failure. I discussed the results with the patient and his wife. I told him that his improved symptoms could be because he is not that active, it could be a placebo effect of the procedure, it could be that things have improved and we just are unable to measure it on the echocardiogram. A BNP was sent and will be compared to his previous level which had gone up from the 400 range into the 800s. In addition over the weekend the patient will increase his activity and see what his level of symptoms are. If his symptoms are significant and worse than they had been a few months ago, we will schedule the ICD and biventricular pacemaker. The patient will contact me after the weekend.\par Nehal 15, 2015. The patient saw Dr. Russell of EPS and unfortunately he has to wait 3 months (August 6, 2015) from the angioplasty unless he becomes unstable, has any arrhythmias, or needs a pacemaker for bradycardic reasons.  He did increase the carvedilol to 12.5 mg in the morning and 25 mg in the evening Currently he feels well and is stable. He does get out of breath and some weakness in his legs if he is walking uphill. When he initially lies down he feels some pressure he then says is shortness of breath but it resolves. No PND or orthopnea per se.\par July 14, 2015. The patient called a few weeks ago thinking he was more short of breath and wanted to move up the AICD/biventricular pacemaker. However after speaking with Dr. Russell of [facility] he explained he still was not qualified and must wait unless his symptoms are so severe that he requires hospitalization. I explained this to the patient and he returns today for routine followup.  He is still symptomatic with dyspnea on exertion but no PND or orthopnea. 3 months from his angioplasty will be August 6. He has a trip to [facility] planned with his daughter August 12.\par August 25, 2015. The patient is here for followup after having his AICD/biventricular pacemaker placed on August 7. No complications with the procedure. The patient does feel better in terms of the shortness of breath he would get when he was lying down but with exertion he feels maybe even worse right now in terms of shortness of breath. Of note he was away with a different diet and gained 4 pounds. (By scale here he has gained 9 pounds.) No palpitations, no syncope or near syncope. He is on carvedilol 25 mg the morning and 12.5 in the evening. He is still complaining of left arm pain ever since the procedure.\par September 8, 2015. The patient is finally starting to feel a little bit better. He cut his diuretic back to every other day and has continued to lose weight. His left arm so bothersome just as much since the procedure. We had a discussion about changing his Avapro to Entresto.\par October 8, 2015. Patient is here for followup. In the interim he had an episode of bronchitis treated with antibiotics and steroids which did cause some fluid retention and he was briefly on diuretics with improvement. He is now off the steroids but still on the diuretic. He does feel better on the Entresto. He thinks he is walking better with less shortness of breath and somewhat more stamina. He otherwise has no complaints. He remains in sinus rhythm and has had no AICD shocks. His blood pressure was borderline and his lungs were clear. I elected to stop the diuretic and increase his Entresto. \par October 23, 2015. On October 20 the patient saw Dr. Morris because of shortness of breath and edema. He had gained 9 pounds and he had significant swelling and she put him back on Lasix 40 mg as we discussed together. Patient had much relief and is here now. He lost 8 of the 9 pounds he again and feels much better. He still gets short of breath going up the stairs and often has to stop. He has trace edema still. His blood pressure is in the 90s systolic. He still eats out but not as much as he used to and they do ask for the low salt.\par November 9, 2015. The patient returns for followup. He has been on Lasix 40 mg since his last visit. He looks great, back to himself, with no complaints or symptoms of CHF. Her systolic pressure is only 90 but he has no symptoms of lightheadedness. He is having a lot of gas to the point of it being embarrassing and wondered if it is related to the Entresto. He also seems to have a dry cough. He will be seeing Dr. Russell in followup next week. Based on his complaints we cut back his Lasix to 20 mg daily but then he called back a few days later with more shortness of breath and went back to 40 mg q.d.\par December 7, 2015. Followup for the patient. As noted he had a go back to 40 mg Lasix because he became short of breath. He was still having GI complaints from the Entresto but he can manage it. He is complaining again of a urinating too much so perhaps we could try 30 mg of Lasix daily. He is seeing Dr. Russell next week. Is turning 89 soon and is planning a cruise on January 24.\par January 12, 2016. Patient is here for followup. Still has good spirits and is looking forward to going to [place] in 10 days. He is still having significant cough from the Entresto and we will have to change back to his prior regimen. Had echocardiogram at [facility] which was supposed to be  a dyssynchrony study and will be seeing Dr. Russell of [facility] tomorrow morning to discuss whether or not to try to improve his biventricular pacing. Currently on 80 mg of Lasix daily. Dr. Morris tried a new inhaler but he does not think it helped.\par March 2, 2016. The patient is here in followup. He went on his cruise and did well. His cough was stopped when he stopped the entresto. On the cruise he was able to cut back to 40 mg of Lasix  and even occasionally he didn't take it if he had a busy day. However after a while he did notice more shortness of breath and edema and the last few days he went back to 80 mg of Lasix with improvement. He is here now today, his weight has gone down 8 pounds, blood pressure was 90/60.\par May 3, 2016. Patient looks well and feels well, but continues to lose weight despite a good appetite. Dr. Jones is concerned and placed him on Ensure and if he does not put on some weight, he will be seeing GI. Abdominal CAT scan was unremarkable, except for some gastric distention and some thickening of the apex of the duodenum, which could just be underfilling. Abdominal aortic aneurysm repair was stable with sac being less than 3 cm. 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 He will follow up with vascular again in one year. He remains on 40 mg of Lasix and irbesartan. Not complaining of shortness of breath. He has done much better since Dr. Russell adjusted. His biventricular pacer by adjusting LV and RV timing. Lab work revealed a very suppressed TSH, so his Synthroid was held and he was to return in 2 weeks to reassess weight loss and thyroid status.\par May 18, 2016. Yesterday the patient was dizzy and lightheaded and fell and hit his head. He was evaluated in the emergency room, where everything was negative. His EKG was unchanged and his defibrillator was interrogated and there were no arrhythmias. His TSH was now 29! He is here today. Patient did fall again last night. Unclear if it is from being lightheaded or losing balance. No syncope. Other than when he had the fall, he has not been complaining of feeling lightheaded. His weight did go back up  off  the thyroid medication. I elected to hold his Lasix and have him reassessed in one week.\par May 24, 2016. Patient called yesterday and is short of breath, edema, and had put his weight back on. I told him to resume the Lasix and he is seen today for his followup. Mostly feels a little better and was able to lie flat last night. Still has some swelling. Blood pressure is still borderline low. Weight is up 6 pounds from last visit. We elected to continue Lasix 40 a day and changed his Synthroid to 0.1 mg daily.\par July 12, 2016. Patient is here for followup, as well as AICD interrogation. He seems to have normal biventricular pacing. Absolutely no arrhythmias for the past 4 months. Does get short of breath with exertion still, but not severe. Rarely will skip a day of Lasix if he has too many things to do. Started physical therapy, and was not out of breath at physical therapy today. His weight at home has been stable.\par September 13, 2016. Patient is getting around okay with a walker. His right knee seemed to be limiting him more than his shortness of breath and he has some trouble with his balance. No palpitation, syncope, or near syncope, or shocks. No change in any medication. He was still on Synthroid 0.1 mg. He is still doing physical therapy.\par November 16, 2016. The patient seems to be doing well, although he did have an episode of just suddenly falling backwards and hitting his head against the refrigerator. He denies lightheadedness or near syncope at the time. He denies vertigo, although he had a different episode. He reached up and looked up and had a vertigo-like episode just for a few seconds. There was no event on his defibrillator interrogation, except a 6 beat run of NSVT on September 24, which was asymptomatic. He is orthostatic here. He is also concerned about his weight loss, although his weight seems to be the same as it was last visit, but he claims he was almost 7 pounds less at home. His wife thinks he does not drink enough water. He is on the Lasix every other day. His EKG showed sinus rhythm with atrial tracking and biventricular pacing. He also has a compression fracture in his lumbar vertebra that is giving him pain\par He is also concerned about his thyroid hormone level, given his weight loss. We continued the Lasix on an every other day basis and lowered the Avapro to 150 mg. BNP was up to 4600 from 2600.\par December 21, 2016. The patient has here in followup. No more falls or complaints of orthostasis, although he is still orthostatic here from 124-106-98 standing with no symptoms. Complaining more of dyspnea on exertion and has some edema. His weight is about the same. After discussion, decided to add digoxin 0.125 q.d.\par February 8, 2017. The patient is here in followup. He has not fallen in a while now and he claims his problem is balance not dizziness. He thinks his dyspnea on exertion is unchanged on the digoxin. However his wife thinks he is better because he is not short of breath coming up the stairs and the  does agree. Just recently he started to develop some pedal edema, but he has had more salt because he loves soups. In addition, he does not sit with his legs elevated. (He also saw Dr. Plascencia for URI and was briefly on prednisone until a couple of weeks ago. He did have an SPEP with normal. Electrophoresis pattern.)\par April 4, 2017. Patient here in followup. Had pacemaker, defibrillator interrogation on March 6, with 2 short episodes of NSVT only. Seems to be doing well on the whole. Dyspnea on exertion is there, but unchanged. Weight is down 3 pounds. He occasionally gets numbness and tingling in his fingers, but not in his toes. Occasionally feels some discomfort over the AICD site. He will be going to his daughter and his niece for the [place] nights.\par Nehal 15, 2017. The patient is here in followup. Has been feeling a little more short of breath and does have a little bit of edema. He saw Dr. Valdez on Friday the ninth, who felt he had bronchitis and gave him Augmentin. Otherwise, patient has no complaints and has been doing pretty well. He still complains occasionally of some soreness over his AICD site. No other interim medical issues, and no change in medication.\par September 12, 2017. Patient is here for followup and defibrillator interrogation. As noted on August 25 had a syncopal episode after he had a hot shower and walked out to the other bathroom. Found himself on the floor and was fine. No recurrence since and in fact defibrillator interrogation shows that he had an episode of VT followed by VF after the device tried to pace terminate, which led to a defibrillator shock, and the patient has been fine since. He is still with sinus rhythm and a first-degree AV block underneath his ventricular pacing. No change in any of the symptoms and in fact, while he does get short of breath sometimes getting into bed, and some walking, was able to go to the gym and work out a little bit without shortness of breath. No chest pain, and no change in medications.\par December 26, 2017. Patient here in followup. Has been having some more shortness of breath, although seems to have frequent cough and URI. Now on a nebulizer. Does have worsening shortness of breath with lying down, but no PND. Had a near syncopal event when he got up right after sitting for a long time watching a movie, but otherwise no episodes of dizziness. AICD interrogation shows no episodes since the event in August. Patient thinks his overall stamina and fatigue are worse. Patient willing to retry Entresto.\par January 11, 2018. So far patient tolerating the Entresto, no cough. Maybe a little less short of breath, but not definite. Does have more edema, but weight is the same. No other complaints. Reluctant to increase the diuretic. We'll keep dose of the same for now and followup in one month if labs are okay. Then consider increasing Entresto.\par Comment:  \NING Genao - 21 Mar 2018 3:12 PM \par   TASK CREATED\par Hi-\par Saw Adam.\par He complained of weight gain, edema, SOB.\par Sent BNP and up to over 5300.\par Do you think that the Entresto is an issue for him?\par Asked him to call you.\par Thanks-\par Ning \par Addendum\par I reviewed the labs and spoke with the patient. Actually, weight is down, and labs are all okay except the BNP. If this is natural history of LV dysfunction, he should be on a higher dose of Entresto. I elected to increase the Entresto between now and his visit next week with me and see if things get worse, improve, or stay the same. Patient has a big cruise coming up on April 18 \par March 27, 2018. Patient returns in followup now on the increased dose of Entresto for one week. On his pacemaker interrogation he has normal function with biventricular pacing, no more VT. His "optivol" has been elevated since mid-January and just for the past week it seems to be coming down, which would correlate with increasing his Enteresto. He does admit to not being a strict on his diet, having Chinese food the other night, and has only been staying with the furosemide every other day despite the increased edema and shortness of breath. Depending on labs, I will probably continue the high-dose Entresto, continue furosemide daily through the beginning of [place] this week and then go back to every other day depending on symptoms, weight and edema.\par May 22, 2018. The patient went on his vacation and did extremely well. By the very end before getting on the plane to come back he was somewhat short of breath and his wife considered going to the emergency room. Instead, he took extra Lasix and was doing well on the plane. At one point got up to the bathroom, came back sat in his seat and his wife leaned over to try to wake him up and they could not arouse him for up to 5 minutes. A doctor on the plane checked him, they took him out of his seat and laid him down on the floor at which point he seemed to come to and felt great. It seems he had a pulse and a blood pressure throughout. When he got off the plane he was brought to [facility] but interrogating the defibrillator showed absolutely no arrhythmias. (Of note, the next day in the hospital he had 2 long runs of nonsustained VT that were asymptomatic.) He did develop a severe cough with rhonchi and sputum. He was seen by Dr. Hema Reyes of pulmonary and discharged on prednisone and antibiotics. He saw Dr. Morris this morning and she just continued those medications and sent labs and told him she thought it was all from his heart. He did have a repeat echo in the hospital, which showed his AS has progressed to borderline severe.\par July 10, 2018. A one point after the hospitalization, the patient's blood pressure was running low and his Lasix was decreased. He developed more shortness of breath, and weight gain, and saw Dr. Romano on June 27. Lasix was put back to 40 mg daily. He is here today, feeling better, has lost 6 pounds. Was at ophthalmologist earlier today and has a new hemorrhage in his left eye, which is his better eye. The ophthalmologist would like me to cut back or hold the aspirin.\par September 12, 2018. Patient returns in followup, as well as for repeat echocardiogram regarding his aortic stenosis. His echo confirms somewhat of a low gradient severe aortic stenosis and is unchanged from May, but the patient feels wonderful maybe somewhat tired, but his breathing is better than it has been a long time and he is able to do physical therapy, etc. Patient was sent for evaluation with structural heart team.\par October 24, 2018. Patient returns for followup. Workup in hospital revealed no significant CAD, very tortuous vessels femorally so if patient needs TAVR would have to use apical approach. Pullback gradient across aortic valve was low as well (15). 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 Since being home the patient has been fairly stable although he does have increased edema since the catheterization. There was also some question about his potassium level. He got a flu shot from Dr. Morris the other day.\par January 23, 2019. The patient here in followup. He remains in sinus or AV paced. Feels good. Taking Lasix 40 mg daily. Blood pressure running on the low side, but no dizziness, lightheadedness, etc. Gets tiredness "from his legs, but not from shortness of breath". he says. He is on 2 new medications from his rheumatologist (Blanca [facility]), gabapentin, and chlorzactazone. He would like to try to go again on at least a ten-day cruise although his wife is very nervous about it. No defibrillator shocks, etc. Off colchicine per Dr. Morris.

## 2019-01-23 NOTE — CARDIOLOGY SUMMARY
[Ant Wall Defect] : anterior wall defect [Inf Wall Defect] : inferior wall defect [LVEF ___%] : LVEF [unfilled]% [Enlarged] : enlarged LA size [Moderate] : moderate mitral regurgitation [___] : [unfilled]

## 2019-01-24 LAB
ALBUMIN SERPL ELPH-MCNC: 3.5 G/DL
ALP BLD-CCNC: 76 U/L
ALT SERPL-CCNC: 11 U/L
ANION GAP SERPL CALC-SCNC: 12 MMOL/L
AST SERPL-CCNC: 13 U/L
BASOPHILS # BLD AUTO: 0.05 K/UL
BASOPHILS NFR BLD AUTO: 0.8 %
BILIRUB SERPL-MCNC: 0.5 MG/DL
BUN SERPL-MCNC: 38 MG/DL
CALCIUM SERPL-MCNC: 8.8 MG/DL
CHLORIDE SERPL-SCNC: 104 MMOL/L
CHOLEST SERPL-MCNC: 109 MG/DL
CHOLEST/HDLC SERPL: 2.2 RATIO
CO2 SERPL-SCNC: 27 MMOL/L
CREAT SERPL-MCNC: 1.47 MG/DL
EOSINOPHIL # BLD AUTO: 0.28 K/UL
EOSINOPHIL NFR BLD AUTO: 4.3 %
FERRITIN SERPL-MCNC: 157 NG/ML
GLUCOSE SERPL-MCNC: 104 MG/DL
HBA1C MFR BLD HPLC: 5.7 %
HCT VFR BLD CALC: 34.1 %
HDLC SERPL-MCNC: 50 MG/DL
HGB BLD-MCNC: 11 G/DL
IMM GRANULOCYTES NFR BLD AUTO: 0.3 %
IRON SATN MFR SERPL: 22 %
IRON SERPL-MCNC: 56 UG/DL
LDLC SERPL CALC-MCNC: 46 MG/DL
LYMPHOCYTES # BLD AUTO: 1.02 K/UL
LYMPHOCYTES NFR BLD AUTO: 15.5 %
MAN DIFF?: NORMAL
MCHC RBC-ENTMCNC: 31.7 PG
MCHC RBC-ENTMCNC: 32.3 GM/DL
MCV RBC AUTO: 98.3 FL
MONOCYTES # BLD AUTO: 0.62 K/UL
MONOCYTES NFR BLD AUTO: 9.4 %
NEUTROPHILS # BLD AUTO: 4.58 K/UL
NEUTROPHILS NFR BLD AUTO: 69.7 %
NT-PROBNP SERPL-MCNC: 3048 PG/ML
PLATELET # BLD AUTO: 119 K/UL
POTASSIUM SERPL-SCNC: 4.3 MMOL/L
PROT SERPL-MCNC: 6.5 G/DL
RBC # BLD: 3.47 M/UL
RBC # FLD: 17.6 %
SODIUM SERPL-SCNC: 143 MMOL/L
TIBC SERPL-MCNC: 253 UG/DL
TRIGL SERPL-MCNC: 65 MG/DL
TSH SERPL-ACNC: 5.15 UIU/ML
UIBC SERPL-MCNC: 197 UG/DL
WBC # FLD AUTO: 6.57 K/UL

## 2019-02-04 ENCOUNTER — RX RENEWAL (OUTPATIENT)
Age: 84
End: 2019-02-04

## 2019-02-19 ENCOUNTER — APPOINTMENT (OUTPATIENT)
Dept: CARDIOLOGY | Facility: CLINIC | Age: 84
End: 2019-02-19
Payer: MEDICARE

## 2019-02-19 PROCEDURE — 93284 PRGRMG EVAL IMPLANTABLE DFB: CPT

## 2019-02-21 ENCOUNTER — RX RENEWAL (OUTPATIENT)
Age: 84
End: 2019-02-21

## 2019-02-23 ENCOUNTER — RX RENEWAL (OUTPATIENT)
Age: 84
End: 2019-02-23

## 2019-03-12 ENCOUNTER — APPOINTMENT (OUTPATIENT)
Dept: INTERNAL MEDICINE | Facility: CLINIC | Age: 84
End: 2019-03-12
Payer: MEDICARE

## 2019-03-12 ENCOUNTER — LABORATORY RESULT (OUTPATIENT)
Age: 84
End: 2019-03-12

## 2019-03-12 VITALS
WEIGHT: 163.8 LBS | SYSTOLIC BLOOD PRESSURE: 102 MMHG | HEART RATE: 70 BPM | BODY MASS INDEX: 25.41 KG/M2 | HEIGHT: 67.25 IN | OXYGEN SATURATION: 98 % | DIASTOLIC BLOOD PRESSURE: 66 MMHG | TEMPERATURE: 97.5 F

## 2019-03-12 PROCEDURE — G0444 DEPRESSION SCREEN ANNUAL: CPT | Mod: 59

## 2019-03-12 PROCEDURE — 36415 COLL VENOUS BLD VENIPUNCTURE: CPT

## 2019-03-12 PROCEDURE — G0439: CPT

## 2019-03-14 NOTE — COUNSELING
[Weight management counseling provided] : Weight management [Healthy eating counseling provided] : healthy eating [Activity counseling provided] : activity [Weight Self Once Weekly] : Weight self once weekly [Low Fat Diet] : Low fat diet [Low Salt Diet] : Low salt diet [Good understanding] : Patient has a good understanding of disease, goals and obesity follow-up plan [Walking] : Walking [ - Annual Depression Screening] : Annual Depression Screening

## 2019-03-14 NOTE — REVIEW OF SYSTEMS
[Vision Problems] : vision problems [Lower Ext Edema] : lower extremity edema [Dyspnea on Exertion] : dyspnea on exertion [Joint Pain] : joint pain [Back Pain] : back pain [Memory Loss] : memory loss [Negative] : Heme/Lymph [Hearing Loss] : hearing loss

## 2019-03-14 NOTE — HISTORY OF PRESENT ILLNESS
[FreeTextEntry1] : Comes in for annual physical. [de-identified] : Doing well.\par Took a fall and scraped his forehead when lost balance bending over.

## 2019-03-14 NOTE — PHYSICAL EXAM
[No Acute Distress] : no acute distress [Well Nourished] : well nourished [Well Developed] : well developed [Well-Appearing] : well-appearing [Normal Voice/Communication] : normal voice/communication [Normal Sclera/Conjunctiva] : normal sclera/conjunctiva [PERRL] : pupils equal round and reactive to light [EOMI] : extraocular movements intact [Normal Outer Ear/Nose] : the outer ears and nose were normal in appearance [Normal Oropharynx] : the oropharynx was normal [Normal TMs] : both tympanic membranes were normal [No JVD] : no jugular venous distention [Supple] : supple [No Lymphadenopathy] : no lymphadenopathy [No Respiratory Distress] : no respiratory distress  [Clear to Auscultation] : lungs were clear to auscultation bilaterally [No Accessory Muscle Use] : no accessory muscle use [Normal Rate] : normal rate  [Regular Rhythm] : with a regular rhythm [Normal S1, S2] : normal S1 and S2 [No Carotid Bruits] : no carotid bruits [Pedal Pulses Present] : the pedal pulses are present [No Extremity Clubbing/Cyanosis] : no extremity clubbing/cyanosis [Normal Appearance] : normal in appearance [No Nipple Discharge] : no nipple discharge [No Axillary Lymphadenopathy] : no axillary lymphadenopathy [Soft] : abdomen soft [Non Tender] : non-tender [Non-distended] : non-distended [No Masses] : no abdominal mass palpated [No HSM] : no HSM [Normal Bowel Sounds] : normal bowel sounds [Normal Supraclavicular Nodes] : no supraclavicular lymphadenopathy [Normal Posterior Cervical Nodes] : no posterior cervical lymphadenopathy [Normal Anterior Cervical Nodes] : no anterior cervical lymphadenopathy [No CVA Tenderness] : no CVA  tenderness [No Spinal Tenderness] : no spinal tenderness [Grossly Normal Strength/Tone] : grossly normal strength/tone [No Rash] : no rash [Normal Gait] : normal gait [Coordination Grossly Intact] : coordination grossly intact [No Focal Deficits] : no focal deficits [Deep Tendon Reflexes (DTR)] : deep tendon reflexes were 2+ and symmetric [Speech Grossly Normal] : speech grossly normal [Normal Affect] : the affect was normal [Alert and Oriented x3] : oriented to person, place, and time [Normal Axillary Nodes] : no axillary lymphadenopathy [de-identified] : No ST; wears right HA [de-identified] : No TM; no stridor [de-identified] : R=16; good air entry; no wheezing [de-identified] : murmur unchanged [de-identified] : normal radial pulses; no cords; trace edema b/l [de-identified] : abrasion on forehead

## 2019-03-14 NOTE — HEALTH RISK ASSESSMENT
[None] : None [With Family] : lives with family [# of Members in Household ___] :  household currently consist of [unfilled] member(s) [Retired] : retired [College] : College [] :  [# Of Children ___] : has [unfilled] children [Feels Safe at Home] : Feels safe at home [Fully functional (bathing, dressing, toileting, transferring, walking, feeding)] : Fully functional (bathing, dressing, toileting, transferring, walking, feeding) [Fully functional (using the telephone, shopping, preparing meals, housekeeping, doing laundry, using] : Fully functional and needs no help or supervision to perform IADLs (using the telephone, shopping, preparing meals, housekeeping, doing laundry, using transportation, managing medications and managing finances) [Reports changes in hearing] : Reports changes in hearing [Smoke Detector] : smoke detector [Carbon Monoxide Detector] : carbon monoxide detector [Seat Belt] :  uses seat belt [Sunscreen] : uses sunscreen [Good] : ~his/her~  mood as  good [Intercurrent hospitalizations] : was admitted to the hospital  [16-20] : 16-20 [Any fall with injury in past year] : Patient reported fall with injury in the past year [0] : 2) Feeling down, depressed, or hopeless: Not at all (0) [Patient declined colonoscopy] : Patient declined colonoscopy [HIV test declined] : HIV test declined [Hepatitis C test declined] : Hepatitis C test declined [Reports changes in dental health] : Reports changes in dental health [With Patient/Caregiver] : With Patient/Caregiver [Designated Healthcare Proxy] : Designated healthcare proxy [Name: ___] : Health Care Proxy's Name: [unfilled]  [Relationship: ___] : Relationship: [unfilled] [FreeTextEntry1] : see HPI [] : No [de-identified] : cardiology, dentist,ophtho, derm, rheum [YearQuit] : 40-45 y ago [de-identified] : PT 2 x / week [de-identified] : regular/ low salt [NEH7Vhbdt] : 0 [Change in mental status noted] : No change in mental status noted [Language] : denies difficulty with language [Behavior] : denies difficulty with behavior [Learning/Retaining New Information] : denies difficulty learning/retaining new information [Handling Complex Tasks] : denies difficulty handling complex tasks [Reasoning] : denies difficulty with reasoning [Spatial Ability and Orientation] : denies difficulty with spatial ability and orientation [Sexually Active] : not sexually active [Reports changes in vision] : Reports no changes in vision [Guns at Home] : no guns at home [Travel to Developing Areas] : does not  travel to developing areas [TB Exposure] : is not being exposed to tuberculosis [Caregiver Concerns] : does not have caregiver concerns [MammogramComments] : NA [PapSmearComments] : NA [BoneDensityComments] : NA [AdvancecareDate] : 03/19

## 2019-03-14 NOTE — ASSESSMENT
[FreeTextEntry1] : See health maintenance assessment and plan outlined below.\par In addition:\par Full labs and urine sent today\par Podiatry f/u as needed\par Vascular f/u as needed\par Ortho/Rheum f/u as needed\par He declines urology f/u\par He refuses colonoscopy \par Continue meds, diet, exercise as outlined\par Hematology f/u\par Cardiology f/u\par He declined EKG today - does with Dr. Paiz\par He declined f/u CXR/CT\par Needs to set up full PFT's\par Vaccines reviewed\par To f/u with ENT, dentist, derm, ophtho,\par RTC for annual physical\par RTC 3-4 months and as needed\par To call for any medical/pulmonary issues

## 2019-03-15 LAB
25(OH)D3 SERPL-MCNC: 51.8 NG/ML
ALBUMIN SERPL ELPH-MCNC: 3.8 G/DL
ALP BLD-CCNC: 139 U/L
ALT SERPL-CCNC: 22 U/L
ANION GAP SERPL CALC-SCNC: 12 MMOL/L
APPEARANCE: CLEAR
AST SERPL-CCNC: 24 U/L
BACTERIA: NEGATIVE
BASOPHILS # BLD AUTO: 0.07 K/UL
BASOPHILS NFR BLD AUTO: 0.9 %
BILIRUB SERPL-MCNC: 0.4 MG/DL
BILIRUBIN URINE: NEGATIVE
BLOOD URINE: ABNORMAL
BUN SERPL-MCNC: 36 MG/DL
CALCIUM SERPL-MCNC: 8.6 MG/DL
CHLORIDE SERPL-SCNC: 105 MMOL/L
CHOLEST SERPL-MCNC: 111 MG/DL
CHOLEST/HDLC SERPL: 2.1 RATIO
CO2 SERPL-SCNC: 24 MMOL/L
COLOR: YELLOW
CREAT SERPL-MCNC: 1.38 MG/DL
EOSINOPHIL # BLD AUTO: 0.42 K/UL
EOSINOPHIL NFR BLD AUTO: 5.6 %
FOLATE SERPL-MCNC: >20 NG/ML
GLUCOSE QUALITATIVE U: NEGATIVE
GLUCOSE SERPL-MCNC: 96 MG/DL
HBA1C MFR BLD HPLC: 5.5 %
HCT VFR BLD CALC: 36.7 %
HDLC SERPL-MCNC: 52 MG/DL
HGB BLD-MCNC: 11.3 G/DL
HYALINE CASTS: 0 /LPF
IMM GRANULOCYTES NFR BLD AUTO: 0.4 %
IRON SERPL-MCNC: 64 UG/DL
KETONES URINE: NEGATIVE
LDLC SERPL CALC-MCNC: 46 MG/DL
LEUKOCYTE ESTERASE URINE: ABNORMAL
LYMPHOCYTES # BLD AUTO: 1.13 K/UL
LYMPHOCYTES NFR BLD AUTO: 15.2 %
MAN DIFF?: NORMAL
MCHC RBC-ENTMCNC: 30.8 GM/DL
MCHC RBC-ENTMCNC: 33.4 PG
MCV RBC AUTO: 108.6 FL
MICROSCOPIC-UA: NORMAL
MONOCYTES # BLD AUTO: 0.57 K/UL
MONOCYTES NFR BLD AUTO: 7.7 %
NEUTROPHILS # BLD AUTO: 5.23 K/UL
NEUTROPHILS NFR BLD AUTO: 70.2 %
NITRITE URINE: NEGATIVE
PH URINE: 6
PLATELET # BLD AUTO: 129 K/UL
POTASSIUM SERPL-SCNC: 4.2 MMOL/L
PROT SERPL-MCNC: 7.1 G/DL
PROTEIN URINE: NORMAL
PSA SERPL-MCNC: 4.25 NG/ML
RBC # BLD: 3.38 M/UL
RBC # FLD: 16.9 %
RED BLOOD CELLS URINE: 17 /HPF
SODIUM SERPL-SCNC: 141 MMOL/L
SPECIFIC GRAVITY URINE: 1.02
SQUAMOUS EPITHELIAL CELLS: 9 /HPF
TRIGL SERPL-MCNC: 67 MG/DL
TSH SERPL-ACNC: 7.07 UIU/ML
UROBILINOGEN URINE: NORMAL
VIT B12 SERPL-MCNC: 431 PG/ML
WBC # FLD AUTO: 7.45 K/UL
WHITE BLOOD CELLS URINE: 6 /HPF

## 2019-03-18 ENCOUNTER — RX RENEWAL (OUTPATIENT)
Age: 84
End: 2019-03-18

## 2019-04-08 ENCOUNTER — RX RENEWAL (OUTPATIENT)
Age: 84
End: 2019-04-08

## 2019-04-11 DIAGNOSIS — M54.9 DORSALGIA, UNSPECIFIED: ICD-10-CM

## 2019-04-15 ENCOUNTER — RX RENEWAL (OUTPATIENT)
Age: 84
End: 2019-04-15

## 2019-04-24 ENCOUNTER — APPOINTMENT (OUTPATIENT)
Dept: CARDIOLOGY | Facility: CLINIC | Age: 84
End: 2019-04-24
Payer: MEDICARE

## 2019-04-24 ENCOUNTER — NON-APPOINTMENT (OUTPATIENT)
Age: 84
End: 2019-04-24

## 2019-04-24 VITALS
WEIGHT: 162 LBS | SYSTOLIC BLOOD PRESSURE: 99 MMHG | BODY MASS INDEX: 25.13 KG/M2 | HEIGHT: 67.25 IN | DIASTOLIC BLOOD PRESSURE: 59 MMHG | OXYGEN SATURATION: 97 % | HEART RATE: 73 BPM

## 2019-04-24 PROCEDURE — 99215 OFFICE O/P EST HI 40 MIN: CPT

## 2019-04-24 PROCEDURE — 93000 ELECTROCARDIOGRAM COMPLETE: CPT

## 2019-04-24 PROCEDURE — 36415 COLL VENOUS BLD VENIPUNCTURE: CPT

## 2019-04-24 NOTE — REASON FOR VISIT
[FreeTextEntry1] : Follow up visit for patient with known LV dysfunction and CAD along with COPD after angioplasty and after AICD and biventricular pacemaker on August 7, 2017 was on Entresto but not tolerating it because of persisting cough; it was stopped and cough resolved. Issue of weight loss but good appetite found to be hyperthyroid on Synthroid therapy. On digoxin. Had syncopal episode at home with VT- VF and defibrillator shock on August 25, 2017. No recurrence of syncope, with only one near syncopal episode that was clearly orthostatic after a movie. AICD interrogation shows no further episodes since August. Then brief hospitalization upon return from his vacation in August. Last visit f/u after structural heart evaluation including dobutamine echo stress.\par

## 2019-04-24 NOTE — REVIEW OF SYSTEMS
[Dyspnea on exertion] : dyspnea during exertion [Lower Ext Edema] : lower extremity edema [Joint Pain] : joint pain [see HPI] : see HPI [Negative] : Heme/Lymph [Fever] : no fever [Recent Weight Gain (___ Lbs)] : no recent weight gain [Chills] : no chills [Feeling Fatigued] : not feeling fatigued [Recent Weight Loss (___ Lbs)] : no recent weight loss [Chest  Pressure] : no chest pressure [Shortness Of Breath] : no shortness of breath [Chest Pain] : no chest pain [Palpitations] : no palpitations [Cough] : no cough [Abdominal Pain] : no abdominal pain [Change in Appetite] : no change in appetite [Change In The Stool] : no change in stool [Confusion] : no confusion was observed [Dizziness] : no dizziness [Anxiety] : no anxiety [Excessive Thirst] : no polydipsia

## 2019-04-24 NOTE — PHYSICAL EXAM
[General Appearance - Well Developed] : well developed [Well Groomed] : well groomed [Normal Appearance] : normal appearance [General Appearance - In No Acute Distress] : no acute distress [No Deformities] : no deformities [Eyelids - No Xanthelasma] : the eyelids demonstrated no xanthelasmas [Normal Conjunctiva] : the conjunctiva exhibited no abnormalities [Normal Oral Mucosa] : normal oral mucosa [No Oral Pallor] : no oral pallor [No Oral Cyanosis] : no oral cyanosis [Normal Jugular Venous A Waves Present] : normal jugular venous A waves present [Normal Jugular Venous V Waves Present] : normal jugular venous V waves present [No Jugular Venous Coley A Waves] : no jugular venous coley A waves [Exaggerated Use Of Accessory Muscles For Inspiration] : no accessory muscle use [Respiration, Rhythm And Depth] : normal respiratory rhythm and effort [Auscultation Breath Sounds / Voice Sounds] : lungs were clear to auscultation bilaterally [Heart Rate And Rhythm] : heart rate and rhythm were normal [Murmurs] : no murmurs present [Heart Sounds] : normal S1 and S2 [Abdomen Soft] : soft [Abdomen Tenderness] : non-tender [Abdomen Mass (___ Cm)] : no abdominal mass palpated [Abnormal Walk] : normal gait [Gait - Sufficient For Exercise Testing] : the gait was sufficient for exercise testing [Nail Clubbing] : no clubbing of the fingernails [Petechial Hemorrhages (___cm)] : no petechial hemorrhages [Cyanosis, Localized] : no localized cyanosis [Skin Turgor] : normal skin turgor [Skin Color & Pigmentation] : normal skin color and pigmentation [No Venous Stasis] : no venous stasis [] : no rash [No Xanthoma] : no  xanthoma was observed [Skin Lesions] : no skin lesions [No Skin Ulcers] : no skin ulcer [Oriented To Time, Place, And Person] : oriented to person, place, and time [Affect] : the affect was normal [Mood] : the mood was normal [FreeTextEntry1] : Pretty good spirits

## 2019-04-24 NOTE — HISTORY OF PRESENT ILLNESS
[FreeTextEntry1] : (MED HX) The patient is now 88 years old. I first saw him in 2006. At that time he had a history of a myocardial infarction and angioplasty back in 1994. He had done well for years although his activity level had diminished with age and he began complaining of fatigue. In December of 2011 he had a stress echocardiogram that was abnormal possibly with very abnormal blood pressure response and possibly with new wall motion abnormalities. He underwent a CTA and this was followed up by cardiac catheterization at [facility] in December of 2011. 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 The patient did well after that which is a minor adjustment of his medications. He has a history of hyperlipidemia and had a past history of hypertension. He stopped smoking 43 years ago, no diabetes, no family history of coronary artery disease.\par April 30, 2013 he was admitted to [facility] with left lower extremity cellulitis and altered mental status. He had positive blood cultures for strep pyogenes group A. An echocardiogram showed a possible small echodensity on the ventricular side of the noncoronary cusp. A joint aspiration of his left ankle was negative. He was treated with vancomycin and Zosyn, switched to cefepime. Followed by plastic surgery for wound care. No evidence of endocarditis clinically. A CAT scan of his abdomen and pelvis showed a 3.7 cm infrarenal abdominal aortic aneurysm. There was also bilateral inguinal hernias. 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 He was followed by Dr. Damián Lovelace of infectious disease. There is still a possibility he may need a skin graft for his left ankle. He was in the hospital for a little over 2 weeks and then in rehabilitation for 5 weeks. \par July 3, 2013. Here for followup visit. He had been home now for about 10 days and had already seen Dr. Lovelace and Dr. Jones his internist.  He denies chest pain or shortness of breath. There has been no syncope or near syncope or palpitations. Upon review of his medications the only change is that his Avapro is at a half of a 150 mg pill daily due to low blood pressure in the hospital and his simvastatin has been cut down from 20 mg to 10 mg. He did lose weight during this hospitalization as well. There have been no episodes of fevers, chills, sweats, etc.\par September 3, 2013. Here for followup. He is preparing to go on a trip to [place] in October. He does note shortness of breath with exertion but only with a lot of exertion, no problem going up stairs, and he thinks no different than even before his cellulitis episode. He denies exertional chest pain. We elected to see how he did with increased exercise and if his shortness of breath was not severe he would be okay to go on the trip to [place], obviously using commonsense as we discussed.\par January 6, 2014. The patient is here for followup. He did very well on the trip to [place] with some limitation from his knees but no significant shortness of breath or chest pain. He gets swelling on his left leg where he had the cellulitis but not on the right side. He only had one episode of slight lightheadedness when he got up too fast but otherwise has been tolerating the current medications. He is planning on going to [place] at the end of January. He has a mild cough, nonproductive, no fever chills. He claims he was unable to get an appointment with his internist. Her workup was unrevealing.\par May 5, 2014. The patient is here for followup. He saw Dr. Jones in the interim and had lab work with an excellent lipid profile but a high BNP. He saw vascular surgery because of the abdominal aortic aneurysm which measured 3.8 cm and that will be followed. He had an episode where his balance was off for about one hour. It only happened when he tried to walk around. He denies feeling lightheaded like he might pass out. If he was sitting and not moving he felt perfectly fine. There was no vertigo. He claims it happened him once before and it resolved as well. I advised him to discuss with Dr. Jones and perhaps a neurologist. In addition he brought up again with his wife his symptoms he's had for 40 years where he suddenly for no reason will get chest pain and pressure that radiates up to his jaw. It will actually go away if he does nothing but if he takes Maalox it will go away faster. I explained to him that this is probably esophageal reflux with esophageal spasm but it so infrequent that it is not worth for him to take a PPI on a daily basis. He denies having exertional chest pain or shortness of breath. He is considering possible knee replacement in the near future. He returned in July for preop clearance for knee replacement which he had in the middle of July with no complications Other than needing to be transfused 3 units and having been sent in to [facility] in the [facility] to have that done on 2 occasions.\par October 7, 2014. The patient is here for followup. He is feeling better and is more active but is also noting more shortness of breath with exertion or at least others with him have noticed it. He thinks he needs the other knee replacement done but his wife said she will divorce him if he does it. His TSH was elevated on labs with Dr. Jones last month so his Synthroid was increased from 6 days a week to 7 days a week. His BNP was elevated and he asked "should I worry about heart failure?". In addition Dr. Jones cut back his Avapro 300 to 150 because blood pressure was 110. The patient denies lightheadedness dizziness etc. His blood pressure today on 2 different occasions was 146/70. He also has an abdominal aortic aneurysm measuring 3.8 cm that is being followed. He'll be seeing Dr. Temple tomorrow.\par October 28, 2014. Dr. Jacobs called yesterday that the patient's abdominal aorta had increased in size by 0.9 cm and the patient is to be scheduled for an endovascular repair of his abdominal aortic aneurysm. He came here for his echocardiogram and discussion. The echo for the most part is unchanged from May of 2013 with left ventricular ejection fraction around 40%, mild to moderate MR, mild to moderate AI, segmental LV dysfunction with akinetic inferoposterior wall, and hypokinetic to akinetic inferolateral and mid lateral walls, normal anterior wall. LV size upper limit of normal. Normal RV size and function with an RVSP estimated at 41 mm of mercury.\par April 29, 2015. The patient did well with his endovascular repair of his abdominal aortic aneurysm. He went down to [place] and at one point was found to be short of breath with that was felt to be a pleural effusion. He was set up for a thoracentesis but after taking a diuretic for a few days they found there was no fluid there. His cough did not go away until he took a course of prednisone.The diuretic was stopped and he did well returning home. He took a tour of [place] and noted that any kind of incline made him very short of breath but no chest pressure. He saw Dr. Morris of pulmonary who found his proBNP to be 3400 and in the past it was only 1100. However his chest x-ray was totally clear. He has no PND or orthopnea and no real edema. She did give him another inhaler (Symbicort) and referred him here. His weight has not been up and if anything has gone down a little. His last echo was in October as above and was unchanged for the most part.  There is no chest pain with exertion. \par April 30, 2015. The patient returned for a stress echocardiogram. His baseline LV function looked worse than previously with left ventricular ejection fraction around 27%. With exercise he dropped his blood pressure and there were new wall motion abnormalities. He was scheduled for cardiac catheterization and probable defibrillator with possible biventricular pacemaker.\par May 6, 2015. Cardiac catheterization was done and revealed for the most part unchanged anatomy.  LVEF by V-gram was 35% . Given these findings this time Dr. Reyes did angioplasty and stent the right coronary artery. Electrophysiology felt he should wait at least 2 weeks and come back for another ejection fraction and clinical evaluation to see if things had improved before deciding on a  possible  AICD/biventricular pacemaker.\par May 21, 2015. The patient returns for followup and echocardiogram. He has felt much better over the 2 weeks but he has not been doing his usual activities because he was told not to do it. His echocardiogram to my eye looks unchanged. His exam is unchanged but there is no signs of congestive heart failure. I discussed the results with the patient and his wife. I told him that his improved symptoms could be because he is not that active, it could be a placebo effect of the procedure, it could be that things have improved and we just are unable to measure it on the echocardiogram. A BNP was sent and will be compared to his previous level which had gone up from the 400 range into the 800s. In addition over the weekend the patient will increase his activity and see what his level of symptoms are. If his symptoms are significant and worse than they had been a few months ago, we will schedule the ICD and biventricular pacemaker. The patient will contact me after the weekend.\par Nehal 15, 2015. The patient saw Dr. Russell of [facility] and unfortunately he has to wait 3 months (August 6, 2015) from the angioplasty unless he becomes unstable, has any arrhythmias, or needs a pacemaker for bradycardic reasons.  He did increase the carvedilol to 12.5 mg in the morning and 25 mg in the evening Currently he feels well and is stable. He does get out of breath and some weakness in his legs if he is walking uphill. When he initially lies down he feels some pressure he then says is shortness of breath but it resolves. No PND or orthopnea per se.\par July 14, 2015. The patient called a few weeks ago thinking he was more short of breath and wanted to move up the AICD/biventricular pacemaker. However after speaking with Dr. Russell of [facility] he explained he still was not qualified and must wait unless his symptoms are so severe that he requires hospitalization. I explained this to the patient and he returns today for routine followup.  He is still symptomatic with dyspnea on exertion but no PND or orthopnea. 3 months from his angioplasty will be August 6. He has a trip to [facility] planned with his daughter August 12.\par August 25, 2015. The patient is here for followup after having his AICD/biventricular pacemaker placed on August 7. No complications with the procedure. The patient does feel better in terms of the shortness of breath he would get when he was lying down but with exertion he feels maybe even worse right now in terms of shortness of breath. Of note he was away with a different diet and gained 4 pounds. (By scale here he has gained 9 pounds.) No palpitations, no syncope or near syncope. He is on carvedilol 25 mg the morning and 12.5 in the evening. He is still complaining of left arm pain ever since the procedure.\par September 8, 2015. The patient is finally starting to feel a little bit better. He cut his diuretic back to every other day and has continued to lose weight. His left arm so bothersome just as much since the procedure. We had a discussion about changing his Avapro to Entresto.\par October 8, 2015. Patient is here for followup. In the interim he had an episode of bronchitis treated with antibiotics and steroids which did cause some fluid retention and he was briefly on diuretics with improvement. He is now off the steroids but still on the diuretic. He does feel better on the Entresto. He thinks he is walking better with less shortness of breath and somewhat more stamina. He otherwise has no complaints. He remains in sinus rhythm and has had no AICD shocks. His blood pressure was borderline and his lungs were clear. I elected to stop the diuretic and increase his Entresto. \par October 23, 2015. On October 20 the patient saw Dr. Morris because of shortness of breath and edema. He had gained 9 pounds and he had significant swelling and she put him back on Lasix 40 mg as we discussed together. Patient had much relief and is here now. He lost 8 of the 9 pounds he again and feels much better. He still gets short of breath going up the stairs and often has to stop. He has trace edema still. His blood pressure is in the 90s systolic. He still eats out but not as much as he used to and they do ask for the low salt.\par November 9, 2015. The patient returns for followup. He has been on Lasix 40 mg since his last visit. He looks great, back to himself, with no complaints or symptoms of CHF. Her systolic pressure is only 90 but he has no symptoms of lightheadedness. He is having a lot of gas to the point of it being embarrassing and wondered if it is related to the Entresto. He also seems to have a dry cough. He will be seeing Dr. Russell in followup next week. Based on his complaints we cut back his Lasix to 20 mg daily but then he called back a few days later with more shortness of breath and went back to 40 mg q.d.\par December 7, 2015. Followup for the patient. As noted he had a go back to 40 mg Lasix because he became short of breath. He was still having GI complaints from the Entresto but he can manage it. He is complaining again of a urinating too much so perhaps we could try 30 mg of Lasix daily. He is seeing Dr. Russell next week. Is turning 89 soon and is planning a cruise on January 24.\par January 12, 2016. Patient is here for followup. Still has good spirits and is looking forward to going to [place] in 10 days. He is still having significant cough from the Entresto and we will have to change back to his prior regimen. Had echocardiogram at [facility] which was supposed to be  a dyssynchrony study and will be seeing Dr. Russell of [facility] tomorrow morning to discuss whether or not to try to improve his biventricular pacing. Currently on 80 mg of Lasix daily. Dr. Morris tried a new inhaler but he does not think it helped.\par March 2, 2016. The patient is here in followup. He went on his cruise and did well. His cough was stopped when he stopped the entresto. On the cruise he was able to cut back to 40 mg of Lasix  and even occasionally he didn't take it if he had a busy day. However after a while he did notice more shortness of breath and edema and the last few days he went back to 80 mg of Lasix with improvement. He is here now today, his weight has gone down 8 pounds, blood pressure was 90/60.\par May 3, 2016. Patient looks well and feels well, but continues to lose weight despite a good appetite. Dr. Jones is concerned and placed him on Ensure and if he does not put on some weight, he will be seeing GI. Abdominal CAT scan was unremarkable, except for some gastric distention and some thickening of the apex of the duodenum, which could just be underfilling. Abdominal aortic aneurysm repair was stable with sac being less than 3 cm. 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 He will follow up with vascular again in one year. He remains on 40 mg of Lasix and irbesartan. Not complaining of shortness of breath. He has done much better since Dr. Russell adjusted. His biventricular pacer by adjusting LV and RV timing. Lab work revealed a very suppressed TSH, so his Synthroid was held and he was to return in 2 weeks to reassess weight loss and thyroid status.\par May 18, 2016. Yesterday the patient was dizzy and lightheaded and fell and hit his head. He was evaluated in the emergency room, where everything was negative. His EKG was unchanged and his defibrillator was interrogated and there were no arrhythmias. His TSH was now 29! He is here today. Patient did fall again last night. Unclear if it is from being lightheaded or losing balance. No syncope. Other than when he had the fall, he has not been complaining of feeling lightheaded. His weight did go back up  off  the thyroid medication. I elected to hold his Lasix and have him reassessed in one week.\par May 24, 2016. Patient called yesterday and is short of breath, edema, and had put his weight back on. I told him to resume the Lasix and he is seen today for his followup. Mostly feels a little better and was able to lie flat last night. Still has some swelling. Blood pressure is still borderline low. Weight is up 6 pounds from last visit. We elected to continue Lasix 40 a day and changed his Synthroid to 0.1 mg daily.\par July 12, 2016. Patient is here for followup, as well as AICD interrogation. He seems to have normal biventricular pacing. Absolutely no arrhythmias for the past 4 months. Does get short of breath with exertion still, but not severe. Rarely will skip a day of Lasix if he has too many things to do. Started physical therapy, and was not out of breath at physical therapy today. His weight at home has been stable.\par September 13, 2016. Patient is getting around okay with a walker. His right knee seemed to be limiting him more than his shortness of breath and he has some trouble with his balance. No palpitation, syncope, or near syncope, or shocks. No change in any medication. He was still on Synthroid 0.1 mg. He is still doing physical therapy.\par November 16, 2016. The patient seems to be doing well, although he did have an episode of just suddenly falling backwards and hitting his head against the refrigerator. He denies lightheadedness or near syncope at the time. He denies vertigo, although he had a different episode. He reached up and looked up and had a vertigo-like episode just for a few seconds. There was no event on his defibrillator interrogation, except a 6 beat run of NSVT on September 24, which was asymptomatic. He is orthostatic here. He is also concerned about his weight loss, although his weight seems to be the same as it was last visit, but he claims he was almost 7 pounds less at home. His wife thinks he does not drink enough water. He is on the Lasix every other day. His EKG showed sinus rhythm with atrial tracking and biventricular pacing. He also has a compression fracture in his lumbar vertebra that is giving him pain\par He is also concerned about his thyroid hormone level, given his weight loss. We continued the Lasix on an every other day basis and lowered the Avapro to 150 mg. BNP was up to 4600 from 2600.\par December 21, 2016. The patient has here in followup. No more falls or complaints of orthostasis, although he is still orthostatic here from 124-106-98 standing with no symptoms. Complaining more of dyspnea on exertion and has some edema. His weight is about the same. After discussion, decided to add digoxin 0.125 q.d.\par February 8, 2017. The patient is here in followup. He has not fallen in a while now and he claims his problem is balance not dizziness. He thinks his dyspnea on exertion is unchanged on the digoxin. However his wife thinks he is better because he is not short of breath coming up the stairs and the  does agree. Just recently he started to develop some pedal edema, but he has had more salt because he loves soups. In addition, he does not sit with his legs elevated. (He also saw Dr. Plascencia for URI and was briefly on prednisone until a couple of weeks ago. He did have an SPEP with normal. Electrophoresis pattern.)\par April 4, 2017. Patient here in followup. Had pacemaker, defibrillator interrogation on March 6, with 2 short episodes of NSVT only. Seems to be doing well on the whole. Dyspnea on exertion is there, but unchanged. Weight is down 3 pounds. He occasionally gets numbness and tingling in his fingers, but not in his toes. Occasionally feels some discomfort over the AICD site. He will be going to his daughter and his niece for the [place] nights.\par Nehal 15, 2017. The patient is here in followup. Has been feeling a little more short of breath and does have a little bit of edema. He saw Dr. Valdez on Friday the ninth, who felt he had bronchitis and gave him Augmentin. Otherwise, patient has no complaints and has been doing pretty well. He still complains occasionally of some soreness over his AICD site. No other interim medical issues, and no change in medication.\par September 12, 2017. Patient is here for followup and defibrillator interrogation. As noted on August 25 had a syncopal episode after he had a hot shower and walked out to the other bathroom. Found himself on the floor and was fine. No recurrence since and in fact defibrillator interrogation shows that he had an episode of VT followed by VF after the device tried to pace terminate, which led to a defibrillator shock, and the patient has been fine since. He is still with sinus rhythm and a first-degree AV block underneath his ventricular pacing. No change in any of the symptoms and in fact, while he does get short of breath sometimes getting into bed, and some walking, was able to go to the gym and work out a little bit without shortness of breath. No chest pain, and no change in medications.\par December 26, 2017. Patient here in followup. Has been having some more shortness of breath, although seems to have frequent cough and URI. Now on a nebulizer. Does have worsening shortness of breath with lying down, but no PND. Had a near syncopal event when he got up right after sitting for a long time watching a movie, but otherwise no episodes of dizziness. AICD interrogation shows no episodes since the event in August. Patient thinks his overall stamina and fatigue are worse. Patient willing to retry Entresto.\par January 11, 2018. So far patient tolerating the Entresto, no cough. Maybe a little less short of breath, but not definite. Does have more edema, but weight is the same. No other complaints. Reluctant to increase the diuretic. We'll keep dose of the same for now and followup in one month if labs are okay. Then consider increasing Entresto.\par Comment:  \NING Genao - 21 Mar 2018 3:12 PM \par   TASK CREATED\par Hi-\par Saw Adam.\par He complained of weight gain, edema, SOB.\par Sent BNP and up to over 5300.\par Do you think that the Entresto is an issue for him?\par Asked him to call you.\par Thanks-\par Ning \par Addendum\par I reviewed the labs and spoke with the patient. Actually, weight is down, and labs are all okay except the BNP. If this is natural history of LV dysfunction, he should be on a higher dose of Entresto. I elected to increase the Entresto between now and his visit next week with me and see if things get worse, improve, or stay the same. Patient has a big cruise coming up on April 18 \par March 27, 2018. Patient returns in followup now on the increased dose of Entresto for one week. On his pacemaker interrogation he has normal function with biventricular pacing, no more VT. His "optivol" has been elevated since mid-January and just for the past week it seems to be coming down, which would correlate with increasing his Enteresto. He does admit to not being a strict on his diet, having Chinese food the other night, and has only been staying with the furosemide every other day despite the increased edema and shortness of breath. Depending on labs, I will probably continue the high-dose Entresto, continue furosemide daily through the beginning of [place] this week and then go back to every other day depending on symptoms, weight and edema.\par May 22, 2018. The patient went on his vacation and did extremely well. By the very end before getting on the plane to come back he was somewhat short of breath and his wife considered going to the emergency room. Instead, he took extra Lasix and was doing well on the plane. At one point got up to the bathroom, came back sat in his seat and his wife leaned over to try to wake him up and they could not arouse him for up to 5 minutes. A doctor on the plane checked him, they took him out of his seat and laid him down on the floor at which point he seemed to come to and felt great. It seems he had a pulse and a blood pressure throughout. When he got off the plane he was brought to [facility] but interrogating the defibrillator showed absolutely no arrhythmias. (Of note, the next day in the hospital he had 2 long runs of nonsustained VT that were asymptomatic.) He did develop a severe cough with rhonchi and sputum. He was seen by Dr. Hema Reyes of pulmonary and discharged on prednisone and antibiotics. He saw Dr. Morris this morning and she just continued those medications and sent labs and told him she thought it was all from his heart. He did have a repeat echo in the hospital, which showed his AS has progressed to borderline severe.\par July 10, 2018. A one point after the hospitalization, the patient's blood pressure was running low and his Lasix was decreased. He developed more shortness of breath, and weight gain, and saw Dr. Romano on June 27. Lasix was put back to 40 mg daily. He is here today, feeling better, has lost 6 pounds. Was at ophthalmologist earlier today and has a new hemorrhage in his left eye, which is his better eye. The ophthalmologist would like me to cut back or hold the aspirin.\par September 12, 2018. Patient returns in followup, as well as for repeat echocardiogram regarding his aortic stenosis. His echo confirms somewhat of a low gradient severe aortic stenosis and is unchanged from May, but the patient feels wonderful maybe somewhat tired, but his breathing is better than it has been a long time and he is able to do physical therapy, etc. Patient was sent for evaluation with structural heart team.\par October 24, 2018. Patient returns for followup. Workup in hospital revealed no significant CAD, very tortuous vessels femorally so if patient needs TAVR would have to use apical approach. Pullback gradient across aortic valve was low as well (15). 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 Since being home the patient has been fairly stable although he does have increased edema since the catheterization. There was also some question about his potassium level. He got a flu shot from Dr. Morris the other day.\par January 23, 2019. The patient here in followup. He remains in sinus or AV paced. Feels good. Taking Lasix 40 mg daily. Blood pressure running on the low side, but no dizziness, lightheadedness, etc. Gets tiredness "from his legs, but not from shortness of breath". he says. He is on 2 new medications from his rheumatologist (Blanca [facility]), gabapentin, and chlorzactazone. He would like to try to go again on at least a ten-day cruise although his wife is very nervous about it. No defibrillator shocks, etc. Off colchicine per Dr. Morris.\par April 24, 2019. Patient here in followup. Good spirits and seems to be doing very well on his current regimen. Remains in sinus rhythm with atrial tracking and ventricular pacing. One VPC noted. No congestive heart failure, despite [place].

## 2019-04-25 LAB
ALBUMIN SERPL ELPH-MCNC: 4 G/DL
ALP BLD-CCNC: 101 U/L
ALT SERPL-CCNC: 10 U/L
ANION GAP SERPL CALC-SCNC: 11 MMOL/L
AST SERPL-CCNC: 17 U/L
BASOPHILS # BLD AUTO: 0.07 K/UL
BASOPHILS NFR BLD AUTO: 1 %
BILIRUB SERPL-MCNC: 0.5 MG/DL
BUN SERPL-MCNC: 37 MG/DL
CALCIUM SERPL-MCNC: 9 MG/DL
CHLORIDE SERPL-SCNC: 102 MMOL/L
CHOLEST SERPL-MCNC: 111 MG/DL
CHOLEST/HDLC SERPL: 2.4 RATIO
CO2 SERPL-SCNC: 27 MMOL/L
CREAT SERPL-MCNC: 1.62 MG/DL
DEPRECATED KAPPA LC FREE/LAMBDA SER: 1.23 RATIO
EOSINOPHIL # BLD AUTO: 0.28 K/UL
EOSINOPHIL NFR BLD AUTO: 4.2 %
ESTIMATED AVERAGE GLUCOSE: 111 MG/DL
FERRITIN SERPL-MCNC: 189 NG/ML
GLUCOSE SERPL-MCNC: 103 MG/DL
HBA1C MFR BLD HPLC: 5.5 %
HCT VFR BLD CALC: 38 %
HDLC SERPL-MCNC: 47 MG/DL
HGB BLD-MCNC: 12 G/DL
IMM GRANULOCYTES NFR BLD AUTO: 0.3 %
IRON SATN MFR SERPL: 16 %
IRON SERPL-MCNC: 48 UG/DL
KAPPA LC CSF-MCNC: 6.05 MG/DL
KAPPA LC SERPL-MCNC: 7.46 MG/DL
LDLC SERPL CALC-MCNC: 52 MG/DL
LYMPHOCYTES # BLD AUTO: 1.03 K/UL
LYMPHOCYTES NFR BLD AUTO: 15.4 %
MAN DIFF?: NORMAL
MCHC RBC-ENTMCNC: 31.6 GM/DL
MCHC RBC-ENTMCNC: 33.4 PG
MCV RBC AUTO: 105.8 FL
MONOCYTES # BLD AUTO: 0.58 K/UL
MONOCYTES NFR BLD AUTO: 8.7 %
NEUTROPHILS # BLD AUTO: 4.69 K/UL
NEUTROPHILS NFR BLD AUTO: 70.4 %
PLATELET # BLD AUTO: 114 K/UL
POTASSIUM SERPL-SCNC: 4.9 MMOL/L
PROT SERPL-MCNC: 7.1 G/DL
RBC # BLD: 3.59 M/UL
RBC # FLD: 14.7 %
SODIUM SERPL-SCNC: 140 MMOL/L
TIBC SERPL-MCNC: 293 UG/DL
TRIGL SERPL-MCNC: 61 MG/DL
TSH SERPL-ACNC: 1.79 UIU/ML
TSH SERPL-ACNC: 1.87 UIU/ML
UIBC SERPL-MCNC: 245 UG/DL
WBC # FLD AUTO: 6.67 K/UL

## 2019-04-29 LAB — NT-PROBNP SERPL-MCNC: 2093 PG/ML

## 2019-07-01 ENCOUNTER — RX RENEWAL (OUTPATIENT)
Age: 84
End: 2019-07-01

## 2019-08-05 ENCOUNTER — RX RENEWAL (OUTPATIENT)
Age: 84
End: 2019-08-05

## 2019-08-19 ENCOUNTER — RX RENEWAL (OUTPATIENT)
Age: 84
End: 2019-08-19

## 2019-08-20 ENCOUNTER — NON-APPOINTMENT (OUTPATIENT)
Age: 84
End: 2019-08-20

## 2019-08-20 ENCOUNTER — APPOINTMENT (OUTPATIENT)
Dept: CARDIOLOGY | Facility: CLINIC | Age: 84
End: 2019-08-20
Payer: MEDICARE

## 2019-08-20 ENCOUNTER — LABORATORY RESULT (OUTPATIENT)
Age: 84
End: 2019-08-20

## 2019-08-20 VITALS
HEIGHT: 67 IN | WEIGHT: 158 LBS | TEMPERATURE: 97.3 F | HEART RATE: 73 BPM | SYSTOLIC BLOOD PRESSURE: 124 MMHG | BODY MASS INDEX: 24.8 KG/M2 | OXYGEN SATURATION: 96 % | DIASTOLIC BLOOD PRESSURE: 67 MMHG

## 2019-08-20 PROCEDURE — 93000 ELECTROCARDIOGRAM COMPLETE: CPT

## 2019-08-20 PROCEDURE — 99214 OFFICE O/P EST MOD 30 MIN: CPT | Mod: 25

## 2019-08-20 PROCEDURE — 36415 COLL VENOUS BLD VENIPUNCTURE: CPT

## 2019-08-20 PROCEDURE — 93000 ELECTROCARDIOGRAM COMPLETE: CPT | Mod: 59

## 2019-08-20 PROCEDURE — 93284 PRGRMG EVAL IMPLANTABLE DFB: CPT

## 2019-08-20 PROCEDURE — 99214 OFFICE O/P EST MOD 30 MIN: CPT

## 2019-08-21 LAB
ALBUMIN SERPL ELPH-MCNC: 3.7 G/DL
ALP BLD-CCNC: 88 U/L
ALT SERPL-CCNC: 11 U/L
ANION GAP SERPL CALC-SCNC: 13 MMOL/L
AST SERPL-CCNC: 13 U/L
BASOPHILS # BLD AUTO: 0.05 K/UL
BASOPHILS NFR BLD AUTO: 0.6 %
BILIRUB SERPL-MCNC: 0.4 MG/DL
BUN SERPL-MCNC: 31 MG/DL
CALCIUM SERPL-MCNC: 8.8 MG/DL
CHLORIDE SERPL-SCNC: 103 MMOL/L
CHOLEST SERPL-MCNC: 115 MG/DL
CHOLEST/HDLC SERPL: 2 RATIO
CO2 SERPL-SCNC: 28 MMOL/L
CREAT SERPL-MCNC: 1.4 MG/DL
DEPRECATED KAPPA LC FREE/LAMBDA SER: 1.31 RATIO
DIGOXIN SERPL-MCNC: 1.5 NG/ML
EOSINOPHIL # BLD AUTO: 0.21 K/UL
EOSINOPHIL NFR BLD AUTO: 2.6 %
ESTIMATED AVERAGE GLUCOSE: 114 MG/DL
GLUCOSE SERPL-MCNC: 120 MG/DL
HBA1C MFR BLD HPLC: 5.6 %
HCT VFR BLD CALC: 35.9 %
HDLC SERPL-MCNC: 59 MG/DL
HGB BLD-MCNC: 11.1 G/DL
IMM GRANULOCYTES NFR BLD AUTO: 0.2 %
KAPPA LC CSF-MCNC: 4.93 MG/DL
KAPPA LC SERPL-MCNC: 6.46 MG/DL
LDLC SERPL CALC-MCNC: 46 MG/DL
LYMPHOCYTES # BLD AUTO: 0.99 K/UL
LYMPHOCYTES NFR BLD AUTO: 12.1 %
MAN DIFF?: NORMAL
MCHC RBC-ENTMCNC: 30.9 GM/DL
MCHC RBC-ENTMCNC: 32.6 PG
MCV RBC AUTO: 105.3 FL
MONOCYTES # BLD AUTO: 0.5 K/UL
MONOCYTES NFR BLD AUTO: 6.1 %
NEUTROPHILS # BLD AUTO: 6.39 K/UL
NEUTROPHILS NFR BLD AUTO: 78.4 %
NT-PROBNP SERPL-MCNC: 5109 PG/ML
PLATELET # BLD AUTO: 90 K/UL
POTASSIUM SERPL-SCNC: 4.2 MMOL/L
PROT SERPL-MCNC: 6.6 G/DL
RBC # BLD: 3.41 M/UL
RBC # FLD: 16.1 %
SODIUM SERPL-SCNC: 143 MMOL/L
TRIGL SERPL-MCNC: 48 MG/DL
TSH SERPL-ACNC: 1.61 UIU/ML
WBC # FLD AUTO: 8.16 K/UL

## 2019-08-21 NOTE — HISTORY OF PRESENT ILLNESS
[FreeTextEntry1] : (MED HX) The patient is now 88 years old. I first saw him in 2006. At that time he had a history of a myocardial infarction and angioplasty back in 1994. He had done well for years although his activity level had diminished with age and he began complaining of fatigue. In December of 2011 he had a stress echocardiogram that was abnormal possibly with very abnormal blood pressure response and possibly with new wall motion abnormalities. He underwent a CTA and this was followed up by cardiac catheterization at [facility] in December of 2011. 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 The patient did well after that which is a minor adjustment of his medications. He has a history of hyperlipidemia and had a past history of hypertension. He stopped smoking 43 years ago, no diabetes, no family history of coronary artery disease.\par April 30, 2013 he was admitted to [facility] with left lower extremity cellulitis and altered mental status. He had positive blood cultures for strep pyogenes group A. An echocardiogram showed a possible small echodensity on the ventricular side of the noncoronary cusp. A joint aspiration of his left ankle was negative. He was treated with vancomycin and Zosyn, switched to cefepime. Followed by plastic surgery for wound care. No evidence of endocarditis clinically. A CAT scan of his abdomen and pelvis showed a 3.7 cm infrarenal abdominal aortic aneurysm. There was also bilateral inguinal hernias. 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 He was followed by Dr. Damián Lovelace of infectious disease. There is still a possibility he may need a skin graft for his left ankle. He was in the hospital for a little over 2 weeks and then in rehabilitation for 5 weeks. \par July 3, 2013. Here for followup visit. He had been home now for about 10 days and had already seen Dr. Lovelace and Dr. Jones his internist.  He denies chest pain or shortness of breath. There has been no syncope or near syncope or palpitations. Upon review of his medications the only change is that his Avapro is at a half of a 150 mg pill daily due to low blood pressure in the hospital and his simvastatin has been cut down from 20 mg to 10 mg. He did lose weight during this hospitalization as well. There have been no episodes of fevers, chills, sweats, etc.\par September 3, 2013. Here for followup. He is preparing to go on a trip to [place] in October. He does note shortness of breath with exertion but only with a lot of exertion, no problem going up stairs, and he thinks no different than even before his cellulitis episode. He denies exertional chest pain. We elected to see how he did with increased exercise and if his shortness of breath was not severe he would be okay to go on the trip to [place], obviously using commonsense as we discussed.\par January 6, 2014. The patient is here for followup. He did very well on the trip to [place] with some limitation from his knees but no significant shortness of breath or chest pain. He gets swelling on his left leg where he had the cellulitis but not on the right side. He only had one episode of slight lightheadedness when he got up too fast but otherwise has been tolerating the current medications. He is planning on going to [place] at the end of January. He has a mild cough, nonproductive, no fever chills. He claims he was unable to get an appointment with his internist. Her workup was unrevealing.\par May 5, 2014. The patient is here for followup. He saw Dr. Jones in the interim and had lab work with an excellent lipid profile but a high BNP. He saw vascular surgery because of the abdominal aortic aneurysm which measured 3.8 cm and that will be followed. He had an episode where his balance was off for about one hour. It only happened when he tried to walk around. He denies feeling lightheaded like he might pass out. If he was sitting and not moving he felt perfectly fine. There was no vertigo. He claims it happened him once before and it resolved as well. I advised him to discuss with Dr. Jones and perhaps a neurologist. In addition he brought up again with his wife his symptoms he's had for 40 years where he suddenly for no reason will get chest pain and pressure that radiates up to his jaw. It will actually go away if he does nothing but if he takes Maalox it will go away faster. I explained to him that this is probably esophageal reflux with esophageal spasm but it so infrequent that it is not worth for him to take a PPI on a daily basis. He denies having exertional chest pain or shortness of breath. He is considering possible knee replacement in the near future. He returned in July for preop clearance for knee replacement which he had in the middle of July with no complications Other than needing to be transfused 3 units and having been sent in to [facility] in the [facility] to have that done on 2 occasions.\par October 7, 2014. The patient is here for followup. He is feeling better and is more active but is also noting more shortness of breath with exertion or at least others with him have noticed it. He thinks he needs the other knee replacement done but his wife said she will divorce him if he does it. His TSH was elevated on labs with Dr. Jones last month so his Synthroid was increased from 6 days a week to 7 days a week. His BNP was elevated and he asked "should I worry about heart failure?". In addition Dr. Jones cut back his Avapro 300 to 150 because blood pressure was 110. The patient denies lightheadedness dizziness etc. His blood pressure today on 2 different occasions was 146/70. He also has an abdominal aortic aneurysm measuring 3.8 cm that is being followed. He'll be seeing Dr. Temple tomorrow.\par October 28, 2014. Dr. Jacobs called yesterday that the patient's abdominal aorta had increased in size by 0.9 cm and the patient is to be scheduled for an endovascular repair of his abdominal aortic aneurysm. He came here for his echocardiogram and discussion. The echo for the most part is unchanged from May of 2013 with left ventricular ejection fraction around 40%, mild to moderate MR, mild to moderate AI, segmental LV dysfunction with akinetic inferoposterior wall, and hypokinetic to akinetic inferolateral and mid lateral walls, normal anterior wall. LV size upper limit of normal. Normal RV size and function with an RVSP estimated at 41 mm of mercury.\par April 29, 2015. The patient did well with his endovascular repair of his abdominal aortic aneurysm. He went down to [place] and at one point was found to be short of breath with that was felt to be a pleural effusion. He was set up for a thoracentesis but after taking a diuretic for a few days they found there was no fluid there. His cough did not go away until he took a course of prednisone.The diuretic was stopped and he did well returning home. He took a tour of [place] and noted that any kind of incline made him very short of breath but no chest pressure. He saw Dr. Morris of pulmonary who found his proBNP to be 3400 and in the past it was only 1100. However his chest x-ray was totally clear. He has no PND or orthopnea and no real edema. She did give him another inhaler (Symbicort) and referred him here. His weight has not been up and if anything has gone down a little. His last echo was in October as above and was unchanged for the most part.  There is no chest pain with exertion. \par April 30, 2015. The patient returned for a stress echocardiogram. His baseline LV function looked worse than previously with left ventricular ejection fraction around 27%. With exercise he dropped his blood pressure and there were new wall motion abnormalities. He was scheduled for cardiac catheterization and probable defibrillator with possible biventricular pacemaker.\par May 6, 2015. Cardiac catheterization was done and revealed for the most part unchanged anatomy.  LVEF by V-gram was 35% . Given these findings this time Dr. Reyes did angioplasty and stent the right coronary artery. Electrophysiology felt he should wait at least 2 weeks and come back for another ejection fraction and clinical evaluation to see if things had improved before deciding on a  possible  AICD/biventricular pacemaker.\par May 21, 2015. The patient returns for followup and echocardiogram. He has felt much better over the 2 weeks but he has not been doing his usual activities because he was told not to do it. His echocardiogram to my eye looks unchanged. His exam is unchanged but there is no signs of congestive heart failure. I discussed the results with the patient and his wife. I told him that his improved symptoms could be because he is not that active, it could be a placebo effect of the procedure, it could be that things have improved and we just are unable to measure it on the echocardiogram. A BNP was sent and will be compared to his previous level which had gone up from the 400 range into the 800s. In addition over the weekend the patient will increase his activity and see what his level of symptoms are. If his symptoms are significant and worse than they had been a few months ago, we will schedule the ICD and biventricular pacemaker. The patient will contact me after the weekend.\par Nehal 15, 2015. The patient saw Dr. Russell of [facility] and unfortunately he has to wait 3 months (August 6, 2015) from the angioplasty unless he becomes unstable, has any arrhythmias, or needs a pacemaker for bradycardic reasons.  He did increase the carvedilol to 12.5 mg in the morning and 25 mg in the evening Currently he feels well and is stable. He does get out of breath and some weakness in his legs if he is walking uphill. When he initially lies down he feels some pressure he then says is shortness of breath but it resolves. No PND or orthopnea per se.\par July 14, 2015. The patient called a few weeks ago thinking he was more short of breath and wanted to move up the AICD/biventricular pacemaker. However after speaking with Dr. Russell of [facility] he explained he still was not qualified and must wait unless his symptoms are so severe that he requires hospitalization. I explained this to the patient and he returns today for routine followup.  He is still symptomatic with dyspnea on exertion but no PND or orthopnea. 3 months from his angioplasty will be August 6. He has a trip to [facility] planned with his daughter August 12.\par August 25, 2015. The patient is here for followup after having his AICD/biventricular pacemaker placed on August 7. No complications with the procedure. The patient does feel better in terms of the shortness of breath he would get when he was lying down but with exertion he feels maybe even worse right now in terms of shortness of breath. Of note he was away with a different diet and gained 4 pounds. (By scale here he has gained 9 pounds.) No palpitations, no syncope or near syncope. He is on carvedilol 25 mg the morning and 12.5 in the evening. He is still complaining of left arm pain ever since the procedure.\par September 8, 2015. The patient is finally starting to feel a little bit better. He cut his diuretic back to every other day and has continued to lose weight. His left arm so bothersome just as much since the procedure. We had a discussion about changing his Avapro to Entresto.\par October 8, 2015. Patient is here for followup. In the interim he had an episode of bronchitis treated with antibiotics and steroids which did cause some fluid retention and he was briefly on diuretics with improvement. He is now off the steroids but still on the diuretic. He does feel better on the Entresto. He thinks he is walking better with less shortness of breath and somewhat more stamina. He otherwise has no complaints. He remains in sinus rhythm and has had no AICD shocks. His blood pressure was borderline and his lungs were clear. I elected to stop the diuretic and increase his Entresto. \par October 23, 2015. On October 20 the patient saw Dr. Morris because of shortness of breath and edema. He had gained 9 pounds and he had significant swelling and she put him back on Lasix 40 mg as we discussed together. Patient had much relief and is here now. He lost 8 of the 9 pounds he again and feels much better. He still gets short of breath going up the stairs and often has to stop. He has trace edema still. His blood pressure is in the 90s systolic. He still eats out but not as much as he used to and they do ask for the low salt.\par November 9, 2015. The patient returns for followup. He has been on Lasix 40 mg since his last visit. He looks great, back to himself, with no complaints or symptoms of CHF. Her systolic pressure is only 90 but he has no symptoms of lightheadedness. He is having a lot of gas to the point of it being embarrassing and wondered if it is related to the Entresto. He also seems to have a dry cough. He will be seeing Dr. Russell in followup next week. Based on his complaints we cut back his Lasix to 20 mg daily but then he called back a few days later with more shortness of breath and went back to 40 mg q.d.\par December 7, 2015. Followup for the patient. As noted he had a go back to 40 mg Lasix because he became short of breath. He was still having GI complaints from the Entresto but he can manage it. He is complaining again of a urinating too much so perhaps we could try 30 mg of Lasix daily. He is seeing Dr. Russell next week. Is turning 89 soon and is planning a cruise on January 24.\par January 12, 2016. Patient is here for followup. Still has good spirits and is looking forward to going to [place] in 10 days. He is still having significant cough from the Entresto and we will have to change back to his prior regimen. Had echocardiogram at [facility] which was supposed to be  a dyssynchrony study and will be seeing Dr. Russell of [facility] tomorrow morning to discuss whether or not to try to improve his biventricular pacing. Currently on 80 mg of Lasix daily. Dr. Morris tried a new inhaler but he does not think it helped.\par March 2, 2016. The patient is here in followup. He went on his cruise and did well. His cough was stopped when he stopped the entresto. On the cruise he was able to cut back to 40 mg of Lasix  and even occasionally he didn't take it if he had a busy day. However after a while he did notice more shortness of breath and edema and the last few days he went back to 80 mg of Lasix with improvement. He is here now today, his weight has gone down 8 pounds, blood pressure was 90/60.\par May 3, 2016. Patient looks well and feels well, but continues to lose weight despite a good appetite. Dr. Jones is concerned and placed him on Ensure and if he does not put on some weight, he will be seeing GI. Abdominal CAT scan was unremarkable, except for some gastric distention and some thickening of the apex of the duodenum, which could just be underfilling. Abdominal aortic aneurysm repair was stable with sac being less than 3 cm. 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 He will follow up with vascular again in one year. He remains on 40 mg of Lasix and irbesartan. Not complaining of shortness of breath. He has done much better since Dr. Russell adjusted. His biventricular pacer by adjusting LV and RV timing. Lab work revealed a very suppressed TSH, so his Synthroid was held and he was to return in 2 weeks to reassess weight loss and thyroid status.\par May 18, 2016. Yesterday the patient was dizzy and lightheaded and fell and hit his head. He was evaluated in the emergency room, where everything was negative. His EKG was unchanged and his defibrillator was interrogated and there were no arrhythmias. His TSH was now 29! He is here today. Patient did fall again last night. Unclear if it is from being lightheaded or losing balance. No syncope. Other than when he had the fall, he has not been complaining of feeling lightheaded. His weight did go back up  off  the thyroid medication. I elected to hold his Lasix and have him reassessed in one week.\par May 24, 2016. Patient called yesterday and is short of breath, edema, and had put his weight back on. I told him to resume the Lasix and he is seen today for his followup. Mostly feels a little better and was able to lie flat last night. Still has some swelling. Blood pressure is still borderline low. Weight is up 6 pounds from last visit. We elected to continue Lasix 40 a day and changed his Synthroid to 0.1 mg daily.\par July 12, 2016. Patient is here for followup, as well as AICD interrogation. He seems to have normal biventricular pacing. Absolutely no arrhythmias for the past 4 months. Does get short of breath with exertion still, but not severe. Rarely will skip a day of Lasix if he has too many things to do. Started physical therapy, and was not out of breath at physical therapy today. His weight at home has been stable.\par September 13, 2016. Patient is getting around okay with a walker. His right knee seemed to be limiting him more than his shortness of breath and he has some trouble with his balance. No palpitation, syncope, or near syncope, or shocks. No change in any medication. He was still on Synthroid 0.1 mg. He is still doing physical therapy.\par November 16, 2016. The patient seems to be doing well, although he did have an episode of just suddenly falling backwards and hitting his head against the refrigerator. He denies lightheadedness or near syncope at the time. He denies vertigo, although he had a different episode. He reached up and looked up and had a vertigo-like episode just for a few seconds. There was no event on his defibrillator interrogation, except a 6 beat run of NSVT on September 24, which was asymptomatic. He is orthostatic here. He is also concerned about his weight loss, although his weight seems to be the same as it was last visit, but he claims he was almost 7 pounds less at home. His wife thinks he does not drink enough water. He is on the Lasix every other day. His EKG showed sinus rhythm with atrial tracking and biventricular pacing. He also has a compression fracture in his lumbar vertebra that is giving him pain\par He is also concerned about his thyroid hormone level, given his weight loss. We continued the Lasix on an every other day basis and lowered the Avapro to 150 mg. BNP was up to 4600 from 2600.\par December 21, 2016. The patient has here in followup. No more falls or complaints of orthostasis, although he is still orthostatic here from 124-106-98 standing with no symptoms. Complaining more of dyspnea on exertion and has some edema. His weight is about the same. After discussion, decided to add digoxin 0.125 q.d.\par February 8, 2017. The patient is here in followup. He has not fallen in a while now and he claims his problem is balance not dizziness. He thinks his dyspnea on exertion is unchanged on the digoxin. However his wife thinks he is better because he is not short of breath coming up the stairs and the  does agree. Just recently he started to develop some pedal edema, but he has had more salt because he loves soups. In addition, he does not sit with his legs elevated. (He also saw Dr. Plascencia for URI and was briefly on prednisone until a couple of weeks ago. He did have an SPEP with normal. Electrophoresis pattern.)\par April 4, 2017. Patient here in followup. Had pacemaker, defibrillator interrogation on March 6, with 2 short episodes of NSVT only. Seems to be doing well on the whole. Dyspnea on exertion is there, but unchanged. Weight is down 3 pounds. He occasionally gets numbness and tingling in his fingers, but not in his toes. Occasionally feels some discomfort over the AICD site. He will be going to his daughter and his niece for the [place] nights.\par Nehal 15, 2017. The patient is here in followup. Has been feeling a little more short of breath and does have a little bit of edema. He saw Dr. Valdez on Friday the ninth, who felt he had bronchitis and gave him Augmentin. Otherwise, patient has no complaints and has been doing pretty well. He still complains occasionally of some soreness over his AICD site. No other interim medical issues, and no change in medication.\par September 12, 2017. Patient is here for followup and defibrillator interrogation. As noted on August 25 had a syncopal episode after he had a hot shower and walked out to the other bathroom. Found himself on the floor and was fine. No recurrence since and in fact defibrillator interrogation shows that he had an episode of VT followed by VF after the device tried to pace terminate, which led to a defibrillator shock, and the patient has been fine since. He is still with sinus rhythm and a first-degree AV block underneath his ventricular pacing. No change in any of the symptoms and in fact, while he does get short of breath sometimes getting into bed, and some walking, was able to go to the gym and work out a little bit without shortness of breath. No chest pain, and no change in medications.\par December 26, 2017. Patient here in followup. Has been having some more shortness of breath, although seems to have frequent cough and URI. Now on a nebulizer. Does have worsening shortness of breath with lying down, but no PND. Had a near syncopal event when he got up right after sitting for a long time watching a movie, but otherwise no episodes of dizziness. AICD interrogation shows no episodes since the event in August. Patient thinks his overall stamina and fatigue are worse. Patient willing to retry Entresto.\par January 11, 2018. So far patient tolerating the Entresto, no cough. Maybe a little less short of breath, but not definite. Does have more edema, but weight is the same. No other complaints. Reluctant to increase the diuretic. We'll keep dose of the same for now and followup in one month if labs are okay. Then consider increasing Entresto.\par Comment:  \NING Genao - 21 Mar 2018 3:12 PM \par   TASK CREATED\par Hi-\par Saw Adam.\par He complained of weight gain, edema, SOB.\par Sent BNP and up to over 5300.\par Do you think that the Entresto is an issue for him?\par Asked him to call you.\par Thanks-\par Ning \par Addendum\par I reviewed the labs and spoke with the patient. Actually, weight is down, and labs are all okay except the BNP. If this is natural history of LV dysfunction, he should be on a higher dose of Entresto. I elected to increase the Entresto between now and his visit next week with me and see if things get worse, improve, or stay the same. Patient has a big cruise coming up on April 18 \par March 27, 2018. Patient returns in followup now on the increased dose of Entresto for one week. On his pacemaker interrogation he has normal function with biventricular pacing, no more VT. His "optivol" has been elevated since mid-January and just for the past week it seems to be coming down, which would correlate with increasing his Enteresto. He does admit to not being a strict on his diet, having Chinese food the other night, and has only been staying with the furosemide every other day despite the increased edema and shortness of breath. Depending on labs, I will probably continue the high-dose Entresto, continue furosemide daily through the beginning of [place] this week and then go back to every other day depending on symptoms, weight and edema.\par May 22, 2018. The patient went on his vacation and did extremely well. By the very end before getting on the plane to come back he was somewhat short of breath and his wife considered going to the emergency room. Instead, he took extra Lasix and was doing well on the plane. At one point got up to the bathroom, came back sat in his seat and his wife leaned over to try to wake him up and they could not arouse him for up to 5 minutes. A doctor on the plane checked him, they took him out of his seat and laid him down on the floor at which point he seemed to come to and felt great. It seems he had a pulse and a blood pressure throughout. When he got off the plane he was brought to [facility] but interrogating the defibrillator showed absolutely no arrhythmias. (Of note, the next day in the hospital he had 2 long runs of nonsustained VT that were asymptomatic.) He did develop a severe cough with rhonchi and sputum. He was seen by Dr. Hema Reyes of pulmonary and discharged on prednisone and antibiotics. He saw Dr. Morris this morning and she just continued those medications and sent labs and told him she thought it was all from his heart. He did have a repeat echo in the hospital, which showed his AS has progressed to borderline severe.\par July 10, 2018. A one point after the hospitalization, the patient's blood pressure was running low and his Lasix was decreased. He developed more shortness of breath, and weight gain, and saw Dr. Romano on June 27. Lasix was put back to 40 mg daily. He is here today, feeling better, has lost 6 pounds. Was at ophthalmologist earlier today and has a new hemorrhage in his left eye, which is his better eye. The ophthalmologist would like me to cut back or hold the aspirin.\par September 12, 2018. Patient returns in followup, as well as for repeat echocardiogram regarding his aortic stenosis. His echo confirms somewhat of a low gradient severe aortic stenosis and is unchanged from May, but the patient feels wonderful maybe somewhat tired, but his breathing is better than it has been a long time and he is able to do physical therapy, etc. Patient was sent for evaluation with structural heart team.\par October 24, 2018. Patient returns for followup. Workup in hospital revealed no significant CAD, very tortuous vessels femorally so if patient needs TAVR would have to use apical approach. Pullback gradient across aortic valve was low as well (15). 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 Since being home the patient has been fairly stable although he does have increased edema since the catheterization. There was also some question about his potassium level. He got a flu shot from Dr. Morris the other day.\par January 23, 2019. The patient here in followup. He remains in sinus or AV paced. Feels good. Taking Lasix 40 mg daily. Blood pressure running on the low side, but no dizziness, lightheadedness, etc. Gets tiredness "from his legs, but not from shortness of breath". he says. He is on 2 new medications from his rheumatologist (Blanca [facility]), gabapentin, and chlorzactazone. He would like to try to go again on at least a ten-day cruise although his wife is very nervous about it. No defibrillator shocks, etc. Off colchicine per Dr. Morris.\par April 24, 2019. Patient here in followup. Good spirits and seems to be doing very well on his current regimen. Remains in sinus rhythm with atrial tracking and ventricular pacing. One VPC noted. No congestive heart failure, despite [place].\par August 20, 2019.  Patient here in follow-up.  Pacemaker interrogation shows 1 or 2 runs of ventricular tachycardia that are pace terminated no shocks and patient was asymptomatic.  EKG shows sinus rhythm with left bundle branch block or more likely atrial tracking and biventricular pacing.  On the whole doing very well although feels that the water pill dictates his life and would like to change to every other day

## 2019-08-21 NOTE — PHYSICAL EXAM
[General Appearance - Well Developed] : well developed [Normal Appearance] : normal appearance [Well Groomed] : well groomed [No Deformities] : no deformities [General Appearance - In No Acute Distress] : no acute distress [Normal Conjunctiva] : the conjunctiva exhibited no abnormalities [Eyelids - No Xanthelasma] : the eyelids demonstrated no xanthelasmas [Normal Oral Mucosa] : normal oral mucosa [No Oral Pallor] : no oral pallor [No Oral Cyanosis] : no oral cyanosis [Normal Jugular Venous A Waves Present] : normal jugular venous A waves present [Normal Jugular Venous V Waves Present] : normal jugular venous V waves present [No Jugular Venous Coley A Waves] : no jugular venous coley A waves [Respiration, Rhythm And Depth] : normal respiratory rhythm and effort [Exaggerated Use Of Accessory Muscles For Inspiration] : no accessory muscle use [Auscultation Breath Sounds / Voice Sounds] : lungs were clear to auscultation bilaterally [Heart Rate And Rhythm] : heart rate and rhythm were normal [Murmurs] : no murmurs present [Heart Sounds] : normal S1 and S2 [Abdomen Soft] : soft [Abdomen Tenderness] : non-tender [Abdomen Mass (___ Cm)] : no abdominal mass palpated [Abnormal Walk] : normal gait [Gait - Sufficient For Exercise Testing] : the gait was sufficient for exercise testing [Nail Clubbing] : no clubbing of the fingernails [Petechial Hemorrhages (___cm)] : no petechial hemorrhages [Cyanosis, Localized] : no localized cyanosis [Skin Color & Pigmentation] : normal skin color and pigmentation [Skin Turgor] : normal skin turgor [] : no rash [Skin Lesions] : no skin lesions [No Venous Stasis] : no venous stasis [No Skin Ulcers] : no skin ulcer [No Xanthoma] : no  xanthoma was observed [Oriented To Time, Place, And Person] : oriented to person, place, and time [Affect] : the affect was normal [Mood] : the mood was normal [FreeTextEntry1] : Pretty good spirits

## 2019-08-21 NOTE — REVIEW OF SYSTEMS
[Recent Weight Loss (___ Lbs)] : recent [unfilled] ~Ulb weight loss [Dyspnea on exertion] : dyspnea during exertion [Lower Ext Edema] : lower extremity edema [Joint Pain] : joint pain [see HPI] : see HPI [Negative] : Endocrine [Fever] : no fever [Recent Weight Gain (___ Lbs)] : no recent weight gain [Chills] : no chills [Feeling Fatigued] : not feeling fatigued [Shortness Of Breath] : no shortness of breath [Chest  Pressure] : no chest pressure [Chest Pain] : no chest pain [Palpitations] : no palpitations [Abdominal Pain] : no abdominal pain [Cough] : no cough [Change in Appetite] : no change in appetite [Change In The Stool] : no change in stool [Dizziness] : no dizziness [Confusion] : no confusion was observed [Anxiety] : no anxiety [Excessive Thirst] : no polydipsia

## 2019-08-22 LAB
ALBUMIN MFR SERPL ELPH: 51.9 %
ALBUMIN SERPL-MCNC: 3.4 G/DL
ALBUMIN/GLOB SERPL: 1.1 RATIO
ALPHA1 GLOB MFR SERPL ELPH: 6.6 %
ALPHA1 GLOB SERPL ELPH-MCNC: 0.4 G/DL
ALPHA2 GLOB MFR SERPL ELPH: 11.9 %
ALPHA2 GLOB SERPL ELPH-MCNC: 0.8 G/DL
B-GLOBULIN MFR SERPL ELPH: 13.8 %
B-GLOBULIN SERPL ELPH-MCNC: 0.9 G/DL
GAMMA GLOB FLD ELPH-MCNC: 1 G/DL
GAMMA GLOB MFR SERPL ELPH: 15.8 %
INTERPRETATION SERPL IEP-IMP: NORMAL
PROT SERPL-MCNC: 6.6 G/DL
PROT SERPL-MCNC: 6.6 G/DL

## 2019-08-27 ENCOUNTER — RX RENEWAL (OUTPATIENT)
Age: 84
End: 2019-08-27

## 2019-09-03 ENCOUNTER — RX RENEWAL (OUTPATIENT)
Age: 84
End: 2019-09-03

## 2019-09-09 ENCOUNTER — MEDICATION RENEWAL (OUTPATIENT)
Age: 84
End: 2019-09-09

## 2019-09-23 ENCOUNTER — RX RENEWAL (OUTPATIENT)
Age: 84
End: 2019-09-23

## 2019-10-14 ENCOUNTER — RX RENEWAL (OUTPATIENT)
Age: 84
End: 2019-10-14

## 2019-10-15 ENCOUNTER — APPOINTMENT (OUTPATIENT)
Dept: INTERNAL MEDICINE | Facility: CLINIC | Age: 84
End: 2019-10-15
Payer: MEDICARE

## 2019-10-15 VITALS
DIASTOLIC BLOOD PRESSURE: 64 MMHG | WEIGHT: 158 LBS | HEART RATE: 75 BPM | HEIGHT: 67 IN | OXYGEN SATURATION: 96 % | TEMPERATURE: 97.5 F | BODY MASS INDEX: 24.8 KG/M2 | SYSTOLIC BLOOD PRESSURE: 118 MMHG

## 2019-10-15 DIAGNOSIS — E03.9 HYPOTHYROIDISM, UNSPECIFIED: ICD-10-CM

## 2019-10-15 DIAGNOSIS — E78.5 HYPERLIPIDEMIA, UNSPECIFIED: ICD-10-CM

## 2019-10-15 DIAGNOSIS — T14.8XXA OTHER INJURY OF UNSPECIFIED BODY REGION, INITIAL ENCOUNTER: ICD-10-CM

## 2019-10-15 DIAGNOSIS — Z23 ENCOUNTER FOR IMMUNIZATION: ICD-10-CM

## 2019-10-15 PROCEDURE — 90662 IIV NO PRSV INCREASED AG IM: CPT

## 2019-10-15 PROCEDURE — 99214 OFFICE O/P EST MOD 30 MIN: CPT | Mod: 25

## 2019-10-15 PROCEDURE — G0008: CPT

## 2019-10-15 NOTE — HISTORY OF PRESENT ILLNESS
[FreeTextEntry8] : LUBNA VALLE is a 89 yo man with CAD, CHF, aortic stenosis, hypercholesterolemia, hypothyroidism, CRI here for right anterior shin concern.  The patient reports that approximately 6 days ago, he banged his anterior shin accidentally into his walker.  he did not fall.  The area developed a mildly raised area and swelling.  Yesterday It bled a little.  The patient denies fever or spreading redness.  The area has improved and decreased in swelling.\par \par The patient regularly follows up with his cardiologist Dr Wolf Paiz for CHF, aortic stenosis.  Hypothyroidism and hypercholesterolemia stable on meds.\par \par The patient is currently receiving RT for skin cancer of the scalp\par \par The patient walks with a rolling walker\par \par The patient is seen with his wife today

## 2019-10-15 NOTE — PHYSICAL EXAM
[No Acute Distress] : no acute distress [Well Nourished] : well nourished [Well Developed] : well developed [Normal Outer Ear/Nose] : the outer ears and nose were normal in appearance [Well-Appearing] : well-appearing [No Accessory Muscle Use] : no accessory muscle use [No Respiratory Distress] : no respiratory distress  [de-identified] : Scab on right side of scalp [Clear to Auscultation] : lungs were clear to auscultation bilaterally [de-identified] : Mild bilateral ankle edema.  Right anterior shin with quarter sized blood blister.  no purulent discharge [de-identified] : Walks with walker [de-identified] : soft [id number]

## 2019-11-12 ENCOUNTER — NON-APPOINTMENT (OUTPATIENT)
Age: 84
End: 2019-11-12

## 2019-11-12 ENCOUNTER — LABORATORY RESULT (OUTPATIENT)
Age: 84
End: 2019-11-12

## 2019-11-12 ENCOUNTER — APPOINTMENT (OUTPATIENT)
Dept: INTERNAL MEDICINE | Facility: CLINIC | Age: 84
End: 2019-11-12
Payer: MEDICARE

## 2019-11-12 ENCOUNTER — APPOINTMENT (OUTPATIENT)
Dept: CARDIOLOGY | Facility: CLINIC | Age: 84
End: 2019-11-12
Payer: MEDICARE

## 2019-11-12 VITALS
HEART RATE: 71 BPM | BODY MASS INDEX: 25.11 KG/M2 | DIASTOLIC BLOOD PRESSURE: 80 MMHG | HEIGHT: 67 IN | SYSTOLIC BLOOD PRESSURE: 119 MMHG | OXYGEN SATURATION: 96 % | WEIGHT: 160 LBS

## 2019-11-12 VITALS
HEART RATE: 73 BPM | TEMPERATURE: 98.1 F | SYSTOLIC BLOOD PRESSURE: 100 MMHG | WEIGHT: 160 LBS | OXYGEN SATURATION: 94 % | DIASTOLIC BLOOD PRESSURE: 60 MMHG | BODY MASS INDEX: 25.06 KG/M2

## 2019-11-12 DIAGNOSIS — I35.1 NONRHEUMATIC AORTIC (VALVE) INSUFFICIENCY: ICD-10-CM

## 2019-11-12 PROCEDURE — 93000 ELECTROCARDIOGRAM COMPLETE: CPT

## 2019-11-12 PROCEDURE — 36415 COLL VENOUS BLD VENIPUNCTURE: CPT

## 2019-11-12 PROCEDURE — 99214 OFFICE O/P EST MOD 30 MIN: CPT

## 2019-11-12 NOTE — HISTORY OF PRESENT ILLNESS
[FreeTextEntry1] : (MED HX) The patient is now 88 years old. I first saw him in 2006. At that time he had a history of a myocardial infarction and angioplasty back in 1994. He had done well for years although his activity level had diminished with age and he began complaining of fatigue. In December of 2011 he had a stress echocardiogram that was abnormal possibly with very abnormal blood pressure response and possibly with new wall motion abnormalities. He underwent a CTA and this was followed up by cardiac catheterization at [facility] in December of 2011. 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 The patient did well after that which is a minor adjustment of his medications. He has a history of hyperlipidemia and had a past history of hypertension. He stopped smoking 43 years ago, no diabetes, no family history of coronary artery disease.\par April 30, 2013 he was admitted to [facility] with left lower extremity cellulitis and altered mental status. He had positive blood cultures for strep pyogenes group A. An echocardiogram showed a possible small echodensity on the ventricular side of the noncoronary cusp. A joint aspiration of his left ankle was negative. He was treated with vancomycin and Zosyn, switched to cefepime. Followed by plastic surgery for wound care. No evidence of endocarditis clinically. A CAT scan of his abdomen and pelvis showed a 3.7 cm infrarenal abdominal aortic aneurysm. There was also bilateral inguinal hernias. 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 He was followed by Dr. Damián Lovelace of infectious disease. There is still a possibility he may need a skin graft for his left ankle. He was in the hospital for a little over 2 weeks and then in rehabilitation for 5 weeks. \par July 3, 2013. Here for followup visit. He had been home now for about 10 days and had already seen Dr. Lovelace and Dr. Jones his internist.  He denies chest pain or shortness of breath. There has been no syncope or near syncope or palpitations. Upon review of his medications the only change is that his Avapro is at a half of a 150 mg pill daily due to low blood pressure in the hospital and his simvastatin has been cut down from 20 mg to 10 mg. He did lose weight during this hospitalization as well. There have been no episodes of fevers, chills, sweats, etc.\par September 3, 2013. Here for followup. He is preparing to go on a trip to [place] in October. He does note shortness of breath with exertion but only with a lot of exertion, no problem going up stairs, and he thinks no different than even before his cellulitis episode. He denies exertional chest pain. We elected to see how he did with increased exercise and if his shortness of breath was not severe he would be okay to go on the trip to [place], obviously using commonsense as we discussed.\par January 6, 2014. The patient is here for followup. He did very well on the trip to [place] with some limitation from his knees but no significant shortness of breath or chest pain. He gets swelling on his left leg where he had the cellulitis but not on the right side. He only had one episode of slight lightheadedness when he got up too fast but otherwise has been tolerating the current medications. He is planning on going to [place] at the end of January. He has a mild cough, nonproductive, no fever chills. He claims he was unable to get an appointment with his internist. Her workup was unrevealing.\par May 5, 2014. The patient is here for followup. He saw Dr. Jones in the interim and had lab work with an excellent lipid profile but a high BNP. He saw vascular surgery because of the abdominal aortic aneurysm which measured 3.8 cm and that will be followed. He had an episode where his balance was off for about one hour. It only happened when he tried to walk around. He denies feeling lightheaded like he might pass out. If he was sitting and not moving he felt perfectly fine. There was no vertigo. He claims it happened him once before and it resolved as well. I advised him to discuss with Dr. Jones and perhaps a neurologist. In addition he brought up again with his wife his symptoms he's had for 40 years where he suddenly for no reason will get chest pain and pressure that radiates up to his jaw. It will actually go away if he does nothing but if he takes Maalox it will go away faster. I explained to him that this is probably esophageal reflux with esophageal spasm but it so infrequent that it is not worth for him to take a PPI on a daily basis. He denies having exertional chest pain or shortness of breath. He is considering possible knee replacement in the near future. He returned in July for preop clearance for knee replacement which he had in the middle of July with no complications Other than needing to be transfused 3 units and having been sent in to [facility] in the [facility] to have that done on 2 occasions.\par October 7, 2014. The patient is here for followup. He is feeling better and is more active but is also noting more shortness of breath with exertion or at least others with him have noticed it. He thinks he needs the other knee replacement done but his wife said she will divorce him if he does it. His TSH was elevated on labs with Dr. Jones last month so his Synthroid was increased from 6 days a week to 7 days a week. His BNP was elevated and he asked "should I worry about heart failure?". In addition Dr. Jones cut back his Avapro 300 to 150 because blood pressure was 110. The patient denies lightheadedness dizziness etc. His blood pressure today on 2 different occasions was 146/70. He also has an abdominal aortic aneurysm measuring 3.8 cm that is being followed. He'll be seeing Dr. Temple tomorrow.\par October 28, 2014. Dr. Jacobs called yesterday that the patient's abdominal aorta had increased in size by 0.9 cm and the patient is to be scheduled for an endovascular repair of his abdominal aortic aneurysm. He came here for his echocardiogram and discussion. The echo for the most part is unchanged from May of 2013 with left ventricular ejection fraction around 40%, mild to moderate MR, mild to moderate AI, segmental LV dysfunction with akinetic inferoposterior wall, and hypokinetic to akinetic inferolateral and mid lateral walls, normal anterior wall. LV size upper limit of normal. Normal RV size and function with an RVSP estimated at 41 mm of mercury.\par April 29, 2015. The patient did well with his endovascular repair of his abdominal aortic aneurysm. He went down to [place] and at one point was found to be short of breath with that was felt to be a pleural effusion. He was set up for a thoracentesis but after taking a diuretic for a few days they found there was no fluid there. His cough did not go away until he took a course of prednisone.The diuretic was stopped and he did well returning home. He took a tour of [place] and noted that any kind of incline made him very short of breath but no chest pressure. He saw Dr. Morris of pulmonary who found his proBNP to be 3400 and in the past it was only 1100. However his chest x-ray was totally clear. He has no PND or orthopnea and no real edema. She did give him another inhaler (Symbicort) and referred him here. His weight has not been up and if anything has gone down a little. His last echo was in October as above and was unchanged for the most part.  There is no chest pain with exertion. \par April 30, 2015. The patient returned for a stress echocardiogram. His baseline LV function looked worse than previously with left ventricular ejection fraction around 27%. With exercise he dropped his blood pressure and there were new wall motion abnormalities. He was scheduled for cardiac catheterization and probable defibrillator with possible biventricular pacemaker.\par May 6, 2015. Cardiac catheterization was done and revealed for the most part unchanged anatomy.  LVEF by V-gram was 35% . Given these findings this time Dr. Reyes did angioplasty and stent the right coronary artery. Electrophysiology felt he should wait at least 2 weeks and come back for another ejection fraction and clinical evaluation to see if things had improved before deciding on a  possible  AICD/biventricular pacemaker.\par May 21, 2015. The patient returns for followup and echocardiogram. He has felt much better over the 2 weeks but he has not been doing his usual activities because he was told not to do it. His echocardiogram to my eye looks unchanged. His exam is unchanged but there is no signs of congestive heart failure. I discussed the results with the patient and his wife. I told him that his improved symptoms could be because he is not that active, it could be a placebo effect of the procedure, it could be that things have improved and we just are unable to measure it on the echocardiogram. A BNP was sent and will be compared to his previous level which had gone up from the 400 range into the 800s. In addition over the weekend the patient will increase his activity and see what his level of symptoms are. If his symptoms are significant and worse than they had been a few months ago, we will schedule the ICD and biventricular pacemaker. The patient will contact me after the weekend.\par Nehal 15, 2015. The patient saw Dr. Russell of [facility] and unfortunately he has to wait 3 months (August 6, 2015) from the angioplasty unless he becomes unstable, has any arrhythmias, or needs a pacemaker for bradycardic reasons.  He did increase the carvedilol to 12.5 mg in the morning and 25 mg in the evening Currently he feels well and is stable. He does get out of breath and some weakness in his legs if he is walking uphill. When he initially lies down he feels some pressure he then says is shortness of breath but it resolves. No PND or orthopnea per se.\par July 14, 2015. The patient called a few weeks ago thinking he was more short of breath and wanted to move up the AICD/biventricular pacemaker. However after speaking with Dr. Russell of [facility] he explained he still was not qualified and must wait unless his symptoms are so severe that he requires hospitalization. I explained this to the patient and he returns today for routine followup.  He is still symptomatic with dyspnea on exertion but no PND or orthopnea. 3 months from his angioplasty will be August 6. He has a trip to [facility] planned with his daughter August 12.\par August 25, 2015. The patient is here for followup after having his AICD/biventricular pacemaker placed on August 7. No complications with the procedure. The patient does feel better in terms of the shortness of breath he would get when he was lying down but with exertion he feels maybe even worse right now in terms of shortness of breath. Of note he was away with a different diet and gained 4 pounds. (By scale here he has gained 9 pounds.) No palpitations, no syncope or near syncope. He is on carvedilol 25 mg the morning and 12.5 in the evening. He is still complaining of left arm pain ever since the procedure.\par September 8, 2015. The patient is finally starting to feel a little bit better. He cut his diuretic back to every other day and has continued to lose weight. His left arm so bothersome just as much since the procedure. We had a discussion about changing his Avapro to Entresto.\par October 8, 2015. Patient is here for followup. In the interim he had an episode of bronchitis treated with antibiotics and steroids which did cause some fluid retention and he was briefly on diuretics with improvement. He is now off the steroids but still on the diuretic. He does feel better on the Entresto. He thinks he is walking better with less shortness of breath and somewhat more stamina. He otherwise has no complaints. He remains in sinus rhythm and has had no AICD shocks. His blood pressure was borderline and his lungs were clear. I elected to stop the diuretic and increase his Entresto. \par October 23, 2015. On October 20 the patient saw Dr. Morris because of shortness of breath and edema. He had gained 9 pounds and he had significant swelling and she put him back on Lasix 40 mg as we discussed together. Patient had much relief and is here now. He lost 8 of the 9 pounds he again and feels much better. He still gets short of breath going up the stairs and often has to stop. He has trace edema still. His blood pressure is in the 90s systolic. He still eats out but not as much as he used to and they do ask for the low salt.\par November 9, 2015. The patient returns for followup. He has been on Lasix 40 mg since his last visit. He looks great, back to himself, with no complaints or symptoms of CHF. Her systolic pressure is only 90 but he has no symptoms of lightheadedness. He is having a lot of gas to the point of it being embarrassing and wondered if it is related to the Entresto. He also seems to have a dry cough. He will be seeing Dr. Russell in followup next week. Based on his complaints we cut back his Lasix to 20 mg daily but then he called back a few days later with more shortness of breath and went back to 40 mg q.d.\par December 7, 2015. Followup for the patient. As noted he had a go back to 40 mg Lasix because he became short of breath. He was still having GI complaints from the Entresto but he can manage it. He is complaining again of a urinating too much so perhaps we could try 30 mg of Lasix daily. He is seeing Dr. Russell next week. Is turning 89 soon and is planning a cruise on January 24.\par January 12, 2016. Patient is here for followup. Still has good spirits and is looking forward to going to [place] in 10 days. He is still having significant cough from the Entresto and we will have to change back to his prior regimen. Had echocardiogram at [facility] which was supposed to be  a dyssynchrony study and will be seeing Dr. Russell of [facility] tomorrow morning to discuss whether or not to try to improve his biventricular pacing. Currently on 80 mg of Lasix daily. Dr. Morris tried a new inhaler but he does not think it helped.\par March 2, 2016. The patient is here in followup. He went on his cruise and did well. His cough was stopped when he stopped the entresto. On the cruise he was able to cut back to 40 mg of Lasix  and even occasionally he didn't take it if he had a busy day. However after a while he did notice more shortness of breath and edema and the last few days he went back to 80 mg of Lasix with improvement. He is here now today, his weight has gone down 8 pounds, blood pressure was 90/60.\par May 3, 2016. Patient looks well and feels well, but continues to lose weight despite a good appetite. Dr. Jones is concerned and placed him on Ensure and if he does not put on some weight, he will be seeing GI. Abdominal CAT scan was unremarkable, except for some gastric distention and some thickening of the apex of the duodenum, which could just be underfilling. Abdominal aortic aneurysm repair was stable with sac being less than 3 cm. 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 He will follow up with vascular again in one year. He remains on 40 mg of Lasix and irbesartan. Not complaining of shortness of breath. He has done much better since Dr. Russell adjusted. His biventricular pacer by adjusting LV and RV timing. Lab work revealed a very suppressed TSH, so his Synthroid was held and he was to return in 2 weeks to reassess weight loss and thyroid status.\par May 18, 2016. Yesterday the patient was dizzy and lightheaded and fell and hit his head. He was evaluated in the emergency room, where everything was negative. His EKG was unchanged and his defibrillator was interrogated and there were no arrhythmias. His TSH was now 29! He is here today. Patient did fall again last night. Unclear if it is from being lightheaded or losing balance. No syncope. Other than when he had the fall, he has not been complaining of feeling lightheaded. His weight did go back up  off  the thyroid medication. I elected to hold his Lasix and have him reassessed in one week.\par May 24, 2016. Patient called yesterday and is short of breath, edema, and had put his weight back on. I told him to resume the Lasix and he is seen today for his followup. Mostly feels a little better and was able to lie flat last night. Still has some swelling. Blood pressure is still borderline low. Weight is up 6 pounds from last visit. We elected to continue Lasix 40 a day and changed his Synthroid to 0.1 mg daily.\par July 12, 2016. Patient is here for followup, as well as AICD interrogation. He seems to have normal biventricular pacing. Absolutely no arrhythmias for the past 4 months. Does get short of breath with exertion still, but not severe. Rarely will skip a day of Lasix if he has too many things to do. Started physical therapy, and was not out of breath at physical therapy today. His weight at home has been stable.\par September 13, 2016. Patient is getting around okay with a walker. His right knee seemed to be limiting him more than his shortness of breath and he has some trouble with his balance. No palpitation, syncope, or near syncope, or shocks. No change in any medication. He was still on Synthroid 0.1 mg. He is still doing physical therapy.\par November 16, 2016. The patient seems to be doing well, although he did have an episode of just suddenly falling backwards and hitting his head against the refrigerator. He denies lightheadedness or near syncope at the time. He denies vertigo, although he had a different episode. He reached up and looked up and had a vertigo-like episode just for a few seconds. There was no event on his defibrillator interrogation, except a 6 beat run of NSVT on September 24, which was asymptomatic. He is orthostatic here. He is also concerned about his weight loss, although his weight seems to be the same as it was last visit, but he claims he was almost 7 pounds less at home. His wife thinks he does not drink enough water. He is on the Lasix every other day. His EKG showed sinus rhythm with atrial tracking and biventricular pacing. He also has a compression fracture in his lumbar vertebra that is giving him pain\par He is also concerned about his thyroid hormone level, given his weight loss. We continued the Lasix on an every other day basis and lowered the Avapro to 150 mg. BNP was up to 4600 from 2600.\par December 21, 2016. The patient has here in followup. No more falls or complaints of orthostasis, although he is still orthostatic here from 124-106-98 standing with no symptoms. Complaining more of dyspnea on exertion and has some edema. His weight is about the same. After discussion, decided to add digoxin 0.125 q.d.\par February 8, 2017. The patient is here in followup. He has not fallen in a while now and he claims his problem is balance not dizziness. He thinks his dyspnea on exertion is unchanged on the digoxin. However his wife thinks he is better because he is not short of breath coming up the stairs and the  does agree. Just recently he started to develop some pedal edema, but he has had more salt because he loves soups. In addition, he does not sit with his legs elevated. (He also saw Dr. Plascencia for URI and was briefly on prednisone until a couple of weeks ago. He did have an SPEP with normal. Electrophoresis pattern.)\par April 4, 2017. Patient here in followup. Had pacemaker, defibrillator interrogation on March 6, with 2 short episodes of NSVT only. Seems to be doing well on the whole. Dyspnea on exertion is there, but unchanged. Weight is down 3 pounds. He occasionally gets numbness and tingling in his fingers, but not in his toes. Occasionally feels some discomfort over the AICD site. He will be going to his daughter and his niece for the [place] nights.\par Nehal 15, 2017. The patient is here in followup. Has been feeling a little more short of breath and does have a little bit of edema. He saw Dr. Valdez on Friday the ninth, who felt he had bronchitis and gave him Augmentin. Otherwise, patient has no complaints and has been doing pretty well. He still complains occasionally of some soreness over his AICD site. No other interim medical issues, and no change in medication.\par September 12, 2017. Patient is here for followup and defibrillator interrogation. As noted on August 25 had a syncopal episode after he had a hot shower and walked out to the other bathroom. Found himself on the floor and was fine. No recurrence since and in fact defibrillator interrogation shows that he had an episode of VT followed by VF after the device tried to pace terminate, which led to a defibrillator shock, and the patient has been fine since. He is still with sinus rhythm and a first-degree AV block underneath his ventricular pacing. No change in any of the symptoms and in fact, while he does get short of breath sometimes getting into bed, and some walking, was able to go to the gym and work out a little bit without shortness of breath. No chest pain, and no change in medications.\par December 26, 2017. Patient here in followup. Has been having some more shortness of breath, although seems to have frequent cough and URI. Now on a nebulizer. Does have worsening shortness of breath with lying down, but no PND. Had a near syncopal event when he got up right after sitting for a long time watching a movie, but otherwise no episodes of dizziness. AICD interrogation shows no episodes since the event in August. Patient thinks his overall stamina and fatigue are worse. Patient willing to retry Entresto.\par January 11, 2018. So far patient tolerating the Entresto, no cough. Maybe a little less short of breath, but not definite. Does have more edema, but weight is the same. No other complaints. Reluctant to increase the diuretic. We'll keep dose of the same for now and followup in one month if labs are okay. Then consider increasing Entresto.\par Comment:  \NING Genao - 21 Mar 2018 3:12 PM \par   TASK CREATED\par Hi-\par Saw Adam.\par He complained of weight gain, edema, SOB.\par Sent BNP and up to over 5300.\par Do you think that the Entresto is an issue for him?\par Asked him to call you.\par Thanks-\par Ning \par Addendum\par I reviewed the labs and spoke with the patient. Actually, weight is down, and labs are all okay except the BNP. If this is natural history of LV dysfunction, he should be on a higher dose of Entresto. I elected to increase the Entresto between now and his visit next week with me and see if things get worse, improve, or stay the same. Patient has a big cruise coming up on April 18 \par March 27, 2018. Patient returns in followup now on the increased dose of Entresto for one week. On his pacemaker interrogation he has normal function with biventricular pacing, no more VT. His "optivol" has been elevated since mid-January and just for the past week it seems to be coming down, which would correlate with increasing his Enteresto. He does admit to not being a strict on his diet, having Chinese food the other night, and has only been staying with the furosemide every other day despite the increased edema and shortness of breath. Depending on labs, I will probably continue the high-dose Entresto, continue furosemide daily through the beginning of [place] this week and then go back to every other day depending on symptoms, weight and edema.\par May 22, 2018. The patient went on his vacation and did extremely well. By the very end before getting on the plane to come back he was somewhat short of breath and his wife considered going to the emergency room. Instead, he took extra Lasix and was doing well on the plane. At one point got up to the bathroom, came back sat in his seat and his wife leaned over to try to wake him up and they could not arouse him for up to 5 minutes. A doctor on the plane checked him, they took him out of his seat and laid him down on the floor at which point he seemed to come to and felt great. It seems he had a pulse and a blood pressure throughout. When he got off the plane he was brought to [facility] but interrogating the defibrillator showed absolutely no arrhythmias. (Of note, the next day in the hospital he had 2 long runs of nonsustained VT that were asymptomatic.) He did develop a severe cough with rhonchi and sputum. He was seen by Dr. Hema Reyes of pulmonary and discharged on prednisone and antibiotics. He saw Dr. Morris this morning and she just continued those medications and sent labs and told him she thought it was all from his heart. He did have a repeat echo in the hospital, which showed his AS has progressed to borderline severe.\par July 10, 2018. A one point after the hospitalization, the patient's blood pressure was running low and his Lasix was decreased. He developed more shortness of breath, and weight gain, and saw Dr. Romano on June 27. Lasix was put back to 40 mg daily. He is here today, feeling better, has lost 6 pounds. Was at ophthalmologist earlier today and has a new hemorrhage in his left eye, which is his better eye. The ophthalmologist would like me to cut back or hold the aspirin.\par September 12, 2018. Patient returns in followup, as well as for repeat echocardiogram regarding his aortic stenosis. His echo confirms somewhat of a low gradient severe aortic stenosis and is unchanged from May, but the patient feels wonderful maybe somewhat tired, but his breathing is better than it has been a long time and he is able to do physical therapy, etc. Patient was sent for evaluation with structural heart team.\par October 24, 2018. Patient returns for followup. Workup in hospital revealed no significant CAD, very tortuous vessels femorally so if patient needs TAVR would have to use apical approach. Pullback gradient across aortic valve was low as well (15). 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 Since being home the patient has been fairly stable although he does have increased edema since the catheterization. There was also some question about his potassium level. He got a flu shot from Dr. Morris the other day.\par January 23, 2019. The patient here in followup. He remains in sinus or AV paced. Feels good. Taking Lasix 40 mg daily. Blood pressure running on the low side, but no dizziness, lightheadedness, etc. Gets tiredness "from his legs, but not from shortness of breath". he says. He is on 2 new medications from his rheumatologist (Blanca [facility]), gabapentin, and chlorzactazone. He would like to try to go again on at least a ten-day cruise although his wife is very nervous about it. No defibrillator shocks, etc. Off colchicine per Dr. Morris.\par April 24, 2019. Patient here in followup. Good spirits and seems to be doing very well on his current regimen. Remains in sinus rhythm with atrial tracking and ventricular pacing. One VPC noted. No congestive heart failure, despite [place].\par August 20, 2019.  Patient here in follow-up.  Pacemaker interrogation shows 1 or 2 runs of ventricular tachycardia that are pace terminated no shocks and patient was asymptomatic.  EKG shows sinus rhythm with left bundle branch block or more likely atrial tracking and biventricular pacing.  On the whole doing very well although feels that the water pill dictates his life and would like to change to every other day\par November 12, 2019.  Patient here in follow-up.  He remains AV paced at 70. Has a cough for the last 2 days initially productive but now not.  Denies shortness of breath.  Denies fever chills or achiness.  Not sure if there has been more salt in his diet.  His weight is up 2 pounds.  No chest pain shortness of breath etc.  Had a flu shot already.  Has an appointment with Dr. Morris later today. (Has [employer]  this weekend in [place] and another [employer] in a week and a half.)

## 2019-11-12 NOTE — REVIEW OF SYSTEMS
[Dyspnea on exertion] : dyspnea during exertion [Lower Ext Edema] : lower extremity edema [see HPI] : see HPI [Joint Pain] : joint pain [Negative] : Endocrine [Recent Weight Gain (___ Lbs)] : recent [unfilled] ~Ulb weight gain [Feeling Fatigued] : not feeling fatigued [Fever] : no fever [Chills] : no chills [Shortness Of Breath] : no shortness of breath [Recent Weight Loss (___ Lbs)] : no recent weight loss [Chest  Pressure] : no chest pressure [Chest Pain] : no chest pain [Palpitations] : no palpitations [Abdominal Pain] : no abdominal pain [Change in Appetite] : no change in appetite [Cough] : no cough [Dizziness] : no dizziness [Change In The Stool] : no change in stool [Excessive Thirst] : no polydipsia [Anxiety] : no anxiety [Confusion] : no confusion was observed

## 2019-11-12 NOTE — PHYSICAL EXAM
[General Appearance - Well Developed] : well developed [Normal Appearance] : normal appearance [Well Groomed] : well groomed [No Deformities] : no deformities [General Appearance - In No Acute Distress] : no acute distress [Normal Conjunctiva] : the conjunctiva exhibited no abnormalities [Eyelids - No Xanthelasma] : the eyelids demonstrated no xanthelasmas [No Oral Pallor] : no oral pallor [No Oral Cyanosis] : no oral cyanosis [Normal Oral Mucosa] : normal oral mucosa [Normal Jugular Venous A Waves Present] : normal jugular venous A waves present [Normal Jugular Venous V Waves Present] : normal jugular venous V waves present [No Jugular Venous Coley A Waves] : no jugular venous coley A waves [Auscultation Breath Sounds / Voice Sounds] : lungs were clear to auscultation bilaterally [Exaggerated Use Of Accessory Muscles For Inspiration] : no accessory muscle use [Respiration, Rhythm And Depth] : normal respiratory rhythm and effort [Heart Rate And Rhythm] : heart rate and rhythm were normal [Heart Sounds] : normal S1 and S2 [Murmurs] : no murmurs present [Abdomen Soft] : soft [Abdomen Tenderness] : non-tender [Abdomen Mass (___ Cm)] : no abdominal mass palpated [Gait - Sufficient For Exercise Testing] : the gait was sufficient for exercise testing [Abnormal Walk] : normal gait [Nail Clubbing] : no clubbing of the fingernails [Cyanosis, Localized] : no localized cyanosis [Petechial Hemorrhages (___cm)] : no petechial hemorrhages [Skin Color & Pigmentation] : normal skin color and pigmentation [Skin Turgor] : normal skin turgor [] : no rash [No Skin Ulcers] : no skin ulcer [No Xanthoma] : no  xanthoma was observed [Skin Lesions] : no skin lesions [No Venous Stasis] : no venous stasis [Oriented To Time, Place, And Person] : oriented to person, place, and time [Mood] : the mood was normal [Affect] : the affect was normal [FreeTextEntry1] : Skin turgor not diminished

## 2019-11-12 NOTE — CARDIOLOGY SUMMARY
[Inf Wall Defect] : inferior wall defect [Ant Wall Defect] : anterior wall defect [LVEF ___%] : LVEF [unfilled]% [Enlarged] : enlarged LA size [Moderate] : moderate mitral regurgitation [___] : [unfilled]

## 2019-11-13 LAB
ALBUMIN SERPL ELPH-MCNC: 3.8 G/DL
ALP BLD-CCNC: 93 U/L
ALT SERPL-CCNC: 6 U/L
ANION GAP SERPL CALC-SCNC: 15 MMOL/L
AST SERPL-CCNC: 12 U/L
BASOPHILS # BLD AUTO: 0.06 K/UL
BASOPHILS NFR BLD AUTO: 0.6 %
BILIRUB SERPL-MCNC: 0.6 MG/DL
BUN SERPL-MCNC: 36 MG/DL
CALCIUM SERPL-MCNC: 9.3 MG/DL
CHLORIDE SERPL-SCNC: 100 MMOL/L
CHOLEST SERPL-MCNC: 115 MG/DL
CHOLEST/HDLC SERPL: 2.4 RATIO
CO2 SERPL-SCNC: 26 MMOL/L
CREAT SERPL-MCNC: 1.55 MG/DL
DEPRECATED KAPPA LC FREE/LAMBDA SER: 1.1 RATIO
DIGOXIN SERPL-MCNC: 1.7 NG/ML
EOSINOPHIL # BLD AUTO: 0.33 K/UL
EOSINOPHIL NFR BLD AUTO: 3.4 %
ESTIMATED AVERAGE GLUCOSE: 105 MG/DL
GLUCOSE SERPL-MCNC: 110 MG/DL
HBA1C MFR BLD HPLC: 5.3 %
HCT VFR BLD CALC: 35.5 %
HDLC SERPL-MCNC: 49 MG/DL
HGB BLD-MCNC: 11.2 G/DL
IMM GRANULOCYTES NFR BLD AUTO: 0.3 %
KAPPA LC CSF-MCNC: 8.69 MG/DL
KAPPA LC SERPL-MCNC: 9.58 MG/DL
LDLC SERPL CALC-MCNC: 56 MG/DL
LYMPHOCYTES # BLD AUTO: 1.08 K/UL
LYMPHOCYTES NFR BLD AUTO: 11.2 %
MAN DIFF?: NORMAL
MCHC RBC-ENTMCNC: 31.5 GM/DL
MCHC RBC-ENTMCNC: 33.2 PG
MCV RBC AUTO: 105.3 FL
MONOCYTES # BLD AUTO: 0.8 K/UL
MONOCYTES NFR BLD AUTO: 8.3 %
NEUTROPHILS # BLD AUTO: 7.32 K/UL
NEUTROPHILS NFR BLD AUTO: 76.2 %
NT-PROBNP SERPL-MCNC: 3420 PG/ML
PLATELET # BLD AUTO: 122 K/UL
POTASSIUM SERPL-SCNC: 4.3 MMOL/L
PROT SERPL-MCNC: 6.9 G/DL
RBC # BLD: 3.37 M/UL
RBC # FLD: 15.3 %
SODIUM SERPL-SCNC: 141 MMOL/L
TRIGL SERPL-MCNC: 52 MG/DL
TSH SERPL-ACNC: 1.51 UIU/ML
URATE SERPL-MCNC: 4.6 MG/DL
WBC # FLD AUTO: 9.62 K/UL

## 2019-11-13 NOTE — HEALTH RISK ASSESSMENT
[Yes] : Yes [No] : In the past 12 months have you used drugs other than those required for medical reasons? No [Any fall with injury in past year] : Patient reported fall with injury in the past year [0] : 2) Feeling down, depressed, or hopeless: Not at all (0) [] : No [de-identified] : PT [de-identified] : cardiology, PT [de-identified] : regular [[id number]] : 0

## 2019-11-13 NOTE — REVIEW OF SYSTEMS
[Vision Problems] : vision problems [Hearing Loss] : hearing loss [Postnasal Drip] : postnasal drip [Lower Ext Edema] : lower extremity edema [Cough] : cough [Back Pain] : back pain [Dyspnea on Exertion] : dyspnea on exertion [Joint Pain] : joint pain [Memory Loss] : memory loss [Negative] : Heme/Lymph

## 2019-11-13 NOTE — PHYSICAL EXAM
[Well Nourished] : well nourished [No Acute Distress] : no acute distress [Well Developed] : well developed [Well-Appearing] : well-appearing [Normal Sclera/Conjunctiva] : normal sclera/conjunctiva [Normal Voice/Communication] : normal voice/communication [PERRL] : pupils equal round and reactive to light [Normal Outer Ear/Nose] : the outer ears and nose were normal in appearance [EOMI] : extraocular movements intact [Normal Oropharynx] : the oropharynx was normal [Supple] : supple [No JVD] : no jugular venous distention [No Accessory Muscle Use] : no accessory muscle use [No Respiratory Distress] : no respiratory distress  [Normal Rate] : normal rate  [Clear to Auscultation] : lungs were clear to auscultation bilaterally [Regular Rhythm] : with a regular rhythm [Normal S1, S2] : normal S1 and S2 [No Edema] : there was no peripheral edema [Soft] : abdomen soft [No Extremity Clubbing/Cyanosis] : no extremity clubbing/cyanosis [Normal Bowel Sounds] : normal bowel sounds [No Spinal Tenderness] : no spinal tenderness [No CVA Tenderness] : no CVA  tenderness [Coordination Grossly Intact] : coordination grossly intact [No Rash] : no rash [No Focal Deficits] : no focal deficits [Normal Gait] : normal gait [Speech Grossly Normal] : speech grossly normal [Normal Affect] : the affect was normal [Alert and Oriented x3] : oriented to person, place, and time [de-identified] : B/L HA's; no ST [de-identified] : no stridor [de-identified] : no cords [de-identified] : R=16; no wheezing; good air entry [de-identified] : ambulates with walker

## 2019-11-13 NOTE — HISTORY OF PRESENT ILLNESS
[Spouse] : spouse [FreeTextEntry8] : Comes in for acute medical visit.\par Has recurrent cough.\par Going to [place] for [employer] on Friday and needs to be better.\par Had ?URI with cough and sputum.\par Denies chest pain.\par Has dry cough.\par Starts as a tickle in throat and then hacking.\par No fevers.\par Denies SOB/wheezing.\par No edema or weight gain.

## 2019-11-13 NOTE — ASSESSMENT
[FreeTextEntry1] : Suspect recent acute URI\par Now with post-viral cough and postnasal drip triggering cough\par COPD stable\par \par FVL declined\par CXR declined\par No sputum for C&S\par Rest\par Benzonatate 100 mg tid as needed for cough\par Hold antibiotics\par Gargles/lozenges\par Steam\par Saline nasal rinses\par Flonase/Atrovent NS bid\par AH as needed\par Continue Anoro 1 puff daily\par Nebs as needed\par Albuterol MDI as needed\par Prednisone with taper\par Written directions given\par To call for any medical/pulmonary issues\par To call if worse or if not improving\par RTC as needed

## 2019-11-14 LAB
ALBUMIN MFR SERPL ELPH: 51.1 %
ALBUMIN SERPL-MCNC: 3.5 G/DL
ALBUMIN/GLOB SERPL: 1 RATIO
ALPHA1 GLOB MFR SERPL ELPH: 6.4 %
ALPHA1 GLOB SERPL ELPH-MCNC: 0.4 G/DL
ALPHA2 GLOB MFR SERPL ELPH: 11.5 %
ALPHA2 GLOB SERPL ELPH-MCNC: 0.8 G/DL
B-GLOBULIN MFR SERPL ELPH: 13.9 %
B-GLOBULIN SERPL ELPH-MCNC: 1 G/DL
GAMMA GLOB FLD ELPH-MCNC: 1.2 G/DL
GAMMA GLOB MFR SERPL ELPH: 17.1 %
INTERPRETATION SERPL IEP-IMP: NORMAL
M PROTEIN MFR SERPL ELPH: 2.1 %
MONOCLON BAND OBS SERPL: 0.1 G/DL
PROT SERPL-MCNC: 6.9 G/DL
PROT SERPL-MCNC: 6.9 G/DL

## 2019-11-18 ENCOUNTER — RX RENEWAL (OUTPATIENT)
Age: 84
End: 2019-11-18

## 2019-11-19 ENCOUNTER — MEDICATION RENEWAL (OUTPATIENT)
Age: 84
End: 2019-11-19

## 2019-11-20 ENCOUNTER — MEDICATION RENEWAL (OUTPATIENT)
Age: 84
End: 2019-11-20

## 2019-11-21 ENCOUNTER — RX RENEWAL (OUTPATIENT)
Age: 84
End: 2019-11-21

## 2019-12-08 ENCOUNTER — RX RENEWAL (OUTPATIENT)
Age: 84
End: 2019-12-08

## 2020-01-01 ENCOUNTER — APPOINTMENT (OUTPATIENT)
Dept: CARDIOLOGY | Facility: CLINIC | Age: 85
End: 2020-01-01
Payer: MEDICARE

## 2020-01-01 ENCOUNTER — APPOINTMENT (OUTPATIENT)
Dept: INTERNAL MEDICINE | Facility: CLINIC | Age: 85
End: 2020-01-01
Payer: MEDICARE

## 2020-01-01 ENCOUNTER — RX RENEWAL (OUTPATIENT)
Age: 85
End: 2020-01-01

## 2020-01-01 ENCOUNTER — NON-APPOINTMENT (OUTPATIENT)
Age: 85
End: 2020-01-01

## 2020-01-01 ENCOUNTER — APPOINTMENT (OUTPATIENT)
Dept: CARDIOLOGY | Facility: CLINIC | Age: 85
End: 2020-01-01

## 2020-01-01 VITALS
DIASTOLIC BLOOD PRESSURE: 60 MMHG | OXYGEN SATURATION: 97 % | WEIGHT: 150 LBS | SYSTOLIC BLOOD PRESSURE: 100 MMHG | BODY MASS INDEX: 24.11 KG/M2 | HEIGHT: 66 IN | TEMPERATURE: 98 F | HEART RATE: 70 BPM

## 2020-01-01 VITALS
DIASTOLIC BLOOD PRESSURE: 74 MMHG | WEIGHT: 157 LBS | RESPIRATION RATE: 17 BRPM | TEMPERATURE: 98.1 F | OXYGEN SATURATION: 99 % | HEART RATE: 70 BPM | HEIGHT: 66 IN | SYSTOLIC BLOOD PRESSURE: 121 MMHG | BODY MASS INDEX: 25.23 KG/M2

## 2020-01-01 VITALS
SYSTOLIC BLOOD PRESSURE: 90 MMHG | OXYGEN SATURATION: 95 % | WEIGHT: 157 LBS | TEMPERATURE: 98 F | HEART RATE: 72 BPM | BODY MASS INDEX: 25.34 KG/M2 | DIASTOLIC BLOOD PRESSURE: 64 MMHG

## 2020-01-01 VITALS
BODY MASS INDEX: 25.82 KG/M2 | WEIGHT: 160 LBS | SYSTOLIC BLOOD PRESSURE: 93 MMHG | DIASTOLIC BLOOD PRESSURE: 54 MMHG | HEART RATE: 74 BPM

## 2020-01-01 DIAGNOSIS — R05 COUGH: ICD-10-CM

## 2020-01-01 DIAGNOSIS — M54.2 CERVICALGIA: ICD-10-CM

## 2020-01-01 DIAGNOSIS — Z95.810 PRESENCE OF AUTOMATIC (IMPLANTABLE) CARDIAC DEFIBRILLATOR: ICD-10-CM

## 2020-01-01 DIAGNOSIS — K21.9 GASTRO-ESOPHAGEAL REFLUX DISEASE W/OUT ESOPHAGITIS: ICD-10-CM

## 2020-01-01 DIAGNOSIS — Z00.00 ENCOUNTER FOR GENERAL ADULT MEDICAL EXAMINATION W/OUT ABNORMAL FINDINGS: ICD-10-CM

## 2020-01-01 DIAGNOSIS — I50.22 CHRONIC SYSTOLIC (CONGESTIVE) HEART FAILURE: ICD-10-CM

## 2020-01-01 DIAGNOSIS — R53.82 CHRONIC FATIGUE, UNSPECIFIED: ICD-10-CM

## 2020-01-01 DIAGNOSIS — Z87.39 PERSONAL HISTORY OF OTHER DISEASES OF THE MUSCULOSKELETAL SYSTEM AND CONNECTIVE TISSUE: ICD-10-CM

## 2020-01-01 DIAGNOSIS — S80.12XA CONTUSION OF LEFT LOWER LEG, INITIAL ENCOUNTER: ICD-10-CM

## 2020-01-01 DIAGNOSIS — Z86.79 PERSONAL HISTORY OF OTHER DISEASES OF THE CIRCULATORY SYSTEM: ICD-10-CM

## 2020-01-01 DIAGNOSIS — K62.5 HEMORRHAGE OF ANUS AND RECTUM: ICD-10-CM

## 2020-01-01 LAB
25(OH)D3 SERPL-MCNC: 64.9 NG/ML
ALBUMIN SERPL ELPH-MCNC: 3.7 G/DL
ALBUMIN SERPL ELPH-MCNC: 4.1 G/DL
ALBUMIN SERPL ELPH-MCNC: 4.2 G/DL
ALP BLD-CCNC: 73 U/L
ALP BLD-CCNC: 79 U/L
ALP BLD-CCNC: 80 U/L
ALT SERPL-CCNC: 7 U/L
ALT SERPL-CCNC: 8 U/L
ALT SERPL-CCNC: 8 U/L
ANION GAP SERPL CALC-SCNC: 10 MMOL/L
ANION GAP SERPL CALC-SCNC: 12 MMOL/L
ANION GAP SERPL CALC-SCNC: 15 MMOL/L
APPEARANCE: CLEAR
AST SERPL-CCNC: 11 U/L
AST SERPL-CCNC: 12 U/L
AST SERPL-CCNC: 12 U/L
BACTERIA: NEGATIVE
BASOPHILS # BLD AUTO: 0.05 K/UL
BASOPHILS # BLD AUTO: 0.08 K/UL
BASOPHILS # BLD AUTO: 0.08 K/UL
BASOPHILS NFR BLD AUTO: 0.7 %
BASOPHILS NFR BLD AUTO: 1 %
BASOPHILS NFR BLD AUTO: 1.1 %
BILIRUB SERPL-MCNC: 0.4 MG/DL
BILIRUB SERPL-MCNC: 0.5 MG/DL
BILIRUB SERPL-MCNC: 0.5 MG/DL
BILIRUBIN URINE: NEGATIVE
BLOOD URINE: ABNORMAL
BUN SERPL-MCNC: 35 MG/DL
BUN SERPL-MCNC: 40 MG/DL
BUN SERPL-MCNC: 43 MG/DL
CALCIUM SERPL-MCNC: 8.9 MG/DL
CALCIUM SERPL-MCNC: 9.2 MG/DL
CALCIUM SERPL-MCNC: 9.2 MG/DL
CHLORIDE SERPL-SCNC: 101 MMOL/L
CHLORIDE SERPL-SCNC: 102 MMOL/L
CHLORIDE SERPL-SCNC: 107 MMOL/L
CHOLEST SERPL-MCNC: 105 MG/DL
CHOLEST SERPL-MCNC: 119 MG/DL
CHOLEST/HDLC SERPL: 2.1 RATIO
CO2 SERPL-SCNC: 26 MMOL/L
CO2 SERPL-SCNC: 27 MMOL/L
CO2 SERPL-SCNC: 28 MMOL/L
COLOR: YELLOW
CREAT SERPL-MCNC: 1.53 MG/DL
CREAT SERPL-MCNC: 1.6 MG/DL
CREAT SERPL-MCNC: 1.63 MG/DL
DIGOXIN SERPL-MCNC: 1 NG/ML
DIGOXIN SERPL-MCNC: 1.6 NG/ML
EOSINOPHIL # BLD AUTO: 0.32 K/UL
EOSINOPHIL # BLD AUTO: 0.33 K/UL
EOSINOPHIL # BLD AUTO: 0.37 K/UL
EOSINOPHIL NFR BLD AUTO: 4.2 %
EOSINOPHIL NFR BLD AUTO: 4.2 %
EOSINOPHIL NFR BLD AUTO: 4.9 %
ESTIMATED AVERAGE GLUCOSE: 105 MG/DL
ESTIMATED AVERAGE GLUCOSE: 105 MG/DL
FOLATE SERPL-MCNC: >20 NG/ML
GLUCOSE QUALITATIVE U: NEGATIVE
GLUCOSE SERPL-MCNC: 104 MG/DL
GLUCOSE SERPL-MCNC: 82 MG/DL
GLUCOSE SERPL-MCNC: 91 MG/DL
HBA1C MFR BLD HPLC: 5.3 %
HBA1C MFR BLD HPLC: 5.3 %
HCT VFR BLD CALC: 31 %
HCT VFR BLD CALC: 35 %
HCT VFR BLD CALC: 35.2 %
HDLC SERPL-MCNC: 47 MG/DL
HDLC SERPL-MCNC: 56 MG/DL
HGB BLD-MCNC: 10.9 G/DL
HGB BLD-MCNC: 11.2 G/DL
HGB BLD-MCNC: 9.8 G/DL
HYALINE CASTS: 0 /LPF
IMM GRANULOCYTES NFR BLD AUTO: 0.1 %
IMM GRANULOCYTES NFR BLD AUTO: 0.3 %
IMM GRANULOCYTES NFR BLD AUTO: 0.4 %
IRON SERPL-MCNC: 49 UG/DL
KETONES URINE: NEGATIVE
LDLC SERPL CALC-MCNC: 48 MG/DL
LDLC SERPL CALC-MCNC: 54 MG/DL
LEUKOCYTE ESTERASE URINE: ABNORMAL
LYMPHOCYTES # BLD AUTO: 1.12 K/UL
LYMPHOCYTES # BLD AUTO: 1.16 K/UL
LYMPHOCYTES # BLD AUTO: 1.4 K/UL
LYMPHOCYTES NFR BLD AUTO: 14.8 %
LYMPHOCYTES NFR BLD AUTO: 15.3 %
LYMPHOCYTES NFR BLD AUTO: 17.6 %
MAN DIFF?: NORMAL
MCHC RBC-ENTMCNC: 31.1 GM/DL
MCHC RBC-ENTMCNC: 31.6 GM/DL
MCHC RBC-ENTMCNC: 31.8 GM/DL
MCHC RBC-ENTMCNC: 32.2 PG
MCHC RBC-ENTMCNC: 32.6 PG
MCHC RBC-ENTMCNC: 32.6 PG
MCV RBC AUTO: 102.3 FL
MCV RBC AUTO: 103 FL
MCV RBC AUTO: 103.6 FL
MICROSCOPIC-UA: NORMAL
MONOCYTES # BLD AUTO: 0.61 K/UL
MONOCYTES # BLD AUTO: 0.64 K/UL
MONOCYTES # BLD AUTO: 0.69 K/UL
MONOCYTES NFR BLD AUTO: 8.1 %
MONOCYTES NFR BLD AUTO: 8.4 %
MONOCYTES NFR BLD AUTO: 8.7 %
NEUTROPHILS # BLD AUTO: 5.35 K/UL
NEUTROPHILS # BLD AUTO: 5.41 K/UL
NEUTROPHILS # BLD AUTO: 5.42 K/UL
NEUTROPHILS NFR BLD AUTO: 68.1 %
NEUTROPHILS NFR BLD AUTO: 70.4 %
NEUTROPHILS NFR BLD AUTO: 71.7 %
NITRITE URINE: NEGATIVE
NONHDLC SERPL-MCNC: 59 MG/DL
NT-PROBNP SERPL-MCNC: 4733 PG/ML
NT-PROBNP SERPL-MCNC: 5121 PG/ML
NT-PROBNP SERPL-MCNC: 6696 PG/ML
PH URINE: 6.5
PLATELET # BLD AUTO: 101 K/UL
PLATELET # BLD AUTO: 110 K/UL
PLATELET # BLD AUTO: 124 K/UL
POTASSIUM SERPL-SCNC: 4.2 MMOL/L
POTASSIUM SERPL-SCNC: 4.3 MMOL/L
POTASSIUM SERPL-SCNC: 4.7 MMOL/L
PROT SERPL-MCNC: 6.6 G/DL
PROT SERPL-MCNC: 6.7 G/DL
PROT SERPL-MCNC: 7.1 G/DL
PROTEIN URINE: NORMAL
PSA SERPL-MCNC: 4.67 NG/ML
RBC # BLD: 3.01 M/UL
RBC # BLD: 3.38 M/UL
RBC # BLD: 3.44 M/UL
RBC # FLD: 16.6 %
RBC # FLD: 16.7 %
RBC # FLD: 17.1 %
RED BLOOD CELLS URINE: 9 /HPF
SARS-COV-2 IGG SERPL IA-ACNC: 4.09 AU/ML
SARS-COV-2 IGG SERPL QL IA: NEGATIVE
SODIUM SERPL-SCNC: 142 MMOL/L
SODIUM SERPL-SCNC: 142 MMOL/L
SODIUM SERPL-SCNC: 143 MMOL/L
SPECIFIC GRAVITY URINE: 1.02
SQUAMOUS EPITHELIAL CELLS: 1 /HPF
TRIGL SERPL-MCNC: 45 MG/DL
TRIGL SERPL-MCNC: 52 MG/DL
TSH SERPL-ACNC: 0.46 UIU/ML
TSH SERPL-ACNC: 1.16 UIU/ML
UROBILINOGEN URINE: NORMAL
VIT B12 SERPL-MCNC: 443 PG/ML
WBC # FLD AUTO: 7.56 K/UL
WBC # FLD AUTO: 7.59 K/UL
WBC # FLD AUTO: 7.94 K/UL
WHITE BLOOD CELLS URINE: 3 /HPF

## 2020-01-01 PROCEDURE — G0008: CPT

## 2020-01-01 PROCEDURE — 93306 TTE W/DOPPLER COMPLETE: CPT

## 2020-01-01 PROCEDURE — 93000 ELECTROCARDIOGRAM COMPLETE: CPT | Mod: 59

## 2020-01-01 PROCEDURE — 93284 PRGRMG EVAL IMPLANTABLE DFB: CPT

## 2020-01-01 PROCEDURE — 36415 COLL VENOUS BLD VENIPUNCTURE: CPT

## 2020-01-01 PROCEDURE — 99215 OFFICE O/P EST HI 40 MIN: CPT | Mod: 25

## 2020-01-01 PROCEDURE — 99215 OFFICE O/P EST HI 40 MIN: CPT

## 2020-01-01 PROCEDURE — G0439: CPT

## 2020-01-01 PROCEDURE — 99213 OFFICE O/P EST LOW 20 MIN: CPT | Mod: 95

## 2020-01-01 PROCEDURE — G0444 DEPRESSION SCREEN ANNUAL: CPT | Mod: 59

## 2020-01-01 PROCEDURE — 90662 IIV NO PRSV INCREASED AG IM: CPT

## 2020-01-01 PROCEDURE — 93000 ELECTROCARDIOGRAM COMPLETE: CPT

## 2020-01-01 PROCEDURE — 90732 PPSV23 VACC 2 YRS+ SUBQ/IM: CPT

## 2020-01-01 PROCEDURE — 99441: CPT | Mod: 95

## 2020-01-01 PROCEDURE — 99214 OFFICE O/P EST MOD 30 MIN: CPT | Mod: 25

## 2020-01-01 PROCEDURE — G0009: CPT

## 2020-01-01 RX ORDER — ALBUTEROL SULFATE 90 UG/1
108 (90 BASE) INHALANT RESPIRATORY (INHALATION) EVERY 6 HOURS
Qty: 1 | Refills: 2 | Status: ACTIVE | COMMUNITY
Start: 2020-01-01 | End: 1900-01-01

## 2020-01-01 RX ORDER — UMECLIDINIUM BROMIDE AND VILANTEROL TRIFENATATE 62.5; 25 UG/1; UG/1
62.5-25 POWDER RESPIRATORY (INHALATION) DAILY
Qty: 3 | Refills: 3 | Status: DISCONTINUED | COMMUNITY
Start: 2018-10-23 | End: 2020-01-01

## 2020-01-01 RX ORDER — ALBUTEROL SULFATE 90 UG/1
108 (90 BASE) AEROSOL, METERED RESPIRATORY (INHALATION) EVERY 6 HOURS
Qty: 1 | Refills: 3 | Status: DISCONTINUED | COMMUNITY
Start: 2017-01-18 | End: 2020-01-01

## 2020-01-01 RX ORDER — AZITHROMYCIN 250 MG/1
250 TABLET, FILM COATED ORAL
Qty: 1 | Refills: 0 | Status: DISCONTINUED | COMMUNITY
Start: 2020-03-17 | End: 2020-01-01

## 2020-01-01 RX ORDER — BENZONATATE 100 MG/1
100 CAPSULE ORAL EVERY 8 HOURS
Qty: 30 | Refills: 1 | Status: DISCONTINUED | COMMUNITY
Start: 2019-11-12 | End: 2020-01-01

## 2020-01-01 RX ORDER — PREDNISONE 5 MG/1
5 TABLET ORAL DAILY
Qty: 90 | Refills: 3 | Status: DISCONTINUED | COMMUNITY
Start: 2019-11-12 | End: 2020-01-01

## 2020-01-01 RX ORDER — PREDNISONE 20 MG/1
20 TABLET ORAL
Qty: 10 | Refills: 0 | Status: DISCONTINUED | COMMUNITY
Start: 2020-03-17 | End: 2020-01-01

## 2020-01-01 RX ORDER — IPRATROPIUM BROMIDE 42 UG/1
0.06 SPRAY NASAL
Qty: 15 | Refills: 0 | Status: DISCONTINUED | COMMUNITY
Start: 2017-08-01 | End: 2020-01-01

## 2020-01-01 RX ORDER — DOXYCYCLINE 100 MG/1
100 CAPSULE ORAL
Qty: 14 | Refills: 0 | Status: DISCONTINUED | COMMUNITY
Start: 2019-11-20 | End: 2020-01-01

## 2020-02-11 ENCOUNTER — RX RENEWAL (OUTPATIENT)
Age: 85
End: 2020-02-11

## 2020-02-18 ENCOUNTER — NON-APPOINTMENT (OUTPATIENT)
Age: 85
End: 2020-02-18

## 2020-02-18 ENCOUNTER — APPOINTMENT (OUTPATIENT)
Dept: CARDIOLOGY | Facility: CLINIC | Age: 85
End: 2020-02-18
Payer: MEDICARE

## 2020-02-18 ENCOUNTER — RX RENEWAL (OUTPATIENT)
Age: 85
End: 2020-02-18

## 2020-02-18 VITALS
BODY MASS INDEX: 24.96 KG/M2 | HEART RATE: 73 BPM | SYSTOLIC BLOOD PRESSURE: 91 MMHG | OXYGEN SATURATION: 94 % | WEIGHT: 159 LBS | DIASTOLIC BLOOD PRESSURE: 52 MMHG | HEIGHT: 67 IN

## 2020-02-18 VITALS — DIASTOLIC BLOOD PRESSURE: 60 MMHG | SYSTOLIC BLOOD PRESSURE: 100 MMHG | HEART RATE: 72 BPM

## 2020-02-18 PROCEDURE — 93000 ELECTROCARDIOGRAM COMPLETE: CPT | Mod: 59

## 2020-02-18 PROCEDURE — 93284 PRGRMG EVAL IMPLANTABLE DFB: CPT

## 2020-02-18 PROCEDURE — 99215 OFFICE O/P EST HI 40 MIN: CPT | Mod: 25

## 2020-02-18 PROCEDURE — 36415 COLL VENOUS BLD VENIPUNCTURE: CPT

## 2020-02-19 LAB
ALBUMIN SERPL ELPH-MCNC: 3.7 G/DL
ALP BLD-CCNC: 72 U/L
ALT SERPL-CCNC: 8 U/L
ANION GAP SERPL CALC-SCNC: 14 MMOL/L
AST SERPL-CCNC: 11 U/L
BASOPHILS # BLD AUTO: 0.06 K/UL
BASOPHILS NFR BLD AUTO: 0.7 %
BILIRUB SERPL-MCNC: 0.5 MG/DL
BUN SERPL-MCNC: 36 MG/DL
CALCIUM SERPL-MCNC: 8.9 MG/DL
CHLORIDE SERPL-SCNC: 102 MMOL/L
CHOLEST SERPL-MCNC: 106 MG/DL
CHOLEST/HDLC SERPL: 2.3 RATIO
CO2 SERPL-SCNC: 26 MMOL/L
CREAT SERPL-MCNC: 1.55 MG/DL
DIGOXIN SERPL-MCNC: 2 NG/ML
EOSINOPHIL # BLD AUTO: 0.32 K/UL
EOSINOPHIL NFR BLD AUTO: 3.7 %
ESTIMATED AVERAGE GLUCOSE: 111 MG/DL
FERRITIN SERPL-MCNC: 290 NG/ML
GLUCOSE SERPL-MCNC: 122 MG/DL
HBA1C MFR BLD HPLC: 5.5 %
HCT VFR BLD CALC: 33.2 %
HDLC SERPL-MCNC: 47 MG/DL
HGB BLD-MCNC: 10.4 G/DL
IMM GRANULOCYTES NFR BLD AUTO: 0.3 %
IRON SATN MFR SERPL: 14 %
IRON SERPL-MCNC: 32 UG/DL
LDLC SERPL CALC-MCNC: 48 MG/DL
LYMPHOCYTES # BLD AUTO: 1.16 K/UL
LYMPHOCYTES NFR BLD AUTO: 13.4 %
MAN DIFF?: NORMAL
MCHC RBC-ENTMCNC: 31.3 GM/DL
MCHC RBC-ENTMCNC: 32.1 PG
MCV RBC AUTO: 102.5 FL
MONOCYTES # BLD AUTO: 0.63 K/UL
MONOCYTES NFR BLD AUTO: 7.3 %
NEUTROPHILS # BLD AUTO: 6.46 K/UL
NEUTROPHILS NFR BLD AUTO: 74.6 %
NT-PROBNP SERPL-MCNC: 3376 PG/ML
PLATELET # BLD AUTO: 145 K/UL
POTASSIUM SERPL-SCNC: 4.3 MMOL/L
PROT SERPL-MCNC: 6.6 G/DL
RBC # BLD: 3.24 M/UL
RBC # FLD: 16.2 %
SODIUM SERPL-SCNC: 141 MMOL/L
TIBC SERPL-MCNC: 232 UG/DL
TRIGL SERPL-MCNC: 55 MG/DL
TSH SERPL-ACNC: 0.63 UIU/ML
UIBC SERPL-MCNC: 199 UG/DL
WBC # FLD AUTO: 8.66 K/UL

## 2020-02-19 NOTE — REVIEW OF SYSTEMS
[Recent Weight Loss (___ Lbs)] : recent [unfilled] ~Ulb weight loss [Dyspnea on exertion] : dyspnea during exertion [Lower Ext Edema] : lower extremity edema [Joint Pain] : joint pain [see HPI] : see HPI [Negative] : Heme/Lymph [Fever] : no fever [Recent Weight Gain (___ Lbs)] : no recent weight gain [Chills] : no chills [Feeling Fatigued] : not feeling fatigued [Shortness Of Breath] : no shortness of breath [Chest  Pressure] : no chest pressure [Chest Pain] : no chest pain [Palpitations] : no palpitations [Cough] : no cough [Change in Appetite] : no change in appetite [Abdominal Pain] : no abdominal pain [Dizziness] : no dizziness [Change In The Stool] : no change in stool [Confusion] : no confusion was observed [Anxiety] : no anxiety [Excessive Thirst] : no polydipsia

## 2020-02-19 NOTE — PHYSICAL EXAM
[General Appearance - Well Developed] : well developed [Normal Appearance] : normal appearance [General Appearance - In No Acute Distress] : no acute distress [No Deformities] : no deformities [Well Groomed] : well groomed [Normal Conjunctiva] : the conjunctiva exhibited no abnormalities [Normal Oral Mucosa] : normal oral mucosa [Eyelids - No Xanthelasma] : the eyelids demonstrated no xanthelasmas [No Oral Cyanosis] : no oral cyanosis [No Oral Pallor] : no oral pallor [Normal Jugular Venous V Waves Present] : normal jugular venous V waves present [No Jugular Venous Coley A Waves] : no jugular venous coley A waves [Normal Jugular Venous A Waves Present] : normal jugular venous A waves present [Respiration, Rhythm And Depth] : normal respiratory rhythm and effort [Exaggerated Use Of Accessory Muscles For Inspiration] : no accessory muscle use [Auscultation Breath Sounds / Voice Sounds] : lungs were clear to auscultation bilaterally [Murmurs] : no murmurs present [Heart Sounds] : normal S1 and S2 [Heart Rate And Rhythm] : heart rate and rhythm were normal [Abdomen Soft] : soft [Abdomen Tenderness] : non-tender [Abdomen Mass (___ Cm)] : no abdominal mass palpated [Abnormal Walk] : normal gait [Nail Clubbing] : no clubbing of the fingernails [Gait - Sufficient For Exercise Testing] : the gait was sufficient for exercise testing [Petechial Hemorrhages (___cm)] : no petechial hemorrhages [Cyanosis, Localized] : no localized cyanosis [Skin Turgor] : normal skin turgor [Skin Color & Pigmentation] : normal skin color and pigmentation [No Venous Stasis] : no venous stasis [] : no rash [Skin Lesions] : no skin lesions [No Xanthoma] : no  xanthoma was observed [No Skin Ulcers] : no skin ulcer [Affect] : the affect was normal [Oriented To Time, Place, And Person] : oriented to person, place, and time [Mood] : the mood was normal [FreeTextEntry1] : Pretty good spirits

## 2020-02-19 NOTE — HISTORY OF PRESENT ILLNESS
[FreeTextEntry1] : (MED HX) The patient is now 88 years old. I first saw him in 2006. At that time he had a history of a myocardial infarction and angioplasty back in 1994. He had done well for years although his activity level had diminished with age and he began complaining of fatigue. In December of 2011 he had a stress echocardiogram that was abnormal possibly with very abnormal blood pressure response and possibly with new wall motion abnormalities. He underwent a CTA and this was followed up by cardiac catheterization at [facility] in December of 2011. 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 The patient did well after that which is a minor adjustment of his medications. He has a history of hyperlipidemia and had a past history of hypertension. He stopped smoking 43 years ago, no diabetes, no family history of coronary artery disease.\par April 30, 2013 he was admitted to [facility] with left lower extremity cellulitis and altered mental status. He had positive blood cultures for strep pyogenes group A. An echocardiogram showed a possible small echodensity on the ventricular side of the noncoronary cusp. A joint aspiration of his left ankle was negative. He was treated with vancomycin and Zosyn, switched to cefepime. Followed by plastic surgery for wound care. No evidence of endocarditis clinically. A CAT scan of his abdomen and pelvis showed a 3.7 cm infrarenal abdominal aortic aneurysm. There was also bilateral inguinal hernias. 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 He was followed by Dr. Damián Lovelace of infectious disease. There is still a possibility he may need a skin graft for his left ankle. He was in the hospital for a little over 2 weeks and then in rehabilitation for 5 weeks. \par July 3, 2013. Here for followup visit. He had been home now for about 10 days and had already seen Dr. Lovelace and Dr. Jones his internist.  He denies chest pain or shortness of breath. There has been no syncope or near syncope or palpitations. Upon review of his medications the only change is that his Avapro is at a half of a 150 mg pill daily due to low blood pressure in the hospital and his simvastatin has been cut down from 20 mg to 10 mg. He did lose weight during this hospitalization as well. There have been no episodes of fevers, chills, sweats, etc.\par September 3, 2013. Here for followup. He is preparing to go on a trip to [place] in October. He does note shortness of breath with exertion but only with a lot of exertion, no problem going up stairs, and he thinks no different than even before his cellulitis episode. He denies exertional chest pain. We elected to see how he did with increased exercise and if his shortness of breath was not severe he would be okay to go on the trip to [place], obviously using commonsense as we discussed.\par January 6, 2014. The patient is here for followup. He did very well on the trip to [place] with some limitation from his knees but no significant shortness of breath or chest pain. He gets swelling on his left leg where he had the cellulitis but not on the right side. He only had one episode of slight lightheadedness when he got up too fast but otherwise has been tolerating the current medications. He is planning on going to [place] at the end of January. He has a mild cough, nonproductive, no fever chills. He claims he was unable to get an appointment with his internist. Her workup was unrevealing.\par May 5, 2014. The patient is here for followup. He saw Dr. Jones in the interim and had lab work with an excellent lipid profile but a high BNP. He saw vascular surgery because of the abdominal aortic aneurysm which measured 3.8 cm and that will be followed. He had an episode where his balance was off for about one hour. It only happened when he tried to walk around. He denies feeling lightheaded like he might pass out. If he was sitting and not moving he felt perfectly fine. There was no vertigo. He claims it happened him once before and it resolved as well. I advised him to discuss with Dr. Jones and perhaps a neurologist. In addition he brought up again with his wife his symptoms he's had for 40 years where he suddenly for no reason will get chest pain and pressure that radiates up to his jaw. It will actually go away if he does nothing but if he takes Maalox it will go away faster. I explained to him that this is probably esophageal reflux with esophageal spasm but it so infrequent that it is not worth for him to take a PPI on a daily basis. He denies having exertional chest pain or shortness of breath. He is considering possible knee replacement in the near future. He returned in July for preop clearance for knee replacement which he had in the middle of July with no complications Other than needing to be transfused 3 units and having been sent in to [facility] in the [facility] to have that done on 2 occasions.\par October 7, 2014. The patient is here for followup. He is feeling better and is more active but is also noting more shortness of breath with exertion or at least others with him have noticed it. He thinks he needs the other knee replacement done but his wife said she will divorce him if he does it. His TSH was elevated on labs with Dr. Jones last month so his Synthroid was increased from 6 days a week to 7 days a week. His BNP was elevated and he asked "should I worry about heart failure?". In addition Dr. Jones cut back his Avapro 300 to 150 because blood pressure was 110. The patient denies lightheadedness dizziness etc. His blood pressure today on 2 different occasions was 146/70. He also has an abdominal aortic aneurysm measuring 3.8 cm that is being followed. He'll be seeing Dr. Temple tomorrow.\par October 28, 2014. Dr. Jacobs called yesterday that the patient's abdominal aorta had increased in size by 0.9 cm and the patient is to be scheduled for an endovascular repair of his abdominal aortic aneurysm. He came here for his echocardiogram and discussion. The echo for the most part is unchanged from May of 2013 with left ventricular ejection fraction around 40%, mild to moderate MR, mild to moderate AI, segmental LV dysfunction with akinetic inferoposterior wall, and hypokinetic to akinetic inferolateral and mid lateral walls, normal anterior wall. LV size upper limit of normal. Normal RV size and function with an RVSP estimated at 41 mm of mercury.\par April 29, 2015. The patient did well with his endovascular repair of his abdominal aortic aneurysm. He went down to [place] and at one point was found to be short of breath with that was felt to be a pleural effusion. He was set up for a thoracentesis but after taking a diuretic for a few days they found there was no fluid there. His cough did not go away until he took a course of prednisone.The diuretic was stopped and he did well returning home. He took a tour of [place] and noted that any kind of incline made him very short of breath but no chest pressure. He saw Dr. Morris of pulmonary who found his proBNP to be 3400 and in the past it was only 1100. However his chest x-ray was totally clear. He has no PND or orthopnea and no real edema. She did give him another inhaler (Symbicort) and referred him here. His weight has not been up and if anything has gone down a little. His last echo was in October as above and was unchanged for the most part.  There is no chest pain with exertion. \par April 30, 2015. The patient returned for a stress echocardiogram. His baseline LV function looked worse than previously with left ventricular ejection fraction around 27%. With exercise he dropped his blood pressure and there were new wall motion abnormalities. He was scheduled for cardiac catheterization and probable defibrillator with possible biventricular pacemaker.\par May 6, 2015. Cardiac catheterization was done and revealed for the most part unchanged anatomy.  LVEF by V-gram was 35% . Given these findings this time Dr. Reyes did angioplasty and stent the right coronary artery. Electrophysiology felt he should wait at least 2 weeks and come back for another ejection fraction and clinical evaluation to see if things had improved before deciding on a  possible  AICD/biventricular pacemaker.\par May 21, 2015. The patient returns for followup and echocardiogram. He has felt much better over the 2 weeks but he has not been doing his usual activities because he was told not to do it. His echocardiogram to my eye looks unchanged. His exam is unchanged but there is no signs of congestive heart failure. I discussed the results with the patient and his wife. I told him that his improved symptoms could be because he is not that active, it could be a placebo effect of the procedure, it could be that things have improved and we just are unable to measure it on the echocardiogram. A BNP was sent and will be compared to his previous level which had gone up from the 400 range into the 800s. In addition over the weekend the patient will increase his activity and see what his level of symptoms are. If his symptoms are significant and worse than they had been a few months ago, we will schedule the ICD and biventricular pacemaker. The patient will contact me after the weekend.\par Nehal 15, 2015. The patient saw Dr. Russell of [facility] and unfortunately he has to wait 3 months (August 6, 2015) from the angioplasty unless he becomes unstable, has any arrhythmias, or needs a pacemaker for bradycardic reasons.  He did increase the carvedilol to 12.5 mg in the morning and 25 mg in the evening Currently he feels well and is stable. He does get out of breath and some weakness in his legs if he is walking uphill. When he initially lies down he feels some pressure he then says is shortness of breath but it resolves. No PND or orthopnea per se.\par July 14, 2015. The patient called a few weeks ago thinking he was more short of breath and wanted to move up the AICD/biventricular pacemaker. However after speaking with Dr. Russell of [facility] he explained he still was not qualified and must wait unless his symptoms are so severe that he requires hospitalization. I explained this to the patient and he returns today for routine followup.  He is still symptomatic with dyspnea on exertion but no PND or orthopnea. 3 months from his angioplasty will be August 6. He has a trip to [facility] planned with his daughter August 12.\par August 25, 2015. The patient is here for followup after having his AICD/biventricular pacemaker placed on August 7. No complications with the procedure. The patient does feel better in terms of the shortness of breath he would get when he was lying down but with exertion he feels maybe even worse right now in terms of shortness of breath. Of note he was away with a different diet and gained 4 pounds. (By scale here he has gained 9 pounds.) No palpitations, no syncope or near syncope. He is on carvedilol 25 mg the morning and 12.5 in the evening. He is still complaining of left arm pain ever since the procedure.\par September 8, 2015. The patient is finally starting to feel a little bit better. He cut his diuretic back to every other day and has continued to lose weight. His left arm so bothersome just as much since the procedure. We had a discussion about changing his Avapro to Entresto.\par October 8, 2015. Patient is here for followup. In the interim he had an episode of bronchitis treated with antibiotics and steroids which did cause some fluid retention and he was briefly on diuretics with improvement. He is now off the steroids but still on the diuretic. He does feel better on the Entresto. He thinks he is walking better with less shortness of breath and somewhat more stamina. He otherwise has no complaints. He remains in sinus rhythm and has had no AICD shocks. His blood pressure was borderline and his lungs were clear. I elected to stop the diuretic and increase his Entresto. \par October 23, 2015. On October 20 the patient saw Dr. Morris because of shortness of breath and edema. He had gained 9 pounds and he had significant swelling and she put him back on Lasix 40 mg as we discussed together. Patient had much relief and is here now. He lost 8 of the 9 pounds he again and feels much better. He still gets short of breath going up the stairs and often has to stop. He has trace edema still. His blood pressure is in the 90s systolic. He still eats out but not as much as he used to and they do ask for the low salt.\par November 9, 2015. The patient returns for followup. He has been on Lasix 40 mg since his last visit. He looks great, back to himself, with no complaints or symptoms of CHF. Her systolic pressure is only 90 but he has no symptoms of lightheadedness. He is having a lot of gas to the point of it being embarrassing and wondered if it is related to the Entresto. He also seems to have a dry cough. He will be seeing Dr. Russell in followup next week. Based on his complaints we cut back his Lasix to 20 mg daily but then he called back a few days later with more shortness of breath and went back to 40 mg q.d.\par December 7, 2015. Followup for the patient. As noted he had a go back to 40 mg Lasix because he became short of breath. He was still having GI complaints from the Entresto but he can manage it. He is complaining again of a urinating too much so perhaps we could try 30 mg of Lasix daily. He is seeing Dr. Russell next week. Is turning 89 soon and is planning a cruise on January 24.\par January 12, 2016. Patient is here for followup. Still has good spirits and is looking forward to going to [place] in 10 days. He is still having significant cough from the Entresto and we will have to change back to his prior regimen. Had echocardiogram at [facility] which was supposed to be  a dyssynchrony study and will be seeing Dr. Russell of [facility] tomorrow morning to discuss whether or not to try to improve his biventricular pacing. Currently on 80 mg of Lasix daily. Dr. Morris tried a new inhaler but he does not think it helped.\par March 2, 2016. The patient is here in followup. He went on his cruise and did well. His cough was stopped when he stopped the entresto. On the cruise he was able to cut back to 40 mg of Lasix  and even occasionally he didn't take it if he had a busy day. However after a while he did notice more shortness of breath and edema and the last few days he went back to 80 mg of Lasix with improvement. He is here now today, his weight has gone down 8 pounds, blood pressure was 90/60.\par May 3, 2016. Patient looks well and feels well, but continues to lose weight despite a good appetite. Dr. Jones is concerned and placed him on Ensure and if he does not put on some weight, he will be seeing GI. Abdominal CAT scan was unremarkable, except for some gastric distention and some thickening of the apex of the duodenum, which could just be underfilling. Abdominal aortic aneurysm repair was stable with sac being less than 3 cm. 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 He will follow up with vascular again in one year. He remains on 40 mg of Lasix and irbesartan. Not complaining of shortness of breath. He has done much better since Dr. Russell adjusted. His biventricular pacer by adjusting LV and RV timing. Lab work revealed a very suppressed TSH, so his Synthroid was held and he was to return in 2 weeks to reassess weight loss and thyroid status.\par May 18, 2016. Yesterday the patient was dizzy and lightheaded and fell and hit his head. He was evaluated in the emergency room, where everything was negative. His EKG was unchanged and his defibrillator was interrogated and there were no arrhythmias. His TSH was now 29! He is here today. Patient did fall again last night. Unclear if it is from being lightheaded or losing balance. No syncope. Other than when he had the fall, he has not been complaining of feeling lightheaded. His weight did go back up  off  the thyroid medication. I elected to hold his Lasix and have him reassessed in one week.\par May 24, 2016. Patient called yesterday and is short of breath, edema, and had put his weight back on. I told him to resume the Lasix and he is seen today for his followup. Mostly feels a little better and was able to lie flat last night. Still has some swelling. Blood pressure is still borderline low. Weight is up 6 pounds from last visit. We elected to continue Lasix 40 a day and changed his Synthroid to 0.1 mg daily.\par July 12, 2016. Patient is here for followup, as well as AICD interrogation. He seems to have normal biventricular pacing. Absolutely no arrhythmias for the past 4 months. Does get short of breath with exertion still, but not severe. Rarely will skip a day of Lasix if he has too many things to do. Started physical therapy, and was not out of breath at physical therapy today. His weight at home has been stable.\par September 13, 2016. Patient is getting around okay with a walker. His right knee seemed to be limiting him more than his shortness of breath and he has some trouble with his balance. No palpitation, syncope, or near syncope, or shocks. No change in any medication. He was still on Synthroid 0.1 mg. He is still doing physical therapy.\par November 16, 2016. The patient seems to be doing well, although he did have an episode of just suddenly falling backwards and hitting his head against the refrigerator. He denies lightheadedness or near syncope at the time. He denies vertigo, although he had a different episode. He reached up and looked up and had a vertigo-like episode just for a few seconds. There was no event on his defibrillator interrogation, except a 6 beat run of NSVT on September 24, which was asymptomatic. He is orthostatic here. He is also concerned about his weight loss, although his weight seems to be the same as it was last visit, but he claims he was almost 7 pounds less at home. His wife thinks he does not drink enough water. He is on the Lasix every other day. His EKG showed sinus rhythm with atrial tracking and biventricular pacing. He also has a compression fracture in his lumbar vertebra that is giving him pain\par He is also concerned about his thyroid hormone level, given his weight loss. We continued the Lasix on an every other day basis and lowered the Avapro to 150 mg. BNP was up to 4600 from 2600.\par December 21, 2016. The patient has here in followup. No more falls or complaints of orthostasis, although he is still orthostatic here from 124-106-98 standing with no symptoms. Complaining more of dyspnea on exertion and has some edema. His weight is about the same. After discussion, decided to add digoxin 0.125 q.d.\par February 8, 2017. The patient is here in followup. He has not fallen in a while now and he claims his problem is balance not dizziness. He thinks his dyspnea on exertion is unchanged on the digoxin. However his wife thinks he is better because he is not short of breath coming up the stairs and the  does agree. Just recently he started to develop some pedal edema, but he has had more salt because he loves soups. In addition, he does not sit with his legs elevated. (He also saw Dr. Plascencia for URI and was briefly on prednisone until a couple of weeks ago. He did have an SPEP with normal. Electrophoresis pattern.)\par April 4, 2017. Patient here in followup. Had pacemaker, defibrillator interrogation on March 6, with 2 short episodes of NSVT only. Seems to be doing well on the whole. Dyspnea on exertion is there, but unchanged. Weight is down 3 pounds. He occasionally gets numbness and tingling in his fingers, but not in his toes. Occasionally feels some discomfort over the AICD site. He will be going to his daughter and his niece for the [place] nights.\par Nehal 15, 2017. The patient is here in followup. Has been feeling a little more short of breath and does have a little bit of edema. He saw Dr. Valdez on Friday the ninth, who felt he had bronchitis and gave him Augmentin. Otherwise, patient has no complaints and has been doing pretty well. He still complains occasionally of some soreness over his AICD site. No other interim medical issues, and no change in medication.\par September 12, 2017. Patient is here for followup and defibrillator interrogation. As noted on August 25 had a syncopal episode after he had a hot shower and walked out to the other bathroom. Found himself on the floor and was fine. No recurrence since and in fact defibrillator interrogation shows that he had an episode of VT followed by VF after the device tried to pace terminate, which led to a defibrillator shock, and the patient has been fine since. He is still with sinus rhythm and a first-degree AV block underneath his ventricular pacing. No change in any of the symptoms and in fact, while he does get short of breath sometimes getting into bed, and some walking, was able to go to the gym and work out a little bit without shortness of breath. No chest pain, and no change in medications.\par December 26, 2017. Patient here in followup. Has been having some more shortness of breath, although seems to have frequent cough and URI. Now on a nebulizer. Does have worsening shortness of breath with lying down, but no PND. Had a near syncopal event when he got up right after sitting for a long time watching a movie, but otherwise no episodes of dizziness. AICD interrogation shows no episodes since the event in August. Patient thinks his overall stamina and fatigue are worse. Patient willing to retry Entresto.\par January 11, 2018. So far patient tolerating the Entresto, no cough. Maybe a little less short of breath, but not definite. Does have more edema, but weight is the same. No other complaints. Reluctant to increase the diuretic. We'll keep dose of the same for now and followup in one month if labs are okay. Then consider increasing Entresto.\par Comment:  \NING Genao - 21 Mar 2018 3:12 PM \par   TASK CREATED\par Hi-\par Saw Adam.\par He complained of weight gain, edema, SOB.\par Sent BNP and up to over 5300.\par Do you think that the Entresto is an issue for him?\par Asked him to call you.\par Thanks-\par Ning \par Addendum\par I reviewed the labs and spoke with the patient. Actually, weight is down, and labs are all okay except the BNP. If this is natural history of LV dysfunction, he should be on a higher dose of Entresto. I elected to increase the Entresto between now and his visit next week with me and see if things get worse, improve, or stay the same. Patient has a big cruise coming up on April 18 \par March 27, 2018. Patient returns in followup now on the increased dose of Entresto for one week. On his pacemaker interrogation he has normal function with biventricular pacing, no more VT. His "optivol" has been elevated since mid-January and just for the past week it seems to be coming down, which would correlate with increasing his Enteresto. He does admit to not being a strict on his diet, having Chinese food the other night, and has only been staying with the furosemide every other day despite the increased edema and shortness of breath. Depending on labs, I will probably continue the high-dose Entresto, continue furosemide daily through the beginning of [place] this week and then go back to every other day depending on symptoms, weight and edema.\par May 22, 2018. The patient went on his vacation and did extremely well. By the very end before getting on the plane to come back he was somewhat short of breath and his wife considered going to the emergency room. Instead, he took extra Lasix and was doing well on the plane. At one point got up to the bathroom, came back sat in his seat and his wife leaned over to try to wake him up and they could not arouse him for up to 5 minutes. A doctor on the plane checked him, they took him out of his seat and laid him down on the floor at which point he seemed to come to and felt great. It seems he had a pulse and a blood pressure throughout. When he got off the plane he was brought to [facility] but interrogating the defibrillator showed absolutely no arrhythmias. (Of note, the next day in the hospital he had 2 long runs of nonsustained VT that were asymptomatic.) He did develop a severe cough with rhonchi and sputum. He was seen by Dr. Hema Reyes of pulmonary and discharged on prednisone and antibiotics. He saw Dr. Morris this morning and she just continued those medications and sent labs and told him she thought it was all from his heart. He did have a repeat echo in the hospital, which showed his AS has progressed to borderline severe.\par July 10, 2018. A one point after the hospitalization, the patient's blood pressure was running low and his Lasix was decreased. He developed more shortness of breath, and weight gain, and saw Dr. Romano on June 27. Lasix was put back to 40 mg daily. He is here today, feeling better, has lost 6 pounds. Was at ophthalmologist earlier today and has a new hemorrhage in his left eye, which is his better eye. The ophthalmologist would like me to cut back or hold the aspirin.\par September 12, 2018. Patient returns in followup, as well as for repeat echocardiogram regarding his aortic stenosis. His echo confirms somewhat of a low gradient severe aortic stenosis and is unchanged from May, but the patient feels wonderful maybe somewhat tired, but his breathing is better than it has been a long time and he is able to do physical therapy, etc. Patient was sent for evaluation with structural heart team.\par October 24, 2018. Patient returns for followup. Workup in hospital revealed no significant CAD, very tortuous vessels femorally so if patient needs TAVR would have to use apical approach. Pullback gradient across aortic valve was low as well (15). 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 Since being home the patient has been fairly stable although he does have increased edema since the catheterization. There was also some question about his potassium level. He got a flu shot from Dr. Morris the other day.\par January 23, 2019. The patient here in followup. He remains in sinus or AV paced. Feels good. Taking Lasix 40 mg daily. Blood pressure running on the low side, but no dizziness, lightheadedness, etc. Gets tiredness "from his legs, but not from shortness of breath". he says. He is on 2 new medications from his rheumatologist (Blanca [facility]), gabapentin, and chlorzactazone. He would like to try to go again on at least a ten-day cruise although his wife is very nervous about it. No defibrillator shocks, etc. Off colchicine per Dr. Morris.\par April 24, 2019. Patient here in followup. Good spirits and seems to be doing very well on his current regimen. Remains in sinus rhythm with atrial tracking and ventricular pacing. One VPC noted. No congestive heart failure, despite [place].\par August 20, 2019.  Patient here in follow-up.  Pacemaker interrogation shows 1 or 2 runs of ventricular tachycardia that are pace terminated no shocks and patient was asymptomatic.  EKG shows sinus rhythm with left bundle branch block or more likely atrial tracking and biventricular pacing.  On the whole doing very well although feels that the water pill dictates his life and would like to change to every other day\par November 12, 2019.  Patient here in follow-up.  He remains AV paced at 70. Has a cough for the last 2 days initially productive but now not.  Denies shortness of breath.  Denies fever chills or achiness.  Not sure if there has been more salt in his diet.  His weight is up 2 pounds.  No chest pain shortness of breath etc.  Had a flu shot already.  Has an appointment with Dr. Morris later today. (Has lucho's Lolabox  this weekend in [place] and another Lolabox in a week and a half.).\par February 18, 2020.  Patient here for follow-up as well as defibrillator interrogation.  Now 89 years old.  Remains either sinus with ventricular pacing or AV pacing. Good spirits feeling well.  Not really doing that much walking but no complaints.  If he just moves around side to side he feels a little short of breath but it passes quickly.  No PND or orthopnea.  Complains about nocturia and again asking if he can lower the diuretic.  Weight is the same with slight edema. Rare brief NSVT. No a fib. 98% biV-paced.

## 2020-03-02 ENCOUNTER — RX RENEWAL (OUTPATIENT)
Age: 85
End: 2020-03-02

## 2020-03-05 ENCOUNTER — NON-APPOINTMENT (OUTPATIENT)
Age: 85
End: 2020-03-05

## 2020-04-10 ENCOUNTER — RX RENEWAL (OUTPATIENT)
Age: 85
End: 2020-04-10

## 2020-04-12 ENCOUNTER — RX RENEWAL (OUTPATIENT)
Age: 85
End: 2020-04-12

## 2020-04-18 ENCOUNTER — RX RENEWAL (OUTPATIENT)
Age: 85
End: 2020-04-18

## 2020-04-29 ENCOUNTER — APPOINTMENT (OUTPATIENT)
Dept: INTERNAL MEDICINE | Facility: CLINIC | Age: 85
End: 2020-04-29

## 2020-05-03 ENCOUNTER — RX RENEWAL (OUTPATIENT)
Age: 85
End: 2020-05-03

## 2020-05-19 ENCOUNTER — NON-APPOINTMENT (OUTPATIENT)
Age: 85
End: 2020-05-19

## 2020-05-19 ENCOUNTER — APPOINTMENT (OUTPATIENT)
Dept: CARDIOLOGY | Facility: CLINIC | Age: 85
End: 2020-05-19
Payer: MEDICARE

## 2020-05-19 VITALS
SYSTOLIC BLOOD PRESSURE: 117 MMHG | HEART RATE: 76 BPM | HEIGHT: 67 IN | BODY MASS INDEX: 24.8 KG/M2 | TEMPERATURE: 97.8 F | WEIGHT: 158 LBS | OXYGEN SATURATION: 99 % | DIASTOLIC BLOOD PRESSURE: 67 MMHG

## 2020-05-19 PROCEDURE — 99215 OFFICE O/P EST HI 40 MIN: CPT

## 2020-05-19 PROCEDURE — 93000 ELECTROCARDIOGRAM COMPLETE: CPT

## 2020-05-19 PROCEDURE — 36415 COLL VENOUS BLD VENIPUNCTURE: CPT

## 2020-05-19 NOTE — HISTORY OF PRESENT ILLNESS
[FreeTextEntry1] : (MED HX) The patient is now 88 years old. I first saw him in 2006. At that time he had a history of a myocardial infarction and angioplasty back in 1994. He had done well for years although his activity level had diminished with age and he began complaining of fatigue. In December of 2011 he had a stress echocardiogram that was abnormal possibly with very abnormal blood pressure response and possibly with new wall motion abnormalities. He underwent a CTA and this was followed up by cardiac catheterization at [facility] in December of 2011. 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 The patient did well after that which is a minor adjustment of his medications. He has a history of hyperlipidemia and had a past history of hypertension. He stopped smoking 43 years ago, no diabetes, no family history of coronary artery disease.\par April 30, 2013 he was admitted to [facility] with left lower extremity cellulitis and altered mental status. He had positive blood cultures for strep pyogenes group A. An echocardiogram showed a possible small echodensity on the ventricular side of the noncoronary cusp. A joint aspiration of his left ankle was negative. He was treated with vancomycin and Zosyn, switched to cefepime. Followed by plastic surgery for wound care. No evidence of endocarditis clinically. A CAT scan of his abdomen and pelvis showed a 3.7 cm infrarenal abdominal aortic aneurysm. There was also bilateral inguinal hernias. 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 He was followed by Dr. Damián Lovelace of infectious disease. There is still a possibility he may need a skin graft for his left ankle. He was in the hospital for a little over 2 weeks and then in rehabilitation for 5 weeks. \par July 3, 2013. Here for followup visit. He had been home now for about 10 days and had already seen Dr. Lovelace and Dr. Jones his internist.  He denies chest pain or shortness of breath. There has been no syncope or near syncope or palpitations. Upon review of his medications the only change is that his Avapro is at a half of a 150 mg pill daily due to low blood pressure in the hospital and his simvastatin has been cut down from 20 mg to 10 mg. He did lose weight during this hospitalization as well. There have been no episodes of fevers, chills, sweats, etc.\par September 3, 2013. Here for followup. He is preparing to go on a trip to [place] in October. He does note shortness of breath with exertion but only with a lot of exertion, no problem going up stairs, and he thinks no different than even before his cellulitis episode. He denies exertional chest pain. We elected to see how he did with increased exercise and if his shortness of breath was not severe he would be okay to go on the trip to [place], obviously using commonsense as we discussed.\par January 6, 2014. The patient is here for followup. He did very well on the trip to [place] with some limitation from his knees but no significant shortness of breath or chest pain. He gets swelling on his left leg where he had the cellulitis but not on the right side. He only had one episode of slight lightheadedness when he got up too fast but otherwise has been tolerating the current medications. He is planning on going to [place] at the end of January. He has a mild cough, nonproductive, no fever chills. He claims he was unable to get an appointment with his internist. Her workup was unrevealing.\par May 5, 2014. The patient is here for followup. He saw Dr. Jones in the interim and had lab work with an excellent lipid profile but a high BNP. He saw vascular surgery because of the abdominal aortic aneurysm which measured 3.8 cm and that will be followed. He had an episode where his balance was off for about one hour. It only happened when he tried to walk around. He denies feeling lightheaded like he might pass out. If he was sitting and not moving he felt perfectly fine. There was no vertigo. He claims it happened him once before and it resolved as well. I advised him to discuss with Dr. Jones and perhaps a neurologist. In addition he brought up again with his wife his symptoms he's had for 40 years where he suddenly for no reason will get chest pain and pressure that radiates up to his jaw. It will actually go away if he does nothing but if he takes Maalox it will go away faster. I explained to him that this is probably esophageal reflux with esophageal spasm but it so infrequent that it is not worth for him to take a PPI on a daily basis. He denies having exertional chest pain or shortness of breath. He is considering possible knee replacement in the near future. He returned in July for preop clearance for knee replacement which he had in the middle of July with no complications Other than needing to be transfused 3 units and having been sent in to [facility] in the [facility] to have that done on 2 occasions.\par October 7, 2014. The patient is here for followup. He is feeling better and is more active but is also noting more shortness of breath with exertion or at least others with him have noticed it. He thinks he needs the other knee replacement done but his wife said she will divorce him if he does it. His TSH was elevated on labs with Dr. Jones last month so his Synthroid was increased from 6 days a week to 7 days a week. His BNP was elevated and he asked "should I worry about heart failure?". In addition Dr. Jones cut back his Avapro 300 to 150 because blood pressure was 110. The patient denies lightheadedness dizziness etc. His blood pressure today on 2 different occasions was 146/70. He also has an abdominal aortic aneurysm measuring 3.8 cm that is being followed. He'll be seeing Dr. Temple tomorrow.\par October 28, 2014. Dr. Jacobs called yesterday that the patient's abdominal aorta had increased in size by 0.9 cm and the patient is to be scheduled for an endovascular repair of his abdominal aortic aneurysm. He came here for his echocardiogram and discussion. The echo for the most part is unchanged from May of 2013 with left ventricular ejection fraction around 40%, mild to moderate MR, mild to moderate AI, segmental LV dysfunction with akinetic inferoposterior wall, and hypokinetic to akinetic inferolateral and mid lateral walls, normal anterior wall. LV size upper limit of normal. Normal RV size and function with an RVSP estimated at 41 mm of mercury.\par April 29, 2015. The patient did well with his endovascular repair of his abdominal aortic aneurysm. He went down to [place] and at one point was found to be short of breath with that was felt to be a pleural effusion. He was set up for a thoracentesis but after taking a diuretic for a few days they found there was no fluid there. His cough did not go away until he took a course of prednisone.The diuretic was stopped and he did well returning home. He took a tour of [place] and noted that any kind of incline made him very short of breath but no chest pressure. He saw Dr. Morris of pulmonary who found his proBNP to be 3400 and in the past it was only 1100. However his chest x-ray was totally clear. He has no PND or orthopnea and no real edema. She did give him another inhaler (Symbicort) and referred him here. His weight has not been up and if anything has gone down a little. His last echo was in October as above and was unchanged for the most part.  There is no chest pain with exertion. \par April 30, 2015. The patient returned for a stress echocardiogram. His baseline LV function looked worse than previously with left ventricular ejection fraction around 27%. With exercise he dropped his blood pressure and there were new wall motion abnormalities. He was scheduled for cardiac catheterization and probable defibrillator with possible biventricular pacemaker.\par May 6, 2015. Cardiac catheterization was done and revealed for the most part unchanged anatomy.  LVEF by V-gram was 35% . Given these findings this time Dr. Reyes did angioplasty and stent the right coronary artery. Electrophysiology felt he should wait at least 2 weeks and come back for another ejection fraction and clinical evaluation to see if things had improved before deciding on a  possible  AICD/biventricular pacemaker.\par May 21, 2015. The patient returns for followup and echocardiogram. He has felt much better over the 2 weeks but he has not been doing his usual activities because he was told not to do it. His echocardiogram to my eye looks unchanged. His exam is unchanged but there is no signs of congestive heart failure. I discussed the results with the patient and his wife. I told him that his improved symptoms could be because he is not that active, it could be a placebo effect of the procedure, it could be that things have improved and we just are unable to measure it on the echocardiogram. A BNP was sent and will be compared to his previous level which had gone up from the 400 range into the 800s. In addition over the weekend the patient will increase his activity and see what his level of symptoms are. If his symptoms are significant and worse than they had been a few months ago, we will schedule the ICD and biventricular pacemaker. The patient will contact me after the weekend.\par Nehal 15, 2015. The patient saw Dr. Russell of [facility] and unfortunately he has to wait 3 months (August 6, 2015) from the angioplasty unless he becomes unstable, has any arrhythmias, or needs a pacemaker for bradycardic reasons.  He did increase the carvedilol to 12.5 mg in the morning and 25 mg in the evening Currently he feels well and is stable. He does get out of breath and some weakness in his legs if he is walking uphill. When he initially lies down he feels some pressure he then says is shortness of breath but it resolves. No PND or orthopnea per se.\par July 14, 2015. The patient called a few weeks ago thinking he was more short of breath and wanted to move up the AICD/biventricular pacemaker. However after speaking with Dr. Russell of [facility] he explained he still was not qualified and must wait unless his symptoms are so severe that he requires hospitalization. I explained this to the patient and he returns today for routine followup.  He is still symptomatic with dyspnea on exertion but no PND or orthopnea. 3 months from his angioplasty will be August 6. He has a trip to [facility] planned with his daughter August 12.\par August 25, 2015. The patient is here for followup after having his AICD/biventricular pacemaker placed on August 7. No complications with the procedure. The patient does feel better in terms of the shortness of breath he would get when he was lying down but with exertion he feels maybe even worse right now in terms of shortness of breath. Of note he was away with a different diet and gained 4 pounds. (By scale here he has gained 9 pounds.) No palpitations, no syncope or near syncope. He is on carvedilol 25 mg the morning and 12.5 in the evening. He is still complaining of left arm pain ever since the procedure.\par September 8, 2015. The patient is finally starting to feel a little bit better. He cut his diuretic back to every other day and has continued to lose weight. His left arm so bothersome just as much since the procedure. We had a discussion about changing his Avapro to Entresto.\par October 8, 2015. Patient is here for followup. In the interim he had an episode of bronchitis treated with antibiotics and steroids which did cause some fluid retention and he was briefly on diuretics with improvement. He is now off the steroids but still on the diuretic. He does feel better on the Entresto. He thinks he is walking better with less shortness of breath and somewhat more stamina. He otherwise has no complaints. He remains in sinus rhythm and has had no AICD shocks. His blood pressure was borderline and his lungs were clear. I elected to stop the diuretic and increase his Entresto. \par October 23, 2015. On October 20 the patient saw Dr. Morris because of shortness of breath and edema. He had gained 9 pounds and he had significant swelling and she put him back on Lasix 40 mg as we discussed together. Patient had much relief and is here now. He lost 8 of the 9 pounds he again and feels much better. He still gets short of breath going up the stairs and often has to stop. He has trace edema still. His blood pressure is in the 90s systolic. He still eats out but not as much as he used to and they do ask for the low salt.\par November 9, 2015. The patient returns for followup. He has been on Lasix 40 mg since his last visit. He looks great, back to himself, with no complaints or symptoms of CHF. Her systolic pressure is only 90 but he has no symptoms of lightheadedness. He is having a lot of gas to the point of it being embarrassing and wondered if it is related to the Entresto. He also seems to have a dry cough. He will be seeing Dr. Russell in followup next week. Based on his complaints we cut back his Lasix to 20 mg daily but then he called back a few days later with more shortness of breath and went back to 40 mg q.d.\par December 7, 2015. Followup for the patient. As noted he had a go back to 40 mg Lasix because he became short of breath. He was still having GI complaints from the Entresto but he can manage it. He is complaining again of a urinating too much so perhaps we could try 30 mg of Lasix daily. He is seeing Dr. Russell next week. Is turning 89 soon and is planning a cruise on January 24.\par January 12, 2016. Patient is here for followup. Still has good spirits and is looking forward to going to [place] in 10 days. He is still having significant cough from the Entresto and we will have to change back to his prior regimen. Had echocardiogram at [facility] which was supposed to be  a dyssynchrony study and will be seeing Dr. Russell of [facility] tomorrow morning to discuss whether or not to try to improve his biventricular pacing. Currently on 80 mg of Lasix daily. Dr. Morris tried a new inhaler but he does not think it helped.\par March 2, 2016. The patient is here in followup. He went on his cruise and did well. His cough was stopped when he stopped the entresto. On the cruise he was able to cut back to 40 mg of Lasix  and even occasionally he didn't take it if he had a busy day. However after a while he did notice more shortness of breath and edema and the last few days he went back to 80 mg of Lasix with improvement. He is here now today, his weight has gone down 8 pounds, blood pressure was 90/60.\par May 3, 2016. Patient looks well and feels well, but continues to lose weight despite a good appetite. Dr. Jones is concerned and placed him on Ensure and if he does not put on some weight, he will be seeing GI. Abdominal CAT scan was unremarkable, except for some gastric distention and some thickening of the apex of the duodenum, which could just be underfilling. Abdominal aortic aneurysm repair was stable with sac being less than 3 cm. 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 He will follow up with vascular again in one year. He remains on 40 mg of Lasix and irbesartan. Not complaining of shortness of breath. He has done much better since Dr. Russell adjusted. His biventricular pacer by adjusting LV and RV timing. Lab work revealed a very suppressed TSH, so his Synthroid was held and he was to return in 2 weeks to reassess weight loss and thyroid status.\par May 18, 2016. Yesterday the patient was dizzy and lightheaded and fell and hit his head. He was evaluated in the emergency room, where everything was negative. His EKG was unchanged and his defibrillator was interrogated and there were no arrhythmias. His TSH was now 29! He is here today. Patient did fall again last night. Unclear if it is from being lightheaded or losing balance. No syncope. Other than when he had the fall, he has not been complaining of feeling lightheaded. His weight did go back up  off  the thyroid medication. I elected to hold his Lasix and have him reassessed in one week.\par May 24, 2016. Patient called yesterday and is short of breath, edema, and had put his weight back on. I told him to resume the Lasix and he is seen today for his followup. Mostly feels a little better and was able to lie flat last night. Still has some swelling. Blood pressure is still borderline low. Weight is up 6 pounds from last visit. We elected to continue Lasix 40 a day and changed his Synthroid to 0.1 mg daily.\par July 12, 2016. Patient is here for followup, as well as AICD interrogation. He seems to have normal biventricular pacing. Absolutely no arrhythmias for the past 4 months. Does get short of breath with exertion still, but not severe. Rarely will skip a day of Lasix if he has too many things to do. Started physical therapy, and was not out of breath at physical therapy today. His weight at home has been stable.\par September 13, 2016. Patient is getting around okay with a walker. His right knee seemed to be limiting him more than his shortness of breath and he has some trouble with his balance. No palpitation, syncope, or near syncope, or shocks. No change in any medication. He was still on Synthroid 0.1 mg. He is still doing physical therapy.\par November 16, 2016. The patient seems to be doing well, although he did have an episode of just suddenly falling backwards and hitting his head against the refrigerator. He denies lightheadedness or near syncope at the time. He denies vertigo, although he had a different episode. He reached up and looked up and had a vertigo-like episode just for a few seconds. There was no event on his defibrillator interrogation, except a 6 beat run of NSVT on September 24, which was asymptomatic. He is orthostatic here. He is also concerned about his weight loss, although his weight seems to be the same as it was last visit, but he claims he was almost 7 pounds less at home. His wife thinks he does not drink enough water. He is on the Lasix every other day. His EKG showed sinus rhythm with atrial tracking and biventricular pacing. He also has a compression fracture in his lumbar vertebra that is giving him pain\par He is also concerned about his thyroid hormone level, given his weight loss. We continued the Lasix on an every other day basis and lowered the Avapro to 150 mg. BNP was up to 4600 from 2600.\par December 21, 2016. The patient has here in followup. No more falls or complaints of orthostasis, although he is still orthostatic here from 124-106-98 standing with no symptoms. Complaining more of dyspnea on exertion and has some edema. His weight is about the same. After discussion, decided to add digoxin 0.125 q.d.\par February 8, 2017. The patient is here in followup. He has not fallen in a while now and he claims his problem is balance not dizziness. He thinks his dyspnea on exertion is unchanged on the digoxin. However his wife thinks he is better because he is not short of breath coming up the stairs and the  does agree. Just recently he started to develop some pedal edema, but he has had more salt because he loves soups. In addition, he does not sit with his legs elevated. (He also saw Dr. Plascencia for URI and was briefly on prednisone until a couple of weeks ago. He did have an SPEP with normal. Electrophoresis pattern.)\par April 4, 2017. Patient here in followup. Had pacemaker, defibrillator interrogation on March 6, with 2 short episodes of NSVT only. Seems to be doing well on the whole. Dyspnea on exertion is there, but unchanged. Weight is down 3 pounds. He occasionally gets numbness and tingling in his fingers, but not in his toes. Occasionally feels some discomfort over the AICD site. He will be going to his daughter and his niece for the [place] nights.\par Nehal 15, 2017. The patient is here in followup. Has been feeling a little more short of breath and does have a little bit of edema. He saw Dr. Valdez on Friday the ninth, who felt he had bronchitis and gave him Augmentin. Otherwise, patient has no complaints and has been doing pretty well. He still complains occasionally of some soreness over his AICD site. No other interim medical issues, and no change in medication.\par September 12, 2017. Patient is here for followup and defibrillator interrogation. As noted on August 25 had a syncopal episode after he had a hot shower and walked out to the other bathroom. Found himself on the floor and was fine. No recurrence since and in fact defibrillator interrogation shows that he had an episode of VT followed by VF after the device tried to pace terminate, which led to a defibrillator shock, and the patient has been fine since. He is still with sinus rhythm and a first-degree AV block underneath his ventricular pacing. No change in any of the symptoms and in fact, while he does get short of breath sometimes getting into bed, and some walking, was able to go to the gym and work out a little bit without shortness of breath. No chest pain, and no change in medications.\par December 26, 2017. Patient here in followup. Has been having some more shortness of breath, although seems to have frequent cough and URI. Now on a nebulizer. Does have worsening shortness of breath with lying down, but no PND. Had a near syncopal event when he got up right after sitting for a long time watching a movie, but otherwise no episodes of dizziness. AICD interrogation shows no episodes since the event in August. Patient thinks his overall stamina and fatigue are worse. Patient willing to retry Entresto.\par January 11, 2018. So far patient tolerating the Entresto, no cough. Maybe a little less short of breath, but not definite. Does have more edema, but weight is the same. No other complaints. Reluctant to increase the diuretic. We'll keep dose of the same for now and followup in one month if labs are okay. Then consider increasing Entresto.\par Comment:  \NING Genao - 21 Mar 2018 3:12 PM \par   TASK CREATED\par Hi-\par Saw Adam.\par He complained of weight gain, edema, SOB.\par Sent BNP and up to over 5300.\par Do you think that the Entresto is an issue for him?\par Asked him to call you.\par Thanks-\par Ning \par Addendum\par I reviewed the labs and spoke with the patient. Actually, weight is down, and labs are all okay except the BNP. If this is natural history of LV dysfunction, he should be on a higher dose of Entresto. I elected to increase the Entresto between now and his visit next week with me and see if things get worse, improve, or stay the same. Patient has a big cruise coming up on April 18 \par March 27, 2018. Patient returns in followup now on the increased dose of Entresto for one week. On his pacemaker interrogation he has normal function with biventricular pacing, no more VT. His "optivol" has been elevated since mid-January and just for the past week it seems to be coming down, which would correlate with increasing his Enteresto. He does admit to not being a strict on his diet, having Chinese food the other night, and has only been staying with the furosemide every other day despite the increased edema and shortness of breath. Depending on labs, I will probably continue the high-dose Entresto, continue furosemide daily through the beginning of [place] this week and then go back to every other day depending on symptoms, weight and edema.\par May 22, 2018. The patient went on his vacation and did extremely well. By the very end before getting on the plane to come back he was somewhat short of breath and his wife considered going to the emergency room. Instead, he took extra Lasix and was doing well on the plane. At one point got up to the bathroom, came back sat in his seat and his wife leaned over to try to wake him up and they could not arouse him for up to 5 minutes. A doctor on the plane checked him, they took him out of his seat and laid him down on the floor at which point he seemed to come to and felt great. It seems he had a pulse and a blood pressure throughout. When he got off the plane he was brought to [facility] but interrogating the defibrillator showed absolutely no arrhythmias. (Of note, the next day in the hospital he had 2 long runs of nonsustained VT that were asymptomatic.) He did develop a severe cough with rhonchi and sputum. He was seen by Dr. Hema Reyes of pulmonary and discharged on prednisone and antibiotics. He saw Dr. Morris this morning and she just continued those medications and sent labs and told him she thought it was all from his heart. He did have a repeat echo in the hospital, which showed his AS has progressed to borderline severe.\par July 10, 2018. A one point after the hospitalization, the patient's blood pressure was running low and his Lasix was decreased. He developed more shortness of breath, and weight gain, and saw Dr. Romano on June 27. Lasix was put back to 40 mg daily. He is here today, feeling better, has lost 6 pounds. Was at ophthalmologist earlier today and has a new hemorrhage in his left eye, which is his better eye. The ophthalmologist would like me to cut back or hold the aspirin.\par September 12, 2018. Patient returns in followup, as well as for repeat echocardiogram regarding his aortic stenosis. His echo confirms somewhat of a low gradient severe aortic stenosis and is unchanged from May, but the patient feels wonderful maybe somewhat tired, but his breathing is better than it has been a long time and he is able to do physical therapy, etc. Patient was sent for evaluation with structural heart team.\par October 24, 2018. Patient returns for followup. Workup in hospital revealed no significant CAD, very tortuous vessels femorally so if patient needs TAVR would have to use apical approach. Pullback gradient across aortic valve was low as well (15). 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 Since being home the patient has been fairly stable although he does have increased edema since the catheterization. There was also some question about his potassium level. He got a flu shot from Dr. Morris the other day.\par January 23, 2019. The patient here in followup. He remains in sinus or AV paced. Feels good. Taking Lasix 40 mg daily. Blood pressure running on the low side, but no dizziness, lightheadedness, etc. Gets tiredness "from his legs, but not from shortness of breath". he says. He is on 2 new medications from his rheumatologist (Blanca [facility]), gabapentin, and chlorzactazone. He would like to try to go again on at least a ten-day cruise although his wife is very nervous about it. No defibrillator shocks, etc. Off colchicine per Dr. Morris.\par April 24, 2019. Patient here in followup. Good spirits and seems to be doing very well on his current regimen. Remains in sinus rhythm with atrial tracking and ventricular pacing. One VPC noted. No congestive heart failure, despite [place].\par August 20, 2019.  Patient here in follow-up.  Pacemaker interrogation shows 1 or 2 runs of ventricular tachycardia that are pace terminated no shocks and patient was asymptomatic.  EKG shows sinus rhythm with left bundle branch block or more likely atrial tracking and biventricular pacing.  On the whole doing very well although feels that the water pill dictates his life and would like to change to every other day\par November 12, 2019.  Patient here in follow-up.  He remains AV paced at 70. Has a cough for the last 2 days initially productive but now not.  Denies shortness of breath.  Denies fever chills or achiness.  Not sure if there has been more salt in his diet.  His weight is up 2 pounds.  No chest pain shortness of breath etc.  Had a flu shot already.  Has an appointment with Dr. Morris later today. (Has [employer]  this weekend in [place] and another [employer] in a week and a half.).\par February 18, 2020.  Patient here for follow-up as well as defibrillator interrogation.  Now 89 years old.  Remains either sinus with ventricular pacing or AV pacing. Good spirits feeling well.  Not really doing that much walking but no complaints.  If he just moves around side to side he feels a little short of breath but it passes quickly.  No PND or orthopnea.  Complains about nocturia and again asking if he can lower the diuretic.  Weight is the same with slight edema. Rare brief NSVT. No a fib. 98% biV-paced.\par February 19, 2020. Reviewed labs with patient. Change digoxin to 6 days a week as his level is 2.0. Currently only on Lasix 40 mg once a day. Would continue and not cut back further.\par May 19, 2020.  Patient returns in follow-up.  Remains in sinus with atrial tracking and ventricular pacing versus AV pacing.  In pretty good spirits and has remained pretty stable from a cardiac point of view.  Asking how many years I think he can continue.

## 2020-05-19 NOTE — PHYSICAL EXAM
[General Appearance - Well Developed] : well developed [Normal Appearance] : normal appearance [Well Groomed] : well groomed [No Deformities] : no deformities [General Appearance - In No Acute Distress] : no acute distress [Normal Conjunctiva] : the conjunctiva exhibited no abnormalities [Eyelids - No Xanthelasma] : the eyelids demonstrated no xanthelasmas [Normal Oral Mucosa] : normal oral mucosa [No Oral Pallor] : no oral pallor [No Oral Cyanosis] : no oral cyanosis [Normal Jugular Venous A Waves Present] : normal jugular venous A waves present [Normal Jugular Venous V Waves Present] : normal jugular venous V waves present [No Jugular Venous Coley A Waves] : no jugular venous coley A waves [Respiration, Rhythm And Depth] : normal respiratory rhythm and effort [Exaggerated Use Of Accessory Muscles For Inspiration] : no accessory muscle use [Auscultation Breath Sounds / Voice Sounds] : lungs were clear to auscultation bilaterally [Heart Rate And Rhythm] : heart rate and rhythm were normal [Heart Sounds] : normal S1 and S2 [Murmurs] : no murmurs present [Abdomen Soft] : soft [Abdomen Mass (___ Cm)] : no abdominal mass palpated [Abdomen Tenderness] : non-tender [Gait - Sufficient For Exercise Testing] : the gait was sufficient for exercise testing [Abnormal Walk] : normal gait [Nail Clubbing] : no clubbing of the fingernails [Cyanosis, Localized] : no localized cyanosis [Petechial Hemorrhages (___cm)] : no petechial hemorrhages [Skin Turgor] : normal skin turgor [Skin Color & Pigmentation] : normal skin color and pigmentation [] : no rash [Skin Lesions] : no skin lesions [No Venous Stasis] : no venous stasis [No Xanthoma] : no  xanthoma was observed [No Skin Ulcers] : no skin ulcer [Oriented To Time, Place, And Person] : oriented to person, place, and time [Affect] : the affect was normal [Mood] : the mood was normal [FreeTextEntry1] : Pretty good spirits

## 2020-05-20 LAB
ALBUMIN SERPL ELPH-MCNC: 3.8 G/DL
ALP BLD-CCNC: 78 U/L
ALT SERPL-CCNC: 7 U/L
ANION GAP SERPL CALC-SCNC: 12 MMOL/L
AST SERPL-CCNC: 13 U/L
BASOPHILS # BLD AUTO: 0.06 K/UL
BASOPHILS NFR BLD AUTO: 0.8 %
BILIRUB SERPL-MCNC: 0.6 MG/DL
BUN SERPL-MCNC: 33 MG/DL
CALCIUM SERPL-MCNC: 8.8 MG/DL
CHLORIDE SERPL-SCNC: 104 MMOL/L
CHOLEST SERPL-MCNC: 112 MG/DL
CHOLEST/HDLC SERPL: 2.2 RATIO
CO2 SERPL-SCNC: 26 MMOL/L
CREAT SERPL-MCNC: 1.5 MG/DL
DIGOXIN SERPL-MCNC: 2.1 NG/ML
EOSINOPHIL # BLD AUTO: 0.38 K/UL
EOSINOPHIL NFR BLD AUTO: 5.3 %
ESTIMATED AVERAGE GLUCOSE: 108 MG/DL
FERRITIN SERPL-MCNC: 234 NG/ML
GLUCOSE SERPL-MCNC: 110 MG/DL
HBA1C MFR BLD HPLC: 5.4 %
HCT VFR BLD CALC: 34.3 %
HDLC SERPL-MCNC: 51 MG/DL
HGB BLD-MCNC: 10.7 G/DL
IMM GRANULOCYTES NFR BLD AUTO: 0.4 %
IRON SATN MFR SERPL: 22 %
IRON SERPL-MCNC: 50 UG/DL
LDLC SERPL CALC-MCNC: 52 MG/DL
LYMPHOCYTES # BLD AUTO: 0.98 K/UL
LYMPHOCYTES NFR BLD AUTO: 13.8 %
MAN DIFF?: NORMAL
MCHC RBC-ENTMCNC: 31.2 GM/DL
MCHC RBC-ENTMCNC: 31.4 PG
MCV RBC AUTO: 100.6 FL
MONOCYTES # BLD AUTO: 0.62 K/UL
MONOCYTES NFR BLD AUTO: 8.7 %
NEUTROPHILS # BLD AUTO: 5.04 K/UL
NEUTROPHILS NFR BLD AUTO: 71 %
NT-PROBNP SERPL-MCNC: 5032 PG/ML
PLATELET # BLD AUTO: 114 K/UL
POTASSIUM SERPL-SCNC: 4.5 MMOL/L
PROT SERPL-MCNC: 6.4 G/DL
RBC # BLD: 3.41 M/UL
RBC # FLD: 17 %
SODIUM SERPL-SCNC: 142 MMOL/L
TIBC SERPL-MCNC: 230 UG/DL
TRIGL SERPL-MCNC: 40 MG/DL
UIBC SERPL-MCNC: 180 UG/DL
URATE SERPL-MCNC: 3.7 MG/DL
WBC # FLD AUTO: 7.11 K/UL

## 2020-06-01 ENCOUNTER — RX RENEWAL (OUTPATIENT)
Age: 85
End: 2020-06-01

## 2020-07-27 PROBLEM — K62.5 RECTAL BLEEDING: Status: ACTIVE | Noted: 2020-01-01

## 2020-07-27 PROBLEM — M54.2 NECK PAIN: Status: ACTIVE | Noted: 2020-01-01

## 2020-07-27 PROBLEM — R53.82 CHRONIC FATIGUE: Status: ACTIVE | Noted: 2020-01-01

## 2020-07-27 PROBLEM — K21.9 CHRONIC GERD: Status: ACTIVE | Noted: 2020-01-01

## 2020-07-27 PROBLEM — Z87.39 HISTORY OF NECK PAIN: Status: RESOLVED | Noted: 2018-07-17 | Resolved: 2020-01-01

## 2020-07-27 NOTE — PHYSICAL EXAM
[Well Nourished] : well nourished [No Acute Distress] : no acute distress [Well-Appearing] : well-appearing [Well Developed] : well developed [Normal Voice/Communication] : normal voice/communication [Normal Sclera/Conjunctiva] : normal sclera/conjunctiva [EOMI] : extraocular movements intact [PERRL] : pupils equal round and reactive to light [Normal Outer Ear/Nose] : the outer ears and nose were normal in appearance [No JVD] : no jugular venous distention [No Lymphadenopathy] : no lymphadenopathy [No Respiratory Distress] : no respiratory distress  [Clear to Auscultation] : lungs were clear to auscultation bilaterally [No Accessory Muscle Use] : no accessory muscle use [Regular Rhythm] : with a regular rhythm [Normal Rate] : normal rate  [Normal S1, S2] : normal S1 and S2 [Pedal Pulses Present] : the pedal pulses are present [No Carotid Bruits] : no carotid bruits [No Extremity Clubbing/Cyanosis] : no extremity clubbing/cyanosis [No Nipple Discharge] : no nipple discharge [Normal Appearance] : normal in appearance [Soft] : abdomen soft [Non Tender] : non-tender [No Axillary Lymphadenopathy] : no axillary lymphadenopathy [No Masses] : no abdominal mass palpated [Non-distended] : non-distended [No HSM] : no HSM [Normal Bowel Sounds] : normal bowel sounds [Normal Supraclavicular Nodes] : no supraclavicular lymphadenopathy [Normal Posterior Cervical Nodes] : no posterior cervical lymphadenopathy [Normal Axillary Nodes] : no axillary lymphadenopathy [No CVA Tenderness] : no CVA  tenderness [No Spinal Tenderness] : no spinal tenderness [Normal Anterior Cervical Nodes] : no anterior cervical lymphadenopathy [Grossly Normal Strength/Tone] : grossly normal strength/tone [No Rash] : no rash [No Focal Deficits] : no focal deficits [Normal Gait] : normal gait [Coordination Grossly Intact] : coordination grossly intact [Deep Tendon Reflexes (DTR)] : deep tendon reflexes were 2+ and symmetric [Speech Grossly Normal] : speech grossly normal [Alert and Oriented x3] : oriented to person, place, and time [Normal Affect] : the affect was normal [Declined Rectal Exam] : declined rectal exam [de-identified] : No ST; wears left HA; [place]; wearing full face mask; b/l ear cerumen [de-identified] : murmur unchanged [de-identified] : R=16; good air entry; no wheezing; scattered rales [de-identified] : No TM; no stridor; reduced ROM [de-identified] : normal radial pulses; no cords; trace edema b/l

## 2020-07-27 NOTE — ASSESSMENT
[FreeTextEntry1] : See health maintenance assessment and plan outlined below.\par In addition:\par Full labs and urine sent today\par Podiatry f/u as needed\par Vascular f/u as needed\par Ortho/Rheum f/u\par PT\par Urology f/u\par He refuses colonoscopy\par GI f/u /// PPI \par Continue meds, diet, exercise as outlined\par Hematology f/u\par Cardiology f/u\par He declined EKG today - does with Dr. Paiz\par To do f/u CXR or CT chest\par Needs to set up full PFT's\par Resume Anoro\par Vaccines reviewed\par To f/u with ENT, dentist, derm, ophtho,\par RTC for annual physical\par RTC 3-4 months and as needed\par To call for any medical/pulmonary issues

## 2020-07-27 NOTE — HISTORY OF PRESENT ILLNESS
[FreeTextEntry1] : Comes in for annual physical. [de-identified] : Feels well.\par Denies COVID 19 illness.

## 2020-07-27 NOTE — REVIEW OF SYSTEMS
[Vision Problems] : vision problems [Lower Ext Edema] : lower extremity edema [Hearing Loss] : hearing loss [Dyspnea on Exertion] : dyspnea on exertion [Memory Loss] : memory loss [Joint Pain] : joint pain [Back Pain] : back pain [Negative] : Integumentary [Fatigue] : fatigue [Skin Rash] : skin rash [Heartburn] : heartburn

## 2020-07-27 NOTE — HEALTH RISK ASSESSMENT
[16-20] : 16-20 [0] : 2) Feeling down, depressed, or hopeless: Not at all (0) [HIV test declined] : HIV test declined [Patient declined colonoscopy] : Patient declined colonoscopy [Hepatitis C test declined] : Hepatitis C test declined [None] : None [Retired] : retired [With Family] : lives with family [College] : College [] :  [# Of Children ___] : has [unfilled] children [Feels Safe at Home] : Feels safe at home [Fully functional (using the telephone, shopping, preparing meals, housekeeping, doing laundry, using] : Fully functional and needs no help or supervision to perform IADLs (using the telephone, shopping, preparing meals, housekeeping, doing laundry, using transportation, managing medications and managing finances) [Fully functional (bathing, dressing, toileting, transferring, walking, feeding)] : Fully functional (bathing, dressing, toileting, transferring, walking, feeding) [Carbon Monoxide Detector] : carbon monoxide detector [Smoke Detector] : smoke detector [Seat Belt] :  uses seat belt [Sunscreen] : uses sunscreen [With Patient/Caregiver] : With Patient/Caregiver [Name: ___] : Health Care Proxy's Name: [unfilled]  [Designated Healthcare Proxy] : Designated healthcare proxy [Relationship: ___] : Relationship: [unfilled] [Fair] : ~his/her~ current health as fair  [Very Good] : ~his/her~  mood as very good [No] : In the past 12 months have you used drugs other than those required for medical reasons? No [No falls in past year] : Patient reported no falls in the past year [# of Members in Household ___] :  household currently consist of [unfilled] member(s) [FreeTextEntry1] : PERRIN; increased urination with diuretics; more sleepy; rectal bleeding; neck pain; indigestion [] : No [de-identified] : cardiology, ophtho; derm; podiatry; dentist [de-identified] : regular/ low salt [de-identified] : PT  [YearQuit] : 40-45 y ago [[id number]] : 0 [Change in mental status noted] : No change in mental status noted [Language] : denies difficulty with language [Behavior] : denies difficulty with behavior [Handling Complex Tasks] : denies difficulty handling complex tasks [Learning/Retaining New Information] : denies difficulty learning/retaining new information [Reasoning] : denies difficulty with reasoning [Spatial Ability and Orientation] : denies difficulty with spatial ability and orientation [Sexually Active] : not sexually active [Reports changes in hearing] : Reports no changes in hearing [Reports changes in vision] : Reports no changes in vision [Reports changes in dental health] : Reports no changes in dental health [Guns at Home] : no guns at home [Travel to Developing Areas] : does not  travel to developing areas [TB Exposure] : is not being exposed to tuberculosis [Caregiver Concerns] : does not have caregiver concerns [MammogramComments] : NA [PapSmearComments] : NA [BoneDensityComments] : NA [AdvancecareDate] : 07/20

## 2020-08-19 NOTE — HISTORY OF PRESENT ILLNESS
[FreeTextEntry1] : (MED HX) The patient is now 88 years old. I first saw him in 2006. At that time he had a history of a myocardial infarction and angioplasty back in 1994. He had done well for years although his activity level had diminished with age and he began complaining of fatigue. In December of 2011 he had a stress echocardiogram that was abnormal possibly with very abnormal blood pressure response and possibly with new wall motion abnormalities. He underwent a CTA and this was followed up by cardiac catheterization at [facility] in December of 2011. 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 The patient did well after that which is a minor adjustment of his medications. He has a history of hyperlipidemia and had a past history of hypertension. He stopped smoking 43 years ago, no diabetes, no family history of coronary artery disease.\par April 30, 2013 he was admitted to [facility] with left lower extremity cellulitis and altered mental status. He had positive blood cultures for strep pyogenes group A. An echocardiogram showed a possible small echodensity on the ventricular side of the noncoronary cusp. A joint aspiration of his left ankle was negative. He was treated with vancomycin and Zosyn, switched to cefepime. Followed by plastic surgery for wound care. No evidence of endocarditis clinically. A CAT scan of his abdomen and pelvis showed a 3.7 cm infrarenal abdominal aortic aneurysm. There was also bilateral inguinal hernias. 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 He was followed by Dr. Damián Lovelace of infectious disease. There is still a possibility he may need a skin graft for his left ankle. He was in the hospital for a little over 2 weeks and then in rehabilitation for 5 weeks. \par July 3, 2013. Here for followup visit. He had been home now for about 10 days and had already seen Dr. Lovelace and Dr. Jones his internist.  He denies chest pain or shortness of breath. There has been no syncope or near syncope or palpitations. Upon review of his medications the only change is that his Avapro is at a half of a 150 mg pill daily due to low blood pressure in the hospital and his simvastatin has been cut down from 20 mg to 10 mg. He did lose weight during this hospitalization as well. There have been no episodes of fevers, chills, sweats, etc.\par September 3, 2013. Here for followup. He is preparing to go on a trip to [place] in October. He does note shortness of breath with exertion but only with a lot of exertion, no problem going up stairs, and he thinks no different than even before his cellulitis episode. He denies exertional chest pain. We elected to see how he did with increased exercise and if his shortness of breath was not severe he would be okay to go on the trip to [place], obviously using commonsense as we discussed.\par January 6, 2014. The patient is here for followup. He did very well on the trip to [place] with some limitation from his knees but no significant shortness of breath or chest pain. He gets swelling on his left leg where he had the cellulitis but not on the right side. He only had one episode of slight lightheadedness when he got up too fast but otherwise has been tolerating the current medications. He is planning on going to [place] at the end of January. He has a mild cough, nonproductive, no fever chills. He claims he was unable to get an appointment with his internist. Her workup was unrevealing.\par May 5, 2014. The patient is here for followup. He saw Dr. Jones in the interim and had lab work with an excellent lipid profile but a high BNP. He saw vascular surgery because of the abdominal aortic aneurysm which measured 3.8 cm and that will be followed. He had an episode where his balance was off for about one hour. It only happened when he tried to walk around. He denies feeling lightheaded like he might pass out. If he was sitting and not moving he felt perfectly fine. There was no vertigo. He claims it happened him once before and it resolved as well. I advised him to discuss with Dr. Jones and perhaps a neurologist. In addition he brought up again with his wife his symptoms he's had for 40 years where he suddenly for no reason will get chest pain and pressure that radiates up to his jaw. It will actually go away if he does nothing but if he takes Maalox it will go away faster. I explained to him that this is probably esophageal reflux with esophageal spasm but it so infrequent that it is not worth for him to take a PPI on a daily basis. He denies having exertional chest pain or shortness of breath. He is considering possible knee replacement in the near future. He returned in July for preop clearance for knee replacement which he had in the middle of July with no complications Other than needing to be transfused 3 units and having been sent in to [facility] in the [facility] to have that done on 2 occasions.\par October 7, 2014. The patient is here for followup. He is feeling better and is more active but is also noting more shortness of breath with exertion or at least others with him have noticed it. He thinks he needs the other knee replacement done but his wife said she will divorce him if he does it. His TSH was elevated on labs with Dr. Jones last month so his Synthroid was increased from 6 days a week to 7 days a week. His BNP was elevated and he asked "should I worry about heart failure?". In addition Dr. Jones cut back his Avapro 300 to 150 because blood pressure was 110. The patient denies lightheadedness dizziness etc. His blood pressure today on 2 different occasions was 146/70. He also has an abdominal aortic aneurysm measuring 3.8 cm that is being followed. He'll be seeing Dr. Temple tomorrow.\par October 28, 2014. Dr. Jacobs called yesterday that the patient's abdominal aorta had increased in size by 0.9 cm and the patient is to be scheduled for an endovascular repair of his abdominal aortic aneurysm. He came here for his echocardiogram and discussion. The echo for the most part is unchanged from May of 2013 with left ventricular ejection fraction around 40%, mild to moderate MR, mild to moderate AI, segmental LV dysfunction with akinetic inferoposterior wall, and hypokinetic to akinetic inferolateral and mid lateral walls, normal anterior wall. LV size upper limit of normal. Normal RV size and function with an RVSP estimated at 41 mm of mercury.\par April 29, 2015. The patient did well with his endovascular repair of his abdominal aortic aneurysm. He went down to [place] and at one point was found to be short of breath with that was felt to be a pleural effusion. He was set up for a thoracentesis but after taking a diuretic for a few days they found there was no fluid there. His cough did not go away until he took a course of prednisone.The diuretic was stopped and he did well returning home. He took a tour of [place] and noted that any kind of incline made him very short of breath but no chest pressure. He saw Dr. Morris of pulmonary who found his proBNP to be 3400 and in the past it was only 1100. However his chest x-ray was totally clear. He has no PND or orthopnea and no real edema. She did give him another inhaler (Symbicort) and referred him here. His weight has not been up and if anything has gone down a little. His last echo was in October as above and was unchanged for the most part.  There is no chest pain with exertion. \par April 30, 2015. The patient returned for a stress echocardiogram. His baseline LV function looked worse than previously with left ventricular ejection fraction around 27%. With exercise he dropped his blood pressure and there were new wall motion abnormalities. He was scheduled for cardiac catheterization and probable defibrillator with possible biventricular pacemaker.\par May 6, 2015. Cardiac catheterization was done and revealed for the most part unchanged anatomy.  LVEF by V-gram was 35% . Given these findings this time Dr. Reyes did angioplasty and stent the right coronary artery. Electrophysiology felt he should wait at least 2 weeks and come back for another ejection fraction and clinical evaluation to see if things had improved before deciding on a  possible  AICD/biventricular pacemaker.\par May 21, 2015. The patient returns for followup and echocardiogram. He has felt much better over the 2 weeks but he has not been doing his usual activities because he was told not to do it. His echocardiogram to my eye looks unchanged. His exam is unchanged but there is no signs of congestive heart failure. I discussed the results with the patient and his wife. I told him that his improved symptoms could be because he is not that active, it could be a placebo effect of the procedure, it could be that things have improved and we just are unable to measure it on the echocardiogram. A BNP was sent and will be compared to his previous level which had gone up from the 400 range into the 800s. In addition over the weekend the patient will increase his activity and see what his level of symptoms are. If his symptoms are significant and worse than they had been a few months ago, we will schedule the ICD and biventricular pacemaker. The patient will contact me after the weekend.\par Nehal 15, 2015. The patient saw Dr. Russell of [facility] and unfortunately he has to wait 3 months (August 6, 2015) from the angioplasty unless he becomes unstable, has any arrhythmias, or needs a pacemaker for bradycardic reasons.  He did increase the carvedilol to 12.5 mg in the morning and 25 mg in the evening Currently he feels well and is stable. He does get out of breath and some weakness in his legs if he is walking uphill. When he initially lies down he feels some pressure he then says is shortness of breath but it resolves. No PND or orthopnea per se.\par July 14, 2015. The patient called a few weeks ago thinking he was more short of breath and wanted to move up the AICD/biventricular pacemaker. However after speaking with Dr. Russell of [facility] he explained he still was not qualified and must wait unless his symptoms are so severe that he requires hospitalization. I explained this to the patient and he returns today for routine followup.  He is still symptomatic with dyspnea on exertion but no PND or orthopnea. 3 months from his angioplasty will be August 6. He has a trip to [facility] planned with his daughter August 12.\par August 25, 2015. The patient is here for followup after having his AICD/biventricular pacemaker placed on August 7. No complications with the procedure. The patient does feel better in terms of the shortness of breath he would get when he was lying down but with exertion he feels maybe even worse right now in terms of shortness of breath. Of note he was away with a different diet and gained 4 pounds. (By scale here he has gained 9 pounds.) No palpitations, no syncope or near syncope. He is on carvedilol 25 mg the morning and 12.5 in the evening. He is still complaining of left arm pain ever since the procedure.\par September 8, 2015. The patient is finally starting to feel a little bit better. He cut his diuretic back to every other day and has continued to lose weight. His left arm so bothersome just as much since the procedure. We had a discussion about changing his Avapro to Entresto.\par October 8, 2015. Patient is here for followup. In the interim he had an episode of bronchitis treated with antibiotics and steroids which did cause some fluid retention and he was briefly on diuretics with improvement. He is now off the steroids but still on the diuretic. He does feel better on the Entresto. He thinks he is walking better with less shortness of breath and somewhat more stamina. He otherwise has no complaints. He remains in sinus rhythm and has had no AICD shocks. His blood pressure was borderline and his lungs were clear. I elected to stop the diuretic and increase his Entresto. \par October 23, 2015. On October 20 the patient saw Dr. Morris because of shortness of breath and edema. He had gained 9 pounds and he had significant swelling and she put him back on Lasix 40 mg as we discussed together. Patient had much relief and is here now. He lost 8 of the 9 pounds he again and feels much better. He still gets short of breath going up the stairs and often has to stop. He has trace edema still. His blood pressure is in the 90s systolic. He still eats out but not as much as he used to and they do ask for the low salt.\par November 9, 2015. The patient returns for followup. He has been on Lasix 40 mg since his last visit. He looks great, back to himself, with no complaints or symptoms of CHF. Her systolic pressure is only 90 but he has no symptoms of lightheadedness. He is having a lot of gas to the point of it being embarrassing and wondered if it is related to the Entresto. He also seems to have a dry cough. He will be seeing Dr. Russell in followup next week. Based on his complaints we cut back his Lasix to 20 mg daily but then he called back a few days later with more shortness of breath and went back to 40 mg q.d.\par December 7, 2015. Followup for the patient. As noted he had a go back to 40 mg Lasix because he became short of breath. He was still having GI complaints from the Entresto but he can manage it. He is complaining again of a urinating too much so perhaps we could try 30 mg of Lasix daily. He is seeing Dr. Russell next week. Is turning 89 soon and is planning a cruise on January 24.\par January 12, 2016. Patient is here for followup. Still has good spirits and is looking forward to going to [place] in 10 days. He is still having significant cough from the Entresto and we will have to change back to his prior regimen. Had echocardiogram at [facility] which was supposed to be  a dyssynchrony study and will be seeing Dr. Russell of [facility] tomorrow morning to discuss whether or not to try to improve his biventricular pacing. Currently on 80 mg of Lasix daily. Dr. Morris tried a new inhaler but he does not think it helped.\par March 2, 2016. The patient is here in followup. He went on his cruise and did well. His cough was stopped when he stopped the entresto. On the cruise he was able to cut back to 40 mg of Lasix  and even occasionally he didn't take it if he had a busy day. However after a while he did notice more shortness of breath and edema and the last few days he went back to 80 mg of Lasix with improvement. He is here now today, his weight has gone down 8 pounds, blood pressure was 90/60.\par May 3, 2016. Patient looks well and feels well, but continues to lose weight despite a good appetite. Dr. Jones is concerned and placed him on Ensure and if he does not put on some weight, he will be seeing GI. Abdominal CAT scan was unremarkable, except for some gastric distention and some thickening of the apex of the duodenum, which could just be underfilling. Abdominal aortic aneurysm repair was stable with sac being less than 3 cm. 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 He will follow up with vascular again in one year. He remains on 40 mg of Lasix and irbesartan. Not complaining of shortness of breath. He has done much better since Dr. Russell adjusted. His biventricular pacer by adjusting LV and RV timing. Lab work revealed a very suppressed TSH, so his Synthroid was held and he was to return in 2 weeks to reassess weight loss and thyroid status.\par May 18, 2016. Yesterday the patient was dizzy and lightheaded and fell and hit his head. He was evaluated in the emergency room, where everything was negative. His EKG was unchanged and his defibrillator was interrogated and there were no arrhythmias. His TSH was now 29! He is here today. Patient did fall again last night. Unclear if it is from being lightheaded or losing balance. No syncope. Other than when he had the fall, he has not been complaining of feeling lightheaded. His weight did go back up  off  the thyroid medication. I elected to hold his Lasix and have him reassessed in one week.\par May 24, 2016. Patient called yesterday and is short of breath, edema, and had put his weight back on. I told him to resume the Lasix and he is seen today for his followup. Mostly feels a little better and was able to lie flat last night. Still has some swelling. Blood pressure is still borderline low. Weight is up 6 pounds from last visit. We elected to continue Lasix 40 a day and changed his Synthroid to 0.1 mg daily.\par July 12, 2016. Patient is here for followup, as well as AICD interrogation. He seems to have normal biventricular pacing. Absolutely no arrhythmias for the past 4 months. Does get short of breath with exertion still, but not severe. Rarely will skip a day of Lasix if he has too many things to do. Started physical therapy, and was not out of breath at physical therapy today. His weight at home has been stable.\par September 13, 2016. Patient is getting around okay with a walker. His right knee seemed to be limiting him more than his shortness of breath and he has some trouble with his balance. No palpitation, syncope, or near syncope, or shocks. No change in any medication. He was still on Synthroid 0.1 mg. He is still doing physical therapy.\par November 16, 2016. The patient seems to be doing well, although he did have an episode of just suddenly falling backwards and hitting his head against the refrigerator. He denies lightheadedness or near syncope at the time. He denies vertigo, although he had a different episode. He reached up and looked up and had a vertigo-like episode just for a few seconds. There was no event on his defibrillator interrogation, except a 6 beat run of NSVT on September 24, which was asymptomatic. He is orthostatic here. He is also concerned about his weight loss, although his weight seems to be the same as it was last visit, but he claims he was almost 7 pounds less at home. His wife thinks he does not drink enough water. He is on the Lasix every other day. His EKG showed sinus rhythm with atrial tracking and biventricular pacing. He also has a compression fracture in his lumbar vertebra that is giving him pain\par He is also concerned about his thyroid hormone level, given his weight loss. We continued the Lasix on an every other day basis and lowered the Avapro to 150 mg. BNP was up to 4600 from 2600.\par December 21, 2016. The patient has here in followup. No more falls or complaints of orthostasis, although he is still orthostatic here from 124-106-98 standing with no symptoms. Complaining more of dyspnea on exertion and has some edema. His weight is about the same. After discussion, decided to add digoxin 0.125 q.d.\par February 8, 2017. The patient is here in followup. He has not fallen in a while now and he claims his problem is balance not dizziness. He thinks his dyspnea on exertion is unchanged on the digoxin. However his wife thinks he is better because he is not short of breath coming up the stairs and the  does agree. Just recently he started to develop some pedal edema, but he has had more salt because he loves soups. In addition, he does not sit with his legs elevated. (He also saw Dr. Plascencia for URI and was briefly on prednisone until a couple of weeks ago. He did have an SPEP with normal. Electrophoresis pattern.)\par April 4, 2017. Patient here in followup. Had pacemaker, defibrillator interrogation on March 6, with 2 short episodes of NSVT only. Seems to be doing well on the whole. Dyspnea on exertion is there, but unchanged. Weight is down 3 pounds. He occasionally gets numbness and tingling in his fingers, but not in his toes. Occasionally feels some discomfort over the AICD site. He will be going to his daughter and his niece for the [place] nights.\par Nehal 15, 2017. The patient is here in followup. Has been feeling a little more short of breath and does have a little bit of edema. He saw Dr. Valdez on Friday the ninth, who felt he had bronchitis and gave him Augmentin. Otherwise, patient has no complaints and has been doing pretty well. He still complains occasionally of some soreness over his AICD site. No other interim medical issues, and no change in medication.\par September 12, 2017. Patient is here for followup and defibrillator interrogation. As noted on August 25 had a syncopal episode after he had a hot shower and walked out to the other bathroom. Found himself on the floor and was fine. No recurrence since and in fact defibrillator interrogation shows that he had an episode of VT followed by VF after the device tried to pace terminate, which led to a defibrillator shock, and the patient has been fine since. He is still with sinus rhythm and a first-degree AV block underneath his ventricular pacing. No change in any of the symptoms and in fact, while he does get short of breath sometimes getting into bed, and some walking, was able to go to the gym and work out a little bit without shortness of breath. No chest pain, and no change in medications.\par December 26, 2017. Patient here in followup. Has been having some more shortness of breath, although seems to have frequent cough and URI. Now on a nebulizer. Does have worsening shortness of breath with lying down, but no PND. Had a near syncopal event when he got up right after sitting for a long time watching a movie, but otherwise no episodes of dizziness. AICD interrogation shows no episodes since the event in August. Patient thinks his overall stamina and fatigue are worse. Patient willing to retry Entresto.\par January 11, 2018. So far patient tolerating the Entresto, no cough. Maybe a little less short of breath, but not definite. Does have more edema, but weight is the same. No other complaints. Reluctant to increase the diuretic. We'll keep dose of the same for now and followup in one month if labs are okay. Then consider increasing Entresto.\par Comment:  \NING Genao - 21 Mar 2018 3:12 PM \par   TASK CREATED\par Hi-\par Saw Adam.\par He complained of weight gain, edema, SOB.\par Sent BNP and up to over 5300.\par Do you think that the Entresto is an issue for him?\par Asked him to call you.\par Thanks-\par Ning \par Addendum\par I reviewed the labs and spoke with the patient. Actually, weight is down, and labs are all okay except the BNP. If this is natural history of LV dysfunction, he should be on a higher dose of Entresto. I elected to increase the Entresto between now and his visit next week with me and see if things get worse, improve, or stay the same. Patient has a big cruise coming up on April 18 \par March 27, 2018. Patient returns in followup now on the increased dose of Entresto for one week. On his pacemaker interrogation he has normal function with biventricular pacing, no more VT. His "optivol" has been elevated since mid-January and just for the past week it seems to be coming down, which would correlate with increasing his Enteresto. He does admit to not being a strict on his diet, having Chinese food the other night, and has only been staying with the furosemide every other day despite the increased edema and shortness of breath. Depending on labs, I will probably continue the high-dose Entresto, continue furosemide daily through the beginning of [place] this week and then go back to every other day depending on symptoms, weight and edema.\par May 22, 2018. The patient went on his vacation and did extremely well. By the very end before getting on the plane to come back he was somewhat short of breath and his wife considered going to the emergency room. Instead, he took extra Lasix and was doing well on the plane. At one point got up to the bathroom, came back sat in his seat and his wife leaned over to try to wake him up and they could not arouse him for up to 5 minutes. A doctor on the plane checked him, they took him out of his seat and laid him down on the floor at which point he seemed to come to and felt great. It seems he had a pulse and a blood pressure throughout. When he got off the plane he was brought to [facility] but interrogating the defibrillator showed absolutely no arrhythmias. (Of note, the next day in the hospital he had 2 long runs of nonsustained VT that were asymptomatic.) He did develop a severe cough with rhonchi and sputum. He was seen by Dr. Hema Reyes of pulmonary and discharged on prednisone and antibiotics. He saw Dr. Morris this morning and she just continued those medications and sent labs and told him she thought it was all from his heart. He did have a repeat echo in the hospital, which showed his AS has progressed to borderline severe.\par July 10, 2018. A one point after the hospitalization, the patient's blood pressure was running low and his Lasix was decreased. He developed more shortness of breath, and weight gain, and saw Dr. Romano on June 27. Lasix was put back to 40 mg daily. He is here today, feeling better, has lost 6 pounds. Was at ophthalmologist earlier today and has a new hemorrhage in his left eye, which is his better eye. The ophthalmologist would like me to cut back or hold the aspirin.\par September 12, 2018. Patient returns in followup, as well as for repeat echocardiogram regarding his aortic stenosis. His echo confirms somewhat of a low gradient severe aortic stenosis and is unchanged from May, but the patient feels wonderful maybe somewhat tired, but his breathing is better than it has been a long time and he is able to do physical therapy, etc. Patient was sent for evaluation with structural heart team.\par October 24, 2018. Patient returns for followup. Workup in hospital revealed no significant CAD, very tortuous vessels femorally so if patient needs TAVR would have to use apical approach. Pullback gradient across aortic valve was low as well (15). 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 Since being home the patient has been fairly stable although he does have increased edema since the catheterization. There was also some question about his potassium level. He got a flu shot from Dr. Morris the other day.\par January 23, 2019. The patient here in followup. He remains in sinus or AV paced. Feels good. Taking Lasix 40 mg daily. Blood pressure running on the low side, but no dizziness, lightheadedness, etc. Gets tiredness "from his legs, but not from shortness of breath". he says. He is on 2 new medications from his rheumatologist (Blanca [facility]), gabapentin, and chlorzactazone. He would like to try to go again on at least a ten-day cruise although his wife is very nervous about it. No defibrillator shocks, etc. Off colchicine per Dr. Morris.\par April 24, 2019. Patient here in followup. Good spirits and seems to be doing very well on his current regimen. Remains in sinus rhythm with atrial tracking and ventricular pacing. One VPC noted. No congestive heart failure, despite Passover.\par August 20, 2019.  Patient here in follow-up.  Pacemaker interrogation shows 1 or 2 runs of ventricular tachycardia that are pace terminated no shocks and patient was asymptomatic.  EKG shows sinus rhythm with left bundle branch block or more likely atrial tracking and biventricular pacing.  On the whole doing very well although feels that the water pill dictates his life and would like to change to every other day\par November 12, 2019.  Patient here in follow-up.  He remains AV paced at 70. Has a cough for the last 2 days initially productive but now not.  Denies shortness of breath.  Denies fever chills or achiness.  Not sure if there has been more salt in his diet.  His weight is up 2 pounds.  No chest pain shortness of breath etc.  Had a flu shot already.  Has an appointment with Dr. Morris later today. (Has [facility]  this weekend in [place] and another [facility] in a week and a half.).\par February 18, 2020.  Patient here for follow-up as well as defibrillator interrogation.  Now 89 years old.  Remains either sinus with ventricular pacing or AV pacing. Good spirits feeling well.  Not really doing that much walking but no complaints.  If he just moves around side to side he feels a little short of breath but it passes quickly.  No PND or orthopnea.  Complains about nocturia and again asking if he can lower the diuretic.  Weight is the same with slight edema. Rare brief NSVT. No a fib. 98% biV-paced.\par February 19, 2020. Reviewed labs with patient. Change digoxin to 6 days a week as his level is 2.0. Currently only on Lasix 40 mg once a day. Would continue and not cut back further.\par May 19, 2020.  Patient returns in follow-up.  Remains in sinus with atrial tracking and ventricular pacing versus AV pacing.  In pretty good spirits and has remained pretty stable from a cardiac point of view.  Asking how many years I think he can continue.\par Reviewed labs with patient. Digoxin level 2.1 so now will take it only 5 days a week skipping Monday and Friday. Satisfied with renal function potassium and other labs. BNP slightly higher than previously but still lower than when he was 7000 and 9000. Patient did have some mushroom barley soup from the [facility] that may have had more salt etc. No other change in medication as of now. \par August 19, 2020.  Patient returns in follow-up.  Saw Dr. Morris a few weeks ago.  BNP 4733, BUN 35 creatinine 1.6 potassium 4.3.  Normal LFTs.  Cholesterol 119, HDL 56, LDL 54, triglycerides 45.  Normal TSH.  Hemoglobin A1c 5.3.  No digoxin level done.  Hemoglobin 11.2 hematocrit 35.2.  Platelets 124,000\par EKG with sinus rhythm and ventricular pacing at 72.  Pacemaker AICD interrogation revealed only one episode of atrial fibrillation that lasted an hour and 48 minutes and 1 SVT of 9 beats and a second 1 of 6 beats.  Symptomatically he sounds pretty stable.  Only feels out of breath when he is trying to get into bed at night but during the day walking around he is unchanged and there is no PND.  There is mild edema that is chronic.  Digoxin was decreased last visit because of elevated levels of that will be rechecked today.  Last echo was almost 2 years ago so he will be scheduled for an echo with his next visit.  He was asking about his ejection fraction.

## 2020-08-19 NOTE — REVIEW OF SYSTEMS
[Dyspnea on exertion] : dyspnea during exertion [Lower Ext Edema] : lower extremity edema [Joint Pain] : joint pain [see HPI] : see HPI [Negative] : Heme/Lymph [Fever] : no fever [Recent Weight Gain (___ Lbs)] : no recent weight gain [Feeling Fatigued] : not feeling fatigued [Chills] : no chills [Chest  Pressure] : no chest pressure [Shortness Of Breath] : no shortness of breath [Recent Weight Loss (___ Lbs)] : recent [unfilled] ~Ulb weight loss [Cough] : no cough [Palpitations] : no palpitations [Chest Pain] : no chest pain [Dizziness] : no dizziness [Change in Appetite] : no change in appetite [Change In The Stool] : no change in stool [Abdominal Pain] : no abdominal pain [Excessive Thirst] : no polydipsia [Confusion] : no confusion was observed [Anxiety] : no anxiety

## 2020-08-19 NOTE — PHYSICAL EXAM
[General Appearance - Well Developed] : well developed [Well Groomed] : well groomed [No Deformities] : no deformities [Normal Appearance] : normal appearance [General Appearance - In No Acute Distress] : no acute distress [Normal Oral Mucosa] : normal oral mucosa [Eyelids - No Xanthelasma] : the eyelids demonstrated no xanthelasmas [Normal Conjunctiva] : the conjunctiva exhibited no abnormalities [No Oral Pallor] : no oral pallor [No Oral Cyanosis] : no oral cyanosis [Normal Jugular Venous A Waves Present] : normal jugular venous A waves present [Normal Jugular Venous V Waves Present] : normal jugular venous V waves present [No Jugular Venous Coley A Waves] : no jugular venous coley A waves [Respiration, Rhythm And Depth] : normal respiratory rhythm and effort [Exaggerated Use Of Accessory Muscles For Inspiration] : no accessory muscle use [Auscultation Breath Sounds / Voice Sounds] : lungs were clear to auscultation bilaterally [Heart Rate And Rhythm] : heart rate and rhythm were normal [Murmurs] : no murmurs present [Heart Sounds] : normal S1 and S2 [Abdomen Soft] : soft [Abdomen Tenderness] : non-tender [Abdomen Mass (___ Cm)] : no abdominal mass palpated [Gait - Sufficient For Exercise Testing] : the gait was sufficient for exercise testing [Abnormal Walk] : normal gait [Cyanosis, Localized] : no localized cyanosis [Nail Clubbing] : no clubbing of the fingernails [Petechial Hemorrhages (___cm)] : no petechial hemorrhages [Skin Color & Pigmentation] : normal skin color and pigmentation [] : no rash [No Venous Stasis] : no venous stasis [Skin Turgor] : normal skin turgor [No Xanthoma] : no  xanthoma was observed [No Skin Ulcers] : no skin ulcer [Skin Lesions] : no skin lesions [Oriented To Time, Place, And Person] : oriented to person, place, and time [Mood] : the mood was normal [Affect] : the affect was normal [FreeTextEntry1] : Trace edema only, 1/4 up bilaterally. Pulses bilaterally symmetrical including right pedal, ?left

## 2020-09-11 NOTE — HISTORY OF PRESENT ILLNESS
[FreeTextEntry8] : Jeovanny reports that he had a severe cough which initially resolved but then recurred.\par He reports that in the last few days his cough has improved and is almost gone.  He was going to cancel his appointment today but decided to come in anyway.\par He denies any chest pain or tightness.\par He denies any increased shortness of breath or wheezing.\par He has been using Anoro Ellipta.\par He has a mild cough mostly in the morning upon awakening.  He has scant clear secretions.  He denies any fevers or hemoptysis.  He denies any increased edema or calf pain.\par He denies any acute URI.  He does have chronic rhinitis but does not feel that he is having active allergy symptoms. [Spouse] : spouse

## 2020-09-11 NOTE — PHYSICAL EXAM
[No Acute Distress] : no acute distress [Well Nourished] : well nourished [Well-Appearing] : well-appearing [Well Developed] : well developed [PERRL] : pupils equal round and reactive to light [Normal Sclera/Conjunctiva] : normal sclera/conjunctiva [Normal Voice/Communication] : normal voice/communication [Normal Outer Ear/Nose] : the outer ears and nose were normal in appearance [EOMI] : extraocular movements intact [No JVD] : no jugular venous distention [Supple] : supple [No Respiratory Distress] : no respiratory distress  [No Accessory Muscle Use] : no accessory muscle use [Clear to Auscultation] : lungs were clear to auscultation bilaterally [Regular Rhythm] : with a regular rhythm [Normal Rate] : normal rate  [Normal S1, S2] : normal S1 and S2 [No Edema] : there was no peripheral edema [Soft] : abdomen soft [Normal Bowel Sounds] : normal bowel sounds [No Extremity Clubbing/Cyanosis] : no extremity clubbing/cyanosis [No Spinal Tenderness] : no spinal tenderness [No CVA Tenderness] : no CVA  tenderness [No Focal Deficits] : no focal deficits [No Rash] : no rash [Normal Gait] : normal gait [Speech Grossly Normal] : speech grossly normal [Normal Affect] : the affect was normal [Alert and Oriented x3] : oriented to person, place, and time [de-identified] : B/L HA's; no ST; wearing full facemask [de-identified] : no stridor [de-identified] : R=16; no wheezing; good air entry [de-identified] : no cords [de-identified] : Murmur unchanged [de-identified] : ambulates with walker

## 2020-09-11 NOTE — HEALTH RISK ASSESSMENT
[] : No [Yes] : Yes [No falls in past year] : Patient reported no falls in the past year [No] : In the past 12 months have you used drugs other than those required for medical reasons? No [0] : 1) Little interest or pleasure doing things: Not at all (0) [de-identified] : Cardiology [[id number]] : 0 [de-identified] : None [de-identified] : Regular

## 2020-09-11 NOTE — ASSESSMENT
[FreeTextEntry1] : Cough\par As per patient-seems to be resolving\par ?  Related to seasonal allergies with postnasal drip given the fact that his cough seems to be worse in the morning upon awakening\par Daily Flonase\par Can try daily antihistamine\par Humidifier\par Saline nasal rinses\par Minimize pollen exposure\par Antireflux diet and precautions\par Pepcid as needed\par \par COPD\par Clinically stable\par Monitor chest x-ray and PFTs\par Flu vaccine given today\par No longer smokes\par UT declined\par Weight control\par Cardiology follow-up\par Continue Anoro Ellipta\par Albuterol as needed\par \par He will return in follow-up for his annual physical and as needed\par He will call if his status worsens or does not continue to improve\par He will call for any medical or pulmonary issues\par All of his questions were answered\par He verbally confirmed understanding of all of the above

## 2020-09-11 NOTE — REVIEW OF SYSTEMS
[Vision Problems] : vision problems [Fatigue] : fatigue [Hearing Loss] : hearing loss [Lower Ext Edema] : lower extremity edema [Dyspnea on Exertion] : dyspnea on exertion [Cough] : cough [Back Pain] : back pain [Joint Pain] : joint pain [Heartburn] : heartburn [Memory Loss] : memory loss [Skin Rash] : skin rash [Negative] : Heme/Lymph

## 2020-10-21 NOTE — REVIEW OF SYSTEMS
[Dyspnea on exertion] : dyspnea during exertion [Lower Ext Edema] : lower extremity edema [Joint Pain] : joint pain [see HPI] : see HPI [Negative] : Heme/Lymph [Fever] : no fever [Recent Weight Gain (___ Lbs)] : recent [unfilled] ~Ulb weight gain [Chills] : no chills [Feeling Fatigued] : not feeling fatigued [Recent Weight Loss (___ Lbs)] : no recent weight loss [Shortness Of Breath] : no shortness of breath [Chest  Pressure] : no chest pressure [Chest Pain] : no chest pain [Palpitations] : no palpitations [Cough] : no cough [Abdominal Pain] : no abdominal pain [Change in Appetite] : no change in appetite [Change In The Stool] : no change in stool [Dizziness] : no dizziness [Confusion] : no confusion was observed [Anxiety] : no anxiety [Excessive Thirst] : no polydipsia

## 2020-10-21 NOTE — HISTORY OF PRESENT ILLNESS
[FreeTextEntry1] : (MED HX) The patient is now 88 years old. I first saw him in 2006. At that time he had a history of a myocardial infarction and angioplasty back in 1994. He had done well for years although his activity level had diminished with age and he began complaining of fatigue. In December of 2011 he had a stress echocardiogram that was abnormal possibly with very abnormal blood pressure response and possibly with new wall motion abnormalities. He underwent a CTA and this was followed up by cardiac catheterization at [facility] in December of 2011. 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 The patient did well after that which is a minor adjustment of his medications. He has a history of hyperlipidemia and had a past history of hypertension. He stopped smoking 43 years ago, no diabetes, no family history of coronary artery disease.\par April 30, 2013 he was admitted to [facility] with left lower extremity cellulitis and altered mental status. He had positive blood cultures for strep pyogenes group A. An echocardiogram showed a possible small echodensity on the ventricular side of the noncoronary cusp. A joint aspiration of his left ankle was negative. He was treated with vancomycin and Zosyn, switched to cefepime. Followed by plastic surgery for wound care. No evidence of endocarditis clinically. A CAT scan of his abdomen and pelvis showed a 3.7 cm infrarenal abdominal aortic aneurysm. There was also bilateral inguinal hernias. 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 He was followed by Dr. Damián Lovelace of infectious disease. There is still a possibility he may need a skin graft for his left ankle. He was in the hospital for a little over 2 weeks and then in rehabilitation for 5 weeks. \par July 3, 2013. Here for followup visit. He had been home now for about 10 days and had already seen Dr. Lovelace and Dr. Jones his internist.  He denies chest pain or shortness of breath. There has been no syncope or near syncope or palpitations. Upon review of his medications the only change is that his Avapro is at a half of a 150 mg pill daily due to low blood pressure in the hospital and his simvastatin has been cut down from 20 mg to 10 mg. He did lose weight during this hospitalization as well. There have been no episodes of fevers, chills, sweats, etc.\par September 3, 2013. Here for followup. He is preparing to go on a trip to [place] in October. He does note shortness of breath with exertion but only with a lot of exertion, no problem going up stairs, and he thinks no different than even before his cellulitis episode. He denies exertional chest pain. We elected to see how he did with increased exercise and if his shortness of breath was not severe he would be okay to go on the trip to [place], obviously using commonsense as we discussed.\par January 6, 2014. The patient is here for followup. He did very well on the trip to [place] with some limitation from his knees but no significant shortness of breath or chest pain. He gets swelling on his left leg where he had the cellulitis but not on the right side. He only had one episode of slight lightheadedness when he got up too fast but otherwise has been tolerating the current medications. He is planning on going to [place] at the end of January. He has a mild cough, nonproductive, no fever chills. He claims he was unable to get an appointment with his internist. Her workup was unrevealing.\par May 5, 2014. The patient is here for followup. He saw Dr. Jones in the interim and had lab work with an excellent lipid profile but a high BNP. He saw vascular surgery because of the abdominal aortic aneurysm which measured 3.8 cm and that will be followed. He had an episode where his balance was off for about one hour. It only happened when he tried to walk around. He denies feeling lightheaded like he might pass out. If he was sitting and not moving he felt perfectly fine. There was no vertigo. He claims it happened him once before and it resolved as well. I advised him to discuss with Dr. Jones and perhaps a neurologist. In addition he brought up again with his wife his symptoms he's had for 40 years where he suddenly for no reason will get chest pain and pressure that radiates up to his jaw. It will actually go away if he does nothing but if he takes Maalox it will go away faster. I explained to him that this is probably esophageal reflux with esophageal spasm but it so infrequent that it is not worth for him to take a PPI on a daily basis. He denies having exertional chest pain or shortness of breath. He is considering possible knee replacement in the near future. He returned in July for preop clearance for knee replacement which he had in the middle of July with no complications Other than needing to be transfused 3 units and having been sent in to [facility] the [facility] to have that done on 2 occasions.\par October 7, 2014. The patient is here for followup. He is feeling better and is more active but is also noting more shortness of breath with exertion or at least others with him have noticed it. He thinks he needs the other knee replacement done but his wife said she will divorce him if he does it. His TSH was elevated on labs with Dr. Jones last month so his Synthroid was increased from 6 days a week to 7 days a week. His BNP was elevated and he asked "should I worry about heart failure?". In addition Dr. Jones cut back his Avapro 300 to 150 because blood pressure was 110. The patient denies lightheadedness dizziness etc. His blood pressure today on 2 different occasions was 146/70. He also has an abdominal aortic aneurysm measuring 3.8 cm that is being followed. He'll be seeing Dr. Temple tomorrow.\par October 28, 2014. Dr. Jacobs called yesterday that the patient's abdominal aorta had increased in size by 0.9 cm and the patient is to be scheduled for an endovascular repair of his abdominal aortic aneurysm. He came here for his echocardiogram and discussion. The echo for the most part is unchanged from May of 2013 with left ventricular ejection fraction around 40%, mild to moderate MR, mild to moderate AI, segmental LV dysfunction with akinetic inferoposterior wall, and hypokinetic to akinetic inferolateral and mid lateral walls, normal anterior wall. LV size upper limit of normal. Normal RV size and function with an RVSP estimated at 41 mm of mercury.\par April 29, 2015. The patient did well with his endovascular repair of his abdominal aortic aneurysm. He went down to [place] and at one point was found to be short of breath with that was felt to be a pleural effusion. He was set up for a thoracentesis but after taking a diuretic for a few days they found there was no fluid there. His cough did not go away until he took a course of prednisone.The diuretic was stopped and he did well returning home. He took a tour of [place] and noted that any kind of incline made him very short of breath but no chest pressure. He saw Dr. Morris of pulmonary who found his proBNP to be 3400 and in the past it was only 1100. However his chest x-ray was totally clear. He has no PND or orthopnea and no real edema. She did give him another inhaler (Symbicort) and referred him here. His weight has not been up and if anything has gone down a little. His last echo was in October as above and was unchanged for the most part.  There is no chest pain with exertion. \par April 30, 2015. The patient returned for a stress echocardiogram. His baseline LV function looked worse than previously with left ventricular ejection fraction around 27%. With exercise he dropped his blood pressure and there were new wall motion abnormalities. He was scheduled for cardiac catheterization and probable defibrillator with possible biventricular pacemaker.\par May 6, 2015. Cardiac catheterization was done and revealed for the most part unchanged anatomy.  LVEF by V-gram was 35% . Given these findings this time Dr. Reyes did angioplasty and stent the right coronary artery. Electrophysiology felt he should wait at least 2 weeks and come back for another ejection fraction and clinical evaluation to see if things had improved before deciding on a  possible  AICD/biventricular pacemaker.\par May 21, 2015. The patient returns for followup and echocardiogram. He has felt much better over the 2 weeks but he has not been doing his usual activities because he was told not to do it. His echocardiogram to my eye looks unchanged. His exam is unchanged but there is no signs of congestive heart failure. I discussed the results with the patient and his wife. I told him that his improved symptoms could be because he is not that active, it could be a placebo effect of the procedure, it could be that things have improved and we just are unable to measure it on the echocardiogram. A BNP was sent and will be compared to his previous level which had gone up from the 400 range into the 800s. In addition over the weekend the patient will increase his activity and see what his level of symptoms are. If his symptoms are significant and worse than they had been a few months ago, we will schedule the ICD and biventricular pacemaker. The patient will contact me after the weekend.\par Nehal 15, 2015. The patient saw Dr. Russell of [facility] and unfortunately he has to wait 3 months (August 6, 2015) from the angioplasty unless he becomes unstable, has any arrhythmias, or needs a pacemaker for bradycardic reasons.  He did increase the carvedilol to 12.5 mg in the morning and 25 mg in the evening Currently he feels well and is stable. He does get out of breath and some weakness in his legs if he is walking uphill. When he initially lies down he feels some pressure he then says is shortness of breath but it resolves. No PND or orthopnea per se.\par July 14, 2015. The patient called a few weeks ago thinking he was more short of breath and wanted to move up the AICD/biventricular pacemaker. However after speaking with Dr. Russell of [facility] he explained he still was not qualified and must wait unless his symptoms are so severe that he requires hospitalization. I explained this to the patient and he returns today for routine followup.  He is still symptomatic with dyspnea on exertion but no PND or orthopnea. 3 months from his angioplasty will be August 6. He has a trip to [facility] planned with his daughter August 12.\par August 25, 2015. The patient is here for followup after having his AICD/biventricular pacemaker placed on August 7. No complications with the procedure. The patient does feel better in terms of the shortness of breath he would get when he was lying down but with exertion he feels maybe even worse right now in terms of shortness of breath. Of note he was away with a different diet and gained 4 pounds. (By scale here he has gained 9 pounds.) No palpitations, no syncope or near syncope. He is on carvedilol 25 mg the morning and 12.5 in the evening. He is still complaining of left arm pain ever since the procedure.\par September 8, 2015. The patient is finally starting to feel a little bit better. He cut his diuretic back to every other day and has continued to lose weight. His left arm so bothersome just as much since the procedure. We had a discussion about changing his Avapro to Entresto.\par October 8, 2015. Patient is here for followup. In the interim he had an episode of bronchitis treated with antibiotics and steroids which did cause some fluid retention and he was briefly on diuretics with improvement. He is now off the steroids but still on the diuretic. He does feel better on the Entresto. He thinks he is walking better with less shortness of breath and somewhat more stamina. He otherwise has no complaints. He remains in sinus rhythm and has had no AICD shocks. His blood pressure was borderline and his lungs were clear. I elected to stop the diuretic and increase his Entresto. \par October 23, 2015. On October 20 the patient saw Dr. Morris because of shortness of breath and edema. He had gained 9 pounds and he had significant swelling and she put him back on Lasix 40 mg as we discussed together. Patient had much relief and is here now. He lost 8 of the 9 pounds he again and feels much better. He still gets short of breath going up the stairs and often has to stop. He has trace edema still. His blood pressure is in the 90s systolic. He still eats out but not as much as he used to and they do ask for the low salt.\par November 9, 2015. The patient returns for followup. He has been on Lasix 40 mg since his last visit. He looks great, back to himself, with no complaints or symptoms of CHF. Her systolic pressure is only 90 but he has no symptoms of lightheadedness. He is having a lot of gas to the point of it being embarrassing and wondered if it is related to the Entresto. He also seems to have a dry cough. He will be seeing Dr. Russell in followup next week. Based on his complaints we cut back his Lasix to 20 mg daily but then he called back a few days later with more shortness of breath and went back to 40 mg q.d.\par December 7, 2015. Followup for the patient. As noted he had a go back to 40 mg Lasix because he became short of breath. He was still having GI complaints from the Entresto but he can manage it. He is complaining again of a urinating too much so perhaps we could try 30 mg of Lasix daily. He is seeing Dr. Russell next week. Is turning 89 soon and is planning a cruise on January 24.\par January 12, 2016. Patient is here for followup. Still has good spirits and is looking forward to going to [place] in 10 days. He is still having significant cough from the Entresto and we will have to change back to his prior regimen. Had echocardiogram at [facility] which was supposed to be  a dyssynchrony study and will be seeing Dr. Russell of [facility] tomorrow morning to discuss whether or not to try to improve his biventricular pacing. Currently on 80 mg of Lasix daily. Dr. Morris tried a new inhaler but he does not think it helped.\par March 2, 2016. The patient is here in followup. He went on his cruise and did well. His cough was stopped when he stopped the entresto. On the cruise he was able to cut back to 40 mg of Lasix  and even occasionally he didn't take it if he had a busy day. However after a while he did notice more shortness of breath and edema and the last few days he went back to 80 mg of Lasix with improvement. He is here now today, his weight has gone down 8 pounds, blood pressure was 90/60.\par May 3, 2016. Patient looks well and feels well, but continues to lose weight despite a good appetite. Dr. Jones is concerned and placed him on Ensure and if he does not put on some weight, he will be seeing GI. Abdominal CAT scan was unremarkable, except for some gastric distention and some thickening of the apex of the duodenum, which could just be underfilling. Abdominal aortic aneurysm repair was stable with sac being less than 3 cm. 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 He will follow up with vascular again in one year. He remains on 40 mg of Lasix and irbesartan. Not complaining of shortness of breath. He has done much better since Dr. Russell adjusted. His biventricular pacer by adjusting LV and RV timing. Lab work revealed a very suppressed TSH, so his Synthroid was held and he was to return in 2 weeks to reassess weight loss and thyroid status.\par May 18, 2016. Yesterday the patient was dizzy and lightheaded and fell and hit his head. He was evaluated in the emergency room, where everything was negative. His EKG was unchanged and his defibrillator was interrogated and there were no arrhythmias. His TSH was now 29! He is here today. Patient did fall again last night. Unclear if it is from being lightheaded or losing balance. No syncope. Other than when he had the fall, he has not been complaining of feeling lightheaded. His weight did go back up  off  the thyroid medication. I elected to hold his Lasix and have him reassessed in one week.\par May 24, 2016. Patient called yesterday and is short of breath, edema, and had put his weight back on. I told him to resume the Lasix and he is seen today for his followup. Mostly feels a little better and was able to lie flat last night. Still has some swelling. Blood pressure is still borderline low. Weight is up 6 pounds from last visit. We elected to continue Lasix 40 a day and changed his Synthroid to 0.1 mg daily.\par July 12, 2016. Patient is here for followup, as well as AICD interrogation. He seems to have normal biventricular pacing. Absolutely no arrhythmias for the past 4 months. Does get short of breath with exertion still, but not severe. Rarely will skip a day of Lasix if he has too many things to do. Started physical therapy, and was not out of breath at physical therapy today. His weight at home has been stable.\par September 13, 2016. Patient is getting around okay with a walker. His right knee seemed to be limiting him more than his shortness of breath and he has some trouble with his balance. No palpitation, syncope, or near syncope, or shocks. No change in any medication. He was still on Synthroid 0.1 mg. He is still doing physical therapy.\par November 16, 2016. The patient seems to be doing well, although he did have an episode of just suddenly falling backwards and hitting his head against the refrigerator. He denies lightheadedness or near syncope at the time. He denies vertigo, although he had a different episode. He reached up and looked up and had a vertigo-like episode just for a few seconds. There was no event on his defibrillator interrogation, except a 6 beat run of NSVT on September 24, which was asymptomatic. He is orthostatic here. He is also concerned about his weight loss, although his weight seems to be the same as it was last visit, but he claims he was almost 7 pounds less at home. His wife thinks he does not drink enough water. He is on the Lasix every other day. His EKG showed sinus rhythm with atrial tracking and biventricular pacing. He also has a compression fracture in his lumbar vertebra that is giving him pain\par He is also concerned about his thyroid hormone level, given his weight loss. We continued the Lasix on an every other day basis and lowered the Avapro to 150 mg. BNP was up to 4600 from 2600.\par December 21, 2016. The patient has here in followup. No more falls or complaints of orthostasis, although he is still orthostatic here from 124-106-98 standing with no symptoms. Complaining more of dyspnea on exertion and has some edema. His weight is about the same. After discussion, decided to add digoxin 0.125 q.d.\par February 8, 2017. The patient is here in followup. He has not fallen in a while now and he claims his problem is balance not dizziness. He thinks his dyspnea on exertion is unchanged on the digoxin. However his wife thinks he is better because he is not short of breath coming up the stairs and the  does agree. Just recently he started to develop some pedal edema, but he has had more salt because he loves soups. In addition, he does not sit with his legs elevated. (He also saw Dr. Plascencia for URI and was briefly on prednisone until a couple of weeks ago. He did have an SPEP with normal. Electrophoresis pattern.)\par April 4, 2017. Patient here in followup. Had pacemaker, defibrillator interrogation on March 6, with 2 short episodes of NSVT only. Seems to be doing well on the whole. Dyspnea on exertion is there, but unchanged. Weight is down 3 pounds. He occasionally gets numbness and tingling in his fingers, but not in his toes. Occasionally feels some discomfort over the AICD site. He will be going to his daughter and his niece for the [place] nights.\par Nehal 15, 2017. The patient is here in followup. Has been feeling a little more short of breath and does have a little bit of edema. He saw Dr. Valdez on Friday the ninth, who felt he had bronchitis and gave him Augmentin. Otherwise, patient has no complaints and has been doing pretty well. He still complains occasionally of some soreness over his AICD site. No other interim medical issues, and no change in medication.\par September 12, 2017. Patient is here for followup and defibrillator interrogation. As noted on August 25 had a syncopal episode after he had a hot shower and walked out to the other bathroom. Found himself on the floor and was fine. No recurrence since and in fact defibrillator interrogation shows that he had an episode of VT followed by VF after the device tried to pace terminate, which led to a defibrillator shock, and the patient has been fine since. He is still with sinus rhythm and a first-degree AV block underneath his ventricular pacing. No change in any of the symptoms and in fact, while he does get short of breath sometimes getting into bed, and some walking, was able to go to the gym and work out a little bit without shortness of breath. No chest pain, and no change in medications.\par December 26, 2017. Patient here in followup. Has been having some more shortness of breath, although seems to have frequent cough and URI. Now on a nebulizer. Does have worsening shortness of breath with lying down, but no PND. Had a near syncopal event when he got up right after sitting for a long time watching a movie, but otherwise no episodes of dizziness. AICD interrogation shows no episodes since the event in August. Patient thinks his overall stamina and fatigue are worse. Patient willing to retry Entresto.\par January 11, 2018. So far patient tolerating the Entresto, no cough. Maybe a little less short of breath, but not definite. Does have more edema, but weight is the same. No other complaints. Reluctant to increase the diuretic. We'll keep dose of the same for now and followup in one month if labs are okay. Then consider increasing Entresto.\par Comment:  \NING Genao - 21 Mar 2018 3:12 PM \par   TASK CREATED\par Hi-\par Saw Adam.\par He complained of weight gain, edema, SOB.\par Sent BNP and up to over 5300.\par Do you think that the Entresto is an issue for him?\par Asked him to call you.\par Thanks-\par Ning \par Addendum\par I reviewed the labs and spoke with the patient. Actually, weight is down, and labs are all okay except the BNP. If this is natural history of LV dysfunction, he should be on a higher dose of Entresto. I elected to increase the Entresto between now and his visit next week with me and see if things get worse, improve, or stay the same. Patient has a big cruise coming up on April 18 \par March 27, 2018. Patient returns in followup now on the increased dose of Entresto for one week. On his pacemaker interrogation he has normal function with biventricular pacing, no more VT. His "optivol" has been elevated since mid-January and just for the past week it seems to be coming down, which would correlate with increasing his Enteresto. He does admit to not being a strict on his diet, having Chinese food the other night, and has only been staying with the furosemide every other day despite the increased edema and shortness of breath. Depending on labs, I will probably continue the high-dose Entresto, continue furosemide daily through the beginning of [place] this week and then go back to every other day depending on symptoms, weight and edema.\par May 22, 2018. The patient went on his vacation and did extremely well. By the very end before getting on the plane to come back he was somewhat short of breath and his wife considered going to the emergency room. Instead, he took extra Lasix and was doing well on the plane. At one point got up to the bathroom, came back sat in his seat and his wife leaned over to try to wake him up and they could not arouse him for up to 5 minutes. A doctor on the plane checked him, they took him out of his seat and laid him down on the floor at which point he seemed to come to and felt great. It seems he had a pulse and a blood pressure throughout. When he got off the plane he was brought to [facility] but interrogating the defibrillator showed absolutely no arrhythmias. (Of note, the next day in the hospital he had 2 long runs of nonsustained VT that were asymptomatic.) He did develop a severe cough with rhonchi and sputum. He was seen by Dr. Hema Reyes of pulmonary and discharged on prednisone and antibiotics. He saw Dr. Morris this morning and she just continued those medications and sent labs and told him she thought it was all from his heart. He did have a repeat echo in the hospital, which showed his AS has progressed to borderline severe.\par July 10, 2018. A one point after the hospitalization, the patient's blood pressure was running low and his Lasix was decreased. He developed more shortness of breath, and weight gain, and saw Dr. Romano on June 27. Lasix was put back to 40 mg daily. He is here today, feeling better, has lost 6 pounds. Was at ophthalmologist earlier today and has a new hemorrhage in his left eye, which is his better eye. The ophthalmologist would like me to cut back or hold the aspirin.\par September 12, 2018. Patient returns in followup, as well as for repeat echocardiogram regarding his aortic stenosis. His echo confirms somewhat of a low gradient severe aortic stenosis and is unchanged from May, but the patient feels wonderful maybe somewhat tired, but his breathing is better than it has been a long time and he is able to do physical therapy, etc. Patient was sent for evaluation with structural heart team.\par October 24, 2018. Patient returns for followup. Workup in hospital revealed no significant CAD, very tortuous vessels femorally so if patient needs TAVR would have to use apical approach. Pullback gradient across aortic valve was low as well (15). 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 Since being home the patient has been fairly stable although he does have increased edema since the catheterization. There was also some question about his potassium level. He got a flu shot from Dr. Morris the other day.\par January 23, 2019. The patient here in followup. He remains in sinus or AV paced. Feels good. Taking Lasix 40 mg daily. Blood pressure running on the low side, but no dizziness, lightheadedness, etc. Gets tiredness "from his legs, but not from shortness of breath". he says. He is on 2 new medications from his rheumatologist (Blanca [facility]), gabapentin, and chlorzactazone. He would like to try to go again on at least a ten-day cruise although his wife is very nervous about it. No defibrillator shocks, etc. Off colchicine per Dr. Morris.\par April 24, 2019. Patient here in followup. Good spirits and seems to be doing very well on his current regimen. Remains in sinus rhythm with atrial tracking and ventricular pacing. One VPC noted. No congestive heart failure, despite [place].\par August 20, 2019.  Patient here in follow-up.  Pacemaker interrogation shows 1 or 2 runs of ventricular tachycardia that are pace terminated no shocks and patient was asymptomatic.  EKG shows sinus rhythm with left bundle branch block or more likely atrial tracking and biventricular pacing.  On the whole doing very well although feels that the water pill dictates his life and would like to change to every other day\par November 12, 2019.  Patient here in follow-up.  He remains AV paced at 70. Has a cough for the last 2 days initially productive but now not.  Denies shortness of breath.  Denies fever chills or achiness.  Not sure if there has been more salt in his diet.  His weight is up 2 pounds.  No chest pain shortness of breath etc.  Had a flu shot already.  Has an appointment with Dr. Morris later today. (Has [employer]uch[employer]'s [email or web address]  this weekend in [place] and another [email or web address] in a week and a half.).\par February 18, 2020.  Patient here for follow-up as well as defibrillator interrogation.  Now 89 years old.  Remains either sinus with ventricular pacing or AV pacing. Good spirits feeling well.  Not really doing that much walking but no complaints.  If he just moves around side to side he feels a little short of breath but it passes quickly.  No PND or orthopnea.  Complains about nocturia and again asking if he can lower the diuretic.  Weight is the same with slight edema. Rare brief NSVT. No a fib. 98% biV-paced.\par February 19, 2020. Reviewed labs with patient. Change digoxin to 6 days a week as his level is 2.0. Currently only on Lasix 40 mg once a day. Would continue and not cut back further.\par May 19, 2020.  Patient returns in follow-up.  Remains in sinus with atrial tracking and ventricular pacing versus AV pacing.  In pretty good spirits and has remained pretty stable from a cardiac point of view.  Asking how many years I think he can continue.\par Reviewed labs with patient. Digoxin level 2.1 so now will take it only 5 days a week skipping Monday and Friday. Satisfied with renal function potassium and other labs. BNP slightly higher than previously but still lower than when he was 7000 and 9000. Patient did have some mushroom barley soup from the [facility] that may have had more salt etc. No other change in medication as of now. \par August 19, 2020.  Patient returns in follow-up.  Saw Dr. Morris a few weeks ago.  BNP 4733, BUN 35 creatinine 1.6 potassium 4.3.  Normal LFTs.  Cholesterol 119, HDL 56, LDL 54, triglycerides 45.  Normal TSH.  Hemoglobin A1c 5.3.  No digoxin level done.  Hemoglobin 11.2 hematocrit 35.2.  Platelets 124,000\par EKG with sinus rhythm and ventricular pacing at 72.  Pacemaker AICD interrogation revealed only one episode of atrial fibrillation that lasted an hour and 48 minutes and 1 SVT of 9 beats and a second 1 of 6 beats.  Symptomatically he sounds pretty stable.  Only feels out of breath when he is trying to get into bed at night but during the day walking around he is unchanged and there is no PND.  There is mild edema that is chronic.  Digoxin was decreased last visit because of elevated levels of that will be rechecked today.  Last echo was almost 2 years ago so he will be scheduled for an echo with his next visit.  He was asking about his ejection fraction.\par October 21, 2020.  Patient returns in follow-up and for echocardiogram.  According to wife he may be getting just a little more short of breath at night and when he comes into bed.  Patient would like to cut back on the water pill however because he is in the bathroom all day.  I will allow him to try doing every other day on furosemide and reevaluate based on symptoms, weight, edema etc.  The echo today probably shows more calcium on the aortic valve and a little bit of progression of his aortic stenosis but his LVEF is 26% and overall unchanged.  Not clear that he will benefit from a TAVR.  Remains in sinus rhythm with atrial tracking and ventricular pacing.

## 2021-01-01 ENCOUNTER — NON-APPOINTMENT (OUTPATIENT)
Age: 86
End: 2021-01-01

## 2021-01-01 ENCOUNTER — RX RENEWAL (OUTPATIENT)
Age: 86
End: 2021-01-01

## 2021-01-01 ENCOUNTER — APPOINTMENT (OUTPATIENT)
Dept: ORTHOPEDIC SURGERY | Facility: CLINIC | Age: 86
End: 2021-01-01
Payer: MEDICARE

## 2021-01-01 ENCOUNTER — TRANSCRIPTION ENCOUNTER (OUTPATIENT)
Age: 86
End: 2021-01-01

## 2021-01-01 ENCOUNTER — APPOINTMENT (OUTPATIENT)
Dept: CARDIOLOGY | Facility: CLINIC | Age: 86
End: 2021-01-01
Payer: MEDICARE

## 2021-01-01 ENCOUNTER — APPOINTMENT (OUTPATIENT)
Dept: UROLOGY | Facility: CLINIC | Age: 86
End: 2021-01-01
Payer: MEDICARE

## 2021-01-01 ENCOUNTER — INPATIENT (INPATIENT)
Facility: HOSPITAL | Age: 86
LOS: 9 days | Discharge: NURSING, REHAB OR LONG-TERM CARE | DRG: 308 | End: 2021-06-16
Attending: GENERAL ACUTE CARE HOSPITAL | Admitting: GENERAL ACUTE CARE HOSPITAL
Payer: MEDICARE

## 2021-01-01 ENCOUNTER — APPOINTMENT (OUTPATIENT)
Dept: INTERNAL MEDICINE | Facility: CLINIC | Age: 86
End: 2021-01-01
Payer: MEDICARE

## 2021-01-01 VITALS
OXYGEN SATURATION: 98 % | HEART RATE: 78 BPM | TEMPERATURE: 98 F | DIASTOLIC BLOOD PRESSURE: 67 MMHG | RESPIRATION RATE: 18 BRPM | SYSTOLIC BLOOD PRESSURE: 107 MMHG

## 2021-01-01 VITALS
WEIGHT: 166 LBS | OXYGEN SATURATION: 96 % | TEMPERATURE: 97.9 F | DIASTOLIC BLOOD PRESSURE: 64 MMHG | HEART RATE: 70 BPM | BODY MASS INDEX: 26.79 KG/M2 | SYSTOLIC BLOOD PRESSURE: 110 MMHG

## 2021-01-01 VITALS
TEMPERATURE: 97.8 F | BODY MASS INDEX: 25.66 KG/M2 | WEIGHT: 159 LBS | DIASTOLIC BLOOD PRESSURE: 70 MMHG | SYSTOLIC BLOOD PRESSURE: 110 MMHG | HEART RATE: 73 BPM | OXYGEN SATURATION: 98 %

## 2021-01-01 VITALS
WEIGHT: 173 LBS | SYSTOLIC BLOOD PRESSURE: 110 MMHG | TEMPERATURE: 98.1 F | HEIGHT: 66 IN | DIASTOLIC BLOOD PRESSURE: 70 MMHG | BODY MASS INDEX: 27.8 KG/M2 | HEART RATE: 79 BPM | OXYGEN SATURATION: 95 %

## 2021-01-01 VITALS
HEART RATE: 71 BPM | OXYGEN SATURATION: 99 % | SYSTOLIC BLOOD PRESSURE: 100 MMHG | DIASTOLIC BLOOD PRESSURE: 56 MMHG | BODY MASS INDEX: 25.02 KG/M2 | WEIGHT: 155 LBS

## 2021-01-01 VITALS
DIASTOLIC BLOOD PRESSURE: 68 MMHG | SYSTOLIC BLOOD PRESSURE: 115 MMHG | TEMPERATURE: 97.8 F | WEIGHT: 163 LBS | HEIGHT: 66 IN | BODY MASS INDEX: 26.2 KG/M2 | OXYGEN SATURATION: 96 % | HEART RATE: 69 BPM | RESPIRATION RATE: 16 BRPM

## 2021-01-01 VITALS
OXYGEN SATURATION: 97 % | HEART RATE: 69 BPM | SYSTOLIC BLOOD PRESSURE: 108 MMHG | DIASTOLIC BLOOD PRESSURE: 61 MMHG | BODY MASS INDEX: 26.52 KG/M2 | HEIGHT: 66 IN | WEIGHT: 165 LBS

## 2021-01-01 VITALS
BODY MASS INDEX: 25.23 KG/M2 | SYSTOLIC BLOOD PRESSURE: 84 MMHG | DIASTOLIC BLOOD PRESSURE: 46 MMHG | HEIGHT: 66 IN | WEIGHT: 157 LBS

## 2021-01-01 VITALS — TEMPERATURE: 98.5 F

## 2021-01-01 VITALS — BODY MASS INDEX: 25.08 KG/M2 | WEIGHT: 155.4 LBS

## 2021-01-01 VITALS
DIASTOLIC BLOOD PRESSURE: 76 MMHG | SYSTOLIC BLOOD PRESSURE: 123 MMHG | HEART RATE: 89 BPM | WEIGHT: 154.98 LBS | HEIGHT: 69 IN | OXYGEN SATURATION: 95 % | RESPIRATION RATE: 18 BRPM | TEMPERATURE: 98 F

## 2021-01-01 VITALS
SYSTOLIC BLOOD PRESSURE: 82 MMHG | HEART RATE: 74 BPM | WEIGHT: 155 LBS | DIASTOLIC BLOOD PRESSURE: 43 MMHG | BODY MASS INDEX: 24.91 KG/M2 | HEIGHT: 66 IN

## 2021-01-01 VITALS
HEART RATE: 96 BPM | OXYGEN SATURATION: 99 % | DIASTOLIC BLOOD PRESSURE: 60 MMHG | SYSTOLIC BLOOD PRESSURE: 82 MMHG | TEMPERATURE: 98.4 F

## 2021-01-01 DIAGNOSIS — N50.89 OTHER SPECIFIED DISORDERS OF THE MALE GENITAL ORGANS: ICD-10-CM

## 2021-01-01 DIAGNOSIS — R06.02 SHORTNESS OF BREATH: ICD-10-CM

## 2021-01-01 DIAGNOSIS — N28.9 DISORDER OF KIDNEY AND URETER, UNSPECIFIED: ICD-10-CM

## 2021-01-01 DIAGNOSIS — I50.23 ACUTE ON CHRONIC SYSTOLIC (CONGESTIVE) HEART FAILURE: ICD-10-CM

## 2021-01-01 DIAGNOSIS — I95.9 HYPOTENSION, UNSPECIFIED: ICD-10-CM

## 2021-01-01 DIAGNOSIS — J44.9 CHRONIC OBSTRUCTIVE PULMONARY DISEASE, UNSPECIFIED: ICD-10-CM

## 2021-01-01 DIAGNOSIS — M25.612 STIFFNESS OF LEFT SHOULDER, NOT ELSEWHERE CLASSIFIED: ICD-10-CM

## 2021-01-01 DIAGNOSIS — I25.10 ATHEROSCLEROTIC HEART DISEASE OF NATIVE CORONARY ARTERY W/OUT ANGINA PECTORIS: ICD-10-CM

## 2021-01-01 DIAGNOSIS — I34.0 NONRHEUMATIC MITRAL (VALVE) INSUFFICIENCY: ICD-10-CM

## 2021-01-01 DIAGNOSIS — D64.9 ANEMIA, UNSPECIFIED: ICD-10-CM

## 2021-01-01 DIAGNOSIS — I50.43 ACUTE ON CHRONIC COMBINED SYSTOLIC (CONGESTIVE) AND DIASTOLIC (CONGESTIVE) HEART FAILURE: ICD-10-CM

## 2021-01-01 DIAGNOSIS — Z96.651 PRESENCE OF RIGHT ARTIFICIAL KNEE JOINT: Chronic | ICD-10-CM

## 2021-01-01 DIAGNOSIS — I48.0 PAROXYSMAL ATRIAL FIBRILLATION: ICD-10-CM

## 2021-01-01 DIAGNOSIS — Z98.89 OTHER SPECIFIED POSTPROCEDURAL STATES: Chronic | ICD-10-CM

## 2021-01-01 DIAGNOSIS — I47.2 VENTRICULAR TACHYCARDIA: ICD-10-CM

## 2021-01-01 DIAGNOSIS — M25.622 STIFFNESS OF LEFT ELBOW, NOT ELSEWHERE CLASSIFIED: ICD-10-CM

## 2021-01-01 DIAGNOSIS — I35.0 NONRHEUMATIC AORTIC (VALVE) STENOSIS: ICD-10-CM

## 2021-01-01 DIAGNOSIS — M70.22 OLECRANON BURSITIS, LEFT ELBOW: ICD-10-CM

## 2021-01-01 DIAGNOSIS — M25.522 PAIN IN LEFT ELBOW: ICD-10-CM

## 2021-01-01 DIAGNOSIS — I71.4 ABDOMINAL AORTIC ANEURYSM, WITHOUT RUPTURE: Chronic | ICD-10-CM

## 2021-01-01 DIAGNOSIS — E87.70 FLUID OVERLOAD, UNSPECIFIED: ICD-10-CM

## 2021-01-01 LAB
% ALBUMIN: 50.5 % — SIGNIFICANT CHANGE UP
% ALPHA 1: 8.4 % — SIGNIFICANT CHANGE UP
% ALPHA 2: 10.5 % — SIGNIFICANT CHANGE UP
% BETA: 13.7 % — SIGNIFICANT CHANGE UP
% GAMMA: 16.9 % — SIGNIFICANT CHANGE UP
% M SPIKE: SIGNIFICANT CHANGE UP
A1C WITH ESTIMATED AVERAGE GLUCOSE RESULT: 5.1 % — SIGNIFICANT CHANGE UP (ref 4–5.6)
ALBUMIN SERPL ELPH-MCNC: 2.7 G/DL — LOW (ref 3.6–5.5)
ALBUMIN SERPL ELPH-MCNC: 3.1 G/DL — LOW (ref 3.3–5)
ALBUMIN SERPL ELPH-MCNC: 3.1 G/DL — LOW (ref 3.3–5)
ALBUMIN SERPL ELPH-MCNC: 3.2 G/DL — LOW (ref 3.3–5)
ALBUMIN SERPL ELPH-MCNC: 3.3 G/DL — SIGNIFICANT CHANGE UP (ref 3.3–5)
ALBUMIN SERPL ELPH-MCNC: 3.4 G/DL — SIGNIFICANT CHANGE UP (ref 3.3–5)
ALBUMIN SERPL ELPH-MCNC: 3.4 G/DL — SIGNIFICANT CHANGE UP (ref 3.3–5)
ALBUMIN SERPL ELPH-MCNC: 3.7 G/DL
ALBUMIN SERPL ELPH-MCNC: 3.8 G/DL
ALBUMIN SERPL ELPH-MCNC: 3.8 G/DL — SIGNIFICANT CHANGE UP (ref 3.3–5)
ALBUMIN SERPL ELPH-MCNC: 3.9 G/DL
ALBUMIN/GLOB SERPL ELPH: 1 RATIO — SIGNIFICANT CHANGE UP
ALP BLD-CCNC: 76 U/L
ALP BLD-CCNC: 77 U/L
ALP BLD-CCNC: 78 U/L
ALP BLD-CCNC: 80 U/L
ALP BLD-CCNC: 80 U/L
ALP SERPL-CCNC: 54 U/L — SIGNIFICANT CHANGE UP (ref 40–120)
ALP SERPL-CCNC: 56 U/L — SIGNIFICANT CHANGE UP (ref 40–120)
ALP SERPL-CCNC: 57 U/L — SIGNIFICANT CHANGE UP (ref 40–120)
ALP SERPL-CCNC: 59 U/L — SIGNIFICANT CHANGE UP (ref 40–120)
ALP SERPL-CCNC: 65 U/L — SIGNIFICANT CHANGE UP (ref 40–120)
ALP SERPL-CCNC: 69 U/L — SIGNIFICANT CHANGE UP (ref 40–120)
ALP SERPL-CCNC: 71 U/L — SIGNIFICANT CHANGE UP (ref 40–120)
ALP SERPL-CCNC: 72 U/L — SIGNIFICANT CHANGE UP (ref 40–120)
ALP SERPL-CCNC: 78 U/L — SIGNIFICANT CHANGE UP (ref 40–120)
ALP SERPL-CCNC: 86 U/L — SIGNIFICANT CHANGE UP (ref 40–120)
ALPHA1 GLOB SERPL ELPH-MCNC: 0.5 G/DL — HIGH (ref 0.1–0.4)
ALPHA2 GLOB SERPL ELPH-MCNC: 0.6 G/DL — SIGNIFICANT CHANGE UP (ref 0.5–1)
ALT FLD-CCNC: 12 U/L — SIGNIFICANT CHANGE UP (ref 10–45)
ALT FLD-CCNC: 13 U/L — SIGNIFICANT CHANGE UP (ref 10–45)
ALT FLD-CCNC: 14 U/L — SIGNIFICANT CHANGE UP (ref 10–45)
ALT FLD-CCNC: 16 U/L — SIGNIFICANT CHANGE UP (ref 10–45)
ALT FLD-CCNC: 21 U/L — SIGNIFICANT CHANGE UP (ref 10–45)
ALT FLD-CCNC: 22 U/L — SIGNIFICANT CHANGE UP (ref 10–45)
ALT FLD-CCNC: 23 U/L — SIGNIFICANT CHANGE UP (ref 10–45)
ALT FLD-CCNC: 25 U/L — SIGNIFICANT CHANGE UP (ref 10–45)
ALT FLD-CCNC: 31 U/L — SIGNIFICANT CHANGE UP (ref 10–45)
ALT FLD-CCNC: 9 U/L — LOW (ref 10–45)
ALT SERPL-CCNC: 10 U/L
ALT SERPL-CCNC: 15 U/L
ALT SERPL-CCNC: 9 U/L
ANA TITR SER: NEGATIVE — SIGNIFICANT CHANGE UP
ANION GAP SERPL CALC-SCNC: 11 MMOL/L
ANION GAP SERPL CALC-SCNC: 11 MMOL/L
ANION GAP SERPL CALC-SCNC: 11 MMOL/L — SIGNIFICANT CHANGE UP (ref 5–17)
ANION GAP SERPL CALC-SCNC: 12 MMOL/L — SIGNIFICANT CHANGE UP (ref 5–17)
ANION GAP SERPL CALC-SCNC: 13 MMOL/L
ANION GAP SERPL CALC-SCNC: 13 MMOL/L — SIGNIFICANT CHANGE UP (ref 5–17)
ANION GAP SERPL CALC-SCNC: 14 MMOL/L
ANION GAP SERPL CALC-SCNC: 14 MMOL/L
ANION GAP SERPL CALC-SCNC: 14 MMOL/L — SIGNIFICANT CHANGE UP (ref 5–17)
ANION GAP SERPL CALC-SCNC: 15 MMOL/L — SIGNIFICANT CHANGE UP (ref 5–17)
ANISOCYTOSIS BLD QL: SIGNIFICANT CHANGE UP
APPEARANCE UR: CLEAR — SIGNIFICANT CHANGE UP
AST SERPL-CCNC: 10 U/L — SIGNIFICANT CHANGE UP (ref 10–40)
AST SERPL-CCNC: 11 U/L
AST SERPL-CCNC: 11 U/L
AST SERPL-CCNC: 11 U/L — SIGNIFICANT CHANGE UP (ref 10–40)
AST SERPL-CCNC: 11 U/L — SIGNIFICANT CHANGE UP (ref 10–40)
AST SERPL-CCNC: 12 U/L
AST SERPL-CCNC: 13 U/L
AST SERPL-CCNC: 16 U/L
AST SERPL-CCNC: 19 U/L — SIGNIFICANT CHANGE UP (ref 10–40)
AST SERPL-CCNC: 19 U/L — SIGNIFICANT CHANGE UP (ref 10–40)
AST SERPL-CCNC: 20 U/L — SIGNIFICANT CHANGE UP (ref 10–40)
AST SERPL-CCNC: 21 U/L — SIGNIFICANT CHANGE UP (ref 10–40)
AST SERPL-CCNC: 28 U/L — SIGNIFICANT CHANGE UP (ref 10–40)
AST SERPL-CCNC: 40 U/L — SIGNIFICANT CHANGE UP (ref 10–40)
AST SERPL-CCNC: 9 U/L — LOW (ref 10–40)
B-GLOBULIN SERPL ELPH-MCNC: 0.7 G/DL — SIGNIFICANT CHANGE UP (ref 0.5–1)
BACTERIA # UR AUTO: NEGATIVE — SIGNIFICANT CHANGE UP
BASE EXCESS BLDV CALC-SCNC: 4.7 MMOL/L — HIGH (ref -2–2)
BASOPHILS # BLD AUTO: 0.04 K/UL — SIGNIFICANT CHANGE UP (ref 0–0.2)
BASOPHILS # BLD AUTO: 0.04 K/UL — SIGNIFICANT CHANGE UP (ref 0–0.2)
BASOPHILS # BLD AUTO: 0.05 K/UL
BASOPHILS # BLD AUTO: 0.06 K/UL
BASOPHILS # BLD AUTO: 0.06 K/UL
BASOPHILS # BLD AUTO: 0.06 K/UL — SIGNIFICANT CHANGE UP (ref 0–0.2)
BASOPHILS # BLD AUTO: 0.06 K/UL — SIGNIFICANT CHANGE UP (ref 0–0.2)
BASOPHILS # BLD AUTO: 0.08 K/UL — SIGNIFICANT CHANGE UP (ref 0–0.2)
BASOPHILS NFR BLD AUTO: 0.6 % — SIGNIFICANT CHANGE UP (ref 0–2)
BASOPHILS NFR BLD AUTO: 0.7 %
BASOPHILS NFR BLD AUTO: 0.7 % — SIGNIFICANT CHANGE UP (ref 0–2)
BASOPHILS NFR BLD AUTO: 0.8 %
BASOPHILS NFR BLD AUTO: 0.8 %
BASOPHILS NFR BLD AUTO: 0.8 % — SIGNIFICANT CHANGE UP (ref 0–2)
BASOPHILS NFR BLD AUTO: 0.9 %
BASOPHILS NFR BLD AUTO: 0.9 % — SIGNIFICANT CHANGE UP (ref 0–2)
BASOPHILS NFR BLD AUTO: 1 %
BASOPHILS NFR BLD AUTO: 1.2 % — SIGNIFICANT CHANGE UP (ref 0–2)
BILIRUB DIRECT SERPL-MCNC: 0.2 MG/DL — SIGNIFICANT CHANGE UP (ref 0–0.2)
BILIRUB INDIRECT FLD-MCNC: 0.5 MG/DL — SIGNIFICANT CHANGE UP (ref 0.2–1)
BILIRUB SERPL-MCNC: 0.3 MG/DL
BILIRUB SERPL-MCNC: 0.4 MG/DL — SIGNIFICANT CHANGE UP (ref 0.2–1.2)
BILIRUB SERPL-MCNC: 0.4 MG/DL — SIGNIFICANT CHANGE UP (ref 0.2–1.2)
BILIRUB SERPL-MCNC: 0.5 MG/DL
BILIRUB SERPL-MCNC: 0.5 MG/DL
BILIRUB SERPL-MCNC: 0.5 MG/DL — SIGNIFICANT CHANGE UP (ref 0.2–1.2)
BILIRUB SERPL-MCNC: 0.5 MG/DL — SIGNIFICANT CHANGE UP (ref 0.2–1.2)
BILIRUB SERPL-MCNC: 0.6 MG/DL
BILIRUB SERPL-MCNC: 0.6 MG/DL
BILIRUB SERPL-MCNC: 0.6 MG/DL — SIGNIFICANT CHANGE UP (ref 0.2–1.2)
BILIRUB SERPL-MCNC: 0.7 MG/DL — SIGNIFICANT CHANGE UP (ref 0.2–1.2)
BILIRUB SERPL-MCNC: 0.7 MG/DL — SIGNIFICANT CHANGE UP (ref 0.2–1.2)
BILIRUB SERPL-MCNC: 0.8 MG/DL — SIGNIFICANT CHANGE UP (ref 0.2–1.2)
BILIRUB UR-MCNC: NEGATIVE — SIGNIFICANT CHANGE UP
BLD GP AB SCN SERPL QL: NEGATIVE — SIGNIFICANT CHANGE UP
BUN SERPL-MCNC: 46 MG/DL
BUN SERPL-MCNC: 47 MG/DL — HIGH (ref 7–23)
BUN SERPL-MCNC: 49 MG/DL
BUN SERPL-MCNC: 49 MG/DL — HIGH (ref 7–23)
BUN SERPL-MCNC: 49 MG/DL — HIGH (ref 7–23)
BUN SERPL-MCNC: 50 MG/DL — HIGH (ref 7–23)
BUN SERPL-MCNC: 53 MG/DL
BUN SERPL-MCNC: 56 MG/DL — HIGH (ref 7–23)
BUN SERPL-MCNC: 56 MG/DL — HIGH (ref 7–23)
BUN SERPL-MCNC: 57 MG/DL — HIGH (ref 7–23)
BUN SERPL-MCNC: 58 MG/DL
BUN SERPL-MCNC: 58 MG/DL — HIGH (ref 7–23)
BUN SERPL-MCNC: 58 MG/DL — HIGH (ref 7–23)
BUN SERPL-MCNC: 69 MG/DL — HIGH (ref 7–23)
BUN SERPL-MCNC: 70 MG/DL
BUN SERPL-MCNC: 78 MG/DL — HIGH (ref 7–23)
BUN SERPL-MCNC: 79 MG/DL — HIGH (ref 7–23)
CA-I SERPL-SCNC: 1.17 MMOL/L — SIGNIFICANT CHANGE UP (ref 1.12–1.3)
CALCIUM SERPL-MCNC: 7.9 MG/DL — LOW (ref 8.4–10.5)
CALCIUM SERPL-MCNC: 8 MG/DL — LOW (ref 8.4–10.5)
CALCIUM SERPL-MCNC: 8.1 MG/DL — LOW (ref 8.4–10.5)
CALCIUM SERPL-MCNC: 8.2 MG/DL — LOW (ref 8.4–10.5)
CALCIUM SERPL-MCNC: 8.3 MG/DL — LOW (ref 8.4–10.5)
CALCIUM SERPL-MCNC: 8.3 MG/DL — LOW (ref 8.4–10.5)
CALCIUM SERPL-MCNC: 8.4 MG/DL — SIGNIFICANT CHANGE UP (ref 8.4–10.5)
CALCIUM SERPL-MCNC: 8.5 MG/DL — SIGNIFICANT CHANGE UP (ref 8.4–10.5)
CALCIUM SERPL-MCNC: 8.7 MG/DL
CALCIUM SERPL-MCNC: 8.7 MG/DL — SIGNIFICANT CHANGE UP (ref 8.4–10.5)
CALCIUM SERPL-MCNC: 8.8 MG/DL
CALCIUM SERPL-MCNC: 8.8 MG/DL — SIGNIFICANT CHANGE UP (ref 8.4–10.5)
CALCIUM SERPL-MCNC: 8.9 MG/DL — SIGNIFICANT CHANGE UP (ref 8.4–10.5)
CALCIUM SERPL-MCNC: 9 MG/DL
CALCIUM SERPL-MCNC: 9 MG/DL
CALCIUM SERPL-MCNC: 9.1 MG/DL
CHLORIDE BLDV-SCNC: 104 MMOL/L — SIGNIFICANT CHANGE UP (ref 96–108)
CHLORIDE SERPL-SCNC: 101 MMOL/L
CHLORIDE SERPL-SCNC: 101 MMOL/L — SIGNIFICANT CHANGE UP (ref 96–108)
CHLORIDE SERPL-SCNC: 102 MMOL/L — SIGNIFICANT CHANGE UP (ref 96–108)
CHLORIDE SERPL-SCNC: 103 MMOL/L — SIGNIFICANT CHANGE UP (ref 96–108)
CHLORIDE SERPL-SCNC: 104 MMOL/L — SIGNIFICANT CHANGE UP (ref 96–108)
CHLORIDE SERPL-SCNC: 105 MMOL/L
CHLORIDE SERPL-SCNC: 105 MMOL/L — SIGNIFICANT CHANGE UP (ref 96–108)
CHLORIDE SERPL-SCNC: 105 MMOL/L — SIGNIFICANT CHANGE UP (ref 96–108)
CHLORIDE SERPL-SCNC: 106 MMOL/L — SIGNIFICANT CHANGE UP (ref 96–108)
CHLORIDE SERPL-SCNC: 107 MMOL/L — SIGNIFICANT CHANGE UP (ref 96–108)
CHLORIDE SERPL-SCNC: 99 MMOL/L
CHOLEST SERPL-MCNC: 81 MG/DL — SIGNIFICANT CHANGE UP
CHOLEST SERPL-MCNC: 83 MG/DL
CHOLEST SERPL-MCNC: 90 MG/DL
CK MB BLD-MCNC: 4.4 % — HIGH (ref 0–3.5)
CK MB CFR SERPL CALC: 2.5 NG/ML — SIGNIFICANT CHANGE UP (ref 0–6.7)
CK MB CFR SERPL CALC: 2.6 NG/ML — SIGNIFICANT CHANGE UP (ref 0–6.7)
CK SERPL-CCNC: 52 U/L — SIGNIFICANT CHANGE UP (ref 30–200)
CK SERPL-CCNC: 59 U/L — SIGNIFICANT CHANGE UP (ref 30–200)
CO2 BLDV-SCNC: 31 MMOL/L — HIGH (ref 22–30)
CO2 SERPL-SCNC: 20 MMOL/L — LOW (ref 22–31)
CO2 SERPL-SCNC: 21 MMOL/L — LOW (ref 22–31)
CO2 SERPL-SCNC: 22 MMOL/L — SIGNIFICANT CHANGE UP (ref 22–31)
CO2 SERPL-SCNC: 23 MMOL/L
CO2 SERPL-SCNC: 23 MMOL/L — SIGNIFICANT CHANGE UP (ref 22–31)
CO2 SERPL-SCNC: 24 MMOL/L — SIGNIFICANT CHANGE UP (ref 22–31)
CO2 SERPL-SCNC: 24 MMOL/L — SIGNIFICANT CHANGE UP (ref 22–31)
CO2 SERPL-SCNC: 25 MMOL/L — SIGNIFICANT CHANGE UP (ref 22–31)
CO2 SERPL-SCNC: 26 MMOL/L
CO2 SERPL-SCNC: 26 MMOL/L — SIGNIFICANT CHANGE UP (ref 22–31)
CO2 SERPL-SCNC: 26 MMOL/L — SIGNIFICANT CHANGE UP (ref 22–31)
CO2 SERPL-SCNC: 27 MMOL/L
CO2 SERPL-SCNC: 28 MMOL/L
CO2 SERPL-SCNC: 28 MMOL/L
COLOR SPEC: SIGNIFICANT CHANGE UP
CORTIS AM PEAK SERPL-MCNC: 8.1 UG/DL — SIGNIFICANT CHANGE UP (ref 6–18.4)
CORTIS AM PEAK SERPL-MCNC: 8.2 UG/DL — SIGNIFICANT CHANGE UP (ref 6–18.4)
COVID-19 SPIKE DOMAIN AB INTERP: POSITIVE
COVID-19 SPIKE DOMAIN ANTIBODY RESULT: 30.5 U/ML — HIGH
CREAT SERPL-MCNC: 1.89 MG/DL
CREAT SERPL-MCNC: 1.96 MG/DL — HIGH (ref 0.5–1.3)
CREAT SERPL-MCNC: 2.02 MG/DL
CREAT SERPL-MCNC: 2.05 MG/DL
CREAT SERPL-MCNC: 2.08 MG/DL — HIGH (ref 0.5–1.3)
CREAT SERPL-MCNC: 2.19 MG/DL — HIGH (ref 0.5–1.3)
CREAT SERPL-MCNC: 2.19 MG/DL — HIGH (ref 0.5–1.3)
CREAT SERPL-MCNC: 2.2 MG/DL
CREAT SERPL-MCNC: 2.21 MG/DL — HIGH (ref 0.5–1.3)
CREAT SERPL-MCNC: 2.26 MG/DL — HIGH (ref 0.5–1.3)
CREAT SERPL-MCNC: 2.27 MG/DL — HIGH (ref 0.5–1.3)
CREAT SERPL-MCNC: 2.27 MG/DL — HIGH (ref 0.5–1.3)
CREAT SERPL-MCNC: 2.28 MG/DL — HIGH (ref 0.5–1.3)
CREAT SERPL-MCNC: 2.38 MG/DL — HIGH (ref 0.5–1.3)
CREAT SERPL-MCNC: 2.46 MG/DL
CREAT SERPL-MCNC: 2.47 MG/DL — HIGH (ref 0.5–1.3)
CREAT SERPL-MCNC: 2.49 MG/DL — HIGH (ref 0.5–1.3)
CREAT SERPL-MCNC: 2.59 MG/DL — HIGH (ref 0.5–1.3)
CREAT SERPL-MCNC: 2.71 MG/DL — HIGH (ref 0.5–1.3)
CULTURE RESULTS: SIGNIFICANT CHANGE UP
DACRYOCYTES BLD QL SMEAR: SLIGHT — SIGNIFICANT CHANGE UP
DIFF PNL FLD: NEGATIVE — SIGNIFICANT CHANGE UP
DIGOXIN SERPL-MCNC: 0.7 NG/ML
DIGOXIN SERPL-MCNC: 1.1 NG/ML — SIGNIFICANT CHANGE UP (ref 0.8–2)
DIGOXIN SERPL-MCNC: 1.3 NG/ML
DIGOXIN SERPL-MCNC: 1.3 NG/ML
DIGOXIN SERPL-MCNC: 1.4 NG/ML — SIGNIFICANT CHANGE UP (ref 0.8–2)
DIGOXIN SERPL-MCNC: 1.5 NG/ML
DIGOXIN SERPL-MCNC: 1.6 NG/ML
ELLIPTOCYTES BLD QL SMEAR: SLIGHT — SIGNIFICANT CHANGE UP
EOSINOPHIL # BLD AUTO: 0.2 K/UL
EOSINOPHIL # BLD AUTO: 0.26 K/UL
EOSINOPHIL # BLD AUTO: 0.27 K/UL
EOSINOPHIL # BLD AUTO: 0.3 K/UL — SIGNIFICANT CHANGE UP (ref 0–0.5)
EOSINOPHIL # BLD AUTO: 0.3 K/UL — SIGNIFICANT CHANGE UP (ref 0–0.5)
EOSINOPHIL # BLD AUTO: 0.31 K/UL
EOSINOPHIL # BLD AUTO: 0.32 K/UL — SIGNIFICANT CHANGE UP (ref 0–0.5)
EOSINOPHIL # BLD AUTO: 0.36 K/UL
EOSINOPHIL # BLD AUTO: 0.37 K/UL — SIGNIFICANT CHANGE UP (ref 0–0.5)
EOSINOPHIL # BLD AUTO: 0.42 K/UL — SIGNIFICANT CHANGE UP (ref 0–0.5)
EOSINOPHIL NFR BLD AUTO: 3.4 %
EOSINOPHIL NFR BLD AUTO: 3.8 %
EOSINOPHIL NFR BLD AUTO: 4 %
EOSINOPHIL NFR BLD AUTO: 4.4 % — SIGNIFICANT CHANGE UP (ref 0–6)
EOSINOPHIL NFR BLD AUTO: 4.8 %
EOSINOPHIL NFR BLD AUTO: 4.8 % — SIGNIFICANT CHANGE UP (ref 0–6)
EOSINOPHIL NFR BLD AUTO: 4.9 % — SIGNIFICANT CHANGE UP (ref 0–6)
EOSINOPHIL NFR BLD AUTO: 5.1 % — SIGNIFICANT CHANGE UP (ref 0–6)
EOSINOPHIL NFR BLD AUTO: 5.3 %
EOSINOPHIL NFR BLD AUTO: 6.1 % — HIGH (ref 0–6)
EPI CELLS # UR: 0 /HPF — SIGNIFICANT CHANGE UP
EPO SERPL-MCNC: 52.3 MIU/ML — HIGH (ref 2.6–18.5)
ESTIMATED AVERAGE GLUCOSE: 100 MG/DL — SIGNIFICANT CHANGE UP (ref 68–114)
ESTIMATED AVERAGE GLUCOSE: 105 MG/DL
ESTIMATED AVERAGE GLUCOSE: 108 MG/DL
FERRITIN SERPL-MCNC: 110 NG/ML — SIGNIFICANT CHANGE UP (ref 30–400)
FERRITIN SERPL-MCNC: 115 NG/ML — SIGNIFICANT CHANGE UP (ref 30–400)
FERRITIN SERPL-MCNC: 119 NG/ML — SIGNIFICANT CHANGE UP (ref 30–400)
FERRITIN SERPL-MCNC: 156 NG/ML
FOLATE SERPL-MCNC: 16.9 NG/ML — SIGNIFICANT CHANGE UP
FOLATE SERPL-MCNC: 18.4 NG/ML — SIGNIFICANT CHANGE UP
GAMMA GLOBULIN: 0.9 G/DL — SIGNIFICANT CHANGE UP (ref 0.6–1.6)
GAS PNL BLDV: 140 MMOL/L — SIGNIFICANT CHANGE UP (ref 135–145)
GAS PNL BLDV: SIGNIFICANT CHANGE UP
GLUCOSE BLDV-MCNC: 109 MG/DL — HIGH (ref 70–99)
GLUCOSE SERPL-MCNC: 101 MG/DL — HIGH (ref 70–99)
GLUCOSE SERPL-MCNC: 105 MG/DL
GLUCOSE SERPL-MCNC: 108 MG/DL
GLUCOSE SERPL-MCNC: 110 MG/DL — HIGH (ref 70–99)
GLUCOSE SERPL-MCNC: 116 MG/DL — HIGH (ref 70–99)
GLUCOSE SERPL-MCNC: 76 MG/DL — SIGNIFICANT CHANGE UP (ref 70–99)
GLUCOSE SERPL-MCNC: 82 MG/DL — SIGNIFICANT CHANGE UP (ref 70–99)
GLUCOSE SERPL-MCNC: 83 MG/DL — SIGNIFICANT CHANGE UP (ref 70–99)
GLUCOSE SERPL-MCNC: 85 MG/DL — SIGNIFICANT CHANGE UP (ref 70–99)
GLUCOSE SERPL-MCNC: 89 MG/DL — SIGNIFICANT CHANGE UP (ref 70–99)
GLUCOSE SERPL-MCNC: 90 MG/DL
GLUCOSE SERPL-MCNC: 90 MG/DL — SIGNIFICANT CHANGE UP (ref 70–99)
GLUCOSE SERPL-MCNC: 91 MG/DL — SIGNIFICANT CHANGE UP (ref 70–99)
GLUCOSE SERPL-MCNC: 91 MG/DL — SIGNIFICANT CHANGE UP (ref 70–99)
GLUCOSE SERPL-MCNC: 92 MG/DL — SIGNIFICANT CHANGE UP (ref 70–99)
GLUCOSE SERPL-MCNC: 96 MG/DL
GLUCOSE SERPL-MCNC: 98 MG/DL
GLUCOSE SERPL-MCNC: 98 MG/DL — SIGNIFICANT CHANGE UP (ref 70–99)
GLUCOSE SERPL-MCNC: 99 MG/DL — SIGNIFICANT CHANGE UP (ref 70–99)
GLUCOSE UR QL: ABNORMAL
HAPTOGLOB SERPL-MCNC: 102 MG/DL — SIGNIFICANT CHANGE UP (ref 34–200)
HAPTOGLOB SERPL-MCNC: 84 MG/DL — SIGNIFICANT CHANGE UP (ref 34–200)
HAV IGM SER-ACNC: SIGNIFICANT CHANGE UP
HBA1C MFR BLD HPLC: 5.3 %
HBA1C MFR BLD HPLC: 5.4 %
HBV CORE IGM SER-ACNC: SIGNIFICANT CHANGE UP
HBV SURFACE AG SER-ACNC: SIGNIFICANT CHANGE UP
HCO3 BLDV-SCNC: 30 MMOL/L — HIGH (ref 21–29)
HCT VFR BLD CALC: 20.1 % — CRITICAL LOW (ref 39–50)
HCT VFR BLD CALC: 20.4 % — CRITICAL LOW (ref 39–50)
HCT VFR BLD CALC: 21.3 % — LOW (ref 39–50)
HCT VFR BLD CALC: 21.9 % — LOW (ref 39–50)
HCT VFR BLD CALC: 22.1 % — LOW (ref 39–50)
HCT VFR BLD CALC: 22.5 % — LOW (ref 39–50)
HCT VFR BLD CALC: 23.8 % — LOW (ref 39–50)
HCT VFR BLD CALC: 24.6 % — LOW (ref 39–50)
HCT VFR BLD CALC: 24.9 % — LOW (ref 39–50)
HCT VFR BLD CALC: 25 % — LOW (ref 39–50)
HCT VFR BLD CALC: 25.9 % — LOW (ref 39–50)
HCT VFR BLD CALC: 26.8 % — LOW (ref 39–50)
HCT VFR BLD CALC: 28.9 % — LOW (ref 39–50)
HCT VFR BLD CALC: 30 % — LOW (ref 39–50)
HCT VFR BLD CALC: 30.1 %
HCT VFR BLD CALC: 30.3 %
HCT VFR BLD CALC: 30.9 %
HCT VFR BLD CALC: 32.3 %
HCT VFR BLD CALC: 32.7 %
HCT VFR BLDA CALC: 31 % — LOW (ref 39–50)
HCV AB S/CO SERPL IA: 0.15 S/CO — SIGNIFICANT CHANGE UP (ref 0–0.99)
HCV AB SERPL-IMP: SIGNIFICANT CHANGE UP
HDLC SERPL-MCNC: 42 MG/DL — SIGNIFICANT CHANGE UP
HDLC SERPL-MCNC: 46 MG/DL
HDLC SERPL-MCNC: 47 MG/DL
HGB BLD CALC-MCNC: 10 G/DL — LOW (ref 13–17)
HGB BLD-MCNC: 10 G/DL
HGB BLD-MCNC: 10.3 G/DL
HGB BLD-MCNC: 6.6 G/DL — CRITICAL LOW (ref 13–17)
HGB BLD-MCNC: 6.7 G/DL — CRITICAL LOW (ref 13–17)
HGB BLD-MCNC: 6.9 G/DL — CRITICAL LOW (ref 13–17)
HGB BLD-MCNC: 7.1 G/DL — LOW (ref 13–17)
HGB BLD-MCNC: 7.2 G/DL — LOW (ref 13–17)
HGB BLD-MCNC: 7.4 G/DL — LOW (ref 13–17)
HGB BLD-MCNC: 7.7 G/DL — LOW (ref 13–17)
HGB BLD-MCNC: 8 G/DL — LOW (ref 13–17)
HGB BLD-MCNC: 8.1 G/DL — LOW (ref 13–17)
HGB BLD-MCNC: 8.2 G/DL — LOW (ref 13–17)
HGB BLD-MCNC: 8.4 G/DL — LOW (ref 13–17)
HGB BLD-MCNC: 8.7 G/DL — LOW (ref 13–17)
HGB BLD-MCNC: 9.1 G/DL — LOW (ref 13–17)
HGB BLD-MCNC: 9.5 G/DL
HGB BLD-MCNC: 9.5 G/DL
HGB BLD-MCNC: 9.6 G/DL — LOW (ref 13–17)
HGB BLD-MCNC: 9.7 G/DL
HYALINE CASTS # UR AUTO: 1 /LPF — SIGNIFICANT CHANGE UP (ref 0–2)
HYPOCHROMIA BLD QL: SIGNIFICANT CHANGE UP
IGA FLD-MCNC: 443 MG/DL — SIGNIFICANT CHANGE UP (ref 84–499)
IGG FLD-MCNC: 1026 MG/DL — SIGNIFICANT CHANGE UP (ref 610–1660)
IGM SERPL-MCNC: 36 MG/DL — SIGNIFICANT CHANGE UP (ref 35–242)
IMM GRANULOCYTES NFR BLD AUTO: 0.3 %
IMM GRANULOCYTES NFR BLD AUTO: 0.3 % — SIGNIFICANT CHANGE UP (ref 0–1.5)
IMM GRANULOCYTES NFR BLD AUTO: 0.3 % — SIGNIFICANT CHANGE UP (ref 0–1.5)
IMM GRANULOCYTES NFR BLD AUTO: 0.4 % — SIGNIFICANT CHANGE UP (ref 0–1.5)
IMM GRANULOCYTES NFR BLD AUTO: 0.4 % — SIGNIFICANT CHANGE UP (ref 0–1.5)
IMM GRANULOCYTES NFR BLD AUTO: 0.5 %
IMM GRANULOCYTES NFR BLD AUTO: 1.2 % — SIGNIFICANT CHANGE UP (ref 0–1.5)
INR BLD: 1.26 RATIO — HIGH (ref 0.88–1.16)
INTERPRETATION SERPL IFE-IMP: SIGNIFICANT CHANGE UP
IRON SATN MFR SERPL: 12 % — LOW (ref 16–55)
IRON SATN MFR SERPL: 13 %
IRON SATN MFR SERPL: 24 % — SIGNIFICANT CHANGE UP (ref 16–55)
IRON SATN MFR SERPL: 25 UG/DL — LOW (ref 45–165)
IRON SATN MFR SERPL: 31 UG/DL — LOW (ref 45–165)
IRON SATN MFR SERPL: 55 UG/DL — SIGNIFICANT CHANGE UP (ref 45–165)
IRON SERPL-MCNC: 38 UG/DL
KAPPA LC SER QL IFE: 9.97 MG/DL — HIGH (ref 0.33–1.94)
KAPPA/LAMBDA FREE LIGHT CHAIN RATIO, SERUM: 1.06 RATIO — SIGNIFICANT CHANGE UP (ref 0.26–1.65)
KETONES UR-MCNC: NEGATIVE — SIGNIFICANT CHANGE UP
LACTATE BLDV-MCNC: 0.9 MMOL/L — SIGNIFICANT CHANGE UP (ref 0.7–2)
LACTATE SERPL-SCNC: 0.8 MMOL/L — SIGNIFICANT CHANGE UP (ref 0.7–2)
LACTATE SERPL-SCNC: 0.8 MMOL/L — SIGNIFICANT CHANGE UP (ref 0.7–2)
LAMBDA LC SER QL IFE: 9.41 MG/DL — HIGH (ref 0.57–2.63)
LDH SERPL L TO P-CCNC: 166 U/L — SIGNIFICANT CHANGE UP (ref 50–242)
LDLC SERPL CALC-MCNC: 31 MG/DL
LDLC SERPL CALC-MCNC: 34 MG/DL
LEUKOCYTE ESTERASE UR-ACNC: NEGATIVE — SIGNIFICANT CHANGE UP
LIPID PNL WITH DIRECT LDL SERPL: 30 MG/DL — SIGNIFICANT CHANGE UP
LYMPHOCYTES # BLD AUTO: 0.93 K/UL
LYMPHOCYTES # BLD AUTO: 0.97 K/UL
LYMPHOCYTES # BLD AUTO: 1 K/UL
LYMPHOCYTES # BLD AUTO: 1.07 K/UL — SIGNIFICANT CHANGE UP (ref 1–3.3)
LYMPHOCYTES # BLD AUTO: 1.08 K/UL — SIGNIFICANT CHANGE UP (ref 1–3.3)
LYMPHOCYTES # BLD AUTO: 1.1 K/UL — SIGNIFICANT CHANGE UP (ref 1–3.3)
LYMPHOCYTES # BLD AUTO: 1.11 K/UL
LYMPHOCYTES # BLD AUTO: 1.11 K/UL — SIGNIFICANT CHANGE UP (ref 1–3.3)
LYMPHOCYTES # BLD AUTO: 1.17 K/UL
LYMPHOCYTES # BLD AUTO: 1.34 K/UL — SIGNIFICANT CHANGE UP (ref 1–3.3)
LYMPHOCYTES # BLD AUTO: 15.5 % — SIGNIFICANT CHANGE UP (ref 13–44)
LYMPHOCYTES # BLD AUTO: 16 % — SIGNIFICANT CHANGE UP (ref 13–44)
LYMPHOCYTES # BLD AUTO: 16 % — SIGNIFICANT CHANGE UP (ref 13–44)
LYMPHOCYTES # BLD AUTO: 18.3 % — SIGNIFICANT CHANGE UP (ref 13–44)
LYMPHOCYTES # BLD AUTO: 18.6 % — SIGNIFICANT CHANGE UP (ref 13–44)
LYMPHOCYTES NFR BLD AUTO: 14.2 %
LYMPHOCYTES NFR BLD AUTO: 14.4 %
LYMPHOCYTES NFR BLD AUTO: 16.5 %
LYMPHOCYTES NFR BLD AUTO: 17 %
LYMPHOCYTES NFR BLD AUTO: 17 %
M-SPIKE: SIGNIFICANT CHANGE UP (ref 0–0)
MACROCYTES BLD QL: SIGNIFICANT CHANGE UP
MAGNESIUM SERPL-MCNC: 2.2 MG/DL — SIGNIFICANT CHANGE UP (ref 1.6–2.6)
MAGNESIUM SERPL-MCNC: 2.3 MG/DL — SIGNIFICANT CHANGE UP (ref 1.6–2.6)
MAGNESIUM SERPL-MCNC: 2.4 MG/DL — SIGNIFICANT CHANGE UP (ref 1.6–2.6)
MAGNESIUM SERPL-MCNC: 2.5 MG/DL — SIGNIFICANT CHANGE UP (ref 1.6–2.6)
MAGNESIUM SERPL-MCNC: 2.6 MG/DL — SIGNIFICANT CHANGE UP (ref 1.6–2.6)
MAN DIFF?: NORMAL
MANUAL SMEAR VERIFICATION: SIGNIFICANT CHANGE UP
MCHC RBC-ENTMCNC: 31 GM/DL
MCHC RBC-ENTMCNC: 31.4 GM/DL
MCHC RBC-ENTMCNC: 31.4 GM/DL
MCHC RBC-ENTMCNC: 31.5 GM/DL
MCHC RBC-ENTMCNC: 31.5 GM/DL — LOW (ref 32–36)
MCHC RBC-ENTMCNC: 31.6 GM/DL
MCHC RBC-ENTMCNC: 31.7 PG
MCHC RBC-ENTMCNC: 31.8 PG
MCHC RBC-ENTMCNC: 32 GM/DL — SIGNIFICANT CHANGE UP (ref 32–36)
MCHC RBC-ENTMCNC: 32.3 PG
MCHC RBC-ENTMCNC: 32.3 PG — SIGNIFICANT CHANGE UP (ref 27–34)
MCHC RBC-ENTMCNC: 32.4 GM/DL — SIGNIFICANT CHANGE UP (ref 32–36)
MCHC RBC-ENTMCNC: 32.4 PG — SIGNIFICANT CHANGE UP (ref 27–34)
MCHC RBC-ENTMCNC: 32.4 PG — SIGNIFICANT CHANGE UP (ref 27–34)
MCHC RBC-ENTMCNC: 32.5 GM/DL — SIGNIFICANT CHANGE UP (ref 32–36)
MCHC RBC-ENTMCNC: 32.5 GM/DL — SIGNIFICANT CHANGE UP (ref 32–36)
MCHC RBC-ENTMCNC: 32.5 PG — SIGNIFICANT CHANGE UP (ref 27–34)
MCHC RBC-ENTMCNC: 32.6 GM/DL — SIGNIFICANT CHANGE UP (ref 32–36)
MCHC RBC-ENTMCNC: 32.6 PG — SIGNIFICANT CHANGE UP (ref 27–34)
MCHC RBC-ENTMCNC: 32.7 PG — SIGNIFICANT CHANGE UP (ref 27–34)
MCHC RBC-ENTMCNC: 32.8 GM/DL — SIGNIFICANT CHANGE UP (ref 32–36)
MCHC RBC-ENTMCNC: 32.8 GM/DL — SIGNIFICANT CHANGE UP (ref 32–36)
MCHC RBC-ENTMCNC: 32.9 GM/DL — SIGNIFICANT CHANGE UP (ref 32–36)
MCHC RBC-ENTMCNC: 32.9 GM/DL — SIGNIFICANT CHANGE UP (ref 32–36)
MCHC RBC-ENTMCNC: 32.9 PG
MCHC RBC-ENTMCNC: 32.9 PG — SIGNIFICANT CHANGE UP (ref 27–34)
MCHC RBC-ENTMCNC: 32.9 PG — SIGNIFICANT CHANGE UP (ref 27–34)
MCHC RBC-ENTMCNC: 33 PG — SIGNIFICANT CHANGE UP (ref 27–34)
MCHC RBC-ENTMCNC: 33.3 PG
MCHC RBC-ENTMCNC: 33.3 PG — SIGNIFICANT CHANGE UP (ref 27–34)
MCHC RBC-ENTMCNC: 33.5 PG — SIGNIFICANT CHANGE UP (ref 27–34)
MCHC RBC-ENTMCNC: 33.5 PG — SIGNIFICANT CHANGE UP (ref 27–34)
MCHC RBC-ENTMCNC: 33.6 PG — SIGNIFICANT CHANGE UP (ref 27–34)
MCHC RBC-ENTMCNC: 33.6 PG — SIGNIFICANT CHANGE UP (ref 27–34)
MCV RBC AUTO: 100 FL — SIGNIFICANT CHANGE UP (ref 80–100)
MCV RBC AUTO: 100 FL — SIGNIFICANT CHANGE UP (ref 80–100)
MCV RBC AUTO: 100.4 FL — HIGH (ref 80–100)
MCV RBC AUTO: 100.9 FL
MCV RBC AUTO: 100.9 FL — HIGH (ref 80–100)
MCV RBC AUTO: 101.7 FL — HIGH (ref 80–100)
MCV RBC AUTO: 102 FL — HIGH (ref 80–100)
MCV RBC AUTO: 102 FL — HIGH (ref 80–100)
MCV RBC AUTO: 102.5 FL
MCV RBC AUTO: 102.9 FL — HIGH (ref 80–100)
MCV RBC AUTO: 103 FL
MCV RBC AUTO: 103.5 FL — HIGH (ref 80–100)
MCV RBC AUTO: 103.7 FL — HIGH (ref 80–100)
MCV RBC AUTO: 104.7 FL — HIGH (ref 80–100)
MCV RBC AUTO: 104.8 FL
MCV RBC AUTO: 105.6 FL
MCV RBC AUTO: 99.2 FL — SIGNIFICANT CHANGE UP (ref 80–100)
MCV RBC AUTO: 99.5 FL — SIGNIFICANT CHANGE UP (ref 80–100)
MCV RBC AUTO: 99.6 FL — SIGNIFICANT CHANGE UP (ref 80–100)
MONOCYTES # BLD AUTO: 0.46 K/UL
MONOCYTES # BLD AUTO: 0.46 K/UL
MONOCYTES # BLD AUTO: 0.48 K/UL — SIGNIFICANT CHANGE UP (ref 0–0.9)
MONOCYTES # BLD AUTO: 0.51 K/UL
MONOCYTES # BLD AUTO: 0.52 K/UL — SIGNIFICANT CHANGE UP (ref 0–0.9)
MONOCYTES # BLD AUTO: 0.53 K/UL
MONOCYTES # BLD AUTO: 0.54 K/UL — SIGNIFICANT CHANGE UP (ref 0–0.9)
MONOCYTES # BLD AUTO: 0.55 K/UL — SIGNIFICANT CHANGE UP (ref 0–0.9)
MONOCYTES # BLD AUTO: 0.56 K/UL
MONOCYTES # BLD AUTO: 0.6 K/UL — SIGNIFICANT CHANGE UP (ref 0–0.9)
MONOCYTES NFR BLD AUTO: 7.1 %
MONOCYTES NFR BLD AUTO: 7.1 % — SIGNIFICANT CHANGE UP (ref 2–14)
MONOCYTES NFR BLD AUTO: 7.2 %
MONOCYTES NFR BLD AUTO: 7.8 %
MONOCYTES NFR BLD AUTO: 7.8 %
MONOCYTES NFR BLD AUTO: 7.9 % — SIGNIFICANT CHANGE UP (ref 2–14)
MONOCYTES NFR BLD AUTO: 8 % — SIGNIFICANT CHANGE UP (ref 2–14)
MONOCYTES NFR BLD AUTO: 8.3 % — SIGNIFICANT CHANGE UP (ref 2–14)
MONOCYTES NFR BLD AUTO: 8.6 %
MONOCYTES NFR BLD AUTO: 8.6 % — SIGNIFICANT CHANGE UP (ref 2–14)
NEUTROPHILS # BLD AUTO: 4.09 K/UL — SIGNIFICANT CHANGE UP (ref 1.8–7.4)
NEUTROPHILS # BLD AUTO: 4.14 K/UL
NEUTROPHILS # BLD AUTO: 4.6 K/UL
NEUTROPHILS # BLD AUTO: 4.62 K/UL
NEUTROPHILS # BLD AUTO: 4.69 K/UL — SIGNIFICANT CHANGE UP (ref 1.8–7.4)
NEUTROPHILS # BLD AUTO: 4.79 K/UL — SIGNIFICANT CHANGE UP (ref 1.8–7.4)
NEUTROPHILS # BLD AUTO: 4.81 K/UL — SIGNIFICANT CHANGE UP (ref 1.8–7.4)
NEUTROPHILS # BLD AUTO: 4.86 K/UL — SIGNIFICANT CHANGE UP (ref 1.8–7.4)
NEUTROPHILS # BLD AUTO: 4.88 K/UL
NEUTROPHILS # BLD AUTO: 5.09 K/UL
NEUTROPHILS NFR BLD AUTO: 66.8 % — SIGNIFICANT CHANGE UP (ref 43–77)
NEUTROPHILS NFR BLD AUTO: 67.2 % — SIGNIFICANT CHANGE UP (ref 43–77)
NEUTROPHILS NFR BLD AUTO: 69.2 % — SIGNIFICANT CHANGE UP (ref 43–77)
NEUTROPHILS NFR BLD AUTO: 69.7 % — SIGNIFICANT CHANGE UP (ref 43–77)
NEUTROPHILS NFR BLD AUTO: 70.5 %
NEUTROPHILS NFR BLD AUTO: 70.7 % — SIGNIFICANT CHANGE UP (ref 43–77)
NEUTROPHILS NFR BLD AUTO: 70.8 %
NEUTROPHILS NFR BLD AUTO: 70.9 %
NEUTROPHILS NFR BLD AUTO: 71.5 %
NEUTROPHILS NFR BLD AUTO: 71.5 %
NITRITE UR-MCNC: NEGATIVE — SIGNIFICANT CHANGE UP
NON HDL CHOLESTEROL: 40 MG/DL — SIGNIFICANT CHANGE UP
NONHDLC SERPL-MCNC: 37 MG/DL
NONHDLC SERPL-MCNC: 43 MG/DL
NRBC # BLD: 0 /100 WBCS — SIGNIFICANT CHANGE UP (ref 0–0)
NT-PROBNP SERPL-MCNC: 8333 PG/ML
NT-PROBNP SERPL-MCNC: 8565 PG/ML
NT-PROBNP SERPL-MCNC: 9230 PG/ML
NT-PROBNP SERPL-MCNC: ABNORMAL PG/ML
NT-PROBNP SERPL-MCNC: ABNORMAL PG/ML
NT-PROBNP SERPL-SCNC: HIGH PG/ML (ref 0–300)
OB PNL STL: NEGATIVE — SIGNIFICANT CHANGE UP
OTHER CELLS CSF MANUAL: 2 ML/DL — LOW (ref 18–22)
PCO2 BLDV: 49 MMHG — SIGNIFICANT CHANGE UP (ref 35–50)
PH BLDV: 7.4 — SIGNIFICANT CHANGE UP (ref 7.35–7.45)
PH UR: 6 — SIGNIFICANT CHANGE UP (ref 5–8)
PHOSPHATE SERPL-MCNC: 2.5 MG/DL — SIGNIFICANT CHANGE UP (ref 2.5–4.5)
PHOSPHATE SERPL-MCNC: 2.9 MG/DL — SIGNIFICANT CHANGE UP (ref 2.5–4.5)
PHOSPHATE SERPL-MCNC: 2.9 MG/DL — SIGNIFICANT CHANGE UP (ref 2.5–4.5)
PHOSPHATE SERPL-MCNC: 3.1 MG/DL — SIGNIFICANT CHANGE UP (ref 2.5–4.5)
PHOSPHATE SERPL-MCNC: 3.2 MG/DL — SIGNIFICANT CHANGE UP (ref 2.5–4.5)
PHOSPHATE SERPL-MCNC: 3.3 MG/DL — SIGNIFICANT CHANGE UP (ref 2.5–4.5)
PHOSPHATE SERPL-MCNC: 3.4 MG/DL — SIGNIFICANT CHANGE UP (ref 2.5–4.5)
PHOSPHATE SERPL-MCNC: 3.5 MG/DL — SIGNIFICANT CHANGE UP (ref 2.5–4.5)
PLAT MORPH BLD: NORMAL — SIGNIFICANT CHANGE UP
PLATELET # BLD AUTO: 103 K/UL
PLATELET # BLD AUTO: 106 K/UL
PLATELET # BLD AUTO: 108 K/UL
PLATELET # BLD AUTO: 69 K/UL — LOW (ref 150–400)
PLATELET # BLD AUTO: 72 K/UL — LOW (ref 150–400)
PLATELET # BLD AUTO: 76 K/UL — LOW (ref 150–400)
PLATELET # BLD AUTO: 77 K/UL — LOW (ref 150–400)
PLATELET # BLD AUTO: 81 K/UL
PLATELET # BLD AUTO: 82 K/UL — LOW (ref 150–400)
PLATELET # BLD AUTO: 83 K/UL — LOW (ref 150–400)
PLATELET # BLD AUTO: 84 K/UL — LOW (ref 150–400)
PLATELET # BLD AUTO: 84 K/UL — LOW (ref 150–400)
PLATELET # BLD AUTO: 85 K/UL — LOW (ref 150–400)
PLATELET # BLD AUTO: 93 K/UL
PLATELET # BLD AUTO: 94 K/UL — LOW (ref 150–400)
PLATELET # BLD AUTO: 96 K/UL — LOW (ref 150–400)
PO2 BLDV: <20 MMHG — LOW (ref 25–45)
POIKILOCYTOSIS BLD QL AUTO: SLIGHT — SIGNIFICANT CHANGE UP
POLYCHROMASIA BLD QL SMEAR: SLIGHT — SIGNIFICANT CHANGE UP
POTASSIUM BLDV-SCNC: 4.4 MMOL/L — SIGNIFICANT CHANGE UP (ref 3.5–5.3)
POTASSIUM SERPL-MCNC: 4 MMOL/L — SIGNIFICANT CHANGE UP (ref 3.5–5.3)
POTASSIUM SERPL-MCNC: 4 MMOL/L — SIGNIFICANT CHANGE UP (ref 3.5–5.3)
POTASSIUM SERPL-MCNC: 4.1 MMOL/L — SIGNIFICANT CHANGE UP (ref 3.5–5.3)
POTASSIUM SERPL-MCNC: 4.2 MMOL/L — SIGNIFICANT CHANGE UP (ref 3.5–5.3)
POTASSIUM SERPL-MCNC: 4.2 MMOL/L — SIGNIFICANT CHANGE UP (ref 3.5–5.3)
POTASSIUM SERPL-MCNC: 4.3 MMOL/L — SIGNIFICANT CHANGE UP (ref 3.5–5.3)
POTASSIUM SERPL-MCNC: 4.3 MMOL/L — SIGNIFICANT CHANGE UP (ref 3.5–5.3)
POTASSIUM SERPL-MCNC: 4.5 MMOL/L — SIGNIFICANT CHANGE UP (ref 3.5–5.3)
POTASSIUM SERPL-MCNC: 4.8 MMOL/L — SIGNIFICANT CHANGE UP (ref 3.5–5.3)
POTASSIUM SERPL-SCNC: 4 MMOL/L — SIGNIFICANT CHANGE UP (ref 3.5–5.3)
POTASSIUM SERPL-SCNC: 4 MMOL/L — SIGNIFICANT CHANGE UP (ref 3.5–5.3)
POTASSIUM SERPL-SCNC: 4.1 MMOL/L — SIGNIFICANT CHANGE UP (ref 3.5–5.3)
POTASSIUM SERPL-SCNC: 4.2 MMOL/L — SIGNIFICANT CHANGE UP (ref 3.5–5.3)
POTASSIUM SERPL-SCNC: 4.2 MMOL/L — SIGNIFICANT CHANGE UP (ref 3.5–5.3)
POTASSIUM SERPL-SCNC: 4.3 MMOL/L — SIGNIFICANT CHANGE UP (ref 3.5–5.3)
POTASSIUM SERPL-SCNC: 4.3 MMOL/L — SIGNIFICANT CHANGE UP (ref 3.5–5.3)
POTASSIUM SERPL-SCNC: 4.4 MMOL/L
POTASSIUM SERPL-SCNC: 4.4 MMOL/L
POTASSIUM SERPL-SCNC: 4.5 MMOL/L — SIGNIFICANT CHANGE UP (ref 3.5–5.3)
POTASSIUM SERPL-SCNC: 4.7 MMOL/L
POTASSIUM SERPL-SCNC: 4.8 MMOL/L
POTASSIUM SERPL-SCNC: 4.8 MMOL/L
POTASSIUM SERPL-SCNC: 4.8 MMOL/L — SIGNIFICANT CHANGE UP (ref 3.5–5.3)
PROT PATTERN SERPL ELPH-IMP: SIGNIFICANT CHANGE UP
PROT SERPL-MCNC: 5.4 G/DL — LOW (ref 6–8.3)
PROT SERPL-MCNC: 5.4 G/DL — LOW (ref 6–8.3)
PROT SERPL-MCNC: 5.7 G/DL — LOW (ref 6–8.3)
PROT SERPL-MCNC: 5.8 G/DL — LOW (ref 6–8.3)
PROT SERPL-MCNC: 5.8 G/DL — LOW (ref 6–8.3)
PROT SERPL-MCNC: 5.9 G/DL — LOW (ref 6–8.3)
PROT SERPL-MCNC: 5.9 G/DL — LOW (ref 6–8.3)
PROT SERPL-MCNC: 6.1 G/DL — SIGNIFICANT CHANGE UP (ref 6–8.3)
PROT SERPL-MCNC: 6.2 G/DL — SIGNIFICANT CHANGE UP (ref 6–8.3)
PROT SERPL-MCNC: 6.2 G/DL — SIGNIFICANT CHANGE UP (ref 6–8.3)
PROT SERPL-MCNC: 6.4 G/DL — SIGNIFICANT CHANGE UP (ref 6–8.3)
PROT SERPL-MCNC: 6.7 G/DL
PROT SERPL-MCNC: 6.8 G/DL
PROT SERPL-MCNC: 6.8 G/DL — SIGNIFICANT CHANGE UP (ref 6–8.3)
PROT SERPL-MCNC: 7.1 G/DL
PROT SERPL-MCNC: 7.3 G/DL
PROT SERPL-MCNC: 7.4 G/DL
PROT UR-MCNC: SIGNIFICANT CHANGE UP
PROTHROM AB SERPL-ACNC: 14.9 SEC — HIGH (ref 10.6–13.6)
RAPID RVP RESULT: SIGNIFICANT CHANGE UP
RBC # BLD: 1.97 M/UL — LOW (ref 4.2–5.8)
RBC # BLD: 1.97 M/UL — LOW (ref 4.2–5.8)
RBC # BLD: 2 M/UL — LOW (ref 4.2–5.8)
RBC # BLD: 2.07 M/UL — LOW (ref 4.2–5.8)
RBC # BLD: 2.19 M/UL — LOW (ref 4.2–5.8)
RBC # BLD: 2.2 M/UL — LOW (ref 4.2–5.8)
RBC # BLD: 2.25 M/UL — LOW (ref 4.2–5.8)
RBC # BLD: 2.38 M/UL — LOW (ref 4.2–5.8)
RBC # BLD: 2.41 M/UL — LOW (ref 4.2–5.8)
RBC # BLD: 2.47 M/UL — LOW (ref 4.2–5.8)
RBC # BLD: 2.51 M/UL — LOW (ref 4.2–5.8)
RBC # BLD: 2.58 M/UL — LOW (ref 4.2–5.8)
RBC # BLD: 2.59 M/UL — LOW (ref 4.2–5.8)
RBC # BLD: 2.76 M/UL — LOW (ref 4.2–5.8)
RBC # BLD: 2.85 M/UL
RBC # BLD: 2.89 M/UL
RBC # BLD: 2.95 M/UL — LOW (ref 4.2–5.8)
RBC # BLD: 3 M/UL
RBC # BLD: 3.15 M/UL
RBC # BLD: 3.24 M/UL
RBC # FLD: 16.6 % — HIGH (ref 10.3–14.5)
RBC # FLD: 16.9 % — HIGH (ref 10.3–14.5)
RBC # FLD: 17 % — HIGH (ref 10.3–14.5)
RBC # FLD: 17.1 % — HIGH (ref 10.3–14.5)
RBC # FLD: 17.2 % — HIGH (ref 10.3–14.5)
RBC # FLD: 17.8 % — HIGH (ref 10.3–14.5)
RBC # FLD: 17.9 %
RBC # FLD: 18.1 %
RBC # FLD: 18.1 % — HIGH (ref 10.3–14.5)
RBC # FLD: 18.1 % — HIGH (ref 10.3–14.5)
RBC # FLD: 18.3 %
RBC # FLD: 18.3 % — HIGH (ref 10.3–14.5)
RBC # FLD: 18.5 % — HIGH (ref 10.3–14.5)
RBC # FLD: 18.6 % — HIGH (ref 10.3–14.5)
RBC # FLD: 18.6 % — HIGH (ref 10.3–14.5)
RBC # FLD: 18.8 %
RBC # FLD: 20.2 %
RBC BLD AUTO: ABNORMAL
RBC CASTS # UR COMP ASSIST: 3 /HPF — SIGNIFICANT CHANGE UP (ref 0–4)
RETICS #: 41.2 K/UL — SIGNIFICANT CHANGE UP (ref 25–125)
RETICS/RBC NFR: 2.1 % — SIGNIFICANT CHANGE UP (ref 0.5–2.5)
RH IG SCN BLD-IMP: POSITIVE — SIGNIFICANT CHANGE UP
RHEUMATOID FACT SERPL-ACNC: 17 IU/ML — HIGH (ref 0–13)
SAO2 % BLDV: 16 % — LOW (ref 67–88)
SARS-COV-2 IGG+IGM SERPL QL IA: 30.5 U/ML — HIGH
SARS-COV-2 IGG+IGM SERPL QL IA: POSITIVE
SARS-COV-2 RNA SPEC QL NAA+PROBE: SIGNIFICANT CHANGE UP
SODIUM SERPL-SCNC: 135 MMOL/L — SIGNIFICANT CHANGE UP (ref 135–145)
SODIUM SERPL-SCNC: 135 MMOL/L — SIGNIFICANT CHANGE UP (ref 135–145)
SODIUM SERPL-SCNC: 136 MMOL/L — SIGNIFICANT CHANGE UP (ref 135–145)
SODIUM SERPL-SCNC: 136 MMOL/L — SIGNIFICANT CHANGE UP (ref 135–145)
SODIUM SERPL-SCNC: 137 MMOL/L — SIGNIFICANT CHANGE UP (ref 135–145)
SODIUM SERPL-SCNC: 138 MMOL/L — SIGNIFICANT CHANGE UP (ref 135–145)
SODIUM SERPL-SCNC: 138 MMOL/L — SIGNIFICANT CHANGE UP (ref 135–145)
SODIUM SERPL-SCNC: 139 MMOL/L
SODIUM SERPL-SCNC: 139 MMOL/L
SODIUM SERPL-SCNC: 140 MMOL/L — SIGNIFICANT CHANGE UP (ref 135–145)
SODIUM SERPL-SCNC: 141 MMOL/L
SODIUM SERPL-SCNC: 141 MMOL/L — SIGNIFICANT CHANGE UP (ref 135–145)
SP GR SPEC: 1.02 — SIGNIFICANT CHANGE UP (ref 1.01–1.02)
SPECIMEN SOURCE: SIGNIFICANT CHANGE UP
T3 SERPL-MCNC: 33 NG/DL — LOW (ref 80–200)
T4 FREE SERPL-MCNC: 1.3 NG/DL — SIGNIFICANT CHANGE UP (ref 0.9–1.8)
TARGETS BLD QL SMEAR: SLIGHT — SIGNIFICANT CHANGE UP
TIBC SERPL-MCNC: 220 UG/DL — SIGNIFICANT CHANGE UP (ref 220–430)
TIBC SERPL-MCNC: 232 UG/DL — SIGNIFICANT CHANGE UP (ref 220–430)
TIBC SERPL-MCNC: 287 UG/DL
TRANSFERRIN SERPL-MCNC: 176 MG/DL — LOW (ref 200–360)
TRIGL SERPL-MCNC: 33 MG/DL
TRIGL SERPL-MCNC: 48 MG/DL
TRIGL SERPL-MCNC: 48 MG/DL — SIGNIFICANT CHANGE UP
TROPONIN T, HIGH SENSITIVITY RESULT: 78 NG/L — HIGH (ref 0–51)
TROPONIN T, HIGH SENSITIVITY RESULT: 90 NG/L — HIGH (ref 0–51)
TROPONIN T, HIGH SENSITIVITY RESULT: 95 NG/L — HIGH (ref 0–51)
TSH SERPL-ACNC: 2.28 UIU/ML
TSH SERPL-MCNC: 1.18 UIU/ML — SIGNIFICANT CHANGE UP (ref 0.27–4.2)
TSH SERPL-MCNC: 1.25 UIU/ML — SIGNIFICANT CHANGE UP (ref 0.27–4.2)
TSH SERPL-MCNC: 1.71 UIU/ML — SIGNIFICANT CHANGE UP (ref 0.27–4.2)
TSH SERPL-MCNC: 2.62 UIU/ML — SIGNIFICANT CHANGE UP (ref 0.27–4.2)
TSH SERPL-MCNC: 3.27 UIU/ML — SIGNIFICANT CHANGE UP (ref 0.27–4.2)
UIBC SERPL-MCNC: 177 UG/DL — SIGNIFICANT CHANGE UP (ref 110–370)
UIBC SERPL-MCNC: 195 UG/DL — SIGNIFICANT CHANGE UP (ref 110–370)
UIBC SERPL-MCNC: 249 UG/DL
UROBILINOGEN FLD QL: NEGATIVE — SIGNIFICANT CHANGE UP
VIT B12 SERPL-MCNC: 349 PG/ML — SIGNIFICANT CHANGE UP (ref 232–1245)
VIT B12 SERPL-MCNC: 367 PG/ML — SIGNIFICANT CHANGE UP (ref 232–1245)
WBC # BLD: 5.19 K/UL — SIGNIFICANT CHANGE UP (ref 3.8–10.5)
WBC # BLD: 5.49 K/UL — SIGNIFICANT CHANGE UP (ref 3.8–10.5)
WBC # BLD: 5.74 K/UL — SIGNIFICANT CHANGE UP (ref 3.8–10.5)
WBC # BLD: 6.08 K/UL — SIGNIFICANT CHANGE UP (ref 3.8–10.5)
WBC # BLD: 6.3 K/UL — SIGNIFICANT CHANGE UP (ref 3.8–10.5)
WBC # BLD: 6.39 K/UL — SIGNIFICANT CHANGE UP (ref 3.8–10.5)
WBC # BLD: 6.7 K/UL — SIGNIFICANT CHANGE UP (ref 3.8–10.5)
WBC # BLD: 6.73 K/UL — SIGNIFICANT CHANGE UP (ref 3.8–10.5)
WBC # BLD: 6.78 K/UL — SIGNIFICANT CHANGE UP (ref 3.8–10.5)
WBC # BLD: 6.87 K/UL — SIGNIFICANT CHANGE UP (ref 3.8–10.5)
WBC # BLD: 6.91 K/UL — SIGNIFICANT CHANGE UP (ref 3.8–10.5)
WBC # BLD: 7.18 K/UL — SIGNIFICANT CHANGE UP (ref 3.8–10.5)
WBC # BLD: 7.21 K/UL — SIGNIFICANT CHANGE UP (ref 3.8–10.5)
WBC # BLD: 8.52 K/UL — SIGNIFICANT CHANGE UP (ref 3.8–10.5)
WBC # FLD AUTO: 5.19 K/UL — SIGNIFICANT CHANGE UP (ref 3.8–10.5)
WBC # FLD AUTO: 5.49 K/UL — SIGNIFICANT CHANGE UP (ref 3.8–10.5)
WBC # FLD AUTO: 5.74 K/UL — SIGNIFICANT CHANGE UP (ref 3.8–10.5)
WBC # FLD AUTO: 5.88 K/UL
WBC # FLD AUTO: 6.08 K/UL — SIGNIFICANT CHANGE UP (ref 3.8–10.5)
WBC # FLD AUTO: 6.3 K/UL — SIGNIFICANT CHANGE UP (ref 3.8–10.5)
WBC # FLD AUTO: 6.39 K/UL — SIGNIFICANT CHANGE UP (ref 3.8–10.5)
WBC # FLD AUTO: 6.48 K/UL
WBC # FLD AUTO: 6.52 K/UL
WBC # FLD AUTO: 6.7 K/UL — SIGNIFICANT CHANGE UP (ref 3.8–10.5)
WBC # FLD AUTO: 6.73 K/UL — SIGNIFICANT CHANGE UP (ref 3.8–10.5)
WBC # FLD AUTO: 6.78 K/UL — SIGNIFICANT CHANGE UP (ref 3.8–10.5)
WBC # FLD AUTO: 6.82 K/UL
WBC # FLD AUTO: 6.87 K/UL — SIGNIFICANT CHANGE UP (ref 3.8–10.5)
WBC # FLD AUTO: 6.91 K/UL — SIGNIFICANT CHANGE UP (ref 3.8–10.5)
WBC # FLD AUTO: 7.11 K/UL
WBC # FLD AUTO: 7.18 K/UL — SIGNIFICANT CHANGE UP (ref 3.8–10.5)
WBC # FLD AUTO: 7.21 K/UL — SIGNIFICANT CHANGE UP (ref 3.8–10.5)
WBC # FLD AUTO: 8.52 K/UL — SIGNIFICANT CHANGE UP (ref 3.8–10.5)
WBC UR QL: 2 /HPF — SIGNIFICANT CHANGE UP (ref 0–5)

## 2021-01-01 PROCEDURE — 93010 ELECTROCARDIOGRAM REPORT: CPT

## 2021-01-01 PROCEDURE — 99292 CRITICAL CARE ADDL 30 MIN: CPT

## 2021-01-01 PROCEDURE — 82533 TOTAL CORTISOL: CPT

## 2021-01-01 PROCEDURE — 85014 HEMATOCRIT: CPT

## 2021-01-01 PROCEDURE — 99215 OFFICE O/P EST HI 40 MIN: CPT

## 2021-01-01 PROCEDURE — 85025 COMPLETE CBC W/AUTO DIFF WBC: CPT

## 2021-01-01 PROCEDURE — 86850 RBC ANTIBODY SCREEN: CPT

## 2021-01-01 PROCEDURE — 93000 ELECTROCARDIOGRAM COMPLETE: CPT

## 2021-01-01 PROCEDURE — 71045 X-RAY EXAM CHEST 1 VIEW: CPT

## 2021-01-01 PROCEDURE — P9016: CPT

## 2021-01-01 PROCEDURE — 71045 X-RAY EXAM CHEST 1 VIEW: CPT | Mod: 26

## 2021-01-01 PROCEDURE — 99233 SBSQ HOSP IP/OBS HIGH 50: CPT

## 2021-01-01 PROCEDURE — 83735 ASSAY OF MAGNESIUM: CPT

## 2021-01-01 PROCEDURE — 80162 ASSAY OF DIGOXIN TOTAL: CPT

## 2021-01-01 PROCEDURE — 82550 ASSAY OF CK (CPK): CPT

## 2021-01-01 PROCEDURE — 93000 ELECTROCARDIOGRAM COMPLETE: CPT | Mod: 59

## 2021-01-01 PROCEDURE — 93005 ELECTROCARDIOGRAM TRACING: CPT

## 2021-01-01 PROCEDURE — P9045: CPT

## 2021-01-01 PROCEDURE — 36415 COLL VENOUS BLD VENIPUNCTURE: CPT

## 2021-01-01 PROCEDURE — 83010 ASSAY OF HAPTOGLOBIN QUANT: CPT

## 2021-01-01 PROCEDURE — 99291 CRITICAL CARE FIRST HOUR: CPT | Mod: 25

## 2021-01-01 PROCEDURE — 99232 SBSQ HOSP IP/OBS MODERATE 35: CPT | Mod: GC

## 2021-01-01 PROCEDURE — 83605 ASSAY OF LACTIC ACID: CPT

## 2021-01-01 PROCEDURE — 87086 URINE CULTURE/COLONY COUNT: CPT

## 2021-01-01 PROCEDURE — 84439 ASSAY OF FREE THYROXINE: CPT

## 2021-01-01 PROCEDURE — 82553 CREATINE MB FRACTION: CPT

## 2021-01-01 PROCEDURE — 84466 ASSAY OF TRANSFERRIN: CPT

## 2021-01-01 PROCEDURE — 73564 X-RAY EXAM KNEE 4 OR MORE: CPT | Mod: 26,RT

## 2021-01-01 PROCEDURE — 93010 ELECTROCARDIOGRAM REPORT: CPT | Mod: 77

## 2021-01-01 PROCEDURE — 85045 AUTOMATED RETICULOCYTE COUNT: CPT

## 2021-01-01 PROCEDURE — 93010 ELECTROCARDIOGRAM REPORT: CPT | Mod: 76

## 2021-01-01 PROCEDURE — 83550 IRON BINDING TEST: CPT

## 2021-01-01 PROCEDURE — 81001 URINALYSIS AUTO W/SCOPE: CPT

## 2021-01-01 PROCEDURE — U0003: CPT

## 2021-01-01 PROCEDURE — 80076 HEPATIC FUNCTION PANEL: CPT

## 2021-01-01 PROCEDURE — 82962 GLUCOSE BLOOD TEST: CPT

## 2021-01-01 PROCEDURE — 80048 BASIC METABOLIC PNL TOTAL CA: CPT

## 2021-01-01 PROCEDURE — 80061 LIPID PANEL: CPT

## 2021-01-01 PROCEDURE — 86901 BLOOD TYPING SEROLOGIC RH(D): CPT

## 2021-01-01 PROCEDURE — 84100 ASSAY OF PHOSPHORUS: CPT

## 2021-01-01 PROCEDURE — 97116 GAIT TRAINING THERAPY: CPT

## 2021-01-01 PROCEDURE — 84165 PROTEIN E-PHORESIS SERUM: CPT

## 2021-01-01 PROCEDURE — 82272 OCCULT BLD FECES 1-3 TESTS: CPT

## 2021-01-01 PROCEDURE — 99232 SBSQ HOSP IP/OBS MODERATE 35: CPT

## 2021-01-01 PROCEDURE — 83036 HEMOGLOBIN GLYCOSYLATED A1C: CPT

## 2021-01-01 PROCEDURE — 99213 OFFICE O/P EST LOW 20 MIN: CPT

## 2021-01-01 PROCEDURE — 82668 ASSAY OF ERYTHROPOIETIN: CPT

## 2021-01-01 PROCEDURE — 82784 ASSAY IGA/IGD/IGG/IGM EACH: CPT

## 2021-01-01 PROCEDURE — 82435 ASSAY OF BLOOD CHLORIDE: CPT

## 2021-01-01 PROCEDURE — 99291 CRITICAL CARE FIRST HOUR: CPT

## 2021-01-01 PROCEDURE — 82947 ASSAY GLUCOSE BLOOD QUANT: CPT

## 2021-01-01 PROCEDURE — 83615 LACTATE (LD) (LDH) ENZYME: CPT

## 2021-01-01 PROCEDURE — 80074 ACUTE HEPATITIS PANEL: CPT

## 2021-01-01 PROCEDURE — 93306 TTE W/DOPPLER COMPLETE: CPT

## 2021-01-01 PROCEDURE — 85018 HEMOGLOBIN: CPT

## 2021-01-01 PROCEDURE — 72170 X-RAY EXAM OF PELVIS: CPT | Mod: 26

## 2021-01-01 PROCEDURE — 99214 OFFICE O/P EST MOD 30 MIN: CPT

## 2021-01-01 PROCEDURE — 84155 ASSAY OF PROTEIN SERUM: CPT

## 2021-01-01 PROCEDURE — 83540 ASSAY OF IRON: CPT

## 2021-01-01 PROCEDURE — 0225U NFCT DS DNA&RNA 21 SARSCOV2: CPT

## 2021-01-01 PROCEDURE — 93306 TTE W/DOPPLER COMPLETE: CPT | Mod: 26

## 2021-01-01 PROCEDURE — 99215 OFFICE O/P EST HI 40 MIN: CPT | Mod: 25

## 2021-01-01 PROCEDURE — 86923 COMPATIBILITY TEST ELECTRIC: CPT

## 2021-01-01 PROCEDURE — 93284 PRGRMG EVAL IMPLANTABLE DFB: CPT

## 2021-01-01 PROCEDURE — 97530 THERAPEUTIC ACTIVITIES: CPT

## 2021-01-01 PROCEDURE — 84132 ASSAY OF SERUM POTASSIUM: CPT

## 2021-01-01 PROCEDURE — 36620 INSERTION CATHETER ARTERY: CPT

## 2021-01-01 PROCEDURE — 99441: CPT | Mod: 95

## 2021-01-01 PROCEDURE — 36430 TRANSFUSION BLD/BLD COMPNT: CPT

## 2021-01-01 PROCEDURE — 82746 ASSAY OF FOLIC ACID SERUM: CPT

## 2021-01-01 PROCEDURE — 80053 COMPREHEN METABOLIC PANEL: CPT

## 2021-01-01 PROCEDURE — 86334 IMMUNOFIX E-PHORESIS SERUM: CPT

## 2021-01-01 PROCEDURE — 83880 ASSAY OF NATRIURETIC PEPTIDE: CPT

## 2021-01-01 PROCEDURE — 70450 CT HEAD/BRAIN W/O DYE: CPT

## 2021-01-01 PROCEDURE — 84484 ASSAY OF TROPONIN QUANT: CPT

## 2021-01-01 PROCEDURE — 84443 ASSAY THYROID STIM HORMONE: CPT

## 2021-01-01 PROCEDURE — 99214 OFFICE O/P EST MOD 30 MIN: CPT | Mod: 25

## 2021-01-01 PROCEDURE — 99204 OFFICE O/P NEW MOD 45 MIN: CPT

## 2021-01-01 PROCEDURE — 99292 CRITICAL CARE ADDL 30 MIN: CPT | Mod: 25

## 2021-01-01 PROCEDURE — 86769 SARS-COV-2 COVID-19 ANTIBODY: CPT

## 2021-01-01 PROCEDURE — 97161 PT EVAL LOW COMPLEX 20 MIN: CPT

## 2021-01-01 PROCEDURE — 86431 RHEUMATOID FACTOR QUANT: CPT

## 2021-01-01 PROCEDURE — 82803 BLOOD GASES ANY COMBINATION: CPT

## 2021-01-01 PROCEDURE — 85610 PROTHROMBIN TIME: CPT

## 2021-01-01 PROCEDURE — 99233 SBSQ HOSP IP/OBS HIGH 50: CPT | Mod: GC

## 2021-01-01 PROCEDURE — 85027 COMPLETE CBC AUTOMATED: CPT

## 2021-01-01 PROCEDURE — 82607 VITAMIN B-12: CPT

## 2021-01-01 PROCEDURE — 82728 ASSAY OF FERRITIN: CPT

## 2021-01-01 PROCEDURE — U0005: CPT

## 2021-01-01 PROCEDURE — 97110 THERAPEUTIC EXERCISES: CPT

## 2021-01-01 PROCEDURE — 86038 ANTINUCLEAR ANTIBODIES: CPT

## 2021-01-01 PROCEDURE — 72170 X-RAY EXAM OF PELVIS: CPT

## 2021-01-01 PROCEDURE — 82330 ASSAY OF CALCIUM: CPT

## 2021-01-01 PROCEDURE — 84295 ASSAY OF SERUM SODIUM: CPT

## 2021-01-01 PROCEDURE — 99223 1ST HOSP IP/OBS HIGH 75: CPT | Mod: GC

## 2021-01-01 PROCEDURE — 70450 CT HEAD/BRAIN W/O DYE: CPT | Mod: 26,MA

## 2021-01-01 PROCEDURE — C8929: CPT

## 2021-01-01 PROCEDURE — 73564 X-RAY EXAM KNEE 4 OR MORE: CPT

## 2021-01-01 PROCEDURE — 99223 1ST HOSP IP/OBS HIGH 75: CPT

## 2021-01-01 PROCEDURE — 86900 BLOOD TYPING SEROLOGIC ABO: CPT

## 2021-01-01 PROCEDURE — 84480 ASSAY TRIIODOTHYRONINE (T3): CPT

## 2021-01-01 PROCEDURE — 99291 CRITICAL CARE FIRST HOUR: CPT | Mod: GC

## 2021-01-01 RX ORDER — LEVOTHYROXINE SODIUM 125 MCG
1 TABLET ORAL
Qty: 0 | Refills: 0 | DISCHARGE
Start: 2021-01-01

## 2021-01-01 RX ORDER — ALLOPURINOL 300 MG
100 TABLET ORAL DAILY
Refills: 0 | Status: DISCONTINUED | OUTPATIENT
Start: 2021-01-01 | End: 2021-01-01

## 2021-01-01 RX ORDER — METOPROLOL TARTRATE 50 MG
12.5 TABLET ORAL
Refills: 0 | Status: DISCONTINUED | OUTPATIENT
Start: 2021-01-01 | End: 2021-01-01

## 2021-01-01 RX ORDER — SENNA PLUS 8.6 MG/1
2 TABLET ORAL AT BEDTIME
Refills: 0 | Status: DISCONTINUED | OUTPATIENT
Start: 2021-01-01 | End: 2021-01-01

## 2021-01-01 RX ORDER — AMIODARONE HYDROCHLORIDE 400 MG/1
400 TABLET ORAL EVERY 12 HOURS
Refills: 0 | Status: DISCONTINUED | OUTPATIENT
Start: 2021-01-01 | End: 2021-01-01

## 2021-01-01 RX ORDER — FUROSEMIDE 40 MG
1 TABLET ORAL
Qty: 0 | Refills: 0 | DISCHARGE

## 2021-01-01 RX ORDER — QUINIDINE SULFATE 200 MG
1 TABLET ORAL
Qty: 0 | Refills: 0 | DISCHARGE
Start: 2021-01-01

## 2021-01-01 RX ORDER — CARVEDILOL PHOSPHATE 80 MG/1
3.12 CAPSULE, EXTENDED RELEASE ORAL EVERY 12 HOURS
Refills: 0 | Status: DISCONTINUED | OUTPATIENT
Start: 2021-01-01 | End: 2021-01-01

## 2021-01-01 RX ORDER — LIDOCAINE HCL 20 MG/ML
50 VIAL (ML) INJECTION ONCE
Refills: 0 | Status: COMPLETED | OUTPATIENT
Start: 2021-01-01 | End: 2021-01-01

## 2021-01-01 RX ORDER — SACUBITRIL AND VALSARTAN 24; 26 MG/1; MG/1
1 TABLET, FILM COATED ORAL
Qty: 0 | Refills: 0 | DISCHARGE
Start: 2021-01-01

## 2021-01-01 RX ORDER — TAMSULOSIN HYDROCHLORIDE 0.4 MG/1
0.8 CAPSULE ORAL AT BEDTIME
Refills: 0 | Status: DISCONTINUED | OUTPATIENT
Start: 2021-01-01 | End: 2021-01-01

## 2021-01-01 RX ORDER — LEVOTHYROXINE SODIUM 0.14 MG/1
137 TABLET ORAL
Qty: 90 | Refills: 0 | Status: ACTIVE | COMMUNITY
Start: 2019-03-15 | End: 1900-01-01

## 2021-01-01 RX ORDER — LEVOTHYROXINE SODIUM 125 MCG
1 TABLET ORAL
Qty: 0 | Refills: 0 | DISCHARGE

## 2021-01-01 RX ORDER — ALBUMIN HUMAN 25 %
250 VIAL (ML) INTRAVENOUS ONCE
Refills: 0 | Status: COMPLETED | OUTPATIENT
Start: 2021-01-01 | End: 2021-01-01

## 2021-01-01 RX ORDER — LEVOTHYROXINE SODIUM 125 MCG
125 TABLET ORAL DAILY
Refills: 0 | Status: DISCONTINUED | OUTPATIENT
Start: 2021-01-01 | End: 2021-01-01

## 2021-01-01 RX ORDER — METOPROLOL TARTRATE 50 MG
25 TABLET ORAL EVERY 12 HOURS
Refills: 0 | Status: DISCONTINUED | OUTPATIENT
Start: 2021-01-01 | End: 2021-01-01

## 2021-01-01 RX ORDER — ALLOPURINOL 300 MG
300 TABLET ORAL DAILY
Refills: 0 | Status: DISCONTINUED | OUTPATIENT
Start: 2021-01-01 | End: 2021-01-01

## 2021-01-01 RX ORDER — LUTEIN 20 MG
1 CAPSULE ORAL
Qty: 0 | Refills: 0 | DISCHARGE

## 2021-01-01 RX ORDER — SODIUM CHLORIDE 9 MG/ML
500 INJECTION INTRAMUSCULAR; INTRAVENOUS; SUBCUTANEOUS ONCE
Refills: 0 | Status: COMPLETED | OUTPATIENT
Start: 2021-01-01 | End: 2021-01-01

## 2021-01-01 RX ORDER — LIDOCAINE HCL 20 MG/ML
100 VIAL (ML) INJECTION ONCE
Refills: 0 | Status: COMPLETED | OUTPATIENT
Start: 2021-01-01 | End: 2021-01-01

## 2021-01-01 RX ORDER — AMIODARONE HYDROCHLORIDE 400 MG/1
400 TABLET ORAL THREE TIMES A DAY
Refills: 0 | Status: DISCONTINUED | OUTPATIENT
Start: 2021-01-01 | End: 2021-01-01

## 2021-01-01 RX ORDER — ALLOPURINOL 300 MG
1 TABLET ORAL
Qty: 0 | Refills: 0 | DISCHARGE
Start: 2021-01-01

## 2021-01-01 RX ORDER — ALBUTEROL 90 UG/1
2 AEROSOL, METERED ORAL
Qty: 0 | Refills: 0 | DISCHARGE

## 2021-01-01 RX ORDER — CARVEDILOL PHOSPHATE 80 MG/1
1 CAPSULE, EXTENDED RELEASE ORAL
Qty: 0 | Refills: 0 | DISCHARGE

## 2021-01-01 RX ORDER — CARVEDILOL PHOSPHATE 80 MG/1
1 CAPSULE, EXTENDED RELEASE ORAL
Qty: 0 | Refills: 0 | DISCHARGE
Start: 2021-01-01

## 2021-01-01 RX ORDER — ALBUMIN HUMAN 25 %
250 VIAL (ML) INTRAVENOUS ONCE
Refills: 0 | Status: DISCONTINUED | OUTPATIENT
Start: 2021-01-01 | End: 2021-01-01

## 2021-01-01 RX ORDER — IBUPROFEN 400 MG/1
400 TABLET, FILM COATED ORAL 3 TIMES DAILY
Qty: 30 | Refills: 0 | Status: ACTIVE | COMMUNITY
Start: 2021-01-01 | End: 1900-01-01

## 2021-01-01 RX ORDER — DIGOXIN 250 MCG
125 TABLET ORAL DAILY
Refills: 0 | Status: DISCONTINUED | OUTPATIENT
Start: 2021-01-01 | End: 2021-01-01

## 2021-01-01 RX ORDER — QUINIDINE SULFATE 200 MG
324 TABLET ORAL EVERY 12 HOURS
Refills: 0 | Status: DISCONTINUED | OUTPATIENT
Start: 2021-01-01 | End: 2021-01-01

## 2021-01-01 RX ORDER — EMPAGLIFLOZIN 25 MG/1
25 TABLET, FILM COATED ORAL
Qty: 30 | Refills: 2 | Status: ACTIVE | COMMUNITY
Start: 2021-01-01 | End: 1900-01-01

## 2021-01-01 RX ORDER — POLYETHYLENE GLYCOL 3350 17 G/17G
17 POWDER, FOR SOLUTION ORAL DAILY
Refills: 0 | Status: DISCONTINUED | OUTPATIENT
Start: 2021-01-01 | End: 2021-01-01

## 2021-01-01 RX ORDER — SODIUM CHLORIDE 9 MG/ML
1000 INJECTION, SOLUTION INTRAVENOUS
Refills: 0 | Status: DISCONTINUED | OUTPATIENT
Start: 2021-01-01 | End: 2021-01-01

## 2021-01-01 RX ORDER — LEVALBUTEROL HYDROCHLORIDE 0.63 MG/3ML
0.63 SOLUTION RESPIRATORY (INHALATION)
Qty: 216 | Refills: 5 | Status: ACTIVE | COMMUNITY
Start: 2021-01-01 | End: 1900-01-01

## 2021-01-01 RX ORDER — LIDOCAINE HCL 20 MG/ML
0.5 VIAL (ML) INJECTION
Qty: 2 | Refills: 0 | Status: DISCONTINUED | OUTPATIENT
Start: 2021-01-01 | End: 2021-01-01

## 2021-01-01 RX ORDER — SACUBITRIL AND VALSARTAN 24; 26 MG/1; MG/1
1 TABLET, FILM COATED ORAL
Refills: 0 | Status: DISCONTINUED | OUTPATIENT
Start: 2021-01-01 | End: 2021-01-01

## 2021-01-01 RX ORDER — HEPARIN SODIUM 5000 [USP'U]/ML
5000 INJECTION INTRAVENOUS; SUBCUTANEOUS EVERY 12 HOURS
Refills: 0 | Status: DISCONTINUED | OUTPATIENT
Start: 2021-01-01 | End: 2021-01-01

## 2021-01-01 RX ORDER — AMIODARONE HYDROCHLORIDE 400 MG/1
2 TABLET ORAL
Qty: 0 | Refills: 0 | DISCHARGE
Start: 2021-01-01

## 2021-01-01 RX ORDER — FUROSEMIDE 40 MG/1
40 TABLET ORAL
Qty: 135 | Refills: 2 | Status: ACTIVE | COMMUNITY
Start: 2020-01-01 | End: 1900-01-01

## 2021-01-01 RX ORDER — SACUBITRIL AND VALSARTAN 24; 26 MG/1; MG/1
1 TABLET, FILM COATED ORAL
Qty: 0 | Refills: 0 | DISCHARGE

## 2021-01-01 RX ORDER — FUROSEMIDE 40 MG
40 TABLET ORAL
Refills: 0 | Status: DISCONTINUED | OUTPATIENT
Start: 2021-01-01 | End: 2021-01-01

## 2021-01-01 RX ORDER — SODIUM CHLORIDE 9 MG/ML
1000 INJECTION INTRAMUSCULAR; INTRAVENOUS; SUBCUTANEOUS
Refills: 0 | Status: DISCONTINUED | OUTPATIENT
Start: 2021-01-01 | End: 2021-01-01

## 2021-01-01 RX ORDER — SIMVASTATIN 20 MG/1
10 TABLET, FILM COATED ORAL AT BEDTIME
Refills: 0 | Status: DISCONTINUED | OUTPATIENT
Start: 2021-01-01 | End: 2021-01-01

## 2021-01-01 RX ORDER — CARVEDILOL PHOSPHATE 80 MG/1
12.5 CAPSULE, EXTENDED RELEASE ORAL EVERY 12 HOURS
Refills: 0 | Status: DISCONTINUED | OUTPATIENT
Start: 2021-01-01 | End: 2021-01-01

## 2021-01-01 RX ORDER — ASPIRIN/CALCIUM CARB/MAGNESIUM 324 MG
1 TABLET ORAL
Qty: 0 | Refills: 0 | DISCHARGE

## 2021-01-01 RX ORDER — TAMSULOSIN HYDROCHLORIDE 0.4 MG/1
0.4 CAPSULE ORAL AT BEDTIME
Refills: 0 | Status: DISCONTINUED | OUTPATIENT
Start: 2021-01-01 | End: 2021-01-01

## 2021-01-01 RX ORDER — FERROUS SULFATE 325(65) MG
1 TABLET ORAL
Qty: 0 | Refills: 0 | DISCHARGE

## 2021-01-01 RX ORDER — FERROUS SULFATE 325(65) MG
325 TABLET ORAL DAILY
Refills: 0 | Status: DISCONTINUED | OUTPATIENT
Start: 2021-01-01 | End: 2021-01-01

## 2021-01-01 RX ORDER — ACETAMINOPHEN 500 MG
650 TABLET ORAL EVERY 6 HOURS
Refills: 0 | Status: DISCONTINUED | OUTPATIENT
Start: 2021-01-01 | End: 2021-01-01

## 2021-01-01 RX ORDER — PANTOPRAZOLE SODIUM 20 MG/1
40 TABLET, DELAYED RELEASE ORAL
Refills: 0 | Status: DISCONTINUED | OUTPATIENT
Start: 2021-01-01 | End: 2021-01-01

## 2021-01-01 RX ORDER — DIGOXIN 250 MCG
1 TABLET ORAL
Qty: 0 | Refills: 0 | DISCHARGE

## 2021-01-01 RX ORDER — ASPIRIN/CALCIUM CARB/MAGNESIUM 324 MG
81 TABLET ORAL DAILY
Refills: 0 | Status: DISCONTINUED | OUTPATIENT
Start: 2021-01-01 | End: 2021-01-01

## 2021-01-01 RX ORDER — CHLORHEXIDINE GLUCONATE 213 G/1000ML
1 SOLUTION TOPICAL
Refills: 0 | Status: DISCONTINUED | OUTPATIENT
Start: 2021-01-01 | End: 2021-01-01

## 2021-01-01 RX ORDER — EMPAGLIFLOZIN 10 MG/1
1 TABLET, FILM COATED ORAL
Qty: 0 | Refills: 0 | DISCHARGE

## 2021-01-01 RX ORDER — SACUBITRIL AND VALSARTAN 97; 103 MG/1; MG/1
97-103 TABLET, FILM COATED ORAL
Qty: 180 | Refills: 0 | Status: ACTIVE | COMMUNITY
Start: 2021-01-01 | End: 1900-01-01

## 2021-01-01 RX ADMIN — POLYETHYLENE GLYCOL 3350 17 GRAM(S): 17 POWDER, FOR SOLUTION ORAL at 10:22

## 2021-01-01 RX ADMIN — Medication 25 MILLIGRAM(S): at 05:20

## 2021-01-01 RX ADMIN — TAMSULOSIN HYDROCHLORIDE 0.8 MILLIGRAM(S): 0.4 CAPSULE ORAL at 21:17

## 2021-01-01 RX ADMIN — SODIUM CHLORIDE 500 MILLILITER(S): 9 INJECTION, SOLUTION INTRAVENOUS at 19:24

## 2021-01-01 RX ADMIN — AMIODARONE HYDROCHLORIDE 400 MILLIGRAM(S): 400 TABLET ORAL at 06:09

## 2021-01-01 RX ADMIN — Medication 324 MILLIGRAM(S): at 14:13

## 2021-01-01 RX ADMIN — Medication 325 MILLIGRAM(S): at 11:46

## 2021-01-01 RX ADMIN — Medication 325 MILLIGRAM(S): at 10:22

## 2021-01-01 RX ADMIN — Medication 125 MICROGRAM(S): at 14:20

## 2021-01-01 RX ADMIN — Medication 25 MILLIGRAM(S): at 17:41

## 2021-01-01 RX ADMIN — Medication 125 MICROGRAM(S): at 05:52

## 2021-01-01 RX ADMIN — Medication 125 MICROGRAM(S): at 06:20

## 2021-01-01 RX ADMIN — Medication 100 MILLIGRAM(S): at 09:49

## 2021-01-01 RX ADMIN — TAMSULOSIN HYDROCHLORIDE 0.4 MILLIGRAM(S): 0.4 CAPSULE ORAL at 21:38

## 2021-01-01 RX ADMIN — Medication 100 MILLIGRAM(S): at 11:58

## 2021-01-01 RX ADMIN — Medication 324 MILLIGRAM(S): at 06:21

## 2021-01-01 RX ADMIN — SIMVASTATIN 10 MILLIGRAM(S): 20 TABLET, FILM COATED ORAL at 05:01

## 2021-01-01 RX ADMIN — Medication 125 MILLILITER(S): at 11:46

## 2021-01-01 RX ADMIN — AMIODARONE HYDROCHLORIDE 400 MILLIGRAM(S): 400 TABLET ORAL at 06:20

## 2021-01-01 RX ADMIN — SACUBITRIL AND VALSARTAN 1 TABLET(S): 24; 26 TABLET, FILM COATED ORAL at 06:10

## 2021-01-01 RX ADMIN — Medication 7.5 MG/MIN: at 13:17

## 2021-01-01 RX ADMIN — SACUBITRIL AND VALSARTAN 1 TABLET(S): 24; 26 TABLET, FILM COATED ORAL at 05:20

## 2021-01-01 RX ADMIN — Medication 25 MILLIGRAM(S): at 05:44

## 2021-01-01 RX ADMIN — Medication 300 MILLIGRAM(S): at 13:29

## 2021-01-01 RX ADMIN — AMIODARONE HYDROCHLORIDE 400 MILLIGRAM(S): 400 TABLET ORAL at 05:38

## 2021-01-01 RX ADMIN — SIMVASTATIN 10 MILLIGRAM(S): 20 TABLET, FILM COATED ORAL at 21:08

## 2021-01-01 RX ADMIN — Medication 12.5 MILLIGRAM(S): at 05:17

## 2021-01-01 RX ADMIN — PANTOPRAZOLE SODIUM 40 MILLIGRAM(S): 20 TABLET, DELAYED RELEASE ORAL at 05:22

## 2021-01-01 RX ADMIN — SACUBITRIL AND VALSARTAN 1 TABLET(S): 24; 26 TABLET, FILM COATED ORAL at 18:04

## 2021-01-01 RX ADMIN — CARVEDILOL PHOSPHATE 3.12 MILLIGRAM(S): 80 CAPSULE, EXTENDED RELEASE ORAL at 17:48

## 2021-01-01 RX ADMIN — Medication 125 MILLILITER(S): at 18:50

## 2021-01-01 RX ADMIN — Medication 7.5 MG/MIN: at 10:44

## 2021-01-01 RX ADMIN — SIMVASTATIN 10 MILLIGRAM(S): 20 TABLET, FILM COATED ORAL at 21:34

## 2021-01-01 RX ADMIN — AMIODARONE HYDROCHLORIDE 400 MILLIGRAM(S): 400 TABLET ORAL at 17:19

## 2021-01-01 RX ADMIN — Medication 25 MILLIGRAM(S): at 17:46

## 2021-01-01 RX ADMIN — HEPARIN SODIUM 5000 UNIT(S): 5000 INJECTION INTRAVENOUS; SUBCUTANEOUS at 17:43

## 2021-01-01 RX ADMIN — POLYETHYLENE GLYCOL 3350 17 GRAM(S): 17 POWDER, FOR SOLUTION ORAL at 11:57

## 2021-01-01 RX ADMIN — HEPARIN SODIUM 5000 UNIT(S): 5000 INJECTION INTRAVENOUS; SUBCUTANEOUS at 06:17

## 2021-01-01 RX ADMIN — Medication 325 MILLIGRAM(S): at 18:47

## 2021-01-01 RX ADMIN — AMIODARONE HYDROCHLORIDE 400 MILLIGRAM(S): 400 TABLET ORAL at 18:47

## 2021-01-01 RX ADMIN — TAMSULOSIN HYDROCHLORIDE 0.8 MILLIGRAM(S): 0.4 CAPSULE ORAL at 21:34

## 2021-01-01 RX ADMIN — TAMSULOSIN HYDROCHLORIDE 0.8 MILLIGRAM(S): 0.4 CAPSULE ORAL at 21:08

## 2021-01-01 RX ADMIN — Medication 324 MILLIGRAM(S): at 05:20

## 2021-01-01 RX ADMIN — Medication 125 MICROGRAM(S): at 06:19

## 2021-01-01 RX ADMIN — TAMSULOSIN HYDROCHLORIDE 0.8 MILLIGRAM(S): 0.4 CAPSULE ORAL at 05:01

## 2021-01-01 RX ADMIN — SENNA PLUS 2 TABLET(S): 8.6 TABLET ORAL at 21:16

## 2021-01-01 RX ADMIN — PANTOPRAZOLE SODIUM 40 MILLIGRAM(S): 20 TABLET, DELAYED RELEASE ORAL at 05:52

## 2021-01-01 RX ADMIN — Medication 325 MILLIGRAM(S): at 09:50

## 2021-01-01 RX ADMIN — HEPARIN SODIUM 5000 UNIT(S): 5000 INJECTION INTRAVENOUS; SUBCUTANEOUS at 05:17

## 2021-01-01 RX ADMIN — AMIODARONE HYDROCHLORIDE 400 MILLIGRAM(S): 400 TABLET ORAL at 17:17

## 2021-01-01 RX ADMIN — TAMSULOSIN HYDROCHLORIDE 0.4 MILLIGRAM(S): 0.4 CAPSULE ORAL at 21:11

## 2021-01-01 RX ADMIN — TAMSULOSIN HYDROCHLORIDE 0.8 MILLIGRAM(S): 0.4 CAPSULE ORAL at 21:33

## 2021-01-01 RX ADMIN — POLYETHYLENE GLYCOL 3350 17 GRAM(S): 17 POWDER, FOR SOLUTION ORAL at 12:09

## 2021-01-01 RX ADMIN — PANTOPRAZOLE SODIUM 40 MILLIGRAM(S): 20 TABLET, DELAYED RELEASE ORAL at 06:09

## 2021-01-01 RX ADMIN — Medication 125 MICROGRAM(S): at 06:18

## 2021-01-01 RX ADMIN — Medication 25 MILLIGRAM(S): at 06:09

## 2021-01-01 RX ADMIN — PANTOPRAZOLE SODIUM 40 MILLIGRAM(S): 20 TABLET, DELAYED RELEASE ORAL at 05:20

## 2021-01-01 RX ADMIN — TAMSULOSIN HYDROCHLORIDE 0.8 MILLIGRAM(S): 0.4 CAPSULE ORAL at 22:10

## 2021-01-01 RX ADMIN — Medication 324 MILLIGRAM(S): at 17:40

## 2021-01-01 RX ADMIN — Medication 81 MILLIGRAM(S): at 11:45

## 2021-01-01 RX ADMIN — Medication 12.5 MILLIGRAM(S): at 17:26

## 2021-01-01 RX ADMIN — Medication 250 MILLILITER(S): at 07:57

## 2021-01-01 RX ADMIN — AMIODARONE HYDROCHLORIDE 400 MILLIGRAM(S): 400 TABLET ORAL at 18:04

## 2021-01-01 RX ADMIN — SIMVASTATIN 10 MILLIGRAM(S): 20 TABLET, FILM COATED ORAL at 21:26

## 2021-01-01 RX ADMIN — CARVEDILOL PHOSPHATE 3.12 MILLIGRAM(S): 80 CAPSULE, EXTENDED RELEASE ORAL at 18:06

## 2021-01-01 RX ADMIN — AMIODARONE HYDROCHLORIDE 400 MILLIGRAM(S): 400 TABLET ORAL at 17:48

## 2021-01-01 RX ADMIN — HEPARIN SODIUM 5000 UNIT(S): 5000 INJECTION INTRAVENOUS; SUBCUTANEOUS at 17:20

## 2021-01-01 RX ADMIN — AMIODARONE HYDROCHLORIDE 400 MILLIGRAM(S): 400 TABLET ORAL at 05:17

## 2021-01-01 RX ADMIN — Medication 100 MILLIGRAM(S): at 11:45

## 2021-01-01 RX ADMIN — Medication 50 MILLIGRAM(S): at 02:28

## 2021-01-01 RX ADMIN — TAMSULOSIN HYDROCHLORIDE 0.8 MILLIGRAM(S): 0.4 CAPSULE ORAL at 21:10

## 2021-01-01 RX ADMIN — SODIUM CHLORIDE 60 MILLILITER(S): 9 INJECTION INTRAMUSCULAR; INTRAVENOUS; SUBCUTANEOUS at 14:42

## 2021-01-01 RX ADMIN — Medication 325 MILLIGRAM(S): at 09:09

## 2021-01-01 RX ADMIN — Medication 324 MILLIGRAM(S): at 05:32

## 2021-01-01 RX ADMIN — SACUBITRIL AND VALSARTAN 1 TABLET(S): 24; 26 TABLET, FILM COATED ORAL at 17:49

## 2021-01-01 RX ADMIN — Medication 12.5 MILLIGRAM(S): at 06:20

## 2021-01-01 RX ADMIN — SACUBITRIL AND VALSARTAN 1 TABLET(S): 24; 26 TABLET, FILM COATED ORAL at 20:46

## 2021-01-01 RX ADMIN — Medication 81 MILLIGRAM(S): at 11:40

## 2021-01-01 RX ADMIN — AMIODARONE HYDROCHLORIDE 400 MILLIGRAM(S): 400 TABLET ORAL at 17:45

## 2021-01-01 RX ADMIN — SACUBITRIL AND VALSARTAN 1 TABLET(S): 24; 26 TABLET, FILM COATED ORAL at 17:44

## 2021-01-01 RX ADMIN — Medication 325 MILLIGRAM(S): at 09:31

## 2021-01-01 RX ADMIN — Medication 324 MILLIGRAM(S): at 18:35

## 2021-01-01 RX ADMIN — Medication 324 MILLIGRAM(S): at 06:09

## 2021-01-01 RX ADMIN — Medication 12.5 MILLIGRAM(S): at 06:19

## 2021-01-01 RX ADMIN — POLYETHYLENE GLYCOL 3350 17 GRAM(S): 17 POWDER, FOR SOLUTION ORAL at 09:31

## 2021-01-01 RX ADMIN — Medication 12.5 MILLIGRAM(S): at 20:49

## 2021-01-01 RX ADMIN — Medication 324 MILLIGRAM(S): at 05:22

## 2021-01-01 RX ADMIN — SIMVASTATIN 10 MILLIGRAM(S): 20 TABLET, FILM COATED ORAL at 21:11

## 2021-01-01 RX ADMIN — AMIODARONE HYDROCHLORIDE 400 MILLIGRAM(S): 400 TABLET ORAL at 06:18

## 2021-01-01 RX ADMIN — SIMVASTATIN 10 MILLIGRAM(S): 20 TABLET, FILM COATED ORAL at 21:38

## 2021-01-01 RX ADMIN — SACUBITRIL AND VALSARTAN 1 TABLET(S): 24; 26 TABLET, FILM COATED ORAL at 05:21

## 2021-01-01 RX ADMIN — SIMVASTATIN 10 MILLIGRAM(S): 20 TABLET, FILM COATED ORAL at 21:33

## 2021-01-01 RX ADMIN — Medication 100 MILLIGRAM(S): at 09:10

## 2021-01-01 RX ADMIN — Medication 40 MILLIGRAM(S): at 06:17

## 2021-01-01 RX ADMIN — Medication 324 MILLIGRAM(S): at 18:46

## 2021-01-01 RX ADMIN — Medication 125 MICROGRAM(S): at 06:10

## 2021-01-01 RX ADMIN — AMIODARONE HYDROCHLORIDE 400 MILLIGRAM(S): 400 TABLET ORAL at 05:01

## 2021-01-01 RX ADMIN — PANTOPRAZOLE SODIUM 40 MILLIGRAM(S): 20 TABLET, DELAYED RELEASE ORAL at 05:39

## 2021-01-01 RX ADMIN — Medication 324 MILLIGRAM(S): at 17:45

## 2021-01-01 RX ADMIN — SIMVASTATIN 10 MILLIGRAM(S): 20 TABLET, FILM COATED ORAL at 21:17

## 2021-01-01 RX ADMIN — Medication 324 MILLIGRAM(S): at 17:17

## 2021-01-01 RX ADMIN — SENNA PLUS 2 TABLET(S): 8.6 TABLET ORAL at 21:26

## 2021-01-01 RX ADMIN — Medication 125 MICROGRAM(S): at 05:20

## 2021-01-01 RX ADMIN — Medication 40 MILLIGRAM(S): at 17:43

## 2021-01-01 RX ADMIN — Medication 12.5 MILLIGRAM(S): at 17:43

## 2021-01-01 RX ADMIN — Medication 125 MICROGRAM(S): at 06:30

## 2021-01-01 RX ADMIN — Medication 125 MICROGRAM(S): at 05:32

## 2021-01-01 RX ADMIN — PANTOPRAZOLE SODIUM 40 MILLIGRAM(S): 20 TABLET, DELAYED RELEASE ORAL at 05:17

## 2021-01-01 RX ADMIN — PANTOPRAZOLE SODIUM 40 MILLIGRAM(S): 20 TABLET, DELAYED RELEASE ORAL at 05:32

## 2021-01-01 RX ADMIN — POLYETHYLENE GLYCOL 3350 17 GRAM(S): 17 POWDER, FOR SOLUTION ORAL at 11:45

## 2021-01-01 RX ADMIN — Medication 81 MILLIGRAM(S): at 13:05

## 2021-01-01 RX ADMIN — CHLORHEXIDINE GLUCONATE 1 APPLICATION(S): 213 SOLUTION TOPICAL at 06:06

## 2021-01-01 RX ADMIN — TAMSULOSIN HYDROCHLORIDE 0.8 MILLIGRAM(S): 0.4 CAPSULE ORAL at 21:26

## 2021-01-01 RX ADMIN — AMIODARONE HYDROCHLORIDE 400 MILLIGRAM(S): 400 TABLET ORAL at 17:40

## 2021-01-01 RX ADMIN — SENNA PLUS 2 TABLET(S): 8.6 TABLET ORAL at 21:09

## 2021-01-01 RX ADMIN — Medication 100 MILLIGRAM(S): at 13:05

## 2021-01-01 RX ADMIN — Medication 100 MILLIGRAM(S): at 12:17

## 2021-01-01 RX ADMIN — PANTOPRAZOLE SODIUM 40 MILLIGRAM(S): 20 TABLET, DELAYED RELEASE ORAL at 06:19

## 2021-01-01 RX ADMIN — Medication 15 MG/MIN: at 07:38

## 2021-01-01 RX ADMIN — Medication 12.5 MILLIGRAM(S): at 10:44

## 2021-01-01 RX ADMIN — Medication 40 MILLIGRAM(S): at 05:17

## 2021-01-01 RX ADMIN — PANTOPRAZOLE SODIUM 40 MILLIGRAM(S): 20 TABLET, DELAYED RELEASE ORAL at 06:18

## 2021-01-01 RX ADMIN — AMIODARONE HYDROCHLORIDE 400 MILLIGRAM(S): 400 TABLET ORAL at 05:20

## 2021-01-01 RX ADMIN — PANTOPRAZOLE SODIUM 40 MILLIGRAM(S): 20 TABLET, DELAYED RELEASE ORAL at 06:20

## 2021-01-01 RX ADMIN — SACUBITRIL AND VALSARTAN 1 TABLET(S): 24; 26 TABLET, FILM COATED ORAL at 05:44

## 2021-01-01 RX ADMIN — Medication 40 MILLIGRAM(S): at 18:21

## 2021-01-01 RX ADMIN — Medication 100 MILLIGRAM(S): at 09:31

## 2021-01-01 RX ADMIN — HEPARIN SODIUM 5000 UNIT(S): 5000 INJECTION INTRAVENOUS; SUBCUTANEOUS at 18:22

## 2021-01-01 RX ADMIN — AMIODARONE HYDROCHLORIDE 400 MILLIGRAM(S): 400 TABLET ORAL at 18:51

## 2021-01-01 RX ADMIN — AMIODARONE HYDROCHLORIDE 400 MILLIGRAM(S): 400 TABLET ORAL at 05:21

## 2021-01-01 RX ADMIN — Medication 81 MILLIGRAM(S): at 12:55

## 2021-01-01 RX ADMIN — Medication 325 MILLIGRAM(S): at 11:58

## 2021-01-01 RX ADMIN — HEPARIN SODIUM 5000 UNIT(S): 5000 INJECTION INTRAVENOUS; SUBCUTANEOUS at 05:53

## 2021-01-01 RX ADMIN — Medication 324 MILLIGRAM(S): at 05:38

## 2021-01-01 RX ADMIN — Medication 100 MILLIGRAM(S): at 12:44

## 2021-01-01 RX ADMIN — Medication 300 MILLIGRAM(S): at 11:40

## 2021-01-01 RX ADMIN — SENNA PLUS 2 TABLET(S): 8.6 TABLET ORAL at 21:32

## 2021-01-01 RX ADMIN — SIMVASTATIN 10 MILLIGRAM(S): 20 TABLET, FILM COATED ORAL at 22:09

## 2021-01-01 RX ADMIN — Medication 100 MILLIGRAM(S): at 10:22

## 2021-01-01 RX ADMIN — AMIODARONE HYDROCHLORIDE 400 MILLIGRAM(S): 400 TABLET ORAL at 17:43

## 2021-01-01 RX ADMIN — CARVEDILOL PHOSPHATE 3.12 MILLIGRAM(S): 80 CAPSULE, EXTENDED RELEASE ORAL at 05:21

## 2021-01-01 RX ADMIN — Medication 125 MICROGRAM(S): at 05:17

## 2021-01-01 RX ADMIN — AMIODARONE HYDROCHLORIDE 400 MILLIGRAM(S): 400 TABLET ORAL at 17:26

## 2021-01-01 RX ADMIN — Medication 125 MILLILITER(S): at 02:10

## 2021-01-01 RX ADMIN — Medication 12.5 MILLIGRAM(S): at 17:42

## 2021-01-01 RX ADMIN — AMIODARONE HYDROCHLORIDE 400 MILLIGRAM(S): 400 TABLET ORAL at 05:31

## 2021-01-01 RX ADMIN — Medication 324 MILLIGRAM(S): at 17:49

## 2021-01-01 RX ADMIN — SODIUM CHLORIDE 500 MILLILITER(S): 9 INJECTION INTRAMUSCULAR; INTRAVENOUS; SUBCUTANEOUS at 09:00

## 2021-01-01 RX ADMIN — Medication 125 MICROGRAM(S): at 05:21

## 2021-01-01 RX ADMIN — Medication 325 MILLIGRAM(S): at 12:18

## 2021-01-01 RX ADMIN — AMIODARONE HYDROCHLORIDE 400 MILLIGRAM(S): 400 TABLET ORAL at 05:52

## 2021-01-01 RX ADMIN — SENNA PLUS 2 TABLET(S): 8.6 TABLET ORAL at 21:11

## 2021-01-01 RX ADMIN — SACUBITRIL AND VALSARTAN 1 TABLET(S): 24; 26 TABLET, FILM COATED ORAL at 17:45

## 2021-01-01 RX ADMIN — Medication 125 MICROGRAM(S): at 05:38

## 2021-01-01 RX ADMIN — AMIODARONE HYDROCHLORIDE 400 MILLIGRAM(S): 400 TABLET ORAL at 11:28

## 2021-01-01 RX ADMIN — Medication 15 MG/MIN: at 02:28

## 2021-01-01 RX ADMIN — CHLORHEXIDINE GLUCONATE 1 APPLICATION(S): 213 SOLUTION TOPICAL at 07:39

## 2021-01-04 PROBLEM — N50.89 SCROTAL SWELLING: Status: ACTIVE | Noted: 2020-01-01

## 2021-01-04 NOTE — ASSESSMENT
[FreeTextEntry1] : patient with several month hx of scrotal swelling\par on exam : c/w bilat hydroceles\par no pitting edema of skin or erythema or tendernss \par patient reassured

## 2021-01-04 NOTE — PHYSICAL EXAM
[General Appearance - Well Developed] : well developed [General Appearance - Well Nourished] : well nourished [Normal Appearance] : normal appearance [Well Groomed] : well groomed [General Appearance - In No Acute Distress] : no acute distress [Urethral Meatus] : meatus normal [Penis Abnormality] : normal circumcised penis [FreeTextEntry1] : testes normal bilat wth no tumors. large hydroceles bilaterally , soft and no erythema or pitting edema

## 2021-01-04 NOTE — HISTORY OF PRESENT ILLNESS
[FreeTextEntry1] : patient with c/o scrotal swelling for the past several months\par no fever or N/V\par no LUTS\par no trauma\par no [pain

## 2021-01-08 PROBLEM — E87.70 FLUID OVERLOAD, UNSPECIFIED: Status: ACTIVE | Noted: 2021-01-01

## 2021-01-08 PROBLEM — R06.02 SOB (SHORTNESS OF BREATH): Status: ACTIVE | Noted: 2021-01-01

## 2021-01-08 NOTE — HISTORY OF PRESENT ILLNESS
[FreeTextEntry8] : Comes in for an acute medical visit for evaluation of shortness of breath.\par For the last 1 to 2 weeks he has noticed increased shortness of breath and wheezing.\par He is on Anoro Ellipta daily and as needed albuterol.\par He denies any chest pain or palpitations.  He denies any hemoptysis.  He does have an occasional dry cough.  He denies any fevers.  He denies any orthopnea or PND.  He has noticed significant pedal and lower extremity edema.  His wife reports that in the last week he has had a 10 pound weight gain.  He has been having taken food at least 2-3 times per week. [Spouse] : spouse

## 2021-01-08 NOTE — ASSESSMENT
[FreeTextEntry1] : Jeovanny presents with increased shortness of breath for the last 1 to 2 weeks.  He notices dyspnea on exertion, occasional dry cough, and intermittent wheezing.  On exam he has increased lower extremity edema.  He also has had a significant weight gain.  Lung exam reveals scattered rales and wheezes.\par He has a history of chronic CHF.  He has had dietary indiscretion.  I suspect that he is fluid overloaded and that this is triggering his symptoms and physical findings.\par He declined an EKG or chest x-ray.\par He declined labs.\par His case was discussed with his cardiologist via phone.  Dr. Paiz has recommended an increase in his Lasix to 80 mg daily.\par I advised a low-salt diet.\par He will continue Anoro 1 puff daily.\par He can use levalbuterol via nebulizer 3 times daily as needed to control his wheezing until he is diuresed.\par He has a follow-up appointment with Dr. Paiz on January 12, 2021\par He will call if his status worsens or is not improving.  He will call for any medical/pulmonary issues.\par All of his questions were answered.\par All of the above was discussed in detail with the patient and his wife and they verbally confirmed understanding of all of the above.

## 2021-01-08 NOTE — HEALTH RISK ASSESSMENT
[] : No [Yes] : Yes [No] : In the past 12 months have you used drugs other than those required for medical reasons? No [Any fall with injury in past year] : Patient reported fall with injury in the past year [0] : 2) Feeling down, depressed, or hopeless: Not at all (0) [de-identified] : Cardiology and urology [de-identified] : None [de-identified] : Regular [[id number]] : 0

## 2021-01-08 NOTE — REVIEW OF SYSTEMS
[Recent Change In Weight] : ~T recent weight change [Vision Problems] : vision problems [Hearing Loss] : hearing loss [Lower Ext Edema] : lower extremity edema [Shortness Of Breath] : shortness of breath [Wheezing] : wheezing [Cough] : cough [Dyspnea on Exertion] : dyspnea on exertion [Heartburn] : heartburn [Joint Pain] : joint pain [Back Pain] : back pain [Skin Rash] : skin rash [Memory Loss] : memory loss [Easy Bruising] : easy bruising [Negative] : Heme/Lymph

## 2021-01-08 NOTE — PHYSICAL EXAM
[No Acute Distress] : no acute distress [Well Nourished] : well nourished [Well Developed] : well developed [Well-Appearing] : well-appearing [Normal Voice/Communication] : normal voice/communication [Normal Sclera/Conjunctiva] : normal sclera/conjunctiva [PERRL] : pupils equal round and reactive to light [EOMI] : extraocular movements intact [Normal Outer Ear/Nose] : the outer ears and nose were normal in appearance [Supple] : supple [No Respiratory Distress] : no respiratory distress  [No Accessory Muscle Use] : no accessory muscle use [Normal Rate] : normal rate  [Regular Rhythm] : with a regular rhythm [Normal S1, S2] : normal S1 and S2 [No Extremity Clubbing/Cyanosis] : no extremity clubbing/cyanosis [Soft] : abdomen soft [Normal Bowel Sounds] : normal bowel sounds [No CVA Tenderness] : no CVA  tenderness [No Spinal Tenderness] : no spinal tenderness [No Rash] : no rash [No Focal Deficits] : no focal deficits [Normal Gait] : normal gait [Speech Grossly Normal] : speech grossly normal [Normal Affect] : the affect was normal [Alert and Oriented x3] : oriented to person, place, and time [de-identified] : B/L HA's; no ST; wearing full facemask [de-identified] : no stridor [de-identified] : R=16; posteriorly with decreased breath sounds at bases and scattered rales; fair air entry; scattered expiratory wheezes anteriorly [de-identified] : Murmur unchanged [de-identified] : no cords; bilateral pedal and lower extremity edema [de-identified] : ambulates with walker

## 2021-01-12 NOTE — HISTORY OF PRESENT ILLNESS
[FreeTextEntry1] : (MED HX) The patient is now 88 years old. I first saw him in 2006. At that time he had a history of a myocardial infarction and angioplasty back in 1994. He had done well for years although his activity level had diminished with age and he began complaining of fatigue. In December of 2011 he had a stress echocardiogram that was abnormal possibly with very abnormal blood pressure response and possibly with new wall motion abnormalities. He underwent a CTA and this was followed up by cardiac catheterization at [facility] in December of 2011. 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 The patient did well after that which is a minor adjustment of his medications. He has a history of hyperlipidemia and had a past history of hypertension. He stopped smoking 43 years ago, no diabetes, no family history of coronary artery disease.\par April 30, 2013 he was admitted to [facility] with left lower extremity cellulitis and altered mental status. He had positive blood cultures for strep pyogenes group A. An echocardiogram showed a possible small echodensity on the ventricular side of the noncoronary cusp. A joint aspiration of his left ankle was negative. He was treated with vancomycin and Zosyn, switched to cefepime. Followed by plastic surgery for wound care. No evidence of endocarditis clinically. A CAT scan of his abdomen and pelvis showed a 3.7 cm infrarenal abdominal aortic aneurysm. There was also bilateral inguinal hernias. 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 He was followed by Dr. Damián Lovelace of infectious disease. There is still a possibility he may need a skin graft for his left ankle. He was in the hospital for a little over 2 weeks and then in rehabilitation for 5 weeks. \par July 3, 2013. Here for followup visit. He had been home now for about 10 days and had already seen Dr. Lovelace and Dr. Jones his internist.  He denies chest pain or shortness of breath. There has been no syncope or near syncope or palpitations. Upon review of his medications the only change is that his Avapro is at a half of a 150 mg pill daily due to low blood pressure in the hospital and his simvastatin has been cut down from 20 mg to 10 mg. He did lose weight during this hospitalization as well. There have been no episodes of fevers, chills, sweats, etc.\par September 3, 2013. Here for followup. He is preparing to go on a trip to [place] in October. He does note shortness of breath with exertion but only with a lot of exertion, no problem going up stairs, and he thinks no different than even before his cellulitis episode. He denies exertional chest pain. We elected to see how he did with increased exercise and if his shortness of breath was not severe he would be okay to go on the trip to [place], obviously using commonsense as we discussed.\par January 6, 2014. The patient is here for followup. He did very well on the trip to [place] with some limitation from his knees but no significant shortness of breath or chest pain. He gets swelling on his left leg where he had the cellulitis but not on the right side. He only had one episode of slight lightheadedness when he got up too fast but otherwise has been tolerating the current medications. He is planning on going to [place] at the end of January. He has a mild cough, nonproductive, no fever chills. He claims he was unable to get an appointment with his internist. Her workup was unrevealing.\par May 5, 2014. The patient is here for followup. He saw Dr. Jones in the interim and had lab work with an excellent lipid profile but a high BNP. He saw vascular surgery because of the abdominal aortic aneurysm which measured 3.8 cm and that will be followed. He had an episode where his balance was off for about one hour. It only happened when he tried to walk around. He denies feeling lightheaded like he might pass out. If he was sitting and not moving he felt perfectly fine. There was no vertigo. He claims it happened him once before and it resolved as well. I advised him to discuss with Dr. Jones and perhaps a neurologist. In addition he brought up again with his wife his symptoms he's had for 40 years where he suddenly for no reason will get chest pain and pressure that radiates up to his jaw. It will actually go away if he does nothing but if he takes Maalox it will go away faster. I explained to him that this is probably esophageal reflux with esophageal spasm but it so infrequent that it is not worth for him to take a PPI on a daily basis. He denies having exertional chest pain or shortness of breath. He is considering possible knee replacement in the near future. He returned in July for preop clearance for knee replacement which he had in the middle of July with no complications Other than needing to be transfused 3 units and having been sent in to [facility] in the [facility] to have that done on 2 occasions.\par October 7, 2014. The patient is here for followup. He is feeling better and is more active but is also noting more shortness of breath with exertion or at least others with him have noticed it. He thinks he needs the other knee replacement done but his wife said she will divorce him if he does it. His TSH was elevated on labs with Dr. Jones last month so his Synthroid was increased from 6 days a week to 7 days a week. His BNP was elevated and he asked "should I worry about heart failure?". In addition Dr. Jones cut back his Avapro 300 to 150 because blood pressure was 110. The patient denies lightheadedness dizziness etc. His blood pressure today on 2 different occasions was 146/70. He also has an abdominal aortic aneurysm measuring 3.8 cm that is being followed. He'll be seeing Dr. Temple tomorrow.\par October 28, 2014. Dr. Jacobs called yesterday that the patient's abdominal aorta had increased in size by 0.9 cm and the patient is to be scheduled for an endovascular repair of his abdominal aortic aneurysm. He came here for his echocardiogram and discussion. The echo for the most part is unchanged from May of 2013 with left ventricular ejection fraction around 40%, mild to moderate MR, mild to moderate AI, segmental LV dysfunction with akinetic inferoposterior wall, and hypokinetic to akinetic inferolateral and mid lateral walls, normal anterior wall. LV size upper limit of normal. Normal RV size and function with an RVSP estimated at 41 mm of mercury.\par April 29, 2015. The patient did well with his endovascular repair of his abdominal aortic aneurysm. He went down to [place] and at one point was found to be short of breath with that was felt to be a pleural effusion. He was set up for a thoracentesis but after taking a diuretic for a few days they found there was no fluid there. His cough did not go away until he took a course of prednisone.The diuretic was stopped and he did well returning home. He took a tour of [place] and noted that any kind of incline made him very short of breath but no chest pressure. He saw Dr. Morris of pulmonary who found his proBNP to be 3400 and in the past it was only 1100. However his chest x-ray was totally clear. He has no PND or orthopnea and no real edema. She did give him another inhaler (Symbicort) and referred him here. His weight has not been up and if anything has gone down a little. His last echo was in October as above and was unchanged for the most part.  There is no chest pain with exertion. \par April 30, 2015. The patient returned for a stress echocardiogram. His baseline LV function looked worse than previously with left ventricular ejection fraction around 27%. With exercise he dropped his blood pressure and there were new wall motion abnormalities. He was scheduled for cardiac catheterization and probable defibrillator with possible biventricular pacemaker.\par May 6, 2015. Cardiac catheterization was done and revealed for the most part unchanged anatomy.  LVEF by V-gram was 35% . Given these findings this time Dr. Reyes did angioplasty and stent the right coronary artery. Electrophysiology felt he should wait at least 2 weeks and come back for another ejection fraction and clinical evaluation to see if things had improved before deciding on a  possible  AICD/biventricular pacemaker.\par May 21, 2015. The patient returns for followup and echocardiogram. He has felt much better over the 2 weeks but he has not been doing his usual activities because he was told not to do it. His echocardiogram to my eye looks unchanged. His exam is unchanged but there is no signs of congestive heart failure. I discussed the results with the patient and his wife. I told him that his improved symptoms could be because he is not that active, it could be a placebo effect of the procedure, it could be that things have improved and we just are unable to measure it on the echocardiogram. A BNP was sent and will be compared to his previous level which had gone up from the 400 range into the 800s. In addition over the weekend the patient will increase his activity and see what his level of symptoms are. If his symptoms are significant and worse than they had been a few months ago, we will schedule the ICD and biventricular pacemaker. The patient will contact me after the weekend.\par Nehal 15, 2015. The patient saw Dr. Russell of [facility] and unfortunately he has to wait 3 months (August 6, 2015) from the angioplasty unless he becomes unstable, has any arrhythmias, or needs a pacemaker for bradycardic reasons.  He did increase the carvedilol to 12.5 mg in the morning and 25 mg in the evening Currently he feels well and is stable. He does get out of breath and some weakness in his legs if he is walking uphill. When he initially lies down he feels some pressure he then says is shortness of breath but it resolves. No PND or orthopnea per se.\par July 14, 2015. The patient called a few weeks ago thinking he was more short of breath and wanted to move up the AICD/biventricular pacemaker. However after speaking with Dr. Russell of [facility] he explained he still was not qualified and must wait unless his symptoms are so severe that he requires hospitalization. I explained this to the patient and he returns today for routine followup.  He is still symptomatic with dyspnea on exertion but no PND or orthopnea. 3 months from his angioplasty will be August 6. He has a trip to [facility] planned with his daughter August 12.\par August 25, 2015. The patient is here for followup after having his AICD/biventricular pacemaker placed on August 7. No complications with the procedure. The patient does feel better in terms of the shortness of breath he would get when he was lying down but with exertion he feels maybe even worse right now in terms of shortness of breath. Of note he was away with a different diet and gained 4 pounds. (By scale here he has gained 9 pounds.) No palpitations, no syncope or near syncope. He is on carvedilol 25 mg the morning and 12.5 in the evening. He is still complaining of left arm pain ever since the procedure.\par September 8, 2015. The patient is finally starting to feel a little bit better. He cut his diuretic back to every other day and has continued to lose weight. His left arm so bothersome just as much since the procedure. We had a discussion about changing his Avapro to Entresto.\par October 8, 2015. Patient is here for followup. In the interim he had an episode of bronchitis treated with antibiotics and steroids which did cause some fluid retention and he was briefly on diuretics with improvement. He is now off the steroids but still on the diuretic. He does feel better on the Entresto. He thinks he is walking better with less shortness of breath and somewhat more stamina. He otherwise has no complaints. He remains in sinus rhythm and has had no AICD shocks. His blood pressure was borderline and his lungs were clear. I elected to stop the diuretic and increase his Entresto. \par October 23, 2015. On October 20 the patient saw Dr. Morris because of shortness of breath and edema. He had gained 9 pounds and he had significant swelling and she put him back on Lasix 40 mg as we discussed together. Patient had much relief and is here now. He lost 8 of the 9 pounds he again and feels much better. He still gets short of breath going up the stairs and often has to stop. He has trace edema still. His blood pressure is in the 90s systolic. He still eats out but not as much as he used to and they do ask for the low salt.\par November 9, 2015. The patient returns for followup. He has been on Lasix 40 mg since his last visit. He looks great, back to himself, with no complaints or symptoms of CHF. Her systolic pressure is only 90 but he has no symptoms of lightheadedness. He is having a lot of gas to the point of it being embarrassing and wondered if it is related to the Entresto. He also seems to have a dry cough. He will be seeing Dr. Russell in followup next week. Based on his complaints we cut back his Lasix to 20 mg daily but then he called back a few days later with more shortness of breath and went back to 40 mg q.d.\par December 7, 2015. Followup for the patient. As noted he had a go back to 40 mg Lasix because he became short of breath. He was still having GI complaints from the Entresto but he can manage it. He is complaining again of a urinating too much so perhaps we could try 30 mg of Lasix daily. He is seeing Dr. Russell next week. Is turning 89 soon and is planning a cruise on January 24.\par January 12, 2016. Patient is here for followup. Still has good spirits and is looking forward to going to [place] in 10 days. He is still having significant cough from the Entresto and we will have to change back to his prior regimen. Had echocardiogram at [facility] which was supposed to be  a dyssynchrony study and will be seeing Dr. Russell of [facility] tomorrow morning to discuss whether or not to try to improve his biventricular pacing. Currently on 80 mg of Lasix daily. Dr. Morris tried a new inhaler but he does not think it helped.\par March 2, 2016. The patient is here in followup. He went on his cruise and did well. His cough was stopped when he stopped the entresto. On the cruise he was able to cut back to 40 mg of Lasix  and even occasionally he didn't take it if he had a busy day. However after a while he did notice more shortness of breath and edema and the last few days he went back to 80 mg of Lasix with improvement. He is here now today, his weight has gone down 8 pounds, blood pressure was 90/60.\par May 3, 2016. Patient looks well and feels well, but continues to lose weight despite a good appetite. Dr. Jones is concerned and placed him on Ensure and if he does not put on some weight, he will be seeing GI. Abdominal CAT scan was unremarkable, except for some gastric distention and some thickening of the apex of the duodenum, which could just be underfilling. Abdominal aortic aneurysm repair was stable with sac being less than 3 cm. 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 He will follow up with vascular again in one year. He remains on 40 mg of Lasix and irbesartan. Not complaining of shortness of breath. He has done much better since Dr. Russell adjusted. His biventricular pacer by adjusting LV and RV timing. Lab work revealed a very suppressed TSH, so his Synthroid was held and he was to return in 2 weeks to reassess weight loss and thyroid status.\par May 18, 2016. Yesterday the patient was dizzy and lightheaded and fell and hit his head. He was evaluated in the emergency room, where everything was negative. His EKG was unchanged and his defibrillator was interrogated and there were no arrhythmias. His TSH was now 29! He is here today. Patient did fall again last night. Unclear if it is from being lightheaded or losing balance. No syncope. Other than when he had the fall, he has not been complaining of feeling lightheaded. His weight did go back up  off  the thyroid medication. I elected to hold his Lasix and have him reassessed in one week.\par May 24, 2016. Patient called yesterday and is short of breath, edema, and had put his weight back on. I told him to resume the Lasix and he is seen today for his followup. Mostly feels a little better and was able to lie flat last night. Still has some swelling. Blood pressure is still borderline low. Weight is up 6 pounds from last visit. We elected to continue Lasix 40 a day and changed his Synthroid to 0.1 mg daily.\par July 12, 2016. Patient is here for followup, as well as AICD interrogation. He seems to have normal biventricular pacing. Absolutely no arrhythmias for the past 4 months. Does get short of breath with exertion still, but not severe. Rarely will skip a day of Lasix if he has too many things to do. Started physical therapy, and was not out of breath at physical therapy today. His weight at home has been stable.\par September 13, 2016. Patient is getting around okay with a walker. His right knee seemed to be limiting him more than his shortness of breath and he has some trouble with his balance. No palpitation, syncope, or near syncope, or shocks. No change in any medication. He was still on Synthroid 0.1 mg. He is still doing physical therapy.\par November 16, 2016. The patient seems to be doing well, although he did have an episode of just suddenly falling backwards and hitting his head against the refrigerator. He denies lightheadedness or near syncope at the time. He denies vertigo, although he had a different episode. He reached up and looked up and had a vertigo-like episode just for a few seconds. There was no event on his defibrillator interrogation, except a 6 beat run of NSVT on September 24, which was asymptomatic. He is orthostatic here. He is also concerned about his weight loss, although his weight seems to be the same as it was last visit, but he claims he was almost 7 pounds less at home. His wife thinks he does not drink enough water. He is on the Lasix every other day. His EKG showed sinus rhythm with atrial tracking and biventricular pacing. He also has a compression fracture in his lumbar vertebra that is giving him pain\par He is also concerned about his thyroid hormone level, given his weight loss. We continued the Lasix on an every other day basis and lowered the Avapro to 150 mg. BNP was up to 4600 from 2600.\par December 21, 2016. The patient has here in followup. No more falls or complaints of orthostasis, although he is still orthostatic here from 124-106-98 standing with no symptoms. Complaining more of dyspnea on exertion and has some edema. His weight is about the same. After discussion, decided to add digoxin 0.125 q.d.\par February 8, 2017. The patient is here in followup. He has not fallen in a while now and he claims his problem is balance not dizziness. He thinks his dyspnea on exertion is unchanged on the digoxin. However his wife thinks he is better because he is not short of breath coming up the stairs and the  does agree. Just recently he started to develop some pedal edema, but he has had more salt because he loves soups. In addition, he does not sit with his legs elevated. (He also saw Dr. Plascencia for URI and was briefly on prednisone until a couple of weeks ago. He did have an SPEP with normal. Electrophoresis pattern.)\par April 4, 2017. Patient here in followup. Had pacemaker, defibrillator interrogation on March 6, with 2 short episodes of NSVT only. Seems to be doing well on the whole. Dyspnea on exertion is there, but unchanged. Weight is down 3 pounds. He occasionally gets numbness and tingling in his fingers, but not in his toes. Occasionally feels some discomfort over the AICD site. He will be going to his daughter and his niece for the [place] nights.\par Nehal 15, 2017. The patient is here in followup. Has been feeling a little more short of breath and does have a little bit of edema. He saw Dr. Valdez on Friday the ninth, who felt he had bronchitis and gave him Augmentin. Otherwise, patient has no complaints and has been doing pretty well. He still complains occasionally of some soreness over his AICD site. No other interim medical issues, and no change in medication.\par September 12, 2017. Patient is here for followup and defibrillator interrogation. As noted on August 25 had a syncopal episode after he had a hot shower and walked out to the other bathroom. Found himself on the floor and was fine. No recurrence since and in fact defibrillator interrogation shows that he had an episode of VT followed by VF after the device tried to pace terminate, which led to a defibrillator shock, and the patient has been fine since. He is still with sinus rhythm and a first-degree AV block underneath his ventricular pacing. No change in any of the symptoms and in fact, while he does get short of breath sometimes getting into bed, and some walking, was able to go to the gym and work out a little bit without shortness of breath. No chest pain, and no change in medications.\par December 26, 2017. Patient here in followup. Has been having some more shortness of breath, although seems to have frequent cough and URI. Now on a nebulizer. Does have worsening shortness of breath with lying down, but no PND. Had a near syncopal event when he got up right after sitting for a long time watching a movie, but otherwise no episodes of dizziness. AICD interrogation shows no episodes since the event in August. Patient thinks his overall stamina and fatigue are worse. Patient willing to retry Entresto.\par January 11, 2018. So far patient tolerating the Entresto, no cough. Maybe a little less short of breath, but not definite. Does have more edema, but weight is the same. No other complaints. Reluctant to increase the diuretic. We'll keep dose of the same for now and followup in one month if labs are okay. Then consider increasing Entresto.\par Comment:  \NING Genao - 21 Mar 2018 3:12 PM \par   TASK CREATED\par Hi-\par Saw Adam.\par He complained of weight gain, edema, SOB.\par Sent BNP and up to over 5300.\par Do you think that the Entresto is an issue for him?\par Asked him to call you.\par Thanks-\par Ning \par Addendum\par I reviewed the labs and spoke with the patient. Actually, weight is down, and labs are all okay except the BNP. If this is natural history of LV dysfunction, he should be on a higher dose of Entresto. I elected to increase the Entresto between now and his visit next week with me and see if things get worse, improve, or stay the same. Patient has a big cruise coming up on April 18 \par March 27, 2018. Patient returns in followup now on the increased dose of Entresto for one week. On his pacemaker interrogation he has normal function with biventricular pacing, no more VT. His "optivol" has been elevated since mid-January and just for the past week it seems to be coming down, which would correlate with increasing his Enteresto. He does admit to not being a strict on his diet, having Chinese food the other night, and has only been staying with the furosemide every other day despite the increased edema and shortness of breath. Depending on labs, I will probably continue the high-dose Entresto, continue furosemide daily through the beginning of [place] this week and then go back to every other day depending on symptoms, weight and edema.\par May 22, 2018. The patient went on his vacation and did extremely well. By the very end before getting on the plane to come back he was somewhat short of breath and his wife considered going to the emergency room. Instead, he took extra Lasix and was doing well on the plane. At one point got up to the bathroom, came back sat in his seat and his wife leaned over to try to wake him up and they could not arouse him for up to 5 minutes. A doctor on the plane checked him, they took him out of his seat and laid him down on the floor at which point he seemed to come to and felt great. It seems he had a pulse and a blood pressure throughout. When he got off the plane he was brought to [facility] but interrogating the defibrillator showed absolutely no arrhythmias. (Of note, the next day in the hospital he had 2 long runs of nonsustained VT that were asymptomatic.) He did develop a severe cough with rhonchi and sputum. He was seen by Dr. Hema Reyes of pulmonary and discharged on prednisone and antibiotics. He saw Dr. Morris this morning and she just continued those medications and sent labs and told him she thought it was all from his heart. He did have a repeat echo in the hospital, which showed his AS has progressed to borderline severe.\par July 10, 2018. A one point after the hospitalization, the patient's blood pressure was running low and his Lasix was decreased. He developed more shortness of breath, and weight gain, and saw Dr. Romano on June 27. Lasix was put back to 40 mg daily. He is here today, feeling better, has lost 6 pounds. Was at ophthalmologist earlier today and has a new hemorrhage in his left eye, which is his better eye. The ophthalmologist would like me to cut back or hold the aspirin.\par September 12, 2018. Patient returns in followup, as well as for repeat echocardiogram regarding his aortic stenosis. His echo confirms somewhat of a low gradient severe aortic stenosis and is unchanged from May, but the patient feels wonderful maybe somewhat tired, but his breathing is better than it has been a long time and he is able to do physical therapy, etc. Patient was sent for evaluation with structural heart team.\par October 24, 2018. Patient returns for followup. Workup in hospital revealed no significant CAD, very tortuous vessels femorally so if patient needs TAVR would have to use apical approach. Pullback gradient across aortic valve was low as well (15). 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 Since being home the patient has been fairly stable although he does have increased edema since the catheterization. There was also some question about his potassium level. He got a flu shot from Dr. Morris the other day.\par January 23, 2019. The patient here in followup. He remains in sinus or AV paced. Feels good. Taking Lasix 40 mg daily. Blood pressure running on the low side, but no dizziness, lightheadedness, etc. Gets tiredness "from his legs, but not from shortness of breath". he says. He is on 2 new medications from his rheumatologist (Blanca [facility]), gabapentin, and chlorzactazone. He would like to try to go again on at least a ten-day cruise although his wife is very nervous about it. No defibrillator shocks, etc. Off colchicine per Dr. Morris.\par April 24, 2019. Patient here in followup. Good spirits and seems to be doing very well on his current regimen. Remains in sinus rhythm with atrial tracking and ventricular pacing. One VPC noted. No congestive heart failure, despite [place].\par August 20, 2019.  Patient here in follow-up.  Pacemaker interrogation shows 1 or 2 runs of ventricular tachycardia that are pace terminated no shocks and patient was asymptomatic.  EKG shows sinus rhythm with left bundle branch block or more likely atrial tracking and biventricular pacing.  On the whole doing very well although feels that the water pill dictates his life and would like to change to every other day\par November 12, 2019.  Patient here in follow-up.  He remains AV paced at 70. Has a cough for the last 2 days initially productive but now not.  Denies shortness of breath.  Denies fever chills or achiness.  Not sure if there has been more salt in his diet.  His weight is up 2 pounds.  No chest pain shortness of breath etc.  Had a flu shot already.  Has an appointment with Dr. Morris later today. (Has lucho's Virax  this weekend in [place] and another Virax in a week and a half.).\par February 18, 2020.  Patient here for follow-up as well as defibrillator interrogation.  Now 89 years old.  Remains either sinus with ventricular pacing or AV pacing. Good spirits feeling well.  Not really doing that much walking but no complaints.  If he just moves around side to side he feels a little short of breath but it passes quickly.  No PND or orthopnea.  Complains about nocturia and again asking if he can lower the diuretic.  Weight is the same with slight edema. Rare brief NSVT. No a fib. 98% biV-paced.\par February 19, 2020. Reviewed labs with patient. Change digoxin to 6 days a week as his level is 2.0. Currently only on Lasix 40 mg once a day. Would continue and not cut back further.\par May 19, 2020.  Patient returns in follow-up.  Remains in sinus with atrial tracking and ventricular pacing versus AV pacing.  In pretty good spirits and has remained pretty stable from a cardiac point of view.  Asking how many years I think he can continue.\par Reviewed labs with patient. Digoxin level 2.1 so now will take it only 5 days a week skipping Monday and Friday. Satisfied with renal function potassium and other labs. BNP slightly higher than previously but still lower than when he was 7000 and 9000. Patient did have some mushroom barley soup from the [facility] that may have had more salt etc. No other change in medication as of now. \par August 19, 2020.  Patient returns in follow-up.  Saw Dr. Morris a few weeks ago.  BNP 4733, BUN 35 creatinine 1.6 potassium 4.3.  Normal LFTs.  Cholesterol 119, HDL 56, LDL 54, triglycerides 45.  Normal TSH.  Hemoglobin A1c 5.3.  No digoxin level done.  Hemoglobin 11.2 hematocrit 35.2.  Platelets 124,000\par EKG with sinus rhythm and ventricular pacing at 72.  Pacemaker AICD interrogation revealed only one episode of atrial fibrillation that lasted an hour and 48 minutes and 1 SVT of 9 beats and a second 1 of 6 beats.  Symptomatically he sounds pretty stable.  Only feels out of breath when he is trying to get into bed at night but during the day walking around he is unchanged and there is no PND.  There is mild edema that is chronic.  Digoxin was decreased last visit because of elevated levels of that will be rechecked today.  Last echo was almost 2 years ago so he will be scheduled for an echo with his next visit.  He was asking about his ejection fraction.\par October 21, 2020.  Patient returns in follow-up and for echocardiogram.  According to wife he may be getting just a little more short of breath at night and when he comes into bed.  Patient would like to cut back on the water pill however because he is in the bathroom all day.  I will allow him to try doing every other day on furosemide and reevaluate based on symptoms, weight, edema etc.  The echo today probably shows more calcium on the aortic valve and a little bit of progression of his aortic stenosis but his LVEF is 26% and overall unchanged.  Not clear that he will benefit from a TAVR.  Remains in sinus rhythm with atrial tracking and ventricular pacing.\par November 4, 2020.Patient's feet started swelling when he was taking to diuretics 1 day and none the next day so now we went back to 1 every day and seems to be improving. \par January 7, 2021 Patient went to Dr. Morris because he is short of breath but in fact has gained about 13 pounds, weight 173 and significant edema.  Possibly he has been off his diet.  Has been taking Lasix 40 a day so I told him to take 80 mg a day until the swelling is gone and to come see me sometime next week. \par January 12, 2021.  Patient returns here for follow-up.  Has lost 7 pounds and does feel a lot better but not 100% back to baseline.  Wife does admit there was dietary indiscretion so hopefully that will oral there is.  Occasional fleeting sharp sticking left chest pain but no angina type symptoms.  No dizziness lightheadedness or defibrillator shocks.  Last AICD pacemaker check was August.  Last echo was October.\par  \par \par

## 2021-01-12 NOTE — REVIEW OF SYSTEMS
[Fever] : no fever [Recent Weight Gain (___ Lbs)] : recent [unfilled] ~Ulb weight gain [Chills] : no chills [Feeling Fatigued] : not feeling fatigued [Recent Weight Loss (___ Lbs)] : recent [unfilled] ~Ulb weight loss [Shortness Of Breath] : no shortness of breath [Dyspnea on exertion] : dyspnea during exertion [Chest  Pressure] : no chest pressure [Chest Pain] : no chest pain [Lower Ext Edema] : lower extremity edema [Palpitations] : no palpitations [Cough] : no cough [Abdominal Pain] : no abdominal pain [Change in Appetite] : no change in appetite [Change In The Stool] : no change in stool [Joint Pain] : joint pain [see HPI] : see HPI [Dizziness] : no dizziness [Confusion] : no confusion was observed [Anxiety] : no anxiety [Excessive Thirst] : no polydipsia [Negative] : Heme/Lymph

## 2021-01-12 NOTE — PHYSICAL EXAM
[General Appearance - Well Developed] : well developed [Normal Appearance] : normal appearance [Well Groomed] : well groomed [No Deformities] : no deformities [General Appearance - In No Acute Distress] : no acute distress [Normal Conjunctiva] : the conjunctiva exhibited no abnormalities [Eyelids - No Xanthelasma] : the eyelids demonstrated no xanthelasmas [Normal Oral Mucosa] : normal oral mucosa [No Oral Pallor] : no oral pallor [No Oral Cyanosis] : no oral cyanosis [Normal Jugular Venous A Waves Present] : normal jugular venous A waves present [Normal Jugular Venous V Waves Present] : normal jugular venous V waves present [No Jugular Venous Coley A Waves] : no jugular venous coley A waves [Exaggerated Use Of Accessory Muscles For Inspiration] : no accessory muscle use [Respiration, Rhythm And Depth] : normal respiratory rhythm and effort [Auscultation Breath Sounds / Voice Sounds] : lungs were clear to auscultation bilaterally [Heart Sounds] : normal S1 and S2 [Heart Rate And Rhythm] : heart rate and rhythm were normal [Murmurs] : no murmurs present [Abdomen Soft] : soft [Abdomen Tenderness] : non-tender [Abdomen Mass (___ Cm)] : no abdominal mass palpated [Abnormal Walk] : normal gait [Gait - Sufficient For Exercise Testing] : the gait was sufficient for exercise testing [Nail Clubbing] : no clubbing of the fingernails [Cyanosis, Localized] : no localized cyanosis [Petechial Hemorrhages (___cm)] : no petechial hemorrhages [Skin Color & Pigmentation] : normal skin color and pigmentation [Skin Turgor] : normal skin turgor [] : no rash [No Venous Stasis] : no venous stasis [Skin Lesions] : no skin lesions [No Skin Ulcers] : no skin ulcer [No Xanthoma] : no  xanthoma was observed [Oriented To Time, Place, And Person] : oriented to person, place, and time [Affect] : the affect was normal [Mood] : the mood was normal [FreeTextEntry1] : Pretty good spirits

## 2021-01-27 PROBLEM — S80.12XA CONTUSION OF LEG, LEFT: Status: ACTIVE | Noted: 2021-01-01

## 2021-01-27 NOTE — PHYSICAL EXAM
[de-identified] : Full ROM at the left knee. [de-identified] : Contusion left leg proximal to knee, anteriorly

## 2021-01-27 NOTE — ASSESSMENT
[FreeTextEntry1] : Patient with a contusion of the left leg after a fall 8 days ago.  Patient has full range of motion, and is able to walk.  The bruising has been improving.  No further work-up needed at this time.\par \par Patient was reluctant to seek medical attention at the time of the fall due to pandemic concerns, and I urged him to be evaluated ASAP if he has another similar incident.

## 2021-02-22 PROBLEM — D64.9 ANEMIA: Status: ACTIVE | Noted: 2018-03-21

## 2021-02-22 NOTE — PHYSICAL EXAM
[General Appearance - Well Developed] : well developed [Normal Appearance] : normal appearance [Well Groomed] : well groomed [No Deformities] : no deformities [General Appearance - In No Acute Distress] : no acute distress [Normal Conjunctiva] : the conjunctiva exhibited no abnormalities [Eyelids - No Xanthelasma] : the eyelids demonstrated no xanthelasmas [Normal Oral Mucosa] : normal oral mucosa [No Oral Cyanosis] : no oral cyanosis [No Oral Pallor] : no oral pallor [Normal Jugular Venous A Waves Present] : normal jugular venous A waves present [Normal Jugular Venous V Waves Present] : normal jugular venous V waves present [No Jugular Venous Coley A Waves] : no jugular venous coley A waves [Exaggerated Use Of Accessory Muscles For Inspiration] : no accessory muscle use [Respiration, Rhythm And Depth] : normal respiratory rhythm and effort [Heart Rate And Rhythm] : heart rate and rhythm were normal [Heart Sounds] : normal S1 and S2 [Murmurs] : no murmurs present [Abdomen Soft] : soft [Abdomen Tenderness] : non-tender [Abdomen Mass (___ Cm)] : no abdominal mass palpated [Abnormal Walk] : normal gait [Gait - Sufficient For Exercise Testing] : the gait was sufficient for exercise testing [Nail Clubbing] : no clubbing of the fingernails [Cyanosis, Localized] : no localized cyanosis [Petechial Hemorrhages (___cm)] : no petechial hemorrhages [Skin Color & Pigmentation] : normal skin color and pigmentation [Skin Turgor] : normal skin turgor [] : no rash [No Venous Stasis] : no venous stasis [Skin Lesions] : no skin lesions [No Skin Ulcers] : no skin ulcer [No Xanthoma] : no  xanthoma was observed [Affect] : the affect was normal [Oriented To Time, Place, And Person] : oriented to person, place, and time [Mood] : the mood was normal [FreeTextEntry1] : Pretty good spirits

## 2021-02-22 NOTE — HISTORY OF PRESENT ILLNESS
[FreeTextEntry1] : (MED HX) The patient is now 88 years old. I first saw him in 2006. At that time he had a history of a myocardial infarction and angioplasty back in 1994. He had done well for years although his activity level had diminished with age and he began complaining of fatigue. In December of 2011 he had a stress echocardiogram that was abnormal possibly with very abnormal blood pressure response and possibly with new wall motion abnormalities. He underwent a CTA and this was followed up by cardiac catheterization at [facility] in December of 2011. 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 The patient did well after that which is a minor adjustment of his medications. He has a history of hyperlipidemia and had a past history of hypertension. He stopped smoking 43 years ago, no diabetes, no family history of coronary artery disease.\par April 30, 2013 he was admitted to [facility] with left lower extremity cellulitis and altered mental status. He had positive blood cultures for strep pyogenes group A. An echocardiogram showed a possible small echodensity on the ventricular side of the noncoronary cusp. A joint aspiration of his left ankle was negative. He was treated with vancomycin and Zosyn, switched to cefepime. Followed by plastic surgery for wound care. No evidence of endocarditis clinically. A CAT scan of his abdomen and pelvis showed a 3.7 cm infrarenal abdominal aortic aneurysm. There was also bilateral inguinal hernias. 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 He was followed by Dr. Damián Lovelace of infectious disease. There is still a possibility he may need a skin graft for his left ankle. He was in the hospital for a little over 2 weeks and then in rehabilitation for 5 weeks. \par July 3, 2013. Here for followup visit. He had been home now for about 10 days and had already seen Dr. Lovelace and Dr. Jones his internist.  He denies chest pain or shortness of breath. There has been no syncope or near syncope or palpitations. Upon review of his medications the only change is that his Avapro is at a half of a 150 mg pill daily due to low blood pressure in the hospital and his simvastatin has been cut down from 20 mg to 10 mg. He did lose weight during this hospitalization as well. There have been no episodes of fevers, chills, sweats, etc.\par September 3, 2013. Here for followup. He is preparing to go on a trip to [place] in October. He does note shortness of breath with exertion but only with a lot of exertion, no problem going up stairs, and he thinks no different than even before his cellulitis episode. He denies exertional chest pain. We elected to see how he did with increased exercise and if his shortness of breath was not severe he would be okay to go on the trip to [place], obviously using commonsense as we discussed.\par January 6, 2014. The patient is here for followup. He did very well on the trip to [place] with some limitation from his knees but no significant shortness of breath or chest pain. He gets swelling on his left leg where he had the cellulitis but not on the right side. He only had one episode of slight lightheadedness when he got up too fast but otherwise has been tolerating the current medications. He is planning on going to [place] at the end of January. He has a mild cough, nonproductive, no fever chills. He claims he was unable to get an appointment with his internist. Her workup was unrevealing.\par May 5, 2014. The patient is here for followup. He saw Dr. Jones in the interim and had lab work with an excellent lipid profile but a high BNP. He saw vascular surgery because of the abdominal aortic aneurysm which measured 3.8 cm and that will be followed. He had an episode where his balance was off for about one hour. It only happened when he tried to walk around. He denies feeling lightheaded like he might pass out. If he was sitting and not moving he felt perfectly fine. There was no vertigo. He claims it happened him once before and it resolved as well. I advised him to discuss with Dr. Jones and perhaps a neurologist. In addition he brought up again with his wife his symptoms he's had for 40 years where he suddenly for no reason will get chest pain and pressure that radiates up to his jaw. It will actually go away if he does nothing but if he takes Maalox it will go away faster. I explained to him that this is probably esophageal reflux with esophageal spasm but it so infrequent that it is not worth for him to take a PPI on a daily basis. He denies having exertional chest pain or shortness of breath. He is considering possible knee replacement in the near future. He returned in July for preop clearance for knee replacement which he had in the middle of July with no complications Other than needing to be transfused 3 units and having been sent in to [facility] in the [facility] to have that done on 2 occasions.\par October 7, 2014. The patient is here for followup. He is feeling better and is more active but is also noting more shortness of breath with exertion or at least others with him have noticed it. He thinks he needs the other knee replacement done but his wife said she will divorce him if he does it. His TSH was elevated on labs with Dr. Jones last month so his Synthroid was increased from 6 days a week to 7 days a week. His BNP was elevated and he asked "should I worry about heart failure?". In addition Dr. Jones cut back his Avapro 300 to 150 because blood pressure was 110. The patient denies lightheadedness dizziness etc. His blood pressure today on 2 different occasions was 146/70. He also has an abdominal aortic aneurysm measuring 3.8 cm that is being followed. He'll be seeing Dr. Temple tomorrow.\par October 28, 2014. Dr. Jacobs called yesterday that the patient's abdominal aorta had increased in size by 0.9 cm and the patient is to be scheduled for an endovascular repair of his abdominal aortic aneurysm. He came here for his echocardiogram and discussion. The echo for the most part is unchanged from May of 2013 with left ventricular ejection fraction around 40%, mild to moderate MR, mild to moderate AI, segmental LV dysfunction with akinetic inferoposterior wall, and hypokinetic to akinetic inferolateral and mid lateral walls, normal anterior wall. LV size upper limit of normal. Normal RV size and function with an RVSP estimated at 41 mm of mercury.\par April 29, 2015. The patient did well with his endovascular repair of his abdominal aortic aneurysm. He went down to [place] and at one point was found to be short of breath with that was felt to be a pleural effusion. He was set up for a thoracentesis but after taking a diuretic for a few days they found there was no fluid there. His cough did not go away until he took a course of prednisone.The diuretic was stopped and he did well returning home. He took a tour of [place] and noted that any kind of incline made him very short of breath but no chest pressure. He saw Dr. Morris of pulmonary who found his proBNP to be 3400 and in the past it was only 1100. However his chest x-ray was totally clear. He has no PND or orthopnea and no real edema. She did give him another inhaler (Symbicort) and referred him here. His weight has not been up and if anything has gone down a little. His last echo was in October as above and was unchanged for the most part.  There is no chest pain with exertion. \par April 30, 2015. The patient returned for a stress echocardiogram. His baseline LV function looked worse than previously with left ventricular ejection fraction around 27%. With exercise he dropped his blood pressure and there were new wall motion abnormalities. He was scheduled for cardiac catheterization and probable defibrillator with possible biventricular pacemaker.\par May 6, 2015. Cardiac catheterization was done and revealed for the most part unchanged anatomy.  LVEF by V-gram was 35% . Given these findings this time Dr. Reyes did angioplasty and stent the right coronary artery. Electrophysiology felt he should wait at least 2 weeks and come back for another ejection fraction and clinical evaluation to see if things had improved before deciding on a  possible  AICD/biventricular pacemaker.\par May 21, 2015. The patient returns for followup and echocardiogram. He has felt much better over the 2 weeks but he has not been doing his usual activities because he was told not to do it. His echocardiogram to my eye looks unchanged. His exam is unchanged but there is no signs of congestive heart failure. I discussed the results with the patient and his wife. I told him that his improved symptoms could be because he is not that active, it could be a placebo effect of the procedure, it could be that things have improved and we just are unable to measure it on the echocardiogram. A BNP was sent and will be compared to his previous level which had gone up from the 400 range into the 800s. In addition over the weekend the patient will increase his activity and see what his level of symptoms are. If his symptoms are significant and worse than they had been a few months ago, we will schedule the ICD and biventricular pacemaker. The patient will contact me after the weekend.\par Nehal 15, 2015. The patient saw Dr. Russell of [facility] and unfortunately he has to wait 3 months (August 6, 2015) from the angioplasty unless he becomes unstable, has any arrhythmias, or needs a pacemaker for bradycardic reasons.  He did increase the carvedilol to 12.5 mg in the morning and 25 mg in the evening Currently he feels well and is stable. He does get out of breath and some weakness in his legs if he is walking uphill. When he initially lies down he feels some pressure he then says is shortness of breath but it resolves. No PND or orthopnea per se.\par July 14, 2015. The patient called a few weeks ago thinking he was more short of breath and wanted to move up the AICD/biventricular pacemaker. However after speaking with Dr. Russell of [facility] he explained he still was not qualified and must wait unless his symptoms are so severe that he requires hospitalization. I explained this to the patient and he returns today for routine followup.  He is still symptomatic with dyspnea on exertion but no PND or orthopnea. 3 months from his angioplasty will be August 6. He has a trip to [facility] planned with his daughter August 12.\par August 25, 2015. The patient is here for followup after having his AICD/biventricular pacemaker placed on August 7. No complications with the procedure. The patient does feel better in terms of the shortness of breath he would get when he was lying down but with exertion he feels maybe even worse right now in terms of shortness of breath. Of note he was away with a different diet and gained 4 pounds. (By scale here he has gained 9 pounds.) No palpitations, no syncope or near syncope. He is on carvedilol 25 mg the morning and 12.5 in the evening. He is still complaining of left arm pain ever since the procedure.\par September 8, 2015. The patient is finally starting to feel a little bit better. He cut his diuretic back to every other day and has continued to lose weight. His left arm so bothersome just as much since the procedure. We had a discussion about changing his Avapro to Entresto.\par October 8, 2015. Patient is here for followup. In the interim he had an episode of bronchitis treated with antibiotics and steroids which did cause some fluid retention and he was briefly on diuretics with improvement. He is now off the steroids but still on the diuretic. He does feel better on the Entresto. He thinks he is walking better with less shortness of breath and somewhat more stamina. He otherwise has no complaints. He remains in sinus rhythm and has had no AICD shocks. His blood pressure was borderline and his lungs were clear. I elected to stop the diuretic and increase his Entresto. \par October 23, 2015. On October 20 the patient saw Dr. Morris because of shortness of breath and edema. He had gained 9 pounds and he had significant swelling and she put him back on Lasix 40 mg as we discussed together. Patient had much relief and is here now. He lost 8 of the 9 pounds he again and feels much better. He still gets short of breath going up the stairs and often has to stop. He has trace edema still. His blood pressure is in the 90s systolic. He still eats out but not as much as he used to and they do ask for the low salt.\par November 9, 2015. The patient returns for followup. He has been on Lasix 40 mg since his last visit. He looks great, back to himself, with no complaints or symptoms of CHF. Her systolic pressure is only 90 but he has no symptoms of lightheadedness. He is having a lot of gas to the point of it being embarrassing and wondered if it is related to the Entresto. He also seems to have a dry cough. He will be seeing Dr. Russell in followup next week. Based on his complaints we cut back his Lasix to 20 mg daily but then he called back a few days later with more shortness of breath and went back to 40 mg q.d.\par December 7, 2015. Followup for the patient. As noted he had a go back to 40 mg Lasix because he became short of breath. He was still having GI complaints from the Entresto but he can manage it. He is complaining again of a urinating too much so perhaps we could try 30 mg of Lasix daily. He is seeing Dr. Russell next week. Is turning 89 soon and is planning a cruise on January 24.\par January 12, 2016. Patient is here for followup. Still has good spirits and is looking forward to going to [place] in 10 days. He is still having significant cough from the Entresto and we will have to change back to his prior regimen. Had echocardiogram at [facility] which was supposed to be  a dyssynchrony study and will be seeing Dr. Russell of [facility] tomorrow morning to discuss whether or not to try to improve his biventricular pacing. Currently on 80 mg of Lasix daily. Dr. Morris tried a new inhaler but he does not think it helped.\par March 2, 2016. The patient is here in followup. He went on his cruise and did well. His cough was stopped when he stopped the entresto. On the cruise he was able to cut back to 40 mg of Lasix  and even occasionally he didn't take it if he had a busy day. However after a while he did notice more shortness of breath and edema and the last few days he went back to 80 mg of Lasix with improvement. He is here now today, his weight has gone down 8 pounds, blood pressure was 90/60.\par May 3, 2016. Patient looks well and feels well, but continues to lose weight despite a good appetite. Dr. Jones is concerned and placed him on Ensure and if he does not put on some weight, he will be seeing GI. Abdominal CAT scan was unremarkable, except for some gastric distention and some thickening of the apex of the duodenum, which could just be underfilling. Abdominal aortic aneurysm repair was stable with sac being less than 3 cm. 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 He will follow up with vascular again in one year. He remains on 40 mg of Lasix and irbesartan. Not complaining of shortness of breath. He has done much better since Dr. Russell adjusted. His biventricular pacer by adjusting LV and RV timing. Lab work revealed a very suppressed TSH, so his Synthroid was held and he was to return in 2 weeks to reassess weight loss and thyroid status.\par May 18, 2016. Yesterday the patient was dizzy and lightheaded and fell and hit his head. He was evaluated in the emergency room, where everything was negative. His EKG was unchanged and his defibrillator was interrogated and there were no arrhythmias. His TSH was now 29! He is here today. Patient did fall again last night. Unclear if it is from being lightheaded or losing balance. No syncope. Other than when he had the fall, he has not been complaining of feeling lightheaded. His weight did go back up  off  the thyroid medication. I elected to hold his Lasix and have him reassessed in one week.\par May 24, 2016. Patient called yesterday and is short of breath, edema, and had put his weight back on. I told him to resume the Lasix and he is seen today for his followup. Mostly feels a little better and was able to lie flat last night. Still has some swelling. Blood pressure is still borderline low. Weight is up 6 pounds from last visit. We elected to continue Lasix 40 a day and changed his Synthroid to 0.1 mg daily.\par July 12, 2016. Patient is here for followup, as well as AICD interrogation. He seems to have normal biventricular pacing. Absolutely no arrhythmias for the past 4 months. Does get short of breath with exertion still, but not severe. Rarely will skip a day of Lasix if he has too many things to do. Started physical therapy, and was not out of breath at physical therapy today. His weight at home has been stable.\par September 13, 2016. Patient is getting around okay with a walker. His right knee seemed to be limiting him more than his shortness of breath and he has some trouble with his balance. No palpitation, syncope, or near syncope, or shocks. No change in any medication. He was still on Synthroid 0.1 mg. He is still doing physical therapy.\par November 16, 2016. The patient seems to be doing well, although he did have an episode of just suddenly falling backwards and hitting his head against the refrigerator. He denies lightheadedness or near syncope at the time. He denies vertigo, although he had a different episode. He reached up and looked up and had a vertigo-like episode just for a few seconds. There was no event on his defibrillator interrogation, except a 6 beat run of NSVT on September 24, which was asymptomatic. He is orthostatic here. He is also concerned about his weight loss, although his weight seems to be the same as it was last visit, but he claims he was almost 7 pounds less at home. His wife thinks he does not drink enough water. He is on the Lasix every other day. His EKG showed sinus rhythm with atrial tracking and biventricular pacing. He also has a compression fracture in his lumbar vertebra that is giving him pain\par He is also concerned about his thyroid hormone level, given his weight loss. We continued the Lasix on an every other day basis and lowered the Avapro to 150 mg. BNP was up to 4600 from 2600.\par December 21, 2016. The patient has here in followup. No more falls or complaints of orthostasis, although he is still orthostatic here from 124-106-98 standing with no symptoms. Complaining more of dyspnea on exertion and has some edema. His weight is about the same. After discussion, decided to add digoxin 0.125 q.d.\par February 8, 2017. The patient is here in followup. He has not fallen in a while now and he claims his problem is balance not dizziness. He thinks his dyspnea on exertion is unchanged on the digoxin. However his wife thinks he is better because he is not short of breath coming up the stairs and the  does agree. Just recently he started to develop some pedal edema, but he has had more salt because he loves soups. In addition, he does not sit with his legs elevated. (He also saw Dr. Plascencia for URI and was briefly on prednisone until a couple of weeks ago. He did have an SPEP with normal. Electrophoresis pattern.)\par April 4, 2017. Patient here in followup. Had pacemaker, defibrillator interrogation on March 6, with 2 short episodes of NSVT only. Seems to be doing well on the whole. Dyspnea on exertion is there, but unchanged. Weight is down 3 pounds. He occasionally gets numbness and tingling in his fingers, but not in his toes. Occasionally feels some discomfort over the AICD site. He will be going to his daughter and his niece for the [place] nights.\par Nehal 15, 2017. The patient is here in followup. Has been feeling a little more short of breath and does have a little bit of edema. He saw Dr. Valdez on Friday the ninth, who felt he had bronchitis and gave him Augmentin. Otherwise, patient has no complaints and has been doing pretty well. He still complains occasionally of some soreness over his AICD site. No other interim medical issues, and no change in medication.\par September 12, 2017. Patient is here for followup and defibrillator interrogation. As noted on August 25 had a syncopal episode after he had a hot shower and walked out to the other bathroom. Found himself on the floor and was fine. No recurrence since and in fact defibrillator interrogation shows that he had an episode of VT followed by VF after the device tried to pace terminate, which led to a defibrillator shock, and the patient has been fine since. He is still with sinus rhythm and a first-degree AV block underneath his ventricular pacing. No change in any of the symptoms and in fact, while he does get short of breath sometimes getting into bed, and some walking, was able to go to the gym and work out a little bit without shortness of breath. No chest pain, and no change in medications.\par December 26, 2017. Patient here in followup. Has been having some more shortness of breath, although seems to have frequent cough and URI. Now on a nebulizer. Does have worsening shortness of breath with lying down, but no PND. Had a near syncopal event when he got up right after sitting for a long time watching a movie, but otherwise no episodes of dizziness. AICD interrogation shows no episodes since the event in August. Patient thinks his overall stamina and fatigue are worse. Patient willing to retry Entresto.\par January 11, 2018. So far patient tolerating the Entresto, no cough. Maybe a little less short of breath, but not definite. Does have more edema, but weight is the same. No other complaints. Reluctant to increase the diuretic. We'll keep dose of the same for now and followup in one month if labs are okay. Then consider increasing Entresto.\par Comment:  \NING Genao - 21 Mar 2018 3:12 PM \par   TASK CREATED\par Hi-\par Saw Adam.\par He complained of weight gain, edema, SOB.\par Sent BNP and up to over 5300.\par Do you think that the Entresto is an issue for him?\par Asked him to call you.\par Thanks-\par Ning \par Addendum\par I reviewed the labs and spoke with the patient. Actually, weight is down, and labs are all okay except the BNP. If this is natural history of LV dysfunction, he should be on a higher dose of Entresto. I elected to increase the Entresto between now and his visit next week with me and see if things get worse, improve, or stay the same. Patient has a big cruise coming up on April 18 \par March 27, 2018. Patient returns in followup now on the increased dose of Entresto for one week. On his pacemaker interrogation he has normal function with biventricular pacing, no more VT. His "optivol" has been elevated since mid-January and just for the past week it seems to be coming down, which would correlate with increasing his Enteresto. He does admit to not being a strict on his diet, having Chinese food the other night, and has only been staying with the furosemide every other day despite the increased edema and shortness of breath. Depending on labs, I will probably continue the high-dose Entresto, continue furosemide daily through the beginning of [place] this week and then go back to every other day depending on symptoms, weight and edema.\par May 22, 2018. The patient went on his vacation and did extremely well. By the very end before getting on the plane to come back he was somewhat short of breath and his wife considered going to the emergency room. Instead, he took extra Lasix and was doing well on the plane. At one point got up to the bathroom, came back sat in his seat and his wife leaned over to try to wake him up and they could not arouse him for up to 5 minutes. A doctor on the plane checked him, they took him out of his seat and laid him down on the floor at which point he seemed to come to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

## 2021-02-22 NOTE — REVIEW OF SYSTEMS
[Dyspnea on exertion] : dyspnea during exertion [Lower Ext Edema] : lower extremity edema [Joint Pain] : joint pain [see HPI] : see HPI [Negative] : Heme/Lymph [Recent Weight Loss (___ Lbs)] : recent [unfilled] ~Ulb weight loss [Fever] : no fever [Recent Weight Gain (___ Lbs)] : no recent weight gain [Chills] : no chills [Feeling Fatigued] : not feeling fatigued [Shortness Of Breath] : no shortness of breath [Chest  Pressure] : no chest pressure [Chest Pain] : no chest pain [Palpitations] : no palpitations [Cough] : no cough [Abdominal Pain] : no abdominal pain [Change in Appetite] : no change in appetite [Change In The Stool] : no change in stool [Dizziness] : no dizziness [Confusion] : no confusion was observed [Anxiety] : no anxiety [Excessive Thirst] : no polydipsia

## 2021-03-23 NOTE — HISTORY OF PRESENT ILLNESS
[FreeTextEntry1] : (MED HX) The patient is now 88 years old. I first saw him in 2006. At that time he had a history of a myocardial infarction and angioplasty back in 1994. He had done well for years although his activity level had diminished with age and he began complaining of fatigue. In December of 2011 he had a stress echocardiogram that was abnormal possibly with very abnormal blood pressure response and possibly with new wall motion abnormalities. He underwent a CTA and this was followed up by cardiac catheterization at [facility] in December of 2011. 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 The patient did well after that which is a minor adjustment of his medications. He has a history of hyperlipidemia and had a past history of hypertension. He stopped smoking 43 years ago, no diabetes, no family history of coronary artery disease.\par April 30, 2013 he was admitted to [facility] with left lower extremity cellulitis and altered mental status. He had positive blood cultures for strep pyogenes group A. An echocardiogram showed a possible small echodensity on the ventricular side of the noncoronary cusp. A joint aspiration of his left ankle was negative. He was treated with vancomycin and Zosyn, switched to cefepime. Followed by plastic surgery for wound care. No evidence of endocarditis clinically. A CAT scan of his abdomen and pelvis showed a 3.7 cm infrarenal abdominal aortic aneurysm. There was also bilateral inguinal hernias. 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 He was followed by Dr. Damián Lovelace of infectious disease. There is still a possibility he may need a skin graft for his left ankle. He was in the hospital for a little over 2 weeks and then in rehabilitation for 5 weeks. \par July 3, 2013. Here for followup visit. He had been home now for about 10 days and had already seen Dr. Lovelace and Dr. Jones his internist.  He denies chest pain or shortness of breath. There has been no syncope or near syncope or palpitations. Upon review of his medications the only change is that his Avapro is at a half of a 150 mg pill daily due to low blood pressure in the hospital and his simvastatin has been cut down from 20 mg to 10 mg. He did lose weight during this hospitalization as well. There have been no episodes of fevers, chills, sweats, etc.\par September 3, 2013. Here for followup. He is preparing to go on a trip to [place] in October. He does note shortness of breath with exertion but only with a lot of exertion, no problem going up stairs, and he thinks no different than even before his cellulitis episode. He denies exertional chest pain. We elected to see how he did with increased exercise and if his shortness of breath was not severe he would be okay to go on the trip to [place], obviously using commonsense as we discussed.\par January 6, 2014. The patient is here for followup. He did very well on the trip to [place] with some limitation from his knees but no significant shortness of breath or chest pain. He gets swelling on his left leg where he had the cellulitis but not on the right side. He only had one episode of slight lightheadedness when he got up too fast but otherwise has been tolerating the current medications. He is planning on going to [place] at the end of January. He has a mild cough, nonproductive, no fever chills. He claims he was unable to get an appointment with his internist. Her workup was unrevealing.\par May 5, 2014. The patient is here for followup. He saw Dr. Jones in the interim and had lab work with an excellent lipid profile but a high BNP. He saw vascular surgery because of the abdominal aortic aneurysm which measured 3.8 cm and that will be followed. He had an episode where his balance was off for about one hour. It only happened when he tried to walk around. He denies feeling lightheaded like he might pass out. If he was sitting and not moving he felt perfectly fine. There was no vertigo. He claims it happened him once before and it resolved as well. I advised him to discuss with Dr. Jones and perhaps a neurologist. In addition he brought up again with his wife his symptoms he's had for 40 years where he suddenly for no reason will get chest pain and pressure that radiates up to his jaw. It will actually go away if he does nothing but if he takes Maalox it will go away faster. I explained to him that this is probably esophageal reflux with esophageal spasm but it so infrequent that it is not worth for him to take a PPI on a daily basis. He denies having exertional chest pain or shortness of breath. He is considering possible knee replacement in the near future. He returned in July for preop clearance for knee replacement which he had in the middle of July with no complications Other than needing to be transfused 3 units and having been sent in to [facility] in the [facility] to have that done on 2 occasions.\par October 7, 2014. The patient is here for followup. He is feeling better and is more active but is also noting more shortness of breath with exertion or at least others with him have noticed it. He thinks he needs the other knee replacement done but his wife said she will divorce him if he does it. His TSH was elevated on labs with Dr. Jones last month so his Synthroid was increased from 6 days a week to 7 days a week. His BNP was elevated and he asked "should I worry about heart failure?". In addition Dr. Jones cut back his Avapro 300 to 150 because blood pressure was 110. The patient denies lightheadedness dizziness etc. His blood pressure today on 2 different occasions was 146/70. He also has an abdominal aortic aneurysm measuring 3.8 cm that is being followed. He'll be seeing Dr. Temple tomorrow.\par October 28, 2014. Dr. Jacobs called yesterday that the patient's abdominal aorta had increased in size by 0.9 cm and the patient is to be scheduled for an endovascular repair of his abdominal aortic aneurysm. He came here for his echocardiogram and discussion. The echo for the most part is unchanged from May of 2013 with left ventricular ejection fraction around 40%, mild to moderate MR, mild to moderate AI, segmental LV dysfunction with akinetic inferoposterior wall, and hypokinetic to akinetic inferolateral and mid lateral walls, normal anterior wall. LV size upper limit of normal. Normal RV size and function with an RVSP estimated at 41 mm of mercury.\par April 29, 2015. The patient did well with his endovascular repair of his abdominal aortic aneurysm. He went down to [place] and at one point was found to be short of breath with that was felt to be a pleural effusion. He was set up for a thoracentesis but after taking a diuretic for a few days they found there was no fluid there. His cough did not go away until he took a course of prednisone.The diuretic was stopped and he did well returning home. He took a tour of [place] and noted that any kind of incline made him very short of breath but no chest pressure. He saw Dr. Morris of pulmonary who found his proBNP to be 3400 and in the past it was only 1100. However his chest x-ray was totally clear. He has no PND or orthopnea and no real edema. She did give him another inhaler (Symbicort) and referred him here. His weight has not been up and if anything has gone down a little. His last echo was in October as above and was unchanged for the most part.  There is no chest pain with exertion. \par April 30, 2015. The patient returned for a stress echocardiogram. His baseline LV function looked worse than previously with left ventricular ejection fraction around 27%. With exercise he dropped his blood pressure and there were new wall motion abnormalities. He was scheduled for cardiac catheterization and probable defibrillator with possible biventricular pacemaker.\par May 6, 2015. Cardiac catheterization was done and revealed for the most part unchanged anatomy.  LVEF by V-gram was 35% . Given these findings this time Dr. Reyes did angioplasty and stent the right coronary artery. Electrophysiology felt he should wait at least 2 weeks and come back for another ejection fraction and clinical evaluation to see if things had improved before deciding on a  possible  AICD/biventricular pacemaker.\par May 21, 2015. The patient returns for followup and echocardiogram. He has felt much better over the 2 weeks but he has not been doing his usual activities because he was told not to do it. His echocardiogram to my eye looks unchanged. His exam is unchanged but there is no signs of congestive heart failure. I discussed the results with the patient and his wife. I told him that his improved symptoms could be because he is not that active, it could be a placebo effect of the procedure, it could be that things have improved and we just are unable to measure it on the echocardiogram. A BNP was sent and will be compared to his previous level which had gone up from the 400 range into the 800s. In addition over the weekend the patient will increase his activity and see what his level of symptoms are. If his symptoms are significant and worse than they had been a few months ago, we will schedule the ICD and biventricular pacemaker. The patient will contact me after the weekend.\par Nehal 15, 2015. The patient saw Dr. Russell of [facility] and unfortunately he has to wait 3 months (August 6, 2015) from the angioplasty unless he becomes unstable, has any arrhythmias, or needs a pacemaker for bradycardic reasons.  He did increase the carvedilol to 12.5 mg in the morning and 25 mg in the evening Currently he feels well and is stable. He does get out of breath and some weakness in his legs if he is walking uphill. When he initially lies down he feels some pressure he then says is shortness of breath but it resolves. No PND or orthopnea per se.\par July 14, 2015. The patient called a few weeks ago thinking he was more short of breath and wanted to move up the AICD/biventricular pacemaker. However after speaking with Dr. Russell of [facility] he explained he still was not qualified and must wait unless his symptoms are so severe that he requires hospitalization. I explained this to the patient and he returns today for routine followup.  He is still symptomatic with dyspnea on exertion but no PND or orthopnea. 3 months from his angioplasty will be August 6. He has a trip to [facility] planned with his daughter August 12.\par August 25, 2015. The patient is here for followup after having his AICD/biventricular pacemaker placed on August 7. No complications with the procedure. The patient does feel better in terms of the shortness of breath he would get when he was lying down but with exertion he feels maybe even worse right now in terms of shortness of breath. Of note he was away with a different diet and gained 4 pounds. (By scale here he has gained 9 pounds.) No palpitations, no syncope or near syncope. He is on carvedilol 25 mg the morning and 12.5 in the evening. He is still complaining of left arm pain ever since the procedure.\par September 8, 2015. The patient is finally starting to feel a little bit better. He cut his diuretic back to every other day and has continued to lose weight. His left arm so bothersome just as much since the procedure. We had a discussion about changing his Avapro to Entresto.\par October 8, 2015. Patient is here for followup. In the interim he had an episode of bronchitis treated with antibiotics and steroids which did cause some fluid retention and he was briefly on diuretics with improvement. He is now off the steroids but still on the diuretic. He does feel better on the Entresto. He thinks he is walking better with less shortness of breath and somewhat more stamina. He otherwise has no complaints. He remains in sinus rhythm and has had no AICD shocks. His blood pressure was borderline and his lungs were clear. I elected to stop the diuretic and increase his Entresto. \par October 23, 2015. On October 20 the patient saw Dr. Morris because of shortness of breath and edema. He had gained 9 pounds and he had significant swelling and she put him back on Lasix 40 mg as we discussed together. Patient had much relief and is here now. He lost 8 of the 9 pounds he again and feels much better. He still gets short of breath going up the stairs and often has to stop. He has trace edema still. His blood pressure is in the 90s systolic. He still eats out but not as much as he used to and they do ask for the low salt.\par November 9, 2015. The patient returns for followup. He has been on Lasix 40 mg since his last visit. He looks great, back to himself, with no complaints or symptoms of CHF. Her systolic pressure is only 90 but he has no symptoms of lightheadedness. He is having a lot of gas to the point of it being embarrassing and wondered if it is related to the Entresto. He also seems to have a dry cough. He will be seeing Dr. Russell in followup next week. Based on his complaints we cut back his Lasix to 20 mg daily but then he called back a few days later with more shortness of breath and went back to 40 mg q.d.\par December 7, 2015. Followup for the patient. As noted he had a go back to 40 mg Lasix because he became short of breath. He was still having GI complaints from the Entresto but he can manage it. He is complaining again of a urinating too much so perhaps we could try 30 mg of Lasix daily. He is seeing Dr. Russell next week. Is turning 89 soon and is planning a cruise on January 24.\par January 12, 2016. Patient is here for followup. Still has good spirits and is looking forward to going to [place] in 10 days. He is still having significant cough from the Entresto and we will have to change back to his prior regimen. Had echocardiogram at [facility] which was supposed to be  a dyssynchrony study and will be seeing Dr. Russell of [facility] tomorrow morning to discuss whether or not to try to improve his biventricular pacing. Currently on 80 mg of Lasix daily. Dr. Morris tried a new inhaler but he does not think it helped.\par March 2, 2016. The patient is here in followup. He went on his cruise and did well. His cough was stopped when he stopped the entresto. On the cruise he was able to cut back to 40 mg of Lasix  and even occasionally he didn't take it if he had a busy day. However after a while he did notice more shortness of breath and edema and the last few days he went back to 80 mg of Lasix with improvement. He is here now today, his weight has gone down 8 pounds, blood pressure was 90/60.\par May 3, 2016. Patient looks well and feels well, but continues to lose weight despite a good appetite. Dr. Jones is concerned and placed him on Ensure and if he does not put on some weight, he will be seeing GI. Abdominal CAT scan was unremarkable, except for some gastric distention and some thickening of the apex of the duodenum, which could just be underfilling. Abdominal aortic aneurysm repair was stable with sac being less than 3 cm. 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 He will follow up with vascular again in one year. He remains on 40 mg of Lasix and irbesartan. Not complaining of shortness of breath. He has done much better since Dr. Russell adjusted. His biventricular pacer by adjusting LV and RV timing. Lab work revealed a very suppressed TSH, so his Synthroid was held and he was to return in 2 weeks to reassess weight loss and thyroid status.\par May 18, 2016. Yesterday the patient was dizzy and lightheaded and fell and hit his head. He was evaluated in the emergency room, where everything was negative. His EKG was unchanged and his defibrillator was interrogated and there were no arrhythmias. His TSH was now 29! He is here today. Patient did fall again last night. Unclear if it is from being lightheaded or losing balance. No syncope. Other than when he had the fall, he has not been complaining of feeling lightheaded. His weight did go back up  off  the thyroid medication. I elected to hold his Lasix and have him reassessed in one week.\par May 24, 2016. Patient called yesterday and is short of breath, edema, and had put his weight back on. I told him to resume the Lasix and he is seen today for his followup. Mostly feels a little better and was able to lie flat last night. Still has some swelling. Blood pressure is still borderline low. Weight is up 6 pounds from last visit. We elected to continue Lasix 40 a day and changed his Synthroid to 0.1 mg daily.\par July 12, 2016. Patient is here for followup, as well as AICD interrogation. He seems to have normal biventricular pacing. Absolutely no arrhythmias for the past 4 months. Does get short of breath with exertion still, but not severe. Rarely will skip a day of Lasix if he has too many things to do. Started physical therapy, and was not out of breath at physical therapy today. His weight at home has been stable.\par September 13, 2016. Patient is getting around okay with a walker. His right knee seemed to be limiting him more than his shortness of breath and he has some trouble with his balance. No palpitation, syncope, or near syncope, or shocks. No change in any medication. He was still on Synthroid 0.1 mg. He is still doing physical therapy.\par November 16, 2016. The patient seems to be doing well, although he did have an episode of just suddenly falling backwards and hitting his head against the refrigerator. He denies lightheadedness or near syncope at the time. He denies vertigo, although he had a different episode. He reached up and looked up and had a vertigo-like episode just for a few seconds. There was no event on his defibrillator interrogation, except a 6 beat run of NSVT on September 24, which was asymptomatic. He is orthostatic here. He is also concerned about his weight loss, although his weight seems to be the same as it was last visit, but he claims he was almost 7 pounds less at home. His wife thinks he does not drink enough water. He is on the Lasix every other day. His EKG showed sinus rhythm with atrial tracking and biventricular pacing. He also has a compression fracture in his lumbar vertebra that is giving him pain\par He is also concerned about his thyroid hormone level, given his weight loss. We continued the Lasix on an every other day basis and lowered the Avapro to 150 mg. BNP was up to 4600 from 2600.\par December 21, 2016. The patient has here in followup. No more falls or complaints of orthostasis, although he is still orthostatic here from 124-106-98 standing with no symptoms. Complaining more of dyspnea on exertion and has some edema. His weight is about the same. After discussion, decided to add digoxin 0.125 q.d.\par February 8, 2017. The patient is here in followup. He has not fallen in a while now and he claims his problem is balance not dizziness. He thinks his dyspnea on exertion is unchanged on the digoxin. However his wife thinks he is better because he is not short of breath coming up the stairs and the  does agree. Just recently he started to develop some pedal edema, but he has had more salt because he loves soups. In addition, he does not sit with his legs elevated. (He also saw Dr. Plascencia for URI and was briefly on prednisone until a couple of weeks ago. He did have an SPEP with normal. Electrophoresis pattern.)\par April 4, 2017. Patient here in followup. Had pacemaker, defibrillator interrogation on March 6, with 2 short episodes of NSVT only. Seems to be doing well on the whole. Dyspnea on exertion is there, but unchanged. Weight is down 3 pounds. He occasionally gets numbness and tingling in his fingers, but not in his toes. Occasionally feels some discomfort over the AICD site. He will be going to his daughter and his niece for the [place] nights.\par Nehal 15, 2017. The patient is here in followup. Has been feeling a little more short of breath and does have a little bit of edema. He saw Dr. Valdez on Friday the ninth, who felt he had bronchitis and gave him Augmentin. Otherwise, patient has no complaints and has been doing pretty well. He still complains occasionally of some soreness over his AICD site. No other interim medical issues, and no change in medication.\par September 12, 2017. Patient is here for followup and defibrillator interrogation. As noted on August 25 had a syncopal episode after he had a hot shower and walked out to the other bathroom. Found himself on the floor and was fine. No recurrence since and in fact defibrillator interrogation shows that he had an episode of VT followed by VF after the device tried to pace terminate, which led to a defibrillator shock, and the patient has been fine since. He is still with sinus rhythm and a first-degree AV block underneath his ventricular pacing. No change in any of the symptoms and in fact, while he does get short of breath sometimes getting into bed, and some walking, was able to go to the gym and work out a little bit without shortness of breath. No chest pain, and no change in medications.\par December 26, 2017. Patient here in followup. Has been having some more shortness of breath, although seems to have frequent cough and URI. Now on a nebulizer. Does have worsening shortness of breath with lying down, but no PND. Had a near syncopal event when he got up right after sitting for a long time watching a movie, but otherwise no episodes of dizziness. AICD interrogation shows no episodes since the event in August. Patient thinks his overall stamina and fatigue are worse. Patient willing to retry Entresto.\par January 11, 2018. So far patient tolerating the Entresto, no cough. Maybe a little less short of breath, but not definite. Does have more edema, but weight is the same. No other complaints. Reluctant to increase the diuretic. We'll keep dose of the same for now and followup in one month if labs are okay. Then consider increasing Entresto.\par Comment:  \NING Genao - 21 Mar 2018 3:12 PM \par   TASK CREATED\par Hi-\par Saw Adam.\par He complained of weight gain, edema, SOB.\par Sent BNP and up to over 5300.\par Do you think that the Entresto is an issue for him?\par Asked him to call you.\par Thanks-\par Ning \par Addendum\par I reviewed the labs and spoke with the patient. Actually, weight is down, and labs are all okay except the BNP. If this is natural history of LV dysfunction, he should be on a higher dose of Entresto. I elected to increase the Entresto between now and his visit next week with me and see if things get worse, improve, or stay the same. Patient has a big cruise coming up on April 18 \par March 27, 2018. Patient returns in followup now on the increased dose of Entresto for one week. On his pacemaker interrogation he has normal function with biventricular pacing, no more VT. His "optivol" has been elevated since mid-January and just for the past week it seems to be coming down, which would correlate with increasing his Enteresto. He does admit to not being a strict on his diet, having Chinese food the other night, and has only been staying with the furosemide every other day despite the increased edema and shortness of breath. Depending on labs, I will probably continue the high-dose Entresto, continue furosemide daily through the beginning of [place] this week and then go back to every other day depending on symptoms, weight and edema.\par May 22, 2018. The patient went on his vacation and did extremely well. By the very end before getting on the plane to come back he was somewhat short of breath and his wife considered going to the emergency room. Instead, he took extra Lasix and was doing well on the plane. At one point got up to the bathroom, came back sat in his seat and his wife leaned over to try to wake him up and they could not arouse him for up to 5 minutes. A doctor on the plane checked him, they took him out of his seat and laid him down on the floor at which point he seemed to come to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

## 2021-03-23 NOTE — REVIEW OF SYSTEMS
[Fever] : no fever [Recent Weight Gain (___ Lbs)] : no recent weight gain [Chills] : no chills [Feeling Fatigued] : not feeling fatigued [Recent Weight Loss (___ Lbs)] : no recent weight loss [Shortness Of Breath] : no shortness of breath [Dyspnea on exertion] : dyspnea during exertion [Chest  Pressure] : no chest pressure [Chest Pain] : no chest pain [Lower Ext Edema] : lower extremity edema [Palpitations] : no palpitations [Cough] : no cough [Abdominal Pain] : no abdominal pain [Change in Appetite] : no change in appetite [Change In The Stool] : no change in stool [Joint Pain] : joint pain [see HPI] : see HPI [Dizziness] : no dizziness [Confusion] : no confusion was observed [Anxiety] : no anxiety [Excessive Thirst] : no polydipsia [Negative] : Heme/Lymph

## 2021-03-23 NOTE — PHYSICAL EXAM
[General Appearance - Well Developed] : well developed [Normal Appearance] : normal appearance [Well Groomed] : well groomed [No Deformities] : no deformities [General Appearance - In No Acute Distress] : no acute distress [Normal Conjunctiva] : the conjunctiva exhibited no abnormalities [Eyelids - No Xanthelasma] : the eyelids demonstrated no xanthelasmas [Normal Oral Mucosa] : normal oral mucosa [No Oral Pallor] : no oral pallor [No Oral Cyanosis] : no oral cyanosis [Normal Jugular Venous A Waves Present] : normal jugular venous A waves present [Normal Jugular Venous V Waves Present] : normal jugular venous V waves present [No Jugular Venous Coley A Waves] : no jugular venous coley A waves [Respiration, Rhythm And Depth] : normal respiratory rhythm and effort [Exaggerated Use Of Accessory Muscles For Inspiration] : no accessory muscle use [Heart Rate And Rhythm] : heart rate and rhythm were normal [Heart Sounds] : normal S1 and S2 [Murmurs] : no murmurs present [Abdomen Soft] : soft [Abdomen Tenderness] : non-tender [Abdomen Mass (___ Cm)] : no abdominal mass palpated [Abnormal Walk] : normal gait [Gait - Sufficient For Exercise Testing] : the gait was sufficient for exercise testing [Nail Clubbing] : no clubbing of the fingernails [Cyanosis, Localized] : no localized cyanosis [Petechial Hemorrhages (___cm)] : no petechial hemorrhages [Skin Color & Pigmentation] : normal skin color and pigmentation [Skin Turgor] : normal skin turgor [] : no rash [No Venous Stasis] : no venous stasis [Skin Lesions] : no skin lesions [No Skin Ulcers] : no skin ulcer [No Xanthoma] : no  xanthoma was observed [Oriented To Time, Place, And Person] : oriented to person, place, and time [Affect] : the affect was normal [Mood] : the mood was normal [FreeTextEntry1] : Pretty good spirits

## 2021-04-05 PROBLEM — M25.522 LEFT ELBOW PAIN: Status: ACTIVE | Noted: 2021-01-01

## 2021-04-12 NOTE — ADDENDUM
[FreeTextEntry1] : I, Suhail Nicolas, acted solely as a scribe for Dr. Enid Neri on 04/12/2021 . All medical record entries made by the Scribe were at my, Dr. Enid Neri, direction and personally dictated by me on 04/12/2021. I have personally reviewed the chart and agree that the record accurately reflects my personal performance of the history, physical exam, assessment and plan.\par

## 2021-04-12 NOTE — ASSESSMENT
[FreeTextEntry1] : Patient is a 89 year old male with left elbow olecranon bursitis from a direct trauma. We discussed the uncertain prognosis and the nature of the condition and treatment options. We attempted aspiration in office, but the blood in the area was too clotted. His elbow was wrapped in a compressive dressing. I recommend that he use a Heelbo brace for his elbow.  We discussed that he should keep his left hand up and moving to prevent stiffness. He should call back in 2-3 days to discuss his progress.

## 2021-04-12 NOTE — PHYSICAL EXAM
[de-identified] : Patient is alert, oriented and in no acute distress. Affect and general appearance are normal and patient is able to answer questions appropriately. Exam of the left elbow reveals inflamed olecranon bursitis, measuring 4 cm in diameter. Skin is intact and is not warm.  Flexion beyond 90 degrees is painful.\par \par Neurologic: Median, ulnar, and radial motor and sensory are intact. \par Skin: No cyanosis, clubbing, edema or rashes. \par Vascular: Radial pulses intact. \par Lymphatic: No streaking or epitrochlear adenopathy.

## 2021-04-12 NOTE — HISTORY OF PRESENT ILLNESS
[FreeTextEntry1] : Patient is a 89 year-old, -hand-dominant male who presents to the office today with complaints of left elbow swelling and discomfort. He notes that he hit his elbow on his dresser about 10 days ago. Since then he has gotten swelling of the olecranon. He now has swelling that goes down to the hand. He can extend his elbow all the way, but cannot flex past 90 degrees. Went to the [facility] ER and xrays were negative for fractures. He also had an infection workup at the hospital that was negative. He presents with swelling of the entire arm. Pain and stiffness are what cause the lack of flexion. He takes Tylenol with no relief. He has also been icing 3-4 times daily. No paresthesias in the hand.

## 2021-04-19 NOTE — HISTORY OF PRESENT ILLNESS
[FreeTextEntry1] : (MED HX) The patient is now 88 years old. I first saw him in 2006. At that time he had a history of a myocardial infarction and angioplasty back in 1994. He had done well for years although his activity level had diminished with age and he began complaining of fatigue. In December of 2011 he had a stress echocardiogram that was abnormal possibly with very abnormal blood pressure response and possibly with new wall motion abnormalities. He underwent a CTA and this was followed up by cardiac catheterization at [facility] in December of 2011. 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 The patient did well after that which is a minor adjustment of his medications. He has a history of hyperlipidemia and had a past history of hypertension. He stopped smoking 43 years ago, no diabetes, no family history of coronary artery disease.\par April 30, 2013 he was admitted to [facility] with left lower extremity cellulitis and altered mental status. He had positive blood cultures for strep pyogenes group A. An echocardiogram showed a possible small echodensity on the ventricular side of the noncoronary cusp. A joint aspiration of his left ankle was negative. He was treated with vancomycin and Zosyn, switched to cefepime. Followed by plastic surgery for wound care. No evidence of endocarditis clinically. A CAT scan of his abdomen and pelvis showed a 3.7 cm infrarenal abdominal aortic aneurysm. There was also bilateral inguinal hernias. 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 He was followed by Dr. Damián Lovelace of infectious disease. There is still a possibility he may need a skin graft for his left ankle. He was in the hospital for a little over 2 weeks and then in rehabilitation for 5 weeks. \par July 3, 2013. Here for followup visit. He had been home now for about 10 days and had already seen Dr. Lovelace and Dr. Jones his internist.  He denies chest pain or shortness of breath. There has been no syncope or near syncope or palpitations. Upon review of his medications the only change is that his Avapro is at a half of a 150 mg pill daily due to low blood pressure in the hospital and his simvastatin has been cut down from 20 mg to 10 mg. He did lose weight during this hospitalization as well. There have been no episodes of fevers, chills, sweats, etc.\par September 3, 2013. Here for followup. He is preparing to go on a trip to [place] in October. He does note shortness of breath with exertion but only with a lot of exertion, no problem going up stairs, and he thinks no different than even before his cellulitis episode. He denies exertional chest pain. We elected to see how he did with increased exercise and if his shortness of breath was not severe he would be okay to go on the trip to [place], obviously using commonsense as we discussed.\par January 6, 2014. The patient is here for followup. He did very well on the trip to [place] with some limitation from his knees but no significant shortness of breath or chest pain. He gets swelling on his left leg where he had the cellulitis but not on the right side. He only had one episode of slight lightheadedness when he got up too fast but otherwise has been tolerating the current medications. He is planning on going to [place] at the end of January. He has a mild cough, nonproductive, no fever chills. He claims he was unable to get an appointment with his internist. Her workup was unrevealing.\par May 5, 2014. The patient is here for followup. He saw Dr. Jones in the interim and had lab work with an excellent lipid profile but a high BNP. He saw vascular surgery because of the abdominal aortic aneurysm which measured 3.8 cm and that will be followed. He had an episode where his balance was off for about one hour. It only happened when he tried to walk around. He denies feeling lightheaded like he might pass out. If he was sitting and not moving he felt perfectly fine. There was no vertigo. He claims it happened him once before and it resolved as well. I advised him to discuss with Dr. Jones and perhaps a neurologist. In addition he brought up again with his wife his symptoms he's had for 40 years where he suddenly for no reason will get chest pain and pressure that radiates up to his jaw. It will actually go away if he does nothing but if he takes Maalox it will go away faster. I explained to him that this is probably esophageal reflux with esophageal spasm but it so infrequent that it is not worth for him to take a PPI on a daily basis. He denies having exertional chest pain or shortness of breath. He is considering possible knee replacement in the near future. He returned in July for preop clearance for knee replacement which he had in the middle of July with no complications Other than needing to be transfused 3 units and having been sent in to [facility] in the [facility] to have that done on 2 occasions.\par October 7, 2014. The patient is here for followup. He is feeling better and is more active but is also noting more shortness of breath with exertion or at least others with him have noticed it. He thinks he needs the other knee replacement done but his wife said she will divorce him if he does it. His TSH was elevated on labs with Dr. Jones last month so his Synthroid was increased from 6 days a week to 7 days a week. His BNP was elevated and he asked "should I worry about heart failure?". In addition Dr. Jones cut back his Avapro 300 to 150 because blood pressure was 110. The patient denies lightheadedness dizziness etc. His blood pressure today on 2 different occasions was 146/70. He also has an abdominal aortic aneurysm measuring 3.8 cm that is being followed. He'll be seeing Dr. Temple tomorrow.\par October 28, 2014. Dr. Jacobs called yesterday that the patient's abdominal aorta had increased in size by 0.9 cm and the patient is to be scheduled for an endovascular repair of his abdominal aortic aneurysm. He came here for his echocardiogram and discussion. The echo for the most part is unchanged from May of 2013 with left ventricular ejection fraction around 40%, mild to moderate MR, mild to moderate AI, segmental LV dysfunction with akinetic inferoposterior wall, and hypokinetic to akinetic inferolateral and mid lateral walls, normal anterior wall. LV size upper limit of normal. Normal RV size and function with an RVSP estimated at 41 mm of mercury.\par April 29, 2015. The patient did well with his endovascular repair of his abdominal aortic aneurysm. He went down to [place] and at one point was found to be short of breath with that was felt to be a pleural effusion. He was set up for a thoracentesis but after taking a diuretic for a few days they found there was no fluid there. His cough did not go away until he took a course of prednisone.The diuretic was stopped and he did well returning home. He took a tour of [place] and noted that any kind of incline made him very short of breath but no chest pressure. He saw Dr. Morris of pulmonary who found his proBNP to be 3400 and in the past it was only 1100. However his chest x-ray was totally clear. He has no PND or orthopnea and no real edema. She did give him another inhaler (Symbicort) and referred him here. His weight has not been up and if anything has gone down a little. His last echo was in October as above and was unchanged for the most part.  There is no chest pain with exertion. \par April 30, 2015. The patient returned for a stress echocardiogram. His baseline LV function looked worse than previously with left ventricular ejection fraction around 27%. With exercise he dropped his blood pressure and there were new wall motion abnormalities. He was scheduled for cardiac catheterization and probable defibrillator with possible biventricular pacemaker.\par May 6, 2015. Cardiac catheterization was done and revealed for the most part unchanged anatomy.  LVEF by V-gram was 35% . Given these findings this time Dr. Reyes did angioplasty and stent the right coronary artery. Electrophysiology felt he should wait at least 2 weeks and come back for another ejection fraction and clinical evaluation to see if things had improved before deciding on a  possible  AICD/biventricular pacemaker.\par May 21, 2015. The patient returns for followup and echocardiogram. He has felt much better over the 2 weeks but he has not been doing his usual activities because he was told not to do it. His echocardiogram to my eye looks unchanged. His exam is unchanged but there is no signs of congestive heart failure. I discussed the results with the patient and his wife. I told him that his improved symptoms could be because he is not that active, it could be a placebo effect of the procedure, it could be that things have improved and we just are unable to measure it on the echocardiogram. A BNP was sent and will be compared to his previous level which had gone up from the 400 range into the 800s. In addition over the weekend the patient will increase his activity and see what his level of symptoms are. If his symptoms are significant and worse than they had been a few months ago, we will schedule the ICD and biventricular pacemaker. The patient will contact me after the weekend.\par Nehal 15, 2015. The patient saw Dr. Russell of EPS and unfortunately he has to wait 3 months (August 6, 2015) from the angioplasty unless he becomes unstable, has any arrhythmias, or needs a pacemaker for bradycardic reasons.  He did increase the carvedilol to 12.5 mg in the morning and 25 mg in the evening Currently he feels well and is stable. He does get out of breath and some weakness in his legs if he is walking uphill. When he initially lies down he feels some pressure he then says is shortness of breath but it resolves. No PND or orthopnea per se.\par July 14, 2015. The patient called a few weeks ago thinking he was more short of breath and wanted to move up the AICD/biventricular pacemaker. However after speaking with Dr. Russell of [facility] he explained he still was not qualified and must wait unless his symptoms are so severe that he requires hospitalization. I explained this to the patient and he returns today for routine followup.  He is still symptomatic with dyspnea on exertion but no PND or orthopnea. 3 months from his angioplasty will be August 6. He has a trip to [facility] planned with his daughter August 12.\par August 25, 2015. The patient is here for followup after having his AICD/biventricular pacemaker placed on August 7. No complications with the procedure. The patient does feel better in terms of the shortness of breath he would get when he was lying down but with exertion he feels maybe even worse right now in terms of shortness of breath. Of note he was away with a different diet and gained 4 pounds. (By scale here he has gained 9 pounds.) No palpitations, no syncope or near syncope. He is on carvedilol 25 mg the morning and 12.5 in the evening. He is still complaining of left arm pain ever since the procedure.\par September 8, 2015. The patient is finally starting to feel a little bit better. He cut his diuretic back to every other day and has continued to lose weight. His left arm so bothersome just as much since the procedure. We had a discussion about changing his Avapro to Entresto.\par October 8, 2015. Patient is here for followup. In the interim he had an episode of bronchitis treated with antibiotics and steroids which did cause some fluid retention and he was briefly on diuretics with improvement. He is now off the steroids but still on the diuretic. He does feel better on the Entresto. He thinks he is walking better with less shortness of breath and somewhat more stamina. He otherwise has no complaints. He remains in sinus rhythm and has had no AICD shocks. His blood pressure was borderline and his lungs were clear. I elected to stop the diuretic and increase his Entresto. \par October 23, 2015. On October 20 the patient saw Dr. Morris because of shortness of breath and edema. He had gained 9 pounds and he had significant swelling and she put him back on Lasix 40 mg as we discussed together. Patient had much relief and is here now. He lost 8 of the 9 pounds he again and feels much better. He still gets short of breath going up the stairs and often has to stop. He has trace edema still. His blood pressure is in the 90s systolic. He still eats out but not as much as he used to and they do ask for the low salt.\par November 9, 2015. The patient returns for followup. He has been on Lasix 40 mg since his last visit. He looks great, back to himself, with no complaints or symptoms of CHF. Her systolic pressure is only 90 but he has no symptoms of lightheadedness. He is having a lot of gas to the point of it being embarrassing and wondered if it is related to the Entresto. He also seems to have a dry cough. He will be seeing Dr. Russell in followup next week. Based on his complaints we cut back his Lasix to 20 mg daily but then he called back a few days later with more shortness of breath and went back to 40 mg q.d.\par December 7, 2015. Followup for the patient. As noted he had a go back to 40 mg Lasix because he became short of breath. He was still having GI complaints from the Entresto but he can manage it. He is complaining again of a urinating too much so perhaps we could try 30 mg of Lasix daily. He is seeing Dr. Russell next week. Is turning 89 soon and is planning a cruise on January 24.\par January 12, 2016. Patient is here for followup. Still has good spirits and is looking forward to going to [place] in 10 days. He is still having significant cough from the Entresto and we will have to change back to his prior regimen. Had echocardiogram at [facility] which was supposed to be  a dyssynchrony study and will be seeing Dr. Russell of [facility] tomorrow morning to discuss whether or not to try to improve his biventricular pacing. Currently on 80 mg of Lasix daily. Dr. Morris tried a new inhaler but he does not think it helped.\par March 2, 2016. The patient is here in followup. He went on his cruise and did well. His cough was stopped when he stopped the entresto. On the cruise he was able to cut back to 40 mg of Lasix  and even occasionally he didn't take it if he had a busy day. However after a while he did notice more shortness of breath and edema and the last few days he went back to 80 mg of Lasix with improvement. He is here now today, his weight has gone down 8 pounds, blood pressure was 90/60.\par May 3, 2016. Patient looks well and feels well, but continues to lose weight despite a good appetite. Dr. Jones is concerned and placed him on Ensure and if he does not put on some weight, he will be seeing GI. Abdominal CAT scan was unremarkable, except for some gastric distention and some thickening of the apex of the duodenum, which could just be underfilling. Abdominal aortic aneurysm repair was stable with sac being less than 3 cm. 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 He will follow up with vascular again in one year. He remains on 40 mg of Lasix and irbesartan. Not complaining of shortness of breath. He has done much better since Dr. Russell adjusted. His biventricular pacer by adjusting LV and RV timing. Lab work revealed a very suppressed TSH, so his Synthroid was held and he was to return in 2 weeks to reassess weight loss and thyroid status.\par May 18, 2016. Yesterday the patient was dizzy and lightheaded and fell and hit his head. He was evaluated in the emergency room, where everything was negative. His EKG was unchanged and his defibrillator was interrogated and there were no arrhythmias. His TSH was now 29! He is here today. Patient did fall again last night. Unclear if it is from being lightheaded or losing balance. No syncope. Other than when he had the fall, he has not been complaining of feeling lightheaded. His weight did go back up  off  the thyroid medication. I elected to hold his Lasix and have him reassessed in one week.\par May 24, 2016. Patient called yesterday and is short of breath, edema, and had put his weight back on. I told him to resume the Lasix and he is seen today for his followup. Mostly feels a little better and was able to lie flat last night. Still has some swelling. Blood pressure is still borderline low. Weight is up 6 pounds from last visit. We elected to continue Lasix 40 a day and changed his Synthroid to 0.1 mg daily.\par July 12, 2016. Patient is here for followup, as well as AICD interrogation. He seems to have normal biventricular pacing. Absolutely no arrhythmias for the past 4 months. Does get short of breath with exertion still, but not severe. Rarely will skip a day of Lasix if he has too many things to do. Started physical therapy, and was not out of breath at physical therapy today. His weight at home has been stable.\par September 13, 2016. Patient is getting around okay with a walker. His right knee seemed to be limiting him more than his shortness of breath and he has some trouble with his balance. No palpitation, syncope, or near syncope, or shocks. No change in any medication. He was still on Synthroid 0.1 mg. He is still doing physical therapy.\par November 16, 2016. The patient seems to be doing well, although he did have an episode of just suddenly falling backwards and hitting his head against the refrigerator. He denies lightheadedness or near syncope at the time. He denies vertigo, although he had a different episode. He reached up and looked up and had a vertigo-like episode just for a few seconds. There was no event on his defibrillator interrogation, except a 6 beat run of NSVT on September 24, which was asymptomatic. He is orthostatic here. He is also concerned about his weight loss, although his weight seems to be the same as it was last visit, but he claims he was almost 7 pounds less at home. His wife thinks he does not drink enough water. He is on the Lasix every other day. His EKG showed sinus rhythm with atrial tracking and biventricular pacing. He also has a compression fracture in his lumbar vertebra that is giving him pain\par He is also concerned about his thyroid hormone level, given his weight loss. We continued the Lasix on an every other day basis and lowered the Avapro to 150 mg. BNP was up to 4600 from 2600.\par December 21, 2016. The patient has here in followup. No more falls or complaints of orthostasis, although he is still orthostatic here from 124-106-98 standing with no symptoms. Complaining more of dyspnea on exertion and has some edema. His weight is about the same. After discussion, decided to add digoxin 0.125 q.d.\par February 8, 2017. The patient is here in followup. He has not fallen in a while now and he claims his problem is balance not dizziness. He thinks his dyspnea on exertion is unchanged on the digoxin. However his wife thinks he is better because he is not short of breath coming up the stairs and the  does agree. Just recently he started to develop some pedal edema, but he has had more salt because he loves soups. In addition, he does not sit with his legs elevated. (He also saw Dr. Plascencia for URI and was briefly on prednisone until a couple of weeks ago. He did have an SPEP with normal. Electrophoresis pattern.)\par April 4, 2017. Patient here in followup. Had pacemaker, defibrillator interrogation on March 6, with 2 short episodes of NSVT only. Seems to be doing well on the whole. Dyspnea on exertion is there, but unchanged. Weight is down 3 pounds. He occasionally gets numbness and tingling in his fingers, but not in his toes. Occasionally feels some discomfort over the AICD site. He will be going to his daughter and his niece for the [place] nights.\par Nehal 15, 2017. The patient is here in followup. Has been feeling a little more short of breath and does have a little bit of edema. He saw Dr. Valdez on Friday the ninth, who felt he had bronchitis and gave him Augmentin. Otherwise, patient has no complaints and has been doing pretty well. He still complains occasionally of some soreness over his AICD site. No other interim medical issues, and no change in medication.\par September 12, 2017. Patient is here for followup and defibrillator interrogation. As noted on August 25 had a syncopal episode after he had a hot shower and walked out to the other bathroom. Found himself on the floor and was fine. No recurrence since and in fact defibrillator interrogation shows that he had an episode of VT followed by VF after the device tried to pace terminate, which led to a defibrillator shock, and the patient has been fine since. He is still with sinus rhythm and a first-degree AV block underneath his ventricular pacing. No change in any of the symptoms and in fact, while he does get short of breath sometimes getting into bed, and some walking, was able to go to the gym and work out a little bit without shortness of breath. No chest pain, and no change in medications.\par December 26, 2017. Patient here in followup. Has been having some more shortness of breath, although seems to have frequent cough and URI. Now on a nebulizer. Does have worsening shortness of breath with lying down, but no PND. Had a near syncopal event when he got up right after sitting for a long time watching a movie, but otherwise no episodes of dizziness. AICD interrogation shows no episodes since the event in August. Patient thinks his overall stamina and fatigue are worse. Patient willing to retry Entresto.\par January 11, 2018. So far patient tolerating the Entresto, no cough. Maybe a little less short of breath, but not definite. Does have more edema, but weight is the same. No other complaints. Reluctant to increase the diuretic. We'll keep dose of the same for now and followup in one month if labs are okay. Then consider increasing Entresto.\par Comment:  \NING Genao - 21 Mar 2018 3:12 PM \par   TASK CREATED\par Hi-\par Saw Adam.\par He complained of weight gain, edema, SOB.\par Sent BNP and up to over 5300.\par Do you think that the Entresto is an issue for him?\par Asked him to call you.\par Thanks-\par Ning \par Addendum\par I reviewed the labs and spoke with the patient. Actually, weight is down, and labs are all okay except the BNP. If this is natural history of LV dysfunction, he should be on a higher dose of Entresto. I elected to increase the Entresto between now and his visit next week with me and see if things get worse, improve, or stay the same. Patient has a big cruise coming up on April 18 \par March 27, 2018. Patient returns in followup now on the increased dose of Entresto for one week. On his pacemaker interrogation he has normal function with biventricular pacing, no more VT. His "optivol" has been elevated since mid-January and just for the past week it seems to be coming down, which would correlate with increasing his Enteresto. He does admit to not being a strict on his diet, having Chinese food the other night, and has only been staying with the furosemide every other day despite the increased edema and shortness of breath. Depending on labs, I will probably continue the high-dose Entresto, continue furosemide daily through the beginning of Pass[place] this week and then go back to every other day depending on symptoms, weight and edema.\par May 22, 2018. The patient went on his vacation and did extremely well. By the very end before getting on the plane to come back he was somewhat short of breath and his wife considered going to the emergency room. Instead, he took extra Lasix and was doing well on the plane. At one point got up to the bathroom, came back sat in his seat and his wife leaned over to try to wake him up and they could not arouse him for up to 5 minutes. A doctor on the plane checked him, they took him out of his seat and laid him down on the floor at which point he seemed to come to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

## 2021-04-19 NOTE — REVIEW OF SYSTEMS
[Dyspnea on exertion] : dyspnea during exertion [Lower Ext Edema] : lower extremity edema [Joint Pain] : joint pain [see HPI] : see HPI [Negative] : Heme/Lymph [Fever] : no fever [Recent Weight Gain (___ Lbs)] : recent [unfilled] ~Ulb weight gain [Chills] : no chills [Feeling Fatigued] : not feeling fatigued [Recent Weight Loss (___ Lbs)] : no recent weight loss [Shortness Of Breath] : no shortness of breath [Chest  Pressure] : no chest pressure [Chest Pain] : no chest pain [Palpitations] : no palpitations [Cough] : no cough [Abdominal Pain] : no abdominal pain [Change In The Stool] : no change in stool [Change in Appetite] : no change in appetite [Dizziness] : no dizziness [Confusion] : no confusion was observed [Anxiety] : no anxiety [Excessive Thirst] : no polydipsia

## 2021-04-19 NOTE — REASON FOR VISIT
[FreeTextEntry1] : Follow up visit for patient with known LV dysfunction and CAD along with COPD after angioplasty and after AICD and biventricular pacemaker on August 7, 2017 was on Entresto but not tolerating it because of persisting cough; it was stopped and cough resolved. Issue of weight loss but good appetite found to be hyperthyroid on Synthroid therapy. On digoxin. Had syncopal episode at home with VT- VF and defibrillator shock on August 25, 2017. No recurrence of syncope, with only one near syncopal episode that was clearly orthostatic after a movie. AICD interrogation shows no further episodes since August. Then brief hospitalization upon return from his vacation in August, then  structural heart evaluation including dobutamine echo stress October 2018.\par \par \par April 19, 2021.  Patient here in follow-up.  In the interim he had banged his elbow and ended up with olecranon bursitis with I believe a bloody effusion.  Has done well with conservative management.  He had been at the [facility] emergency room and labs there had a BUN of 58 with a creatinine of 2.1 and a potassium of 4.7.  Hemoglobin 9.7, hematocrit 30.2, platelets 99,000.  Orthopedics spoke with him on April 16 and seemed satisfied with his progress but did put him on ibuprofen 403 times a day.  Is now on Jardiance 10 mg and seems to be doing a little better.  Labs were sent.  Advised to change to Tylenol if the pain is bearable\par

## 2021-04-24 PROBLEM — M70.22 OLECRANON BURSITIS OF LEFT ELBOW: Status: ACTIVE | Noted: 2021-01-01

## 2021-04-27 PROBLEM — M25.622 STIFFNESS OF LEFT ELBOW JOINT: Status: ACTIVE | Noted: 2021-01-01

## 2021-04-27 PROBLEM — M25.612 STIFFNESS OF LEFT SHOULDER JOINT: Status: ACTIVE | Noted: 2021-01-01

## 2021-04-28 NOTE — ASSESSMENT
[FreeTextEntry1] : Patient is a 89 year old male who has done well after aspiration for left elbow olecranon bursitis, that has nearly completely resolved, but with persistent arm swelling/venous stasis changes without cellulitis. We discussed the uncertain prognosis and the nature of the condition and treatment options. The patient was encouraged to massage the forearm and aspiration site 2-3 times per day to encourage blood flow to the area. A script was given for physical therapy to work on range of motion, edema control, and strengthening. Follow up as needed.

## 2021-04-28 NOTE — PHYSICAL EXAM
[de-identified] : Patient is alert, oriented and in no acute distress. Affect and general appearance are normal and patient is able to answer questions appropriately. On the left, chronic edema throughout the forearm. Resolving ecchymosis along the ulnar border of the forearm. Point tenderness at the midshaft of the ulna. Able to flex the elbow to 130 degrees. Shoulder forward elevation to 120 degrees. The elbow skin is still thickened but not nearly as warm or inflamed. \par \par Neurologic: Median, ulnar, and radial motor and sensory are intact. \par Skin: No cyanosis, clubbing, edema or rashes. \par Vascular: Radial pulses intact. \par Lymphatic: No streaking or epitrochlear adenopathy.  [de-identified] : Xray with 3 views of the _ done in office today, 04/6/2021, and reviewed by Dr. Enid Neri demonstrated\par

## 2021-04-28 NOTE — ADDENDUM
[FreeTextEntry1] : I, Yulia Rodriguez acted solely as a scribe for Dr. Enid Neri on 04/27/2021. \par \par All medical record entries made by the Scribe were at my, Dr. Enid Neri, direction and personally dictated by me on 04/27/2021. I have personally reviewed the chart and agree that the record accurately reflects my personal performance of the history, physical exam, assessment and plan.

## 2021-04-28 NOTE — HISTORY OF PRESENT ILLNESS
[FreeTextEntry1] : Patient is a 89 year-old, right-hand-dominant male who presents to the office today with complaints of left elbow swelling and discomfort. He notes that he hit his elbow on his dresser about 5 weeks ago. Went to the [facility] ER and xrays were negative for fractures. He also had an infection workup at the hospital that was negative. He presents with mild swelling of the entire arm. Pain and stiffness are what cause the lack of flexion. He takes Tylenol with no relief. He has also been icing 3-4 times daily. No paresthesias in the hand. We attempted to drain the bursitis 2 weeks ago with no improvement in swelling. He presents today for re-evaluation of a continued swollen and uncomfortable left elbow, but improved ROM

## 2021-05-19 PROBLEM — I48.0 PAROXYSMAL A-FIB: Status: ACTIVE | Noted: 2021-01-01

## 2021-05-19 PROBLEM — N28.9 ACUTE RENAL INSUFFICIENCY: Status: ACTIVE | Noted: 2018-10-27

## 2021-05-19 PROBLEM — I35.0 AORTIC STENOSIS, SEVERE: Status: ACTIVE | Noted: 2018-05-22

## 2021-06-06 NOTE — PROGRESS NOTE ADULT - SUBJECTIVE AND OBJECTIVE BOX
LUBNA VALLE  MRN-[id number]  Patient is a 89y old  Male who presents with a chief complaint of VT (2021 14:45)    HPI:  89 yo M PMHx COPD (not home O2 dependent), CAD s/p stent  on ASA, AAA repair with stent, HTN, HLD, chronic systolic HF (EF 20%), GERD, hypothyroidism, Bi-V ICD ([employer]tr[employer]), who presented to [facility] ED on 21 for an episode of syncope yesterday night. His wife saw him in bed but then he started sliding off the bed onto the floor. He had denied any prodrome, feeling CP, sob, palpitations, or shock from his ICD. THis morning, a similar episode happened again, which prompted the wife to call EMS.     In the ED, VS: T 97.8, HR 90, /76, RR 18, 95% on RA.   Labs: WBC 6.7, H&H 9.6/30.0, Plt 82, Na 141, K 4.5, Cl 102, HCO3 26, BUN/Cr 50/2.19, Ca 8.7, TNI 95->90, BNP 02832, CK 59, Mg 2.6, Ph 3.1, Digoxin 1.1  CT head negative. Xrays of chest, pelvis, and R knee negative for acute pathology.    EP was consulted and his device was interrogated. It showed multiple episodes of VT since 21 requiring ATP and shocks (last was 21 at 6:17AM). Pt was initially to be admitted to telemetry after being given oral amiodarone 400 mg however, while in the ED, pt had an episode of VT w/ unresponsiveness and AICD terminated the rhythm. Pt was awake and responsive afterwards. Pt was given a lidocaine bolus and was started on a lidocaine gtt and was admitted to the CCU for further monitoring.     (2021 11:40)      Hospital Course:   Presented to the [facility] for an episode of syncope.     24 HOUR EVENTS:    REVIEW OF SYSTEMS:   Constitutional: No fevers, or chills  Eyes/ENT: No visual changes  Respiratory: No cough, wheezing, hemoptysis  Cardiovascular: No chest pain, no palpitations  Gastrointestinal: No abdominal pain.  Genitourinary: No dysuria  Neurological: No numbness, no weakness  Skin: No itching, rashes    ICU Vital Signs Last 24 Hrs  T(C): 36.6 (2021 15:00), Max: 36.6 (2021 07:10)  T(F): 97.9 (2021 15:00), Max: 97.9 (2021 15:00)  HR: 68 (2021 18:00) (68 - 89)  BP: 100/50 (2021 18:00) (98/74 - 135/82)  BP(mean): 68 (2021 18:00) (68 - 103)  ABP: --  ABP(mean): --  RR: 20 (2021 18:00) (16 - 24)  SpO2: 97% (2021 18:00) (73% - 100%)      CVP(mm Hg): --  CO: --  CI: --  PA: --  PA(mean): --  PA(direct): --  PCWP: --  LA: --  RA: --  SVR: --  SVRI: --  PVR: --  PVRI: --  I&O's Summary    2021 07:01  -  2021 20:00  --------------------------------------------------------  IN: 437.5 mL / OUT: 0 mL / NET: 437.5 mL        CAPILLARY BLOOD GLUCOSE    CAPILLARY BLOOD GLUCOSE      POCT Blood Glucose.: 111 mg/dL (2021 07:23)      PHYSICAL EXAM:   General: No acute distress  Eyes: EOMI, PERRLA, conjunctiva and sclera clear  Chest/Lung: CTAB, no wheezes, rales, or rhonchi  Heart: Regular rate, regular rhythm. Normal S1/S2. No murmurs, rubs, or gallops.  Abdomen: Soft, nontender, nondistended. Normal bowel sounds.  Extremites: 2+ peripheral pulses B/L. No clubbing, cyanosis, or edema.  Neurology: A&O x3, no focal deficits  Skin: No rashes or lesions  ============================I/O===========================   I&O's Detail    2021 07:01  -  2021 20:00  --------------------------------------------------------  IN:    Lidocaine: 37.5 mL    Oral Fluid: 400 mL  Total IN: 437.5 mL    OUT:  Total OUT: 0 mL    Total NET: 437.5 mL        ============================ LABS =========================                        9.1    6.87  )-----------( 72       ( 2021 16:21 )             28.9     -    136  |  102  |  47<H>  ----------------------------<  90  4.3   |  20<L>  |  1.96<H>    Ca    8.9      2021 16:22  Phos  3.1     -  Mg     2.5     -    TPro  6.4  /  Alb  3.4  /  TBili  0.6  /  DBili  x   /  AST  28  /  ALT  25  /  AlkPhos  78  -06    LIVER FUNCTIONS - ( 2021 16:22 )  Alb: 3.4 g/dL / Pro: 6.4 g/dL / ALK PHOS: 78 U/L / ALT: 25 U/L / AST: 28 U/L / GGT: x             Urinalysis Basic - ( 2021 07:58 )    Color: Light Yellow / Appearance: Clear / S.018 / pH: x  Gluc: x / Ketone: Negative  / Bili: Negative / Urobili: Negative   Blood: x / Protein: Trace / Nitrite: Negative   Leuk Esterase: Negative / RBC: 3 /hpf / WBC 2 /HPF   Sq Epi: x / Non Sq Epi: 0 /hpf / Bacteria: Negative      ======================Micro/Rad/Cardio=================  Telemetry: Reviewed   EKG: Reviewed  CXR: Reviewed  Culture: Reviewed   Echo: Reviewed  Cath: Reviewed  ======================================================  PAST MEDICAL & SURGICAL HISTORY:  HTN (hypertension)    HLD (hyperlipidemia)    CAD (coronary artery disease)  S/P angioplasty     BPH (Benign Prostatic Hyperplasia)    CHF (congestive heart failure)    Hypothyroid    GERD (gastroesophageal reflux disease)    Gout    COPD (chronic obstructive pulmonary disease)    MI (myocardial infarction)      Cataract    Hernia, inguinal, bilateral    Achilles rupture    S/P knee replacement, right    AAA (abdominal aortic aneurysm)    H/O repair of rotator cuff    S/P angioplasty      ====================ASSESSMENT ==============  COPD   CAD s/p stent  and AAA repair in    HTN  HLD   Chronic systolic HF (EF 20%)  GERD  Hypothyroidism   Syncope  Plan:  ====================== NEUROLOGY=====================  Nonfocal, continue to monitor neuro status per ICU protocol.     ==================== RESPIRATORY======================  Hx of COPD  - not home O2 dependent.    Comfortable on room air, SpO2 >95%  Continue to monitor SpO2 via pulse oximetry  Encourage bedside spirometry     ====================CARDIOVASCULAR==================  CAD s/p stent and AAA repair in   Hypertension  Rate control with Amio and Dig  Continue with ASA for tissue procedure    aMIOdarone    Tablet 400 milliGRAM(s) Oral three times a day  digoxin     Tablet 125 MICROGram(s) Oral daily  lidocaine   Infusion 1 mG/Min (7.5 mL/Hr) IV Continuous <Continuous>  aspirin  chewable 81 milliGRAM(s) Oral daily  ===================HEMATOLOGIC/ONC ===================  VTE prophylaxis  -Continue with Heparin    heparin   Injectable 5000 Unit(s) SubCutaneous every 12 hours    ===================== RENAL =========================  Diuresis  -Continue with IV Lasix 40mg  -Continue to monitor I/Os, BUN/Creatinine, and urine output.   -Goal net negative fluid balance.   -Replete lytes PRN. Keep K> 4 and Mg >2.     furosemide   Injectable 40 milliGRAM(s) IntraMuscular two times a day  tamsulosin 0.8 milliGRAM(s) Oral at bedtime  ==================== GASTROINTESTINAL===================  Diet  -Tolerating PO consistent carb diet.   -Continue Protonix for stress ulcer prophylaxis.     pantoprazole    Tablet 40 milliGRAM(s) Oral before breakfast    =======================    ENDOCRINE  =====================    Bgl controlled, monitor glucose for need to initiate sliding scale.     Hypothyroidism   - c/w Synthroid     allopurinol 300 milliGRAM(s) Oral daily  levothyroxine 125 MICROGram(s) Oral daily  simvastatin 10 milliGRAM(s) Oral at bedtime    ========================INFECTIOUS DISEASE================  -Afebrile, WBC within normal limits.   -Monitor temperature and trend WBC. Monitor off abx.     Patient requires continuous monitoring with bedside rhythm monitoring, pulse ox monitoring, and intermittent blood gas analysis. Care plan discussed with ICU care team. Patient remained critical and at risk for life threatening decompensation.  Patient seen, examined and plan discussed with CCU team during rounds.     I have personally provided 35 minutes of critical care time excluding time spent on separate procedures.    By signing my name below, I, Luz Maria Kelly, attest that this documentation has been prepared under the direction and in the presence of LANA Tyler  Electronically signed: Svetlana Medeiros, 21 @ 20:00    I, LANA Tyler, personally performed the services described in this documentation. all medical record entries made by the apibe were at my direction and in my presence. I have reviewed the chart and agree that the record reflects my personal performance and is accurate and complete  Electronically signed: LANA Tyler       LUBNA VALLE  MRN-[id number]  Patient is a 89y old  Male who presents with a chief complaint of VT (2021 14:45)    HPI:  89 yo M PMHx COPD (not home O2 dependent), CAD s/p stent  on ASA, AAA repair with stent, HTN, HLD, chronic systolic HF (EF 20%), GERD, hypothyroidism, Bi-V ICD ([employer]), who presented to [facility] ED on 21 for an episode of syncope yesterday night. His wife saw him in bed but then he started sliding off the bed onto the floor. He had denied any prodrome, feeling CP, sob, palpitations, or shock from his ICD. THis morning, a similar episode happened again, which prompted the wife to call EMS.     In the ED, VS: T 97.8, HR 90, /76, RR 18, 95% on RA.   Labs: WBC 6.7, H&H 9.6/30.0, Plt 82, Na 141, K 4.5, Cl 102, HCO3 26, BUN/Cr 50/2.19, Ca 8.7, TNI 95->90, BNP 12175, CK 59, Mg 2.6, Ph 3.1, Digoxin 1.1  CT head negative. Xrays of chest, pelvis, and R knee negative for acute pathology.    EP was consulted and his device was interrogated. It showed multiple episodes of VT since 21 requiring ATP and shocks (last was 21 at 6:17AM). Pt was initially to be admitted to telemetry after being given oral amiodarone 400 mg however, while in the ED, pt had an episode of VT w/ unresponsiveness and AICD terminated the rhythm. Pt was awake and responsive afterwards. Pt was given a lidocaine bolus and was started on a lidocaine gtt and was admitted to the CCU for further monitoring.     (2021 11:40)      Hospital Course:   Presented to the [facility] for an episode of syncope.     24 HOUR EVENTS: Admitted to CICU started on lidocaine gtt and PO amio loading.     REVIEW OF SYSTEMS:   Constitutional: No fevers, or chills  Eyes/ENT: No visual changes  Respiratory: No cough, wheezing, hemoptysis  Cardiovascular: No chest pain, no palpitations  Gastrointestinal: No abdominal pain.  Genitourinary: No dysuria  Neurological: No numbness, no weakness  Skin: No itching, rashes    ICU Vital Signs Last 24 Hrs  T(C): 36.6 (2021 15:00), Max: 36.6 (2021 07:10)  T(F): 97.9 (2021 15:00), Max: 97.9 (2021 15:00)  HR: 68 (2021 18:00) (68 - 89)  BP: 100/50 (2021 18:00) (98/74 - 135/82)  BP(mean): 68 (2021 18:00) (68 - 103)  RR: 20 (2021 18:00) (16 - 24)  SpO2: 97% (2021 18:00) (73% - 100%)      I&O's Summary    2021 07:01  -  2021 20:00  --------------------------------------------------------  IN: 437.5 mL / OUT: 0 mL / NET: 437.5 mL        POCT Blood Glucose.: 111 mg/dL (2021 07:23)      PHYSICAL EXAM:   General: No acute distress  Eyes: EOMI, PERRLA, conjunctiva and sclera clear  Chest/Lung: CTAB, no wheezes, rales, or rhonchi  Heart: Regular rate, regular rhythm. Normal S1/S2. No murmurs, rubs, or gallops.  Abdomen: Soft, nontender, nondistended. Normal bowel sounds.  Extremites: 2+ peripheral pulses B/L. 1+ pitting edema bilaterally up to  mid calf.   Neurology: A&O x3, no focal deficits  Skin: No rashes or lesions    ============================I/O===========================   I&O's Detail    2021 07:01  -  2021 20:00  --------------------------------------------------------  IN:    Lidocaine: 37.5 mL    Oral Fluid: 400 mL  Total IN: 437.5 mL    OUT:  Total OUT: 0 mL    Total NET: 437.5 mL        ============================ LABS =========================                        9.1    6.87  )-----------( 72       ( 2021 16:21 )             28.9     -    136  |  102  |  47<H>  ----------------------------<  90  4.3   |  20<L>  |  1.96<H>    Ca    8.9      2021 16:22  Phos  3.1     06-  Mg     2.5     -    TPro  6.4  /  Alb  3.4  /  TBili  0.6  /  DBili  x   /  AST  28  /  ALT  25  /  AlkPhos  78  06-06    LIVER FUNCTIONS - ( 2021 16:22 )  Alb: 3.4 g/dL / Pro: 6.4 g/dL / ALK PHOS: 78 U/L / ALT: 25 U/L / AST: 28 U/L / GGT: x             Urinalysis Basic - ( 2021 07:58 )    Color: Light Yellow / Appearance: Clear / S.018 / pH: x  Gluc: x / Ketone: Negative  / Bili: Negative / Urobili: Negative   Blood: x / Protein: Trace / Nitrite: Negative   Leuk Esterase: Negative / RBC: 3 /hpf / WBC 2 /HPF   Sq Epi: x / Non Sq Epi: 0 /hpf / Bacteria: Negative      ======================Micro/Rad/Cardio=================  Telemetry: Reviewed   EKG: Reviewed  CXR: Reviewed  Culture: Reviewed   Echo: Reviewed  Cath: Reviewed  ======================================================  PAST MEDICAL & SURGICAL HISTORY:  HTN (hypertension)    HLD (hyperlipidemia)    CAD (coronary artery disease)  S/P angioplasty     BPH (Benign Prostatic Hyperplasia)    CHF (congestive heart failure)    Hypothyroid    GERD (gastroesophageal reflux disease)    Gout    COPD (chronic obstructive pulmonary disease)    MI (myocardial infarction)      Cataract    Hernia, inguinal, bilateral    Achilles rupture    S/P knee replacement, right    AAA (abdominal aortic aneurysm)    H/O repair of rotator cuff    S/P angioplasty      ====================ASSESSMENT ==============  COPD   CAD s/p stent  and AAA repair in    HTN  HLD   Chronic systolic HF (EF 20%)  GERD  Hypothyroidism   Syncope  Plan:  ====================== NEUROLOGY=====================  Nonfocal, continue to monitor neuro status per ICU protocol.   AOx3, functional at home with all of his ADLs     ==================== RESPIRATORY======================  Hx of COPD  - not home O2 dependent.    Comfortable on room air, SpO2 >95%  Continue to monitor SpO2 via pulse oximetry  Encourage bedside spirometry     ====================CARDIOVASCULAR==================  VT Storm  - interrogation revealed multiple episodes of VT since may requiring ATP and shocks  - Continue lidocaine gtt @1   - Cont PO amio loading for a total of 10 g   - monitor lytes and keep K>4 and Mg>2  - will need to discuss ischemic workup and VT ablation  - f/u EP recs   - f/u echo    Hx CAD with stent  and HFrEF  - repeat echo pending  - continue ASA and statin  - continue diuresis with IV lasix BID, monitor UO and I/Os closely   - discuss role for repeat ischemic workup  - continue coreg 12.5 BID     aMIOdarone    Tablet 400 milliGRAM(s) Oral three times a day  digoxin     Tablet 125 MICROGram(s) Oral daily  lidocaine   Infusion 1 mG/Min (7.5 mL/Hr) IV Continuous <Continuous>  aspirin  chewable 81 milliGRAM(s) Oral daily  ===================HEMATOLOGIC/ONC ===================  VTE prophylaxis  -Continue with Heparin SQ    heparin   Injectable 5000 Unit(s) SubCutaneous every 12 hours    ===================== RENAL =========================  INGRID, non-oliguric   - admission SCr 2.19, now downtrending to 1.96  -Continue with IV Lasix 40mg BID  -Continue to monitor I/Os, BUN/Creatinine, and urine output.   - patient is having incontinent counts so I/Os may not be accurate   -Replete lytes PRN. Keep K> 4 and Mg >2.     furosemide   Injectable 40 milliGRAM(s) IntraMuscular two times a day  tamsulosin 0.8 milliGRAM(s) Oral at bedtime  ==================== GASTROINTESTINAL===================  Diet  -Tolerating PO consistent carb diet.   -Continue Protonix for stress ulcer prophylaxis.     pantoprazole    Tablet 40 milliGRAM(s) Oral before breakfast    =======================    ENDOCRINE  =====================    Bgl controlled, monitor glucose for need to initiate sliding scale.     Hypothyroidism   - c/w Synthroid     allopurinol 300 milliGRAM(s) Oral daily  levothyroxine 125 MICROGram(s) Oral daily  simvastatin 10 milliGRAM(s) Oral at bedtime    ========================INFECTIOUS DISEASE================  -Afebrile, WBC within normal limits.   -Monitor temperature and trend WBC. Monitor off abx.     Patient requires continuous monitoring with bedside rhythm monitoring, pulse ox monitoring, and intermittent blood gas analysis. Care plan discussed with ICU care team. Patient remained critical and at risk for life threatening decompensation.  Patient seen, examined and plan discussed with CCU team during rounds.     I have personally provided 35 minutes of critical care time excluding time spent on separate procedures.    By signing my name below, I, Luz Maria Kelly, attest that this documentation has been prepared under the direction and in the presence of LANA Tyler  Electronically signed: Svetlana Medeiros, 21 @ 20:00    I, LANA Tyler, personally performed the services described in this documentation. all medical record entries made by the apibe were at my direction and in my presence. I have reviewed the chart and agree that the record reflects my personal performance and is accurate and complete  Electronically signed: LANA Tyler       LUBNA VALLE  MRN-[id number]  Patient is a 89y old  Male who presents with a chief complaint of VT (2021 14:45)    HPI:  89 yo M PMHx COPD (not home O2 dependent), CAD s/p stent  on ASA, AAA repair with stent, HTN, HLD, chronic systolic HF (EF 20%), GERD, hypothyroidism, Bi-V ICD ([employer]), who presented to [facility] ED on 21 for an episode of syncope yesterday night. His wife saw him in bed but then he started sliding off the bed onto the floor. He had denied any prodrome, feeling CP, sob, palpitations, or shock from his ICD. THis morning, a similar episode happened again, which prompted the wife to call EMS.     In the ED, VS: T 97.8, HR 90, /76, RR 18, 95% on RA.   Labs: WBC 6.7, H&H 9.6/30.0, Plt 82, Na 141, K 4.5, Cl 102, HCO3 26, BUN/Cr 50/2.19, Ca 8.7, TNI 95->90, BNP 76265, CK 59, Mg 2.6, Ph 3.1, Digoxin 1.1  CT head negative. Xrays of chest, pelvis, and R knee negative for acute pathology.    EP was consulted and his device was interrogated. It showed multiple episodes of VT since 21 requiring ATP and shocks (last was 21 at 6:17AM). Pt was initially to be admitted to telemetry after being given oral amiodarone 400 mg however, while in the ED, pt had an episode of VT w/ unresponsiveness and AICD terminated the rhythm. Pt was awake and responsive afterwards. Pt was given a lidocaine bolus and was started on a lidocaine gtt and was admitted to the CCU for further monitoring.     (2021 11:40)      Hospital Course:   Presented to the [facility] for an episode of syncope.     24 HOUR EVENTS: Admitted to CICU started on lidocaine gtt and PO amio loading.     REVIEW OF SYSTEMS:   Constitutional: No fevers, or chills  Eyes/ENT: No visual changes  Respiratory: No cough, wheezing, hemoptysis  Cardiovascular: No chest pain, no palpitations  Gastrointestinal: No abdominal pain.  Genitourinary: No dysuria  Neurological: No numbness, no weakness  Skin: No itching, rashes    ICU Vital Signs Last 24 Hrs  T(C): 36.6 (2021 15:00), Max: 36.6 (2021 07:10)  T(F): 97.9 (2021 15:00), Max: 97.9 (2021 15:00)  HR: 68 (2021 18:00) (68 - 89)  BP: 100/50 (2021 18:00) (98/74 - 135/82)  BP(mean): 68 (2021 18:00) (68 - 103)  RR: 20 (2021 18:00) (16 - 24)  SpO2: 97% (2021 18:00) (73% - 100%)      I&O's Summary    2021 07:01  -  2021 20:00  --------------------------------------------------------  IN: 437.5 mL / OUT: 0 mL / NET: 437.5 mL        POCT Blood Glucose.: 111 mg/dL (2021 07:23)      PHYSICAL EXAM:   General: No acute distress  Eyes: EOMI, PERRLA, conjunctiva and sclera clear  Chest/Lung: CTAB, no wheezes, rales, or rhonchi  Heart: Regular rate, regular rhythm. Normal S1/S2. No murmurs, rubs, or gallops.  Abdomen: Soft, nontender, nondistended. Normal bowel sounds.  Extremites: 2+ peripheral pulses B/L. 1+ pitting edema bilaterally up to  mid calf.   Neurology: A&O x3, no focal deficits  Skin: No rashes or lesions    ============================I/O===========================   I&O's Detail    2021 07:01  -  2021 20:00  --------------------------------------------------------  IN:    Lidocaine: 37.5 mL    Oral Fluid: 400 mL  Total IN: 437.5 mL    OUT:  Total OUT: 0 mL    Total NET: 437.5 mL        ============================ LABS =========================                        9.1    6.87  )-----------( 72       ( 2021 16:21 )             28.9     -    136  |  102  |  47<H>  ----------------------------<  90  4.3   |  20<L>  |  1.96<H>    Ca    8.9      2021 16:22  Phos  3.1     06-  Mg     2.5     -    TPro  6.4  /  Alb  3.4  /  TBili  0.6  /  DBili  x   /  AST  28  /  ALT  25  /  AlkPhos  78  06-06    LIVER FUNCTIONS - ( 2021 16:22 )  Alb: 3.4 g/dL / Pro: 6.4 g/dL / ALK PHOS: 78 U/L / ALT: 25 U/L / AST: 28 U/L / GGT: x             Urinalysis Basic - ( 2021 07:58 )    Color: Light Yellow / Appearance: Clear / S.018 / pH: x  Gluc: x / Ketone: Negative  / Bili: Negative / Urobili: Negative   Blood: x / Protein: Trace / Nitrite: Negative   Leuk Esterase: Negative / RBC: 3 /hpf / WBC 2 /HPF   Sq Epi: x / Non Sq Epi: 0 /hpf / Bacteria: Negative      ======================Micro/Rad/Cardio=================  Telemetry: Reviewed   EKG: Reviewed  CXR: Reviewed  Culture: Reviewed   Echo: Reviewed  Cath: Reviewed  ======================================================  PAST MEDICAL & SURGICAL HISTORY:  HTN (hypertension)    HLD (hyperlipidemia)    CAD (coronary artery disease)  S/P angioplasty     BPH (Benign Prostatic Hyperplasia)    CHF (congestive heart failure)    Hypothyroid    GERD (gastroesophageal reflux disease)    Gout    COPD (chronic obstructive pulmonary disease)    MI (myocardial infarction)      Cataract    Hernia, inguinal, bilateral    Achilles rupture    S/P knee replacement, right    AAA (abdominal aortic aneurysm)    H/O repair of rotator cuff    S/P angioplasty      ====================ASSESSMENT ==============  COPD   CAD s/p stent  and AAA repair in    HTN  HLD   Chronic systolic HF (EF 20%)  GERD  Hypothyroidism   Syncope  Plan:  ====================== NEUROLOGY=====================  Nonfocal, continue to monitor neuro status per ICU protocol.   AOx3, functional at home with all of his ADLs     ==================== RESPIRATORY======================  Hx of COPD  - not home O2 dependent.    Comfortable on room air, SpO2 >95%  Continue to monitor SpO2 via pulse oximetry  Encourage bedside spirometry     ====================CARDIOVASCULAR==================  VT Storm  - interrogation revealed multiple episodes of VT since may requiring ATP and shocks  - Continue lidocaine gtt @1   - Cont PO amio loading for a total of 10 g   - monitor lytes and keep K>4 and Mg>2  - will need to discuss ischemic workup and VT ablation  - f/u EP recs   - f/u echo    Hx CAD with stent  and HFrEF  - repeat echo pending  - continue ASA and statin  - continue diuresis with IV lasix BID, monitor UO and I/Os closely   - discuss role for repeat ischemic workup  - continue coreg 12.5 BID     aMIOdarone    Tablet 400 milliGRAM(s) Oral three times a day  digoxin     Tablet 125 MICROGram(s) Oral daily  lidocaine   Infusion 1 mG/Min (7.5 mL/Hr) IV Continuous <Continuous>  aspirin  chewable 81 milliGRAM(s) Oral daily  ===================HEMATOLOGIC/ONC ===================  VTE prophylaxis  -Continue with Heparin SQ    heparin   Injectable 5000 Unit(s) SubCutaneous every 12 hours    ===================== RENAL =========================  INGRID, non-oliguric   - admission SCr 2.19, now downtrending to 1.96  -Continue with IV Lasix 40mg BID  -Continue to monitor I/Os, BUN/Creatinine, and urine output.   - patient is having incontinent counts so I/Os may not be accurate   -Replete lytes PRN. Keep K> 4 and Mg >2.     furosemide   Injectable 40 milliGRAM(s) IntraMuscular two times a day  tamsulosin 0.8 milliGRAM(s) Oral at bedtime  ==================== GASTROINTESTINAL===================  Diet  -Tolerating PO consistent carb diet.   -Continue Protonix for stress ulcer prophylaxis.     pantoprazole    Tablet 40 milliGRAM(s) Oral before breakfast    =======================    ENDOCRINE  =====================    Bgl controlled, monitor glucose for need to initiate sliding scale.     Hypothyroidism   - c/w Synthroid     allopurinol 300 milliGRAM(s) Oral daily  levothyroxine 125 MICROGram(s) Oral daily  simvastatin 10 milliGRAM(s) Oral at bedtime    ========================INFECTIOUS DISEASE================  -Afebrile, WBC within normal limits.   -Monitor temperature and trend WBC. Monitor off abx.     Patient requires continuous monitoring with bedside rhythm monitoring, pulse ox monitoring, and intermittent blood gas analysis. Care plan discussed with ICU care team. Patient remained critical and at risk for life threatening decompensation.  Patient seen, examined and plan discussed with CCU team during rounds.     I have personally provided 35 minutes of critical care time excluding time spent on separate procedures.    By signing my name below, I, Luz Maria Kelly, attest that this documentation has been prepared under the direction and in the presence of LANA Tyler  Electronically signed: Svetlana Medeiros, 21 @ 20:00    I, LANA Tyler, personally performed the services described in this documentation. all medical record entries made by the scribe were at my direction and in my presence. I have reviewed the chart and agree that the record reflects my personal performance and is accurate and complete  Electronically signed: LANA Tyler            ****Fellow Note****    Plan  -run of NSVT, s/p lido bolus and increase lido gtt  -NPO at [place], [facility] f/u HOWARD Smith MD  Cardiology Fellow  Text or Call: [phone]  For all New Consults and Questions:  [email or web address]   Login: [email or web address]

## 2021-06-06 NOTE — ED ADULT NURSE NOTE - OBJECTIVE STATEMENT
pt 89 yo male from home s/p syncopal episode pt has abrasion to top of head and bruise right upper arm hx cardiac paacemaker pt states similar episode yesterday he wasent seen for on arrival pt alert oriented x 3denies any chest pain pt placed on cardiac monitoring oxygen sat 90% on room air placed on nasal canula with sat up to 100% pt fully vaccinated motor sensory intact to extremities

## 2021-06-06 NOTE — CONSULT NOTE ADULT - ASSESSMENT
A/P89 yo M PMHx COPD (no home O2), CAD s/p stent 2015 on ASA, AAA repair with stent, HTN, HLD, chronic systolic HF (last EF 20%), hypothyroidism, Bi-V ICD ([employer]) who presented for syncope, found have multiple episodes of VT requiring ATP and shock, given amio po, lidocaine bolus and started on gtt. Pt admitted to CCU for further monitoring.    Neuro  A&Ox3  no active issues    Respiratory  history of COPD not on home O2  no active issues currently, on RA  maintain O2 sats > 92%    Cardiovascular  #CAD s/p stent 2015 and AAA with stent  -c/w ASA 81     #HTN  -c/w coreg 12.5 bid  -simvastatin 10 qhs    #HFrEF (20%) s/p [employer]onic Bi-V ICD  -found to be in VT requiring ATP and shocks  -EP consulted and following   -given amiodarone PO, c/w Amiodarone 400 PO TID  -s/p lidocaine bolus and started on gtt  -will need repeat TTE  -c/w home digoxin 125 qd  -c/w home entresto 97/103 bid    GI  #History of GERD  -tolerating regular diet  -c/w home pantoprazole 40    Renal/  #CKD (last Bun/Cr 32/1.49 in Oct 2018)  -BUN/Cr on admission 50/2.19  -strict I&Os  -monitor BMP  -keep K > 4, Mg >2    #BPH  -c/w home flomax 0.8 qHS    Heme  -HSQ  -monitor for signs of bleeding  -monitor H&H, transfuse PRN    Endo  -f/u TSH, A1c  -hx of hypothyroidism  -c/w home synthroid 125 qd    Dispo  -monitor in CCU    Medicine Attending : pt was seen and examined , pt was admitted to medicine however due to recurrence of VT despite amio loading , discussed with Dr. Bhakta ..CCU was consulted and pt was accepted.. appreciate care

## 2021-06-06 NOTE — CONSULT NOTE ADULT - SUBJECTIVE AND OBJECTIVE BOX
Patient seen and evaluated @   Chief Complaint: Patient is a 89y old  Male who presents with a chief complaint of syncope (06 Jun 2021 09:36)      HPI:  89 M CAD s/p stents, low-flow low gradient AS, HFrEF 20% s/p MDT Bi-V ICD, COPD who presents with syncope x 2.    Pt reports usual state of health until yesterday night when he experienced an episode of syncope. His wife saw him in bed but he started to slide off the bed onto the floor. He denies any prodrome or feeling any chest pain, sob, palpitations, or shock from his ICD. This AM, the same scenario happened again which prompted the wife to call EMS.     On interrogation, it showed multiple episodes (24) of VT since 5/17 leading up to today that required ATP and shocks. The last one was 6/6/21 at 617AM which did not break with 2 ATPs and ultimately led to shock. (06 Jun 2021 11:40)    CARDIAC HISTORY: I have known the patient since 2006.  He had an MI and angioplasty in 1994.  Had a cath in 2011 with a 40% aneurysmal left main normal LAD and circumflex with a 95% right lesion and an akinetic inferior wall.  Treated medically and did well other than an admission for cellulitis in 2013 with positive blood cultures.  No endocarditis.  Issues with shortness of breath both from COPD and CHF.  Able to undergo knee replacement in 2014 and then later endovascular repair of an abdominal aortic aneurysm in October 2014.  Stress echo in 2015 showed an ejection fraction of 27%.  Cath revealed unchanged coronaries but LVEF 35%.  The right coronary was stented but did not change his LVEF so we had a defibrillator and biventricular pacemaker placed by Dr. Russell in August 2015.  I changed his Avapro to Entresto and the patient's CHF seem to improve.  August 25 of 2017 had a syncopal episode in the bathroom.  He had no idea what happened but interrogation showed V. tach followed by V. fib which led to a defibrillator shock.  No recurrence and no AICD shocks since then until this admission.  2019 had a couple of runs of ventricular tachycardia that were paced terminated.  98% of the time biventricular pacing.  2021 episode of atrial fibrillation on interrogation that lasted an hour and 48 minutes.  Progressive heart failure and he was worked up for possible TAVR with low gradient aortic stenosis including a dobutamine echo and was found not to be a candidate.  Has had progressive shortness of breath and LV dysfunction with adjusting of his medications and recently adding Jardiance and then increasing it from 10-25.  Issues with creatinine running between 2 and 2.5.  BNP as high as 18,000.  Most recent echo was March 23 of this year with an aortic gradient of 31 peak and 19 mean, dimensionless index 0.20 and no AI.  Severe segmental LV systolic dysfunction with LVEF 23 to 25%.  Mild LVH.  Only mild MR and mild to moderate TR with RVSP 54.    PMH:   HTN (hypertension)    HLD (hyperlipidemia)    CAD (coronary artery disease)    BPH (Benign Prostatic Hyperplasia)    CHF (congestive heart failure)    Hypothyroid    GERD (gastroesophageal reflux disease)    Gout    COPD (chronic obstructive pulmonary disease)    MI (myocardial infarction)      PSH:   Cataract    Hernia, inguinal, bilateral    Achilles rupture    S/P knee replacement, right    AAA (abdominal aortic aneurysm)    H/O repair of rotator cuff    S/P angioplasty    OUTPATIENT CARDIAC MEDS: Carvedilol 12.5 mg twice daily, Entresto 97/103 twice daily, digoxin 0.125 daily, Jardiance 25 mg daily, furosemide 40 mg alternating with 80 mg daily, Synthroid 0.137 daily, simvastatin 10 daily.    Medications:   allopurinol 300 milliGRAM(s) Oral daily  aMIOdarone    Tablet 400 milliGRAM(s) Oral three times a day  aspirin  chewable 81 milliGRAM(s) Oral daily  carvedilol 12.5 milliGRAM(s) Oral every 12 hours  chlorhexidine 4% Liquid 1 Application(s) Topical <User Schedule>  digoxin     Tablet 125 MICROGram(s) Oral daily  furosemide   Injectable 40 milliGRAM(s) IntraMuscular two times a day  heparin   Injectable 5000 Unit(s) SubCutaneous every 12 hours  levothyroxine 125 MICROGram(s) Oral daily  lidocaine   Infusion 1 mG/Min IV Continuous <Continuous>  pantoprazole    Tablet 40 milliGRAM(s) Oral before breakfast  sacubitril 97 mG/valsartan 103 mG 1 Tablet(s) Oral two times a day  simvastatin 10 milliGRAM(s) Oral at bedtime  tamsulosin 0.8 milliGRAM(s) Oral at bedtime    Allergies:  No Known Allergies    FAMILY HISTORY:  Family history of hemolytic uremic syndrome    Family history of CHF (congestive heart failure)      Social History: , fairly active despite restrictions from CHF and COPD  Smoking: Remote  Alcohol: No  Drugs: No    Review of Systems:  Constitutional: no recent weight loss  HEENT: no scleral icterus. Normal mucosa.  Respiratory: (+) COPD followed by Dr. Morris  Cardiovascular: see HPI  Gastrointestinal: No Abdominal Pain, no Diarrhea, no Constipation, no Nausea or Vomiting  Genitourinary: No Nocturia, Dysuria, or Incontinence. No hematuria.  BPH  Extremities: (+) edema. No cyanosis.  Neurologic: No Focal deficit. No Paresthesias. No Syncope. No seizures  Lymphatic: No Swelling. No Lymphadenopathy   Skin: No Rash,  Ecchymoses, Wounds, or Lesions  Psychiatry: No Depression. No Anxiety.    10 point review of systems is otherwise negative except as mentioned above            [ ]Unable to obtain    Physical Exam:  T(C): 36.4 (06-06-21 @ 10:58), Max: 36.6 (06-06-21 @ 07:10)  HR: 70 (06-06-21 @ 14:05) (70 - 89)  BP: 114/73 (06-06-21 @ 14:05) (98/74 - 135/82)  RR: 18 (06-06-21 @ 14:05) (16 - 22)  SpO2: 98% (06-06-21 @ 14:05) (90% - 100%)  Wt(kg): --    Daily Height in cm: 175.26 (06 Jun 2021 07:10)    Daily     Appearance: WD, WN, NAD  Eyes:  No scleral icterus. PERRL, EOMI  HENT: Normal oral mucosa.]NC/AT  Neck: No JVD at 90 degrees. Normal carotid upstrokes without bruits or murmurs.  Cardiovascular: Normal PMI. Normal S1, S2 physiologically split. No S3 or S4. No murmurs.  Respiratory: Clear to auscultation bilaterally. No wheezes. No rales.  Gastrointestinal: Soft.  Non-tender. No hepatosplenomegally.  Extremities: No clubbing. No edema. Pulses 2+ bilaterally symmetric including pedal.  Musculoskeletal:  No joint deformity   Neurologic: Non-focal  Lymphatic:  No lymphadenopathy  Psychiatry: [+ ] AAOx3 [ +] Mood & affect appropriate  Skin: No rashes. No ecchymoses. No cyanosis    Cardiovascular Diagnostic Testing:  ECG:    Echo: 3/23/2021 MAC with only mild MR.  Calcified aortic valve with decreased opening.  Peak gradient 31, mean 19, dimensionless index 0.20 and no AI noted this time.  Severe LAE.  Severe segmental LV systolic dysfunction with LVEF 23 to 25%.  Mild LVH.  Normal RV size and function with mild to moderate TR.  RVSP 54.  No pericardial effusion.       Stress Testing:< from: Stress Echocardiogram-Pharmacologic (10.09.18 @ 17:10) >  Observations:  Mitral Valve: Mitral annular calcification. Moderate mitral  regurgitation.  Aortic Valve/Aorta: Calcified trileaflet aortic valve with  decreased opening. Peak transaortic valve gradient equals  25 mm Hg, mean transaortic valve gradient equals 12 mm Hg,  estimated aortic valve area equals 0.9 sqcm (by continuity  equation), consistent with severe aortic stenosis. Mild  aortic regurgitation.  Peak left ventricular outflow tract  gradient equals 1 mm Hg, mean gradient is equal to 1 mm Hg.  Aortic Root: 3.6 cm.  LVOT diameter: 2.4 cm.  Left Atrium: Severely dilated left atrium.  LA volume index  = 51 cc/m2.  Left Ventricle: Severe segmental left ventricular systolic  dysfunction.  Severe hypokinesis/akinesis of the inferior  and inferolateral wall, basal inferoseptum, anterolateral  wall. Mild left ventricular enlargement.  Right Heart: Normal right atrium. A device wire is noted in  the right heart. Decreased right ventricular systolic  function. Normal tricuspid valve. Minimal tricuspid  regurgitation. Normal pulmonic valve.  Pericardium/Pleura: Normal pericardium with no pericardial  effusion.  Hemodynamic: Estimated right atrial pressure is 8 mm Hg.  Estimated right ventricular systolic pressure equals 33 mm  Hg, assuming right atrial pressure equals 8 mm Hg,  consistent with normal pulmonary pressures.  ------------------------------------------------------------------------  Pharmacologic Stress Test:  Agent:  Dobutamine Dose: 5  mcg/Kg/min over 0 min.  Baseline HR: 71 bpm  Peak HR: 74 bpm  % MPHR: 58 %  Baseline BP: 121/68 mmHg  Peak BP: 134/77 mmHg  Peak RPP: 9,916 (Rate Pressure Product)  Test terminated: Completion of test  Baseline EKG: Paced rhythm  EKG changes: Non diagnostic ECG.  Arrhythmia: None  Heart Rhythm: Paced  HR Response: HR failed to increase appropriately.  BP Response: Appropriate  Symptoms: None  ------------------------------------------------------------------------  Echocardiographic Study:  (A) Baseline echocardiogram reveals:  1. Moderate mitral regurgitation.  2. Calcified trileaflet aortic valve with decreased  opening. Peak transaortic valve gradient equals 25 mm Hg,  mean transaortic valve gradient equals 12 mm Hg, estimated  aortic valve area equals 0.9 sqcm (by continuity equation),  consistent with severe aortic stenosis. Mild aortic  regurgitation.  3. Left ventricular enlargement.  4. Severe segmental left ventricular systolic dysfunction.  Severe hypokinesis/akinesis of the inferior and  inferolateral wall, basal inferoseptum, anterolateral wall.  5. A device wire is noted in the right heart. Decreased  right ventricular systolic function.  (B) Stress echocardiogram reveals:  No significant change in left ventricular systolic  function.  ------------------------------------------------------------------------  Conclusions:  1. Normal hemodynamic response.  2. Non Diagnostic electrocardiographic response.  3. No significant change in left ventricular systolic  function.  4. HR failed to increase appropriately.  5. 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  Discussed with Dr. Kay.  ------------------------------------------------------------------------  Confirmed on  10/10/2018 - 17:48:23 by Jerome Johnson M.D.  ------------------------------------------------------------------------    < end of copied text >      Cath:< from: Cardiac Cath Lab - Adult (10.08.18 @ 15:55) >  CORONARY VESSELS: The coronary circulation is right dominant.  LM:   --  Proximal left main: There was a 40 % stenosis. 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  --  Distal left main: There was a stenosis. The lesion was eccentric.  LAD:   --  LAD: Angiography showed moderate atherosclerosis.  CX:   --  Circumflex: Angiography showed moderate atherosclerosis.  RCA:   --  RCA: Angiography showed moderate atherosclerosis.  --  Proximal RCA: There was a diffuse 30 % stenosis at the site of a prior  stent.  LEFT LOWER EXTREMITY VESSELS: Left external iliac: Angiography showed  aneurysmal dilatation. Left common femoral: The vessel was tortuous.  RIGHT LOWER EXTREMITY VESSELS: Right external iliac: Angiography showed  aneurysmal dilatation. Right common femoral: The vessel was excessively  totuos  < end of copied text >      Interpretation of Telemetry:    Imaging:   < from: Xray Chest 1 View- PORTABLE-Urgent (06.06.21 @ 08:38) >  COMPARISON: Chest x-ray from 5/14/2018.    FINDINGS:    The lungs are clear.  There is no pneumothorax or large pleural effusion.  Heart size cannot be accurately assessed in this projection. Dual lead pacemaker overlying the left chest wall.  No acute rib fractures or other osseous abnormality. Suture anchors noted in the left humeral head.    IMPRESSION:    Clear lungs.        < end of copied text >    Labs:                        9.6    6.73  )-----------( 82       ( 06 Jun 2021 07:49 )             30.0     06-06    141  |  102  |  50<H>  ----------------------------<  110<H>  4.5   |  26  |  2.19<H>    Ca    8.7      06 Jun 2021 07:49  Phos  3.1     06-06  Mg     2.6     06-06    TPro  6.8  /  Alb  3.8  /  TBili  0.6  /  DBili  x   /  AST  40  /  ALT  31  /  AlkPhos  86  06-06      CARDIAC MARKERS ( 06 Jun 2021 07:49 )  x     / x     / 59 U/L / x     / 2.6 ng/mL      Serum Pro-Brain Natriuretic Peptide: 04723 pg/mL (06-06 @ 07:49)         Patient seen and evaluated @ 1600  Chief Complaint: Patient is a 89y old  Male who presents with a chief complaint of syncope (06 Jun 2021 09:36)      HPI:  89 M CAD s/p stents, low-flow low gradient AS, HFrEF 20% s/p MDT Bi-V ICD, COPD who presents with syncope x 2.    Pt reports usual state of health until yesterday night when he experienced an episode of syncope. His wife saw him in bed but he started to slide off the bed onto the floor. He denies any prodrome or feeling any chest pain, sob, palpitations, or shock from his ICD. This AM, the same scenario happened again which prompted the wife to call EMS.     On interrogation, it showed multiple episodes (24) of VT since 5/17 leading up to today that required ATP and shocks. The last one was 6/6/21 at 617AM which did not break with 2 ATPs and ultimately led to shock. (06 Jun 2021 11:40)    CARDIAC HISTORY: I have known the patient since 2006.  He had an MI and angioplasty in 1994.  Had a cath in 2011 with a 40% aneurysmal left main normal LAD and circumflex with a 95% right lesion and an akinetic inferior wall.  Treated medically and did well other than an admission for cellulitis in 2013 with positive blood cultures.  No endocarditis.  Issues with shortness of breath both from COPD and CHF.  Able to undergo knee replacement in 2014 and then later endovascular repair of an abdominal aortic aneurysm in October 2014.  Stress echo in 2015 showed an ejection fraction of 27%.  Cath revealed unchanged coronaries but LVEF 35%.  The right coronary was stented but did not change his LVEF so we had a defibrillator and biventricular pacemaker placed by Dr. Russell in August 2015.  I changed his Avapro to Entresto and the patient's CHF seem to improve.  August 25 of 2017 had a syncopal episode in the bathroom.  He had no idea what happened but interrogation showed V. tach followed by V. fib which led to a defibrillator shock.  No recurrence and no AICD shocks since then until this admission.  2019 had a couple of runs of ventricular tachycardia that were paced terminated.  98% of the time biventricular pacing.  2021 episode of atrial fibrillation on interrogation that lasted an hour and 48 minutes.  Progressive heart failure and he was worked up for possible TAVR with low gradient aortic stenosis including a dobutamine echo and was found not to be a candidate.  Has had progressive shortness of breath and LV dysfunction with adjusting of his medications and recently adding Jardiance and then increasing it from 10-25.  Issues with creatinine running between 2 and 2.5.  BNP as high as 18,000.  Most recent echo was March 23 of this year with an aortic gradient of 31 peak and 19 mean, dimensionless index 0.20 and no AI.  Severe segmental LV systolic dysfunction with LVEF 23 to 25%.  Mild LVH.  Only mild MR and mild to moderate TR with RVSP 54.    PMH:   HTN (hypertension)    HLD (hyperlipidemia)    CAD (coronary artery disease)    BPH (Benign Prostatic Hyperplasia)    CHF (congestive heart failure)    Hypothyroid    GERD (gastroesophageal reflux disease)    Gout    COPD (chronic obstructive pulmonary disease)    MI (myocardial infarction)      PSH:   Cataract    Hernia, inguinal, bilateral    Achilles rupture    S/P knee replacement, right    AAA (abdominal aortic aneurysm)    H/O repair of rotator cuff    S/P angioplasty    OUTPATIENT CARDIAC MEDS: Carvedilol 12.5 mg twice daily, Entresto 97/103 twice daily, digoxin 0.125 daily, Jardiance 25 mg daily, furosemide 40 mg daily, Synthroid 0.137 daily, simvastatin 10 daily.    Medications:   allopurinol 300 milliGRAM(s) Oral daily  aMIOdarone    Tablet 400 milliGRAM(s) Oral three times a day  aspirin  chewable 81 milliGRAM(s) Oral daily  carvedilol 12.5 milliGRAM(s) Oral every 12 hours  chlorhexidine 4% Liquid 1 Application(s) Topical <User Schedule>  digoxin     Tablet 125 MICROGram(s) Oral daily  furosemide   Injectable 40 milliGRAM(s) IntraMuscular two times a day  heparin   Injectable 5000 Unit(s) SubCutaneous every 12 hours  levothyroxine 125 MICROGram(s) Oral daily  lidocaine   Infusion 1 mG/Min IV Continuous <Continuous>  pantoprazole    Tablet 40 milliGRAM(s) Oral before breakfast  sacubitril 97 mG/valsartan 103 mG 1 Tablet(s) Oral two times a day  simvastatin 10 milliGRAM(s) Oral at bedtime  tamsulosin 0.8 milliGRAM(s) Oral at bedtime    Allergies:  No Known Allergies    FAMILY HISTORY:  Family history of hemolytic uremic syndrome    Family history of CHF (congestive heart failure)      Social History: , fairly active despite restrictions from CHF and COPD  Smoking: Remote  Alcohol: No  Drugs: No    Review of Systems:  Constitutional: no recent weight loss  HEENT: no scleral icterus. Normal mucosa.  Respiratory: (+) COPD followed by Dr. Morris  Cardiovascular: see HPI  Gastrointestinal: No Abdominal Pain, no Diarrhea, no Constipation, no Nausea or Vomiting  Genitourinary: No Nocturia, Dysuria, or Incontinence. No hematuria.  BPH  Extremities: (+) edema. No cyanosis.  Neurologic: No Focal deficit. No Paresthesias. No Syncope. No seizures  Lymphatic: No Swelling. No Lymphadenopathy   Skin: No Rash,  Ecchymoses, Wounds, or Lesions  Psychiatry: No Depression. No Anxiety.    10 point review of systems is otherwise negative except as mentioned above            [ ]Unable to obtain    Physical Exam:  T(C): 36.4 (06-06-21 @ 10:58), Max: 36.6 (06-06-21 @ 07:10)  HR: 70 (06-06-21 @ 14:05) (70 - 89)  BP: 114/73 (06-06-21 @ 14:05) (98/74 - 135/82)  RR: 18 (06-06-21 @ 14:05) (16 - 22)  SpO2: 98% (06-06-21 @ 14:05) (90% - 100%)  Wt(kg): --    Daily Height in cm: 175.26 (06 Jun 2021 07:10)    Daily     Appearance: WD, WN, NAD. Good spirits, in CICU.  Eyes:  No scleral icterus. PERRL, EOMI  HENT: Normal oral mucosa.]NC/AT  Neck:  JVD 1/3 up at 90 degrees. Normal carotid upstrokes without bruits or murmurs.  Cardiovascular: Normal PMI. Normal S1, S2 physiologically split. No S3, ? S4. 2/6 [id number], 2-3/6 HSM apex.  Respiratory: Clear to auscultation bilaterally. No wheezes. No rales.  Gastrointestinal: Soft.  Non-tender. No hepatosplenomegally.  Extremities: No clubbing. Trace edema left, 1+ right 1/2 up. Pulses 2+ bilaterally symmetric except diminished pedal.  Musculoskeletal:  No joint deformity   Neurologic: Non-focal  Lymphatic:  No lymphadenopathy  Psychiatry: [+ ] AAOx3 [ +] Mood & affect appropriate  Skin: No rashes. No ecchymoses. No cyanosis    Cardiovascular Diagnostic Testing:  ECG:    Echo: 3/23/2021 MAC with only mild MR.  Calcified aortic valve with decreased opening.  Peak gradient 31, mean 19, dimensionless index 0.20 and no AI noted this time.  Severe LAE.  Severe segmental LV systolic dysfunction with LVEF 23 to 25%.  Mild LVH.  Normal RV size and function with mild to moderate TR.  RVSP 54.  No pericardial effusion.       Stress Testing:< from: Stress Echocardiogram-Pharmacologic (10.09.18 @ 17:10) >  Observations:  Mitral Valve: Mitral annular calcification. Moderate mitral  regurgitation.  Aortic Valve/Aorta: Calcified trileaflet aortic valve with  decreased opening. Peak transaortic valve gradient equals  25 mm Hg, mean transaortic valve gradient equals 12 mm Hg,  estimated aortic valve area equals 0.9 sqcm (by continuity  equation), consistent with severe aortic stenosis. Mild  aortic regurgitation.  Peak left ventricular outflow tract  gradient equals 1 mm Hg, mean gradient is equal to 1 mm Hg.  Aortic Root: 3.6 cm.  LVOT diameter: 2.4 cm.  Left Atrium: Severely dilated left atrium.  LA volume index  = 51 cc/m2.  Left Ventricle: Severe segmental left ventricular systolic  dysfunction.  Severe hypokinesis/akinesis of the inferior  and inferolateral wall, basal inferoseptum, anterolateral  wall. Mild left ventricular enlargement.  Right Heart: Normal right atrium. A device wire is noted in  the right heart. Decreased right ventricular systolic  function. Normal tricuspid valve. Minimal tricuspid  regurgitation. Normal pulmonic valve.  Pericardium/Pleura: Normal pericardium with no pericardial  effusion.  Hemodynamic: Estimated right atrial pressure is 8 mm Hg.  Estimated right ventricular systolic pressure equals 33 mm  Hg, assuming right atrial pressure equals 8 mm Hg,  consistent with normal pulmonary pressures.  ------------------------------------------------------------------------  Pharmacologic Stress Test:  Agent:  Dobutamine Dose: 5  mcg/Kg/min over 0 min.  Baseline HR: 71 bpm  Peak HR: 74 bpm  % MPHR: 58 %  Baseline BP: 121/68 mmHg  Peak BP: 134/77 mmHg  Peak RPP: 9,916 (Rate Pressure Product)  Test terminated: Completion of test  Baseline EKG: Paced rhythm  EKG changes: Non diagnostic ECG.  Arrhythmia: None  Heart Rhythm: Paced  HR Response: HR failed to increase appropriately.  BP Response: Appropriate  Symptoms: None  ------------------------------------------------------------------------  Echocardiographic Study:  (A) Baseline echocardiogram reveals:  1. Moderate mitral regurgitation.  2. Calcified trileaflet aortic valve with decreased  opening. Peak transaortic valve gradient equals 25 mm Hg,  mean transaortic valve gradient equals 12 mm Hg, estimated  aortic valve area equals 0.9 sqcm (by continuity equation),  consistent with severe aortic stenosis. Mild aortic  regurgitation.  3. Left ventricular enlargement.  4. Severe segmental left ventricular systolic dysfunction.  Severe hypokinesis/akinesis of the inferior and  inferolateral wall, basal inferoseptum, anterolateral wall.  5. A device wire is noted in the right heart. Decreased  right ventricular systolic function.  (B) Stress echocardiogram reveals:  No significant change in left ventricular systolic  function.  ------------------------------------------------------------------------  Conclusions:  1. Normal hemodynamic response.  2. Non Diagnostic electrocardiographic response.  3. No significant change in left ventricular systolic  function.  4. HR failed to increase appropriately.  5. 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  Discussed with Dr. Kay.  ------------------------------------------------------------------------  Confirmed on  10/10/2018 - 17:48:23 by Jerome Johnson M.D.  ------------------------------------------------------------------------    < end of copied text >      Cath:< from: Cardiac Cath Lab - Adult (10.08.18 @ 15:55) >  CORONARY VESSELS: The coronary circulation is right dominant.  LM:   --  Proximal left main: There was a 40 % stenosis. 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  --  Distal left main: There was a stenosis. The lesion was eccentric.  LAD:   --  LAD: Angiography showed moderate atherosclerosis.  CX:   --  Circumflex: Angiography showed moderate atherosclerosis.  RCA:   --  RCA: Angiography showed moderate atherosclerosis.  --  Proximal RCA: There was a diffuse 30 % stenosis at the site of a prior  stent.  LEFT LOWER EXTREMITY VESSELS: Left external iliac: Angiography showed  aneurysmal dilatation. Left common femoral: The vessel was tortuous.  RIGHT LOWER EXTREMITY VESSELS: Right external iliac: Angiography showed  aneurysmal dilatation. Right common femoral: The vessel was excessively  totuos  < end of copied text >      Interpretation of Telemetry:    Imaging:   < from: Xray Chest 1 View- PORTABLE-Urgent (06.06.21 @ 08:38) >  COMPARISON: Chest x-ray from 5/14/2018.    FINDINGS:    The lungs are clear.  There is no pneumothorax or large pleural effusion.  Heart size cannot be accurately assessed in this projection. Dual lead pacemaker overlying the left chest wall.  No acute rib fractures or other osseous abnormality. Suture anchors noted in the left humeral head.    IMPRESSION:    Clear lungs.        < end of copied text >    Labs:                        9.6    6.73  )-----------( 82       ( 06 Jun 2021 07:49 )             30.0     06-06    141  |  102  |  50<H>  ----------------------------<  110<H>  4.5   |  26  |  2.19<H>    Ca    8.7      06 Jun 2021 07:49  Phos  3.1     06-06  Mg     2.6     06-06    TPro  6.8  /  Alb  3.8  /  TBili  0.6  /  DBili  x   /  AST  40  /  ALT  31  /  AlkPhos  86  06-06      CARDIAC MARKERS ( 06 Jun 2021 07:49 )  x     / x     / 59 U/L / x     / 2.6 ng/mL      Serum Pro-Brain Natriuretic Peptide: [id number] pg/mL (06-06 @ 07:49)

## 2021-06-06 NOTE — H&P ADULT - NSHPREVIEWOFSYSTEMS_GEN_ALL_CORE
Constitutional: no fevers; no chills  HEENT: no visual changes, no sore throat, no rhinorrhea  CV: no cp; no palpitations  Resp: no sob; no cough  GI: no abd pain, no nausea, no vomiting, no diarrhea, no constipation  : no dysuria, no hematuria  MSK: no myalgais; no arthralgias  skin: no rashes  neuro: no HA, no numbness; no weakness, no tingling  ROS statement: all other ROS negative except as per HPI

## 2021-06-06 NOTE — ED ADULT NURSE REASSESSMENT NOTE - NS ED NURSE REASSESS COMMENT FT1
pt and family member informed by cardiology that upon interrogation of device pt has been having episodes of v tach and shocked multiple times in past few days pt denies feeling any shocks cardiolgy is going to speak with Dr Paiz regarding plan for patient pt pending admission remains on cardiac monitoring and remains alert and oriented in no distress family member at bedside

## 2021-06-06 NOTE — ED PROVIDER NOTE - NS ED ROS FT
ROS:  GENERAL: No fever, no chills  EYES: no change in vision  HEENT: no trouble swallowing, no trouble speaking  CARDIAC: no chest pain  PULMONARY: no cough, no shortness of breath  GI: no abdominal pain, no nausea, no vomiting, no diarrhea, no constipation  : No dysuria, no frequency, no change in appearance, or odor of urine  SKIN: no rashes  NEURO: no headache, no weakness  MSK: r knee joint pain  ~Arian East D.O. -Resident

## 2021-06-06 NOTE — PROCEDURE NOTE - ADDITIONAL PROCEDURE DETAILS
26 episodes of VT detected since 5/17/21 requiring ATP +/- shock. Last episode on 6/6/21 747AM in ED terminated with ATP x 1. On 6/6/21 at 617AM, he received ATP x 2 and then shock, which correlated with his episode of syncope. He also had VT requiring shock 6/5/21, which correlated with his 1st reported episode of syncope. 26 episodes of VT detected since 5/17/21 requiring ATP +/- shock. Last episode on 6/6/21 747AM in ED terminated with ATP x 1. On 6/6/21 at 617AM, he received ATP x 2 and then shock, which correlated with his episode of syncope. He also had VT requiring shock 6/5/21, which correlated with his 1st reported episode of syncope.    Device changes: added Ramp (1) after Burst (2) to allow for more ATP prior to 35J x 4

## 2021-06-06 NOTE — H&P ADULT - NSHPPHYSICALEXAM_GEN_ALL_CORE
PHYSICAL EXAM:  GENERAL: non-toxic appearing; in no respiratory distress  HEAD Atraumatic, Normocephalic  NECK: No JVD; FROM  EYES: PERRL, EOMs intact b/l w/out deficits; normal conjunctiva  CHEST/LUNG: CTAB no wheezes/rhonchi/rales  HEART: RRR no murmur/gallops/rubs  ABDOMEN: +BS, soft, NT, ND  EXTREMITIES: 1+ pitting edema of lower extremities, +2 radial pulses b/l, +2 DP/PT pulses b/l  MUSCULOSKELETAL: FROM of all 4 extremities;  NERVOUS SYSTEM:  A&Ox3, No motor deficits or sensory deficits; CNII-XII intact; no focal neurologic deficits  SKIN:  No new rashes

## 2021-06-06 NOTE — ED PROVIDER NOTE - OBJECTIVE STATEMENT
89yo m pmhx COPD (not O2 dependent), CAD s/p stent 2015, AAA repair with stent, HTN, HLD, Chronic systolic HF (EF 20%), GERD, hypothyroidism, Biventricular ICD [employer] who presents for syncope. ems reports he had 2 syncopal episodes over the past 2 days. unwitnessed. on aspirin. no complaints. ems reports wife saw him in bed this am then found him out of bed shortly thereafter. does not remember event. no incontinence, no tongue bite. no other complaints now. Denies recent trauma, fevers, chills, headache, dizziness, nausea, vomiting, dysuria, freq, hematuria, diarrhea, constipation, chest pain, shortness of breath, cough. does endorse stools being softer than normal but no blood in stool or dark stools or diarrhea.     the reports they have completed their covid-19 vaccination series

## 2021-06-06 NOTE — CONSULT NOTE ADULT - SUBJECTIVE AND OBJECTIVE BOX
HPI:  89 yo M PMHx COPD (not home O2 dependent), CAD s/p stent  on ASA, AAA repair with stent, HTN, HLD, chronic systolic HF (EF 20%), GERD, hypothyroidism, Bi-V ICD ([employer]), who presented to [facility] ED on 21 for an episode of syncope yesterday night. His wife saw him in bed but then he started sliding off the bed onto the floor. He had denied any prodrome, feeling CP, sob, palpitations, or shock from his ICD. THis morning, a similar episode happened again, which prompted the wife to call EMS.     In the ED, VS: T 97.8, HR 90, /76, RR 18, 95% on RA.   Labs: WBC 6.7, H&H 9.6/30.0, Plt 82, Na 141, K 4.5, Cl 102, HCO3 26, BUN/Cr 50/2.19, Ca 8.7, TNI 95->90, BNP 98428, CK 59, Mg 2.6, Ph 3.1, Digoxin 1.1  CT head negative. Xrays of chest, pelvis, and R knee negative for acute pathology.    EP was consulted and his device was interrogated. It showed multiple episodes of VT since 21 requiring ATP and shocks (last was 21 at 6:17AM). Pt was initially to be admitted to telemetry after being given oral amiodarone 400 mg however, while in the ED, pt had an episode of VT w/ unresponsiveness and AICD terminated the rhythm. Pt was awake and responsive afterwards. Pt was given a lidocaine bolus and was started on a lidocaine gtt and was admitted to the CCU for further monitoring.     (2021 11:40)      REVIEW OF SYSTEMS:    CONSTITUTIONAL: No weakness, fevers or chills  EYES/ENT: No visual changes;  No vertigo or throat pain   NECK: No pain or stiffness  RESPIRATORY: No cough, wheezing, hemoptysis; No shortness of breath  CARDIOVASCULAR: No chest pain or palpitations  GASTROINTESTINAL: No abdominal or epigastric pain. No nausea, vomiting, or hematemesis; No diarrhea or constipation. No melena or hematochezia.  GENITOURINARY: No dysuria, frequency or hematuria  NEUROLOGICAL: No numbness or weakness  SKIN: No itching, burning, rashes, or lesions   All other review of systems is negative unless indicated above.      Medications:   MEDICATIONS  (STANDING):  allopurinol 300 milliGRAM(s) Oral daily  aMIOdarone    Tablet 400 milliGRAM(s) Oral every 12 hours  aspirin  chewable 81 milliGRAM(s) Oral daily  chlorhexidine 4% Liquid 1 Application(s) Topical <User Schedule>  furosemide   Injectable 40 milliGRAM(s) IV Push two times a day  heparin   Injectable 5000 Unit(s) SubCutaneous every 12 hours  levothyroxine 125 MICROGram(s) Oral daily  lidocaine   Infusion 1 mG/Min (7.5 mL/Hr) IV Continuous <Continuous>  metoprolol tartrate 12.5 milliGRAM(s) Oral two times a day  pantoprazole    Tablet 40 milliGRAM(s) Oral before breakfast  simvastatin 10 milliGRAM(s) Oral at bedtime  tamsulosin 0.8 milliGRAM(s) Oral at bedtime    MEDICATIONS  (PRN):      Allergies    No Known Allergies    Intolerances        PAST MEDICAL & SURGICAL HISTORY:  HTN (hypertension)    HLD (hyperlipidemia)    CAD (coronary artery disease)  S/P angioplasty     BPH (Benign Prostatic Hyperplasia)    CHF (congestive heart failure)    Hypothyroid    GERD (gastroesophageal reflux disease)    Gout    COPD (chronic obstructive pulmonary disease)    MI (myocardial infarction)      Cataract    Hernia, inguinal, bilateral    Achilles rupture    S/P knee replacement, right    AAA (abdominal aortic aneurysm)    H/O repair of rotator cuff    S/P angioplasty        Social :    No smoking       No ETOH use            Vital Signs Last 24 Hrs  stable        Physical Exam:    Daily     Daily   General:  NAD   HEENT:  Nonicteric, PERRLA  CV:  RRR, no murmur, no JVD  Lungs:  CTA B/L, no wheezes, rales, rhonchi  Abdomen:  Soft, non-tender, no distended, positive BS, no hepatosplenomegaly  Extremities:  edema   Neuro:  AAOx3, non-focal, CN II-XII grossly intact  No Restraints    LABS:                        8.7    6.91  )-----------( 69       ( 2021 02:47 )             26.8     06-07    138  |  101  |  49<H>  ----------------------------<  116<H>  4.3   |  25  |  2.28<H>    Ca    8.5      2021 16:19  Phos  3.3     06-  Mg     2.4     06-    TPro  6.2  /  Alb  3.1<L>  /  TBili  0.5  /  DBili  x   /  AST  21  /  ALT  23  /  AlkPhos  71  06-    PT/INR - ( 2021 16:21 )   PT: 14.9 sec;   INR: 1.26 ratio           Urinalysis Basic - ( 2021 07:58 )    Color: Light Yellow / Appearance: Clear / S.018 / pH: x  Gluc: x / Ketone: Negative  / Bili: Negative / Urobili: Negative   Blood: x / Protein: Trace / Nitrite: Negative   Leuk Esterase: Negative / RBC: 3 /hpf / WBC 2 /HPF   Sq Epi: x / Non Sq Epi: 0 /hpf / Bacteria: Negative              RADIOLOGY & ADDITIONAL TESTS:    ---------------------------------------------------------------------------  I personally reviewed: [  ]EKG   [  ]CXR    [  ] CT    [  ]Other  ---------------------------------------------------------------------------

## 2021-06-06 NOTE — ED PROVIDER NOTE - ATTENDING CONTRIBUTION TO CARE
Attending MD Rodriguez:  I personally have seen and examined this patient.  Resident note reviewed and agree on plan of care and except where noted.  See HPI, PE, and MDM for details.

## 2021-06-06 NOTE — CONSULT NOTE ADULT - SUBJECTIVE AND OBJECTIVE BOX
Patient seen and evaluated at bedside    Chief Complaint:    HPI:  89 M CAD s/p stents, low-flow low gradient AS, HFrEF 20% s/p MDT Bi-V ICD, COPD who presents with syncope x 2.    Pt reports usual state of health until yesterday night when he experienced an episode of syncope. His wife saw him in bed but he started to slide off the bed onto the floor. He denies any prodrome or feeling any chest pain, sob, palpitations, or shock from his ICD. This AM, the same scenario happened again which prompted the wife to call EMS.     On interrogation, it showed multiple episodes of VT since 5/17 leading up to today that required ATP and shocks. The last one was 6/6/21 at 617AM which did not break with 2 ATPs and ultimately led to shock.    PMHx:   HTN (hypertension)    HLD (hyperlipidemia)    CAD (coronary artery disease)    BPH (Benign Prostatic Hyperplasia)    CHF (congestive heart failure)    Hypothyroid    GERD (gastroesophageal reflux disease)    Gout    COPD (chronic obstructive pulmonary disease)    MI (myocardial infarction)      PSHx:   Cataract    Hernia, inguinal, bilateral    Achilles rupture    S/P knee replacement, right    AAA (abdominal aortic aneurysm)    H/O repair of rotator cuff    S/P angioplasty        Allergies:  No Known Allergies      Home Meds:    Current Medications:       FAMILY HISTORY:  Family history of hemolytic uremic syndrome    Family history of CHF (congestive heart failure)        Social History:  Smoking History:  Alcohol Use:  Drug Use:    REVIEW OF SYSTEMS:  CONSTITUTIONAL: No weakness, fevers or chills  EYES/ENT: No visual changes;  No dysphagia  NECK: No pain or stiffness  RESPIRATORY: No cough, wheezing, hemoptysis; No shortness of breath  CARDIOVASCULAR: No chest pain or palpitations; No lower extremity edema  GASTROINTESTINAL: No abdominal or epigastric pain. No nausea, vomiting, or hematemesis; No diarrhea or constipation. No melena or hematochezia.  BACK: No back pain  GENITOURINARY: No dysuria, frequency or hematuria  NEUROLOGICAL: No numbness or weakness  SKIN: No itching, burning, rashes, or lesions   All other review of systems is negative unless indicated above.    Physical Exam:  T(F): 97.6 (06-06), Max: 97.8 (06-06)  HR: 71 (06-06) (71 - 89)  BP: 128/74 (06-06) (98/74 - 128/74)  RR: 21 (06-06)  SpO2: 100% (06-06)  GENERAL: No acute distress, well-developed  HEAD:  Atraumatic, Normocephalic  ENT: EOMI, PERRLA, conjunctiva and sclera clear, Neck supple, No JVD, moist mucosa  CHEST/LUNG: Clear to auscultation bilaterally; No wheeze, equal breath sounds bilaterally   BACK: No spinal tenderness  HEART: Regular rate and rhythm; No murmurs, rubs, or gallops  ABDOMEN: Soft, Nontender, Nondistended; Bowel sounds present  EXTREMITIES:  2+ edema up knees b/l  PSYCH: Nl behavior, nl affect  NEUROLOGY: AAOx3, non-focal, cranial nerves intact  SKIN: Normal color, No rashes or lesions    LINES:    Labs:  reviewed                        9.6    6.73  )-----------( x        ( 06 Jun 2021 07:49 )             30.0     06-06    141  |  102  |  50<H>  ----------------------------<  110<H>  4.5   |  26  |  2.19<H>    Ca    8.7      06 Jun 2021 07:49  Phos  3.1     06-06  Mg     2.6     06-06    TPro  6.8  /  Alb  3.8  /  TBili  0.6  /  DBili  x   /  AST  40  /  ALT  31  /  AlkPhos  86  06-06        CARDIAC MARKERS ( 06 Jun 2021 07:49 )  95 ng/L / x     / x     / 59 U/L / x     / 2.6 ng/mL        Serum Pro-Brain Natriuretic Peptide: [id number] pg/mL (06-06 @ 07:49)      Cardiovascular Diagnostic Testing:    ECG: bi-v paced    Echo: ------------------------------------------------------------------------  Conclusions:  1. Calcified aortic valve with decreased opening. Peak  transaortic valve gradient equals 31 mm Hg, mean  transaortic valve gradient equals 17 mm Hg, estimated  aortic valve area equals 0.8 sqcm (by continuity equation),  aortic valve velocity time integral equals 56 cm,  consistent with severe aortic stenosis. Mild aortic  regurgitation.  2. Severe segmental left ventricular systolic dysfunction.  The mid anterior septum, the apical inferior wall, the  apical anterior wall, the apical septum, the apical lateral  wall, the basal anterior wall, the basal anterior septum,  the basal inferior wall, the basal anterolateral wall, the  mid anterior wall, the mid inferoseptum, and the apical cap  are hypokinetic. The mid inferolateral wall, the basal  inferolateral wall, the mid inferior wall, the mid  anterolateral wall, and the basal inferoseptum are  akinetic.  3. Moderate diastolic dysfunction (Stage II).  4. Right ventricular enlargement with normal right  ventricular systolic function. A device wire is noted in  the right heart.  *** Compared with echocardiogram of 12/30/2015, there has  been an interval increase in gradients and velocities  accross the aortic valve.  ------------------------------------------------------------------------  Confirmed on  5/15/2018 - 15:55:06 by Sunil De León M.D.  -----------------------------------------------------------------------

## 2021-06-06 NOTE — CONSULT NOTE ADULT - ASSESSMENT
24 episodes of VT since 5/17, most pace-terminated with mild sx, 2 episodes syncope with shock.   Renal function and BNP only slightly worse than recent baseline.  No chest pain--troponin 90s maybe from shock--will trend.    Discussed with Dr. Bhakta of EPS  1. Lidocaine now and loading with amiodarone.  2. Resume outpatient cardiac meds.--Check echo.  3. Trend troponin.  4. COPD--continue outpatient meds.  (Followed by Dr. Ning Morris.)    Will follow along with EPS/CICU.  Wolf Paiz MD, FACC  (O) [phone]  (C) [phone]

## 2021-06-06 NOTE — ED ADULT NURSE NOTE - NSIMPLEMENTINTERV_GEN_ALL_ED
Implemented All Fall with Harm Risk Interventions:  [place] to call system. Call bell, personal items and telephone within reach. Instruct patient to call for assistance. Room bathroom lighting operational. Non-slip footwear when patient is off stretcher. Physically safe environment: no spills, clutter or unnecessary equipment. Stretcher in lowest position, wheels locked, appropriate side rails in place. Provide visual cue, wrist band, yellow gown, etc. Monitor gait and stability. Monitor for mental status changes and reorient to person, place, and time. Review medications for side effects contributing to fall risk. Reinforce activity limits and safety measures with patient and family. Provide visual clues: red socks.

## 2021-06-06 NOTE — H&P ADULT - NSICDXPASTMEDICALHX_GEN_ALL_CORE_FT
PAST MEDICAL HISTORY:  BPH (Benign Prostatic Hyperplasia)     CAD (coronary artery disease) S/P angioplasty 2000    CHF (congestive heart failure)     COPD (chronic obstructive pulmonary disease)     GERD (gastroesophageal reflux disease)     Gout     HLD (hyperlipidemia)     HTN (hypertension)     Hypothyroid     MI (myocardial infarction) 2000

## 2021-06-06 NOTE — ED PROVIDER NOTE - CLINICAL SUMMARY MEDICAL DECISION MAKING FREE TEXT BOX
Attending MD Rodriguez:  89M with HFrEF, PPM, AS presenting with recurrent syncope x 2, +abrasion to right scalp. ECG AV paced. Plan for screening labs, CT head, admission to telemetry given extensive cardiac history and known valvulopathy.     *The above represents an initial assessment/impression. Please refer to progress notes for potential changes in patient clinical course* Attending MD Rodriguez:  89M with HFrEF, PPM, AS presenting with recurrent syncope x 2, +abrasion to right scalp. ECG AV paced. Plan for screening labs, CT head, admission to telemetry given extensive cardiac history and known valvulopathy.     *The above represents an initial assessment/impression. Please refer to progress notes for potential changes in patient clinical course*      pettet- 89yo m pmhx COPD (not O2 dependent), CAD s/p stent 2015, AAA repair with stent, HTN, HLD, Chronic systolic HF (EF 20%), GERD, hypothyroidism, Biventricular ICD [employer] who presents for syncope, concern for significant cardiac risk factors as well as undifferentiated syncope in history but otherwise exam only with abrasions to scalp, r knee pain vs wnl, ekg v paced, will eval with labs, ppm interrogation, cth, unlikely aaa leak, will need admission and further ep eval tte

## 2021-06-06 NOTE — ED ADULT NURSE REASSESSMENT NOTE - NS ED NURSE REASSESS COMMENT FT1
pt had gone into another episode of vtach was unresponsive for seconds  came to was verbal placed on pacer pads with code cart brought in seen by md with lidocaine given as ordered pt pending ccu bed assignment

## 2021-06-06 NOTE — CONSULT NOTE ADULT - ASSESSMENT
Assessment and Recommendations:  89 M CAD s/p stents, low-flow low gradient AS, HFrEF 20% s/p MDT Bi-V ICD, COPD who presents with syncope x 2 found to have multiple episodes of VT on interrogation requiring ATP and shock.     #VT  #HFrEF due to ICM   #CAD      Earn Conner MD  Cardiology Fellow    All Cardiology service information can be found 24/7 on [email or web address], password: [email or web address]    Note is preliminary until cosigned by an attending Assessment and Recommendations:  89 M CAD s/p stents, low-flow low gradient AS, HFrEF 20% s/p MDT Bi-V ICD, COPD who presents with syncope x 2 found to have multiple episodes of VT on interrogation requiring ATP and shock. He is currently Bi-V paced and hemodynamically stable. Given his age, will opt for medical management with amiodarone to see if this will suppress his VT.    #VT  #HFrEF due to ICM   #CAD  -start amiodarone 400mg PO TID for 10g load  -continue home coreg 12.5 q12h  -likely needs diuresis, defer HF management to primary cardiologist  -repeat TTE at this time    Eran Conner MD  Cardiology Fellow    All Cardiology service information can be found 24/7 on [email or web address], password: [email or web address]    Note is preliminary until cosigned by an attending Assessment and Recommendations:  89 M CAD s/p stents, low-flow low gradient AS, HFrEF 20% s/p MDT Bi-V ICD, COPD who presents with syncope x 2 found to have multiple episodes of VT on interrogation requiring ATP and shock. He had another episode of VT in the ED that was successfully treated with ATP. He is currently Bi-V paced and hemodynamically stable. Given his age, will opt for medical management with amiodarone to see if this will suppress his VT for now.    #VT  #HFrEF due to ICM   #CAD  -Added a Ramp (1) to allow for more ATP therapy after Burst (2)  -start amiodarone 400mg PO TID for 10g load  -continue home coreg 12.5 q12h  -likely needs diuresis, defer HF management to primary cardiologist  -repeat TTE at this time    Eran Conner MD  Cardiology Fellow    All Cardiology service information can be found 24/7 on [email or web address], password: [email or web address]    Note is preliminary until cosigned by an attending Assessment and Recommendations:  89 M CAD s/p stents, low-flow low gradient AS, HFrEF 20% s/p MDT Bi-V ICD, COPD who presents with syncope x 2 found to have multiple episodes of VT on interrogation requiring ATP and shock. He had another episode of VT in the ED that was successfully treated with ATP and another that required shock. He is currently Bi-V paced and hemodynamically stable. Given his age, will opt for medical management with amiodarone to see if this will suppress his VT for now.    #VT  #HFrEF due to ICM   #CAD  -Added a Ramp (1) to allow for more ATP therapy after Burst (2)  -start amiodarone 400mg PO TID for 10g load  -give lidocaine 100mg bolus, start lido gtt at 1/hr  -he is currently hemodynamically stable at this time, would monitor on telemetry on lido gtt. No indication for CCU at this time  -continue home coreg 12.5 q12h  -likely needs diuresis, defer HF management to primary cardiologist  -repeat TTE at this time    Earn Conner MD  Cardiology Fellow    All Cardiology service information can be found 24/7 on [email or web address], password: [email or web address]    Discussed with Dr. Bhakta Assessment and Recommendations:  89 M CAD s/p stents, low-flow low gradient AS, HFrEF 20% s/p MDT Bi-V ICD, COPD who presents with syncope x 2 found to have multiple episodes of VT on interrogation requiring ATP and shock. He had another episode of VT in the ED that was successfully treated with ATP and another that required shock. He is currently Bi-V paced and hemodynamically stable. Given his age, will opt for medical management with amiodarone to see if this will suppress his VT for now.    #VT  #HFrEF due to ICM   #CAD  -Added a Ramp (1) to allow for more ATP therapy after Burst (2)  -start amiodarone 400mg PO TID for 10g load  -give lidocaine 100mg bolus, start lido gtt at 1  -he is currently hemodynamically stable at this time, would monitor on telemetry on lido gtt. No indication for CCU at this time  -continue home coreg 12.5 q12h  -likely needs diuresis, defer HF management to primary cardiologist  -repeat TTE at this time    Eran Conner MD  Cardiology Fellow    All Cardiology service information can be found 24/7 on [email or web address], password: [email or web address]    Discussed with Dr. Bhakta Assessment and Recommendations:  89 M CAD s/p stents, low-flow low gradient AS, HFrEF 20% s/p MDT Bi-V ICD, COPD who presents with syncope x 2 found to have multiple episodes of VT on interrogation requiring ATP and shock. He had another episode of VT in the ED that was successfully treated with ATP and another that required shock. He is currently Bi-V paced and hemodynamically stable. Given his age, will opt for medical management with amiodarone to see if this will suppress his VT for now.    #VT (monomorphic)  #HFrEF due to ICM   #CAD  -Added a Ramp (1) to allow for more ATP therapy after Burst (2)  -start amiodarone 400mg PO TID for 10g load  -give lidocaine 100mg bolus, start lido gtt at 1  -admit to CCU for further monitoring  -he is currently hemodynamically stable at this time  -continue home coreg 12.5 q12h  -likely needs diuresis, defer HF management to primary cardiologist  -repeat TTE at this time    Eran Conner MD  Cardiology Fellow    All Cardiology service information can be found 24/7 on [email or web address], password: [email or web address]    Discussed with Dr. Bhakta

## 2021-06-06 NOTE — ED ADULT NURSE REASSESSMENT NOTE - NS ED NURSE REASSESS COMMENT FT1
pt with episode of V Tach noted and pt felt burnimng feeling md to bedside pt became alert again returned to pacemaker rythem pt wife at bedside cardiology notifed of episode b/p 125/76

## 2021-06-06 NOTE — ED PROVIDER NOTE - PROGRESS NOTE DETAILS
Attending MD Rodriguez: patient had episode of VT on telemetry, appears to have been terminated by ATP by PPM/AICD. Pt clinically stable. Will proceed with labs, EP consultation. discussed with ep, numerous events of vtach As well as icd firing in the last few days and >20 ep in the last few months. discussed with dr garcia and dr aguirre, agreed with ep recc 400mg amio tid for 10g.  discussed with dr pineda and the patient was admitted to medicine for further evaluation and treatment/management Attending MD Rodriguez: pt had episode of VT with unresponsiveness, AICD terminated rhythm. Patient awake and alert now. EP updated on recent event and recommends lidocaine bolus and infusion. Close observation. CCU admission was proposed to EP team, they stated they wanted to see pt's response to lidocaine first, if recurrent episodes, CCU admission required at that time. pettet- another episode of VT with unresponsiveness, AICD terminated rhythm patient aox3 after, ep updated will take to CCU, edwin aware, lidocaine being administered by rn now

## 2021-06-06 NOTE — H&P ADULT - NSICDXPASTSURGICALHX_GEN_ALL_CORE_FT
PAST SURGICAL HISTORY:  AAA (abdominal aortic aneurysm)     Achilles rupture     Cataract     H/O repair of rotator cuff     Hernia, inguinal, bilateral     S/P angioplasty     S/P knee replacement, right

## 2021-06-06 NOTE — ED ADULT NURSE NOTE - CHPI ED NUR SYMPTOMS NEG
no back pain/no chest pain/no chills/no congestion/no diaphoresis/no dizziness/no nausea/no vomiting

## 2021-06-06 NOTE — H&P ADULT - ASSESSMENT
A/P A/P89 yo M PMHx COPD (no home O2), CAD s/p stent 2015 on ASA, AAA repair with stent, HTN, HLD, chronic systolic HF (last EF 20%), hypothyroidism, Bi-V ICD (Medtronic) who presented for syncope, found have multiple episodes of VT requiring ATP and shock, given amio po, lidocaine bolus and started on gtt. Pt admitted to CCU for further monitoring.    Neuro  A&Ox3  no active issues    Respiratory  history of COPD not on home O2  no active issues currently, on RA  maintain O2 sats > 92%    Cardiovascular  #CAD s/p stent 2015 and AAA with stent  -c/w ASA 81     #HTN  -c/w coreg 12.5 bid  -simvastatin 10 qhs    #HFrEF (20%) s/p medtronic Bi-V ICD  -found to be in VT requiring ATP and shocks  -EP consulted and following   -given amiodarone PO, c/w Amiodarone 400 PO TID  -s/p lidocaine bolus and started on gtt  -will need repeat TTE  -c/w home digoxin 125 qd  -c/w home entresto 97/103 bid    GI  #History of GERD  -tolerating regular diet  -c/w home pantoprazole 40    Renal   #CKD (last Bun/Cr 32/1.49 in Oct 2018)  -BUN/Cr on admission 50/2.19  -strict I&Os  -monitor BMP  -keep K > 4, Mg >2    Heme  -HSQ  -monitor for signs of bleeding  -monitor H&H, transfuse PRN    Endo  -f/u TSH, A1c  -hx of hypothyroidism  -c/w home synthroid 125 qd    Dispo  -monitor in CCU A/P89 yo M PMHx COPD (no home O2), CAD s/p stent 2015 on ASA, AAA repair with stent, HTN, HLD, chronic systolic HF (last EF 20%), hypothyroidism, Bi-V ICD (Medtronic) who presented for syncope, found have multiple episodes of VT requiring ATP and shock, given amio po, lidocaine bolus and started on gtt. Pt admitted to CCU for further monitoring.    Neuro  A&Ox3  no active issues    Respiratory  history of COPD not on home O2  no active issues currently, on RA  maintain O2 sats > 92%    Cardiovascular  #CAD s/p stent 2015 and AAA with stent  -c/w ASA 81     #HTN  -c/w coreg 12.5 bid  -simvastatin 10 qhs    #HFrEF (20%) s/p medtronic Bi-V ICD  -found to be in VT requiring ATP and shocks  -EP consulted and following   -given amiodarone PO, c/w Amiodarone 400 PO TID  -s/p lidocaine bolus and started on gtt  -will need repeat TTE  -c/w home digoxin 125 qd  -c/w home entresto 97/103 bid    GI  #History of GERD  -tolerating regular diet  -c/w home pantoprazole 40    Renal/  #CKD (last Bun/Cr 32/1.49 in Oct 2018)  -BUN/Cr on admission 50/2.19  -strict I&Os  -monitor BMP  -keep K > 4, Mg >2    #BPH  -c/w home flomax 0.8 qHS    Heme  -HSQ  -monitor for signs of bleeding  -monitor H&H, transfuse PRN    Endo  -f/u TSH, A1c  -hx of hypothyroidism  -c/w home synthroid 125 qd    Dispo  -monitor in CCU A/P89 yo M PMHx COPD (no home O2), CAD s/p stent 2015 on ASA, AAA repair with stent, HTN, HLD, chronic systolic HF (last EF 20%), hypothyroidism, Bi-V ICD (Medtronic) who presented for syncope, found have multiple episodes of VT requiring ATP and shock, given amio po, lidocaine bolus and started on gtt. Pt admitted to CCU for further monitoring.    Neuro  A&Ox3  no active issues    Respiratory  history of COPD not on home O2  no active issues currently, on RA  maintain O2 sats > 92%    Cardiovascular  #CAD s/p stent 2015 and AAA with stent  -c/w ASA 81     #HTN  -c/w coreg 12.5 bid  -simvastatin 10 qhs    #HFrEF (20%) s/p medtronic Bi-V ICD  -found to be in VT requiring ATP and shocks  -EP consulted and following   -given amiodarone PO, c/w Amiodarone 400 PO TID  -s/p lidocaine bolus and started on gtt  -will need repeat TTE  -c/w home digoxin 125 qd  -c/w home entresto 97/103 bid    GI  #History of GERD  -tolerating regular diet  -c/w home pantoprazole 40    Renal/  #CKD (last Bun/Cr 32/1.49 in Oct 2018)  -BUN/Cr on admission 50/2.19  -strict I&Os  -monitor BMP  -keep K > 4, Mg >2    #BPH  -c/w home flomax 0.8 qHS    Heme  -HSQ  -monitor for signs of bleeding  -monitor H&H, transfuse PRN    Endo  -f/u TSH, A1c  -hx of hypothyroidism  -c/w home synthroid 125 qd    Dispo  -monitor in CCU    Medicine Attending : pt was seen and examined , pt was admitted to medicine however due to recurrence of VT despite amio loading , discussed with Dr. Bhakta ..CCU was consulted and pt was accepted.. appreciate care

## 2021-06-06 NOTE — ED PROVIDER NOTE - PHYSICAL EXAMINATION
Physical Exam:  Gen: NAD, AOx3, non-toxic appearing  Head: normal appearing, superficial abrasions on top of head  HEENT: normal conjunctiva, oral mucosa moist, tongue midline, no facial droop, no cervical spine ttp or decreased ROM  Lung:  no respiratory distress, speaking in full sentences, clear to ascultation bilaterally     CV: regular rate and rhythm, no chest wall ttp   Abd: soft, ND, NT  MSK: no visible deformities, no t/l spinal ttp, stable pelvis, FROM bilateral ue's and le's, slight ttp at suprapatellar region without ecchymosis swelling or induration   Neuro: No focal deficits  Skin: Warm  Psych: normal affect  ~Arian East D.O. -Resident

## 2021-06-06 NOTE — H&P ADULT - NSICDXFAMILYHX_GEN_ALL_CORE_FT
FAMILY HISTORY:  Family history of CHF (congestive heart failure)  Family history of hemolytic uremic syndrome

## 2021-06-06 NOTE — H&P ADULT - ATTENDING COMMENTS
Patient is seen and examined with fellow, NP and the CCU house-staff. I agree with the history, physical and the assessment and plan.  recurrent vt s/p ATP and multiple ICD shocks  with h/o ICM will d/w pt and family in regards to further aggressive work up including a cath and possible VT ablation  repeat TTE  trend CE  cjeck pBNP  c/w lidocain for arrhythmia suppression

## 2021-06-06 NOTE — H&P ADULT - HISTORY OF PRESENT ILLNESS
89 M CAD s/p stents, low-flow low gradient AS, HFrEF 20% s/p MDT Bi-V ICD, COPD who presents with syncope x 2.    Pt reports usual state of health until yesterday night when he experienced an episode of syncope. His wife saw him in bed but he started to slide off the bed onto the floor. He denies any prodrome or feeling any chest pain, sob, palpitations, or shock from his ICD. This AM, the same scenario happened again which prompted the wife to call EMS.     On interrogation, it showed multiple episodes of VT since 5/17 leading up to today that required ATP and shocks. The last one was 6/6/21 at 617AM which did not break with 2 ATPs and ultimately led to shock. 89 yo M PMHx COPD (not home O2 dependent), CAD s/p stent 2015 on ASA, AAA repair with stent, HTN, HLD, chronic systolic HF (EF 20%), GERD, hypothyroidism, Bi-V ICD (Medtronic), who presented to [facility] ED on 6/6/21 for an episode of syncope yesterday night. His wife saw him in bed but then he started sliding off the bed onto the floor. He had denied any prodrome, feeling CP, sob, palpitations, or shock from his ICD. THis morning, a similar episode happened again, which prompted the wife to call EMS.     In the ED, VS: T 97.8, HR 90, /76, RR 18, 95% on RA.   Labs: WBC 6.7, H&H 9.6/30.0, Plt 82, Na 141, K 4.5, Cl 102, HCO3 26, BUN/Cr 50/2.19, Ca 8.7, TNI 95->90, BNP 44736, CK 59, Mg 2.6, Ph 3.1, Digoxin 1.1  CT head negative. Xrays of chest, pelvis, and R knee negative for acute pathology.    EP was consulted and his device was interrogated. It showed multiple episodes of VT since 5/17/21 requiring ATP and shocks (last was 6/6/21 at 6:17AM).    89 yo M PMHx COPD (not home O2 dependent), CAD s/p stent 2015 on ASA, AAA repair with stent, HTN, HLD, chronic systolic HF (EF 20%), GERD, hypothyroidism, Bi-V ICD (Medtronic), who presented to [facility] ED on 6/6/21 for an episode of syncope yesterday night. His wife saw him in bed but then he started sliding off the bed onto the floor. He had denied any prodrome, feeling CP, sob, palpitations, or shock from his ICD. THis morning, a similar episode happened again, which prompted the wife to call EMS.     In the ED, VS: T 97.8, HR 90, /76, RR 18, 95% on RA.   Labs: WBC 6.7, H&H 9.6/30.0, Plt 82, Na 141, K 4.5, Cl 102, HCO3 26, BUN/Cr 50/2.19, Ca 8.7, TNI 95->90, BNP 75126, CK 59, Mg 2.6, Ph 3.1, Digoxin 1.1  CT head negative. Xrays of chest, pelvis, and R knee negative for acute pathology.    EP was consulted and his device was interrogated. It showed multiple episodes of VT since 5/17/21 requiring ATP and shocks (last was 6/6/21 at 6:17AM). Pt was initially to be admitted to telemetry after being given oral amiodarone 400 mg however, while in the ED, pt had an episode of VT w/ unresponsiveness and AICD terminated the rhythm. Pt was awake and responsive afterwards. Pt was given a lidocaine bolus and was started on a lidocaine gtt and was admitted to the CCU for further monitoring.

## 2021-06-06 NOTE — ED ADULT NURSE REASSESSMENT NOTE - NS ED NURSE REASSESS COMMENT FT1
pt returned from radiology remains alert family member at bedside cardiology at bedside for pacemaker interrogation

## 2021-06-07 NOTE — PROGRESS NOTE ADULT - ASSESSMENT
24 episodes of VT since 5/17, most pace-terminated with mild sx, 2 episodes syncope with shock.   Renal function and BNP only slightly worse than recent baseline.  No chest pain--troponin 90s maybe from shock--will trend.    Discussed with Dr. Bhakta of EPS  1. Lidocaine now and loading with amiodarone.  2. Resume outpatient cardiac meds.--Check echo.  3. Trend troponin.  4. COPD--continue outpatient meds.  (Followed by Dr. Ning Morris.)    Will follow along with EPS/CICU.  Wolf Paiz MD, FACC  (O) [phone]  (C) [phone] 24 episodes of VT since 5/17, most pace-terminated with mild sx, 2 episodes syncope with shock.   Renal function and BNP only slightly worse than recent baseline.  No chest pain--troponin 90s maybe from shock--will trend.    Discussed with Dr. Bhakta of EPS  1. Lidocaine now and loading with amiodarone. ?? if patient would consider VT ablation if fails amio.  2. Resume outpatient cardiac meds.--Check echo. Would continue Entresto and Jardiance.  I do not think there is a role for structural heart here but will review echo in detail.  3. Trend troponin.  4. COPD--continue outpatient meds.  5. Continue anticoagulation.  (Followed by Dr. Ning Morris.)    Will follow along with EPS/CICU.  Wolf Paiz MD, FACC  O) [phone]  (C) [phone]

## 2021-06-07 NOTE — PROGRESS NOTE ADULT - ATTENDING COMMENTS
This 89 year old man with an ischemic CM and systolic LV dysfunction presented with VT storm due to a monomorphic rapid VT. Since adding in ATP therapies, some have been successfully pace-terminated.    Given his advanced age, the use of antiarrhythmic drugs from suppression is appropriate as a first step. If he breaks through the drugs and wants his ICD active, ablation is a possibility. We will continue to follow..

## 2021-06-07 NOTE — REASON FOR VISIT
[FreeTextEntry1] : Follow up visit for patient with known LV dysfunction and CAD along with COPD after angioplasty and after AICD and biventricular pacemaker on August 7, 2017 was on Entresto but not tolerating it because of persisting cough; it was stopped and cough resolved. Issue of weight loss but good appetite found to be hyperthyroid on Synthroid therapy. On digoxin. Had syncopal episode at home with VT- VF and defibrillator shock on August 25, 2017. No recurrence of syncope, with only one near syncopal episode that was clearly orthostatic after a movie. AICD interrogation shows no further episodes since August. Then brief hospitalization upon return from his vacation in August, then  structural heart evaluation including dobutamine echo stress October 2018.\par \par \par April 19, 2021.  Patient here in follow-up.  In the interim he had banged his elbow and ended up with olecranon bursitis with I believe a bloody effusion.  Has done well with conservative management.  He had been at the [place] emergency room and labs there had a BUN of 58 with a creatinine of 2.1 and a potassium of 4.7.  Hemoglobin 9.7, hematocrit 30.2, platelets 99,000.  Orthopedics spoke with him on April 16 and seemed satisfied with his progress but did put him on ibuprofen 403 times a day.  Is now on Jardiance 10 mg and seems to be doing a little better.  Labs were sent.  Advised to change to Tylenol if the pain is bearable.\par Kidney numbers slightly worse and BNP higher but patient does look better, less edema etc.  Would keep Jardiance at 10 mg and recheck in 4 weeks again. \par May 19, 2021.  Patient returns in follow-up.  Remains with coarse A. fib and ventricular pacing.  Patient walking slowly with his rolling walker recliner hunched over not that different from baseline.  Here with his wife.  He does feel like it is a little harder to breathe when he is walking and he has gained about 5 pounds.  When he first gets into bed and lies down he feels short of breath for a few minutes but after that it passes and he has no PND or orthopnea.  Depending on the labs I might try to increase the Jardiance from 10 to 25 mg.\par May 20, 2021 Reviewed labs.  Creatinine came down to 2.05, potassium 4.7, BNP down from 17,000-13,000.  Will increase Jardiance to 25 mg daily and follow-up in about 5 weeks \par \par \par \par \par \par

## 2021-06-07 NOTE — PROGRESS NOTE ADULT - SUBJECTIVE AND OBJECTIVE BOX
Date of service: 06-07-21 @ 23:59      Patient is a 89y old  Male who presents with a chief complaint of ICD fired with VT (07 Jun 2021 21:45)                                                               INTERVAL HPI/OVERNIGHT EVENTS:    REVIEW OF SYSTEMS:     CONSTITUTIONAL: No weakness, fevers or chills  EYES/ENT: No visual changes , no ear ache   NECK: No pain or stiffness  RESPIRATORY: No cough, wheezing,  No shortness of breath  CARDIOVASCULAR: No chest pain or palpitations  GASTROINTESTINAL: No abdominal pain  . No nausea, vomiting, or hematemesis; No diarrhea or constipation. No melena or hematochezia.  GENITOURINARY: No dysuria, frequency or hematuria  NEUROLOGICAL: No numbness or weakness  SKIN: No itching, burning, rashes, or lesions                                                                                                                                                                                                                                                                                 Medications:  MEDICATIONS  (STANDING):  allopurinol 300 milliGRAM(s) Oral daily  aMIOdarone    Tablet 400 milliGRAM(s) Oral every 12 hours  aspirin  chewable 81 milliGRAM(s) Oral daily  chlorhexidine 4% Liquid 1 Application(s) Topical <User Schedule>  furosemide   Injectable 40 milliGRAM(s) IV Push two times a day  heparin   Injectable 5000 Unit(s) SubCutaneous every 12 hours  levothyroxine 125 MICROGram(s) Oral daily  lidocaine   Infusion 1 mG/Min (7.5 mL/Hr) IV Continuous <Continuous>  metoprolol tartrate 12.5 milliGRAM(s) Oral two times a day  pantoprazole    Tablet 40 milliGRAM(s) Oral before breakfast  simvastatin 10 milliGRAM(s) Oral at bedtime  tamsulosin 0.8 milliGRAM(s) Oral at bedtime    MEDICATIONS  (PRN):       Allergies    No Known Allergies    Intolerances      Vital Signs Last 24 Hrs  T(C): 36.3 (07 Jun 2021 16:00), Max: 36.5 (07 Jun 2021 03:00)  T(F): 97.4 (07 Jun 2021 16:00), Max: 97.7 (07 Jun 2021 03:00)  HR: 68 (07 Jun 2021 23:05) (68 - 188)  BP: 97/56 (07 Jun 2021 23:05) (83/40 - 109/68)  BP(mean): 69 (07 Jun 2021 23:05) (56 - 83)  RR: 17 (07 Jun 2021 23:05) (10 - 27)  SpO2: 93% (07 Jun 2021 23:05) (93% - 100%)  CAPILLARY BLOOD GLUCOSE          06-06 @ 07:01  -  06-07 @ 07:00  --------------------------------------------------------  IN: 685 mL / OUT: 775 mL / NET: -90 mL    06-07 @ 07:01  -  06-07 @ 23:59  --------------------------------------------------------  IN: 150 mL / OUT: 1295 mL / NET: -1145 mL      Physical Exam:    Daily     Daily   General:  Well appearing, NAD, not cachetic  HEENT:  Nonicteric, PERRLA  CV:  RRR, S1S2   Lungs:  CTA B/L, no wheezes, rales, rhonchi  Abdomen:  Soft, non-tender, no distended, positive BS  Extremities:  2+ pulses, no c/c, no edema  Skin:  Warm and dry, no rashes  :  No rios  Neuro:  AAOx3, non-focal, grossly intact                                                                                                                                                                                                                                                                                                LABS:                               8.7    6.91  )-----------( 69       ( 07 Jun 2021 02:47 )             26.8                      06-07    138  |  101  |  49<H>  ----------------------------<  116<H>  4.3   |  25  |  2.28<H>    Ca    8.5      07 Jun 2021 16:19  Phos  3.3     06-07  Mg     2.4     06-07    TPro  6.2  /  Alb  3.1<L>  /  TBili  0.5  /  DBili  x   /  AST  21  /  ALT  23  /  AlkPhos  71  06-07                       RADIOLOGY & ADDITIONAL TESTS         I personally reviewed: [  ]EKG   [  ]CXR    [  ] CT      A/P:         Discussed with :     Kamlesh consultants' Notes   Time spent :   Date of service: 06-07-21 @ 23:59      Patient is a 89y old  Male who presents with a chief complaint of ICD fired with VT (07 Jun 2021 21:45)                                                               INTERVAL HPI/OVERNIGHT EVENTS:    REVIEW OF SYSTEMS:     CONSTITUTIONAL: No weakness, fevers or chills  RESPIRATORY: No cough, wheezing,  No shortness of breath  CARDIOVASCULAR: No chest pain or palpitations  GASTROINTESTINAL: No abdominal pain  . No nausea, vomiting, or hematemesis; No diarrhea or constipation. No melena or hematochezia.  GENITOURINARY: No dysuria, frequency or hematuria  NEUROLOGICAL: No numbness or weakness                                                                                                                                                                                                                                                                                   Medications:  MEDICATIONS  (STANDING):  allopurinol 300 milliGRAM(s) Oral daily  aMIOdarone    Tablet 400 milliGRAM(s) Oral every 12 hours  aspirin  chewable 81 milliGRAM(s) Oral daily  chlorhexidine 4% Liquid 1 Application(s) Topical <User Schedule>  furosemide   Injectable 40 milliGRAM(s) IV Push two times a day  heparin   Injectable 5000 Unit(s) SubCutaneous every 12 hours  levothyroxine 125 MICROGram(s) Oral daily  lidocaine   Infusion 1 mG/Min (7.5 mL/Hr) IV Continuous <Continuous>  metoprolol tartrate 12.5 milliGRAM(s) Oral two times a day  pantoprazole    Tablet 40 milliGRAM(s) Oral before breakfast  simvastatin 10 milliGRAM(s) Oral at bedtime  tamsulosin 0.8 milliGRAM(s) Oral at bedtime    MEDICATIONS  (PRN):       Allergies    No Known Allergies    Intolerances      Vital Signs Last 24 Hrs  T(C): 36.3 (07 Jun 2021 16:00), Max: 36.5 (07 Jun 2021 03:00)  T(F): 97.4 (07 Jun 2021 16:00), Max: 97.7 (07 Jun 2021 03:00)  HR: 68 (07 Jun 2021 23:05) (68 - 188)  BP: 97/56 (07 Jun 2021 23:05) (83/40 - 109/68)  BP(mean): 69 (07 Jun 2021 23:05) (56 - 83)  RR: 17 (07 Jun 2021 23:05) (10 - 27)  SpO2: 93% (07 Jun 2021 23:05) (93% - 100%)  CAPILLARY BLOOD GLUCOSE          06-06 @ 07:01  -  06-07 @ 07:00  --------------------------------------------------------  IN: 685 mL / OUT: 775 mL / NET: -90 mL    06-07 @ 07:01  -  06-07 @ 23:59  --------------------------------------------------------  IN: 150 mL / OUT: 1295 mL / NET: -1145 mL      Physical Exam:    Daily     Daily   General:  NAD   HEENT:  Nonicteric, PERRLA  CV:  RRR, S1S2   Lungs:  CTA B/L, no wheezes, rales, rhonchi  Abdomen:  Soft, non-tender, no distended, positive BS  Extremities:  edema   Neuro:  AAOx3, non-focal, grossly intact                                                                                                                                                                                                                                                                                                LABS:                               8.7    6.91  )-----------( 69       ( 07 Jun 2021 02:47 )             26.8                      06-07    138  |  101  |  49<H>  ----------------------------<  116<H>  4.3   |  25  |  2.28<H>    Ca    8.5      07 Jun 2021 16:19  Phos  3.3     06-07  Mg     2.4     06-07    TPro  6.2  /  Alb  3.1<L>  /  TBili  0.5  /  DBili  x   /  AST  21  /  ALT  23  /  AlkPhos  71  06-07                       RADIOLOGY & ADDITIONAL TESTS         I personally reviewed: [  ]EKG   [  ]CXR    [  ] CT      A/P:         Discussed with :     Kamlesh consultants' Notes   Time spent :

## 2021-06-07 NOTE — PROGRESS NOTE ADULT - SUBJECTIVE AND OBJECTIVE BOX
CHIEF COMPLAINT:    Interval Events:    REVIEW OF SYSTEMS:  Constitutional: [ ] negative [ ] fevers [ ] chills [ ] weight loss [ ] weight gain  HEENT: [ ] negative [ ] dry eyes [ ] eye irritation [ ] postnasal drip [ ] nasal congestion  CV: [ ] negative  [ ] chest pain [ ] orthopnea [ ] palpitations [ ] murmur  Resp: [ ] negative [ ] cough [ ] shortness of breath [ ] dyspnea [ ] wheezing [ ] sputum [ ] hemoptysis  GI: [ ] negative [ ] nausea [ ] vomiting [ ] diarrhea [ ] constipation [ ] abd pain [ ] dysphagia   : [ ] negative [ ] dysuria [ ] nocturia [ ] hematuria [ ] increased urinary frequency  Musculoskeletal: [ ] negative [ ] back pain [ ] myalgias [ ] arthralgias [ ] fracture  Skin: [ ] negative [ ] rash [ ] itch  Neurological: [ ] negative [ ] headache [ ] dizziness [ ] syncope [ ] weakness [ ] numbness  Psychiatric: [ ] negative [ ] anxiety [ ] depression  Endocrine: [ ] negative [ ] diabetes [ ] thyroid problem  Hematologic/Lymphatic: [ ] negative [ ] anemia [ ] bleeding problem  Allergic/Immunologic: [ ] negative [ ] itchy eyes [ ] nasal discharge [ ] hives [ ] angioedema  [ ] All other systems negative  [ ] Unable to assess ROS because ________    OBJECTIVE:  ICU Vital Signs Last 24 Hrs  T(C): 36.5 (2021 03:00), Max: 37 (2021 20:00)  T(F): 97.7 (2021 03:00), Max: 98.6 (2021 20:00)  HR: 68 (2021 06:00) (68 - 188)  BP: 106/55 (2021 06:00) (96/55 - 135/82)  BP(mean): 72 (2021 06:00) (63 - 103)  ABP: --  ABP(mean): --  RR: 12 (2021 06:00) (10 - 24)  SpO2: 97% (2021 06:00) (73% - 100%)        06-06 @ 07:01  -  06- @ 07:00  --------------------------------------------------------  IN: 685 mL / OUT: 775 mL / NET: -90 mL      CAPILLARY BLOOD GLUCOSE      POCT Blood Glucose.: 111 mg/dL (2021 07:23)      PHYSICAL EXAM:  General:   HEENT:   Lymph Nodes:  Neck:   Respiratory:   Cardiovascular:   Abdomen:   Extremities:   Skin:   Neurological:  Psychiatry:    LINES:    HOSPITAL MEDICATIONS:  Standing Meds:  allopurinol 300 milliGRAM(s) Oral daily  aMIOdarone    Tablet 400 milliGRAM(s) Oral three times a day  aspirin  chewable 81 milliGRAM(s) Oral daily  chlorhexidine 4% Liquid 1 Application(s) Topical <User Schedule>  digoxin     Tablet 125 MICROGram(s) Oral daily  furosemide   Injectable 40 milliGRAM(s) IV Push two times a day  heparin   Injectable 5000 Unit(s) SubCutaneous every 12 hours  levothyroxine 125 MICROGram(s) Oral daily  lidocaine   Infusion 2 mG/Min IV Continuous <Continuous>  pantoprazole    Tablet 40 milliGRAM(s) Oral before breakfast  simvastatin 10 milliGRAM(s) Oral at bedtime  tamsulosin 0.8 milliGRAM(s) Oral at bedtime      PRN Meds:      LABS:                        8.7    6.91  )-----------( 69       ( 2021 02:47 )             26.8     Hgb Trend: 8.7<--, 9.1<--, 9.6<--  06-07    141  |  104  |  49<H>  ----------------------------<  99  4.1   |  26  |  2.26<H>    Ca    8.8      2021 02:47  Phos  3.5     06-07  Mg     2.5     06-07    TPro  6.1  /  Alb  3.3  /  TBili  0.5  /  DBili  x   /  AST  19  /  ALT  22  /  AlkPhos  72  06-    Creatinine Trend: 2.26<--, 1.96<--, 2.19<--  PT/INR - ( 2021 16:21 )   PT: 14.9 sec;   INR: 1.26 ratio           Urinalysis Basic - ( 2021 07:58 )    Color: Light Yellow / Appearance: Clear / S.018 / pH: x  Gluc: x / Ketone: Negative  / Bili: Negative / Urobili: Negative   Blood: x / Protein: Trace / Nitrite: Negative   Leuk Esterase: Negative / RBC: 3 /hpf / WBC 2 /HPF   Sq Epi: x / Non Sq Epi: 0 /hpf / Bacteria: Negative        Venous Blood Gas:   @ 07:49  7.40/49/<20/30/16  VBG Lactate: 0.9      MICROBIOLOGY:     RADIOLOGY:  [ ] Reviewed and interpreted by me    EKG:   CHIEF COMPLAINT: syncope    Interval Events: pt had 8 seconds of monomorphic NSVT (ATP aborted), then later on 17 seconds of NSVT requiring lidocaine 50 additional bolus and increase of gtt from 1->2. pt states he felt his heart flutter but denied cp, sob, dizziness, syncope, headache, nausea, vomiting.     Constitutional: no fevers; no chills  HEENT: no visual changes, no sore throat, no rhinorrhea  CV: no cp; palpitations  Resp: no sob; no cough  GI: no abd pain, no nausea, no vomiting, no diarrhea, no constipation  : no dysuria, no hematuria  MSK: no myalgais; no arthralgias  skin: no rashes  neuro: no HA, no numbness; no weakness, no tingling  ROS statement: all other ROS negative except as per HPI     OBJECTIVE:  ICU Vital Signs Last 24 Hrs  T(C): 36.5 (2021 03:00), Max: 37 (2021 20:00)  T(F): 97.7 (2021 03:00), Max: 98.6 (2021 20:00)  HR: 68 (2021 06:00) (68 - 188)  BP: 106/55 (2021 06:00) (96/55 - 135/82)  BP(mean): 72 (2021 06:00) (63 - 103)  ABP: --  ABP(mean): --  RR: 12 (2021 06:00) (10 - 24)  SpO2: 97% (2021 06:00) (73% - 100%)         @ 07:01  -  - @ 07:00  --------------------------------------------------------  IN: 685 mL / OUT: 775 mL / NET: -90 mL      CAPILLARY BLOOD GLUCOSE      POCT Blood Glucose.: 111 mg/dL (2021 07:23)    PHYSICAL EXAM:  GENERAL: non-toxic appearing; in no respiratory distress  HEAD Atraumatic, Normocephalic  NECK: No JVD; FROM  EYES: PERRL, EOMs intact b/l w/out deficits; normal conjunctiva  CHEST/LUNG: mild crackles at base  HEART: RRR no murmur/gallops/rubs  ABDOMEN: +BS, soft, NT, ND  EXTREMITIES: 1+ pitting LE edema, +2 radial pulses b/l, +2 DP/PT pulses b/l  MUSCULOSKELETAL: FROM of all 4 extremities  NERVOUS SYSTEM:  A&Ox3, No motor deficits or sensory deficits; CNII-XII intact; no focal neurologic deficits  SKIN:  No new rashes     HOSPITAL MEDICATIONS:  Standing Meds:  allopurinol 300 milliGRAM(s) Oral daily  aMIOdarone    Tablet 400 milliGRAM(s) Oral three times a day  aspirin  chewable 81 milliGRAM(s) Oral daily  chlorhexidine 4% Liquid 1 Application(s) Topical <User Schedule>  digoxin     Tablet 125 MICROGram(s) Oral daily  furosemide   Injectable 40 milliGRAM(s) IV Push two times a day  heparin   Injectable 5000 Unit(s) SubCutaneous every 12 hours  levothyroxine 125 MICROGram(s) Oral daily  lidocaine   Infusion 2 mG/Min IV Continuous <Continuous>  pantoprazole    Tablet 40 milliGRAM(s) Oral before breakfast  simvastatin 10 milliGRAM(s) Oral at bedtime  tamsulosin 0.8 milliGRAM(s) Oral at bedtime      PRN Meds:      LABS:                        8.7    6.91  )-----------( 69       ( 2021 02:47 )             26.8     Hgb Trend: 8.7<--, 9.1<--, 9.6<--  06-07    141  |  104  |  49<H>  ----------------------------<  99  4.1   |  26  |  2.26<H>    Ca    8.8      2021 02:47  Phos  3.5     06-07  Mg     2.5     06-07    TPro  6.1  /  Alb  3.3  /  TBili  0.5  /  DBili  x   /  AST  19  /  ALT  22  /  AlkPhos  72  06-07    Creatinine Trend: 2.26<--, 1.96<--, 2.19<--  PT/INR - ( 2021 16:21 )   PT: 14.9 sec;   INR: 1.26 ratio           Urinalysis Basic - ( 2021 07:58 )    Color: Light Yellow / Appearance: Clear / S.018 / pH: x  Gluc: x / Ketone: Negative  / Bili: Negative / Urobili: Negative   Blood: x / Protein: Trace / Nitrite: Negative   Leuk Esterase: Negative / RBC: 3 /hpf / WBC 2 /HPF   Sq Epi: x / Non Sq Epi: 0 /hpf / Bacteria: Negative        Venous Blood Gas:   @ 07:49  7.40/49/<20/30/16  VBG Lactate: 0.9      MICROBIOLOGY:     RADIOLOGY:  [ ] Reviewed and interpreted by me    EKG:

## 2021-06-07 NOTE — PROGRESS NOTE ADULT - ASSESSMENT
====================ASSESSMENT ==============  89 yo M COPD, CAD s/p stent, severe AS (was not candidate for TAVR), AAA repair w/ stent, HTN, HLD, HF (last EF 20%), s/p Bi-V ICD ([employer]) presented with syncope, found to have multiple episodes of VTach requiring ATP and shock, amio oral load and started on lidocaine gtt.   Plan:  ====================== NEUROLOGY=====================  Nonfocal, continue to monitor neuro status per ICU protocol.   AOx3, functional at home with all of his ADLs     ==================== RESPIRATORY======================  Hx of COPD  - not home O2 dependent.    Comfortable on room air, SpO2 >95%  Continue to monitor SpO2 via pulse oximetry  Encourage bedside spirometry     ====================CARDIOVASCULAR==================  VT Storm  - interrogation revealed multiple episodes of VT since may requiring ATP and shocks  - Continue lidocaine gtt @1   - Cont PO amio 400 BID loading for a total of 10 g   - is on home coreg 12.5 bid but will switch to lopressor 12.5 bid  - monitor lytes and keep K>4 and Mg>2  - f/u EP recs, medical management at this time  - f/u echo  - will hold digoxin at this time    Hx CAD with stent 2015 and HFrEF  - repeat echo pending  - continue ASA and statin  - continue diuresis with IV lasix BID, monitor UO and I/Os closely   - holding entresto at this time    ===================HEMATOLOGIC/ONC ===================  VTE prophylaxis  -Continue with Heparin SQ    heparin   Injectable 5000 Unit(s) SubCutaneous every 12 hours    ===================== RENAL =========================  INGRID, non-oliguric, Baseline Cr 2-2.5  - admission SCr 2.19, now downtrending to 1.96  -Continue with IV Lasix 40mg BID  -Continue to monitor I/Os, BUN/Creatinine, and urine output.   - patient is having incontinent counts so I/Os may not be accurate   -Replete lytes PRN. Keep K> 4 and Mg >2.     #BPH  -c/w flomax 0.8 qhs (home)  ==================== GASTROINTESTINAL===================  Diet  -Tolerating PO consistent carb diet.   -Continue Protonix for stress ulcer prophylaxis.     pantoprazole    Tablet 40 milliGRAM(s) Oral before breakfast    =======================    ENDOCRINE  =====================    Bgl controlled, monitor glucose for need to initiate sliding scale.     Hypothyroidism   - c/w Synthroid     allopurinol 300 milliGRAM(s) Oral daily  levothyroxine 125 MICROGram(s) Oral daily  simvastatin 10 milliGRAM(s) Oral at bedtime    ========================INFECTIOUS DISEASE================  -Afebrile, WBC within normal limits.   -Monitor temperature and trend WBC. Monitor off abx.     ========================GOC============================  Pt normally functions at home and performs ADLs independently. Lives with his wife. If necessary, pt would like to pursue interventions, whether that be CPR, intubation, and/or surgery. Pt is full code.

## 2021-06-07 NOTE — PROGRESS NOTE ADULT - ASSESSMENT
89M CAD s/p stents, low-flow low gradient AS, HFrEF 20% s/p MDT Bi-V ICD, COPD who presents with syncope x 2 found to have multiple episodes of VT on interrogation requiring ATP and shock. He had another episode of VT in the ED that was successfully treated with ATP and another that required shock. He is currently Bi-V paced and hemodynamically stable. Given his age, will opt for medical management with amiodarone to see if this will suppress his VT for now.    Monomorphic VT s/p ATP and defibrillation: No further sustained VT episodes  - Added a Ramp (1) to allow for more ATP therapy after Burst (2)  - continue amiodarone 400mg PO TID for 10g load  - s/p lidocaine 100mg bolus, was on lido drip @ 2/hr until this morning, decreased to 1/hr - can likely further wean lido if no significant ectopy  - home coreg 12.5 q12h held currently  - TTE pending    Plan not finalized until signed by attending    Junie Lr MD  Cardiology Fellow, PGY-4  [phone]  For all other Cardiology service contact information, go to [email or web address] and use "[email or web address]" to login. 89M CAD s/p stents, low-flow low gradient AS, HFrEF 20% s/p MDT Bi-V ICD, COPD who presents with syncope x 2 found to have multiple episodes of VT on interrogation requiring ATP and shock. He had another episode of VT in the ED that was successfully treated with ATP and another that required shock. He is currently Bi-V paced and hemodynamically stable. Given his age, will opt for medical management with amiodarone to see if this will suppress his VT for now.    Monomorphic VT s/p ATP and defibrillation: No further sustained VT episodes  - Added a Ramp (1) to allow for more ATP therapy after Burst (2)  - would decrease amiodarone 400 TID to 400 BID for total of 7-10 g load, and then 400 QD (eventually decrease to 200 QD)  - s/p lidocaine 100mg bolus, was on lido drip @ 2/hr until this morning, decreased to 1/hr - can likely further wean lido tonight if no significant ectopy  - home coreg 12.5 q12h held currently  - TTE pending    Plan not finalized until signed by attending    Junie Lr MD  Cardiology Fellow, PGY-4  [phone]  For all other Cardiology service contact information, go to [email or web address] and use "[email or web address]" to login.

## 2021-06-07 NOTE — PROGRESS NOTE ADULT - ATTENDING COMMENTS
89M CAD s/p stents, low-flow low gradient AS, HFrEF 20% s/p MDT Bi-V ICD, COPD who presented with syncope x 2 found to have multiple episodes of VT on interrogation requiring ATP and shock.   He had another episode of VT in the ED that was successfully treated with ATP and another that required shock. He is currently Bi-V paced and hemodynamically stable with /57 on lidocain 2 mg and off of his HF management at  home that includes coreg and entresto  As per EPS, will opt for medical management with amiodarone to see if this will suppress his VT for now. Will decrease the lidocaine to 1 mg and change the amio to 40 mg bid; resume low dose BB - lopressor 12.5 bid  no ischmic work up with cath at this time  awating repeat TTE - previously deemed not a TAVR candidate - due to pseudo-severe AS and poor femeoral vacsular approach 89M CAD s/p stents, low-flow low gradient AS, HFrEF 20% s/p MDT Bi-V ICD, COPD who presented with syncope x 2 found to have multiple episodes of VT on interrogation requiring ATP and shock.   He had another episode of VT in the ED that was successfully treated with ATP and another that required shock. He is currently Bi-V paced and hemodynamically stable with /57 on lidocaine 2 mg and off of his HF management at  home that includes coreg and Entresto  As per EPS, will opt for medical management with amiodarone to see if this will suppress his VT for now. Will decrease the lidocaine to 1 mg and change the amio to 40 mg bid; resume low dose BB - lopressor 12.5 bid  no ischemic work up with cath at this time  awating repeat TTE - previously deemed not a TAVR candidate - due to pseudo-severe AS and poor femoral vascular approach

## 2021-06-07 NOTE — PROGRESS NOTE ADULT - ASSESSMENT
A/P89 yo M PMHx COPD (no home O2), CAD s/p stent 2015 on ASA, AAA repair with stent, HTN, HLD, chronic systolic HF (last EF 20%), hypothyroidism, Bi-V ICD ([employer]) who presented for syncope, found have multiple episodes of VT requiring ATP and shock, given amio po, lidocaine bolus and started on gtt. Pt admitted to CCU for further monitoring.      HFrEF (20%) s/p [employer]tronic Bi-V ICD  -found to be in VT requiring ATP and shocks  -EP consulted and following   -given amiodarone PO, c/w Amiodarone 400 PO TID  -s/p lidocaine bolus and started on gtt  -c/w home digoxin 125 qd      #CAD s/p stent 2015 and AAA with stent  -c/w ASA 81     #HTN  -c/w coreg 12.5 bid  -simvastatin 10 qhs    Renal/  #CKD (last Bun/Cr 32/1.49 in Oct 2018)  -BUN/Cr on admission 50/2.19  -strict I&Os  -monitor BMP  -keep K > 4, Mg >2    #BPH  -c/w home flomax 0.8 qHS        -f/u TSH, A1c  -hx of hypothyroidism  -c/w home synthroid 125 qd    Dispo  -monitor in CCU

## 2021-06-07 NOTE — PROGRESS NOTE ADULT - SUBJECTIVE AND OBJECTIVE BOX
Patient seen and evaluated @   Chief Complaint: Patient is a 89y old  Male who presents with a chief complaint of syncope (2021 09:36)      HPI:  89 M CAD s/p stents, low-flow low gradient AS, HFrEF 20% s/p MDT Bi-V ICD, COPD who presents with syncope x 2.    Pt reports usual state of health until yesterday night when he experienced an episode of syncope. His wife saw him in bed but he started to slide off the bed onto the floor. He denies any prodrome or feeling any chest pain, sob, palpitations, or shock from his ICD. This AM, the same scenario happened again which prompted the wife to call EMS.     On interrogation, it showed multiple episodes (24) of VT since  leading up to today that required ATP and shocks. The last one was 21 at 617AM which did not break with 2 ATPs and ultimately led to shock. (2021 11:40)    CARDIAC HISTORY: I have known the patient since .  He had an MI and angioplasty in .  Had a cath in  with a 40% aneurysmal left main normal LAD and circumflex with a 95% right lesion and an akinetic inferior wall.  Treated medically and did well other than an admission for cellulitis in  with positive blood cultures.  No endocarditis.  Issues with shortness of breath both from COPD and CHF.  Able to undergo knee replacement in  and then later endovascular repair of an abdominal aortic aneurysm in 2014.  Stress echo in  showed an ejection fraction of 27%.  Cath revealed unchanged coronaries but LVEF 35%.  The right coronary was stented but did not change his LVEF so we had a defibrillator and biventricular pacemaker placed by Dr. Russell in 2015.  I changed his Avapro to Entresto and the patient's CHF seem to improve.   had a syncopal episode in the bathroom.  He had no idea what happened but interrogation showed V. tach followed by V. fib which led to a defibrillator shock.  No recurrence and no AICD shocks since then until this admission.   had a couple of runs of ventricular tachycardia that were paced terminated.  98% of the time biventricular pacing.   episode of atrial fibrillation on interrogation that lasted an hour and 48 minutes.  Progressive heart failure and he was worked up for possible TAVR with low gradient aortic stenosis including a dobutamine echo and was found not to be a candidate.  Has had progressive shortness of breath and LV dysfunction with adjusting of his medications and recently adding Jardiance and then increasing it from 10-25.  Issues with creatinine running between 2 and 2.5.  BNP as high as 18,000.  Most recent echo was  of this year with an aortic gradient of 31 peak and 19 mean, dimensionless index 0.20 and no AI.  Severe segmental LV systolic dysfunction with LVEF 23 to 25%.  Mild LVH.  Only mild MR and mild to moderate TR with RVSP 54.    PMH:   HTN (hypertension)    HLD (hyperlipidemia)    CAD (coronary artery disease)    BPH (Benign Prostatic Hyperplasia)    CHF (congestive heart failure)    Hypothyroid    GERD (gastroesophageal reflux disease)    Gout    COPD (chronic obstructive pulmonary disease)    MI (myocardial infarction)      PSH:   Cataract    Hernia, inguinal, bilateral    Achilles rupture    S/P knee replacement, right    AAA (abdominal aortic aneurysm)    H/O repair of rotator cuff    S/P angioplasty    OUTPATIENT CARDIAC MEDS: Carvedilol 12.5 mg twice daily, Entresto 97/103 twice daily, digoxin 0.125 daily, Jardiance 25 mg daily, furosemide 40 mg daily, Synthroid 0.137 daily, simvastatin 10 daily.    Medications:   allopurinol 300 milliGRAM(s) Oral daily  aMIOdarone    Tablet 400 milliGRAM(s) Oral three times a day  aspirin  chewable 81 milliGRAM(s) Oral daily  chlorhexidine 4% Liquid 1 Application(s) Topical <User Schedule>  digoxin     Tablet 125 MICROGram(s) Oral daily  furosemide   Injectable 40 milliGRAM(s) IV Push two times a day  heparin   Injectable 5000 Unit(s) SubCutaneous every 12 hours  levothyroxine 125 MICROGram(s) Oral daily  lidocaine   Infusion 2 mG/Min IV Continuous <Continuous>  pantoprazole    Tablet 40 milliGRAM(s) Oral before breakfast  simvastatin 10 milliGRAM(s) Oral at bedtime  tamsulosin 0.8 milliGRAM(s) Oral at bedtime    Allergies:  No Known Allergies    FAMILY HISTORY:  Family history of hemolytic uremic syndrome    Family history of CHF (congestive heart failure)      Social History: , fairly active despite restrictions from CHF and COPD  Smoking: Remote  Alcohol: No  Drugs: No    Review of Systems:  Constitutional: no recent weight loss  HEENT: no scleral icterus. Normal mucosa.  Respiratory: (+) COPD followed by Dr. Morris  Cardiovascular: see HPI  Gastrointestinal: No Abdominal Pain, no Diarrhea, no Constipation, no Nausea or Vomiting  Genitourinary: No Nocturia, Dysuria, or Incontinence. No hematuria.  BPH  Extremities: (+) edema. No cyanosis.  Neurologic: No Focal deficit. No Paresthesias. No Syncope. No seizures  Lymphatic: No Swelling. No Lymphadenopathy   Skin: No Rash,  Ecchymoses, Wounds, or Lesions  Psychiatry: No Depression. No Anxiety.    10 point review of systems is otherwise negative except as mentioned above            [ ]Unable to obtain    Physical Exam:  Vital Signs Last 24 Hrs  T(C): 36.5 (2021 03:00), Max: 37 (2021 20:00)  T(F): 97.7 (2021 03:00), Max: 98.6 (2021 20:00)  HR: 68 (2021 06:00) (68 - 188)  BP: 106/55 (2021 06:00) (96/55 - 135/82)  BP(mean): 72 (2021 06:00) (63 - 103)  RR: 12 (2021 06:00) (10 - 24)  SpO2: 97% (2021 06:00) (73% - 100%)    Daily     Appearance: WD, WN, NAD. Good spirits, in CICU.  Eyes:  No scleral icterus. PERRL, EOMI  HENT: Normal oral mucosa.]NC/AT  Neck:  JVD 1/3 up at 90 degrees. Normal carotid upstrokes without bruits or murmurs.  Cardiovascular: Normal PMI. Normal S1, S2 physiologically split. No S3, ? S4. 2/6 RODRÍGUEZ, 2-3/6 HSM apex.  Respiratory: Clear to auscultation bilaterally. No wheezes. No rales.  Gastrointestinal: Soft.  Non-tender. No hepatosplenomegally.  Extremities: No clubbing. Trace edema left, 1+ right 1/2 up. Pulses 2+ bilaterally symmetric except diminished pedal.  Musculoskeletal:  No joint deformity   Neurologic: Non-focal  Lymphatic:  No lymphadenopathy  Psychiatry: [+ ] AAOx3 [ +] Mood & affect appropriate  Skin: No rashes. No ecchymoses. No cyanosis    Cardiovascular Diagnostic Testing:  ECG:    Echo: 3/23/2021 MAC with only mild MR.  Calcified aortic valve with decreased opening.  Peak gradient 31, mean 19, dimensionless index 0.20 and no AI noted this time.  Severe LAE.  Severe segmental LV systolic dysfunction with LVEF 23 to 25%.  Mild LVH.  Normal RV size and function with mild to moderate TR.  RVSP 54.  No pericardial effusion.       Stress Testing:< from: Stress Echocardiogram-Pharmacologic (10.09.18 @ 17:10) >  Observations:  Mitral Valve: Mitral annular calcification. Moderate mitral  regurgitation.  Aortic Valve/Aorta: Calcified trileaflet aortic valve with  decreased opening. Peak transaortic valve gradient equals  25 mm Hg, mean transaortic valve gradient equals 12 mm Hg,  estimated aortic valve area equals 0.9 sqcm (by continuity  equation), consistent with severe aortic stenosis. Mild  aortic regurgitation.  Peak left ventricular outflow tract  gradient equals 1 mm Hg, mean gradient is equal to 1 mm Hg.  Aortic Root: 3.6 cm.  LVOT diameter: 2.4 cm.  Left Atrium: Severely dilated left atrium.  LA volume index  = 51 cc/m2.  Left Ventricle: Severe segmental left ventricular systolic  dysfunction.  Severe hypokinesis/akinesis of the inferior  and inferolateral wall, basal inferoseptum, anterolateral  wall. Mild left ventricular enlargement.  Right Heart: Normal right atrium. A device wire is noted in  the right heart. Decreased right ventricular systolic  function. Normal tricuspid valve. Minimal tricuspid  regurgitation. Normal pulmonic valve.  Pericardium/Pleura: Normal pericardium with no pericardial  effusion.  Hemodynamic: Estimated right atrial pressure is 8 mm Hg.  Estimated right ventricular systolic pressure equals 33 mm  Hg, assuming right atrial pressure equals 8 mm Hg,  consistent with normal pulmonary pressures.  ------------------------------------------------------------------------  Pharmacologic Stress Test:  Agent:  Dobutamine Dose: 5  mcg/Kg/min over 0 min.  Baseline HR: 71 bpm  Peak HR: 74 bpm  % MPHR: 58 %  Baseline BP: 121/68 mmHg  Peak BP: 134/77 mmHg  Peak RPP: 9,916 (Rate Pressure Product)  Test terminated: Completion of test  Baseline EKG: Paced rhythm  EKG changes: Non diagnostic ECG.  Arrhythmia: None  Heart Rhythm: Paced  HR Response: HR failed to increase appropriately.  BP Response: Appropriate  Symptoms: None  ------------------------------------------------------------------------  Echocardiographic Study:  (A) Baseline echocardiogram reveals:  1. Moderate mitral regurgitation.  2. Calcified trileaflet aortic valve with decreased  opening. Peak transaortic valve gradient equals 25 mm Hg,  mean transaortic valve gradient equals 12 mm Hg, estimated  aortic valve area equals 0.9 sqcm (by continuity equation),  consistent with severe aortic stenosis. Mild aortic  regurgitation.  3. Left ventricular enlargement.  4. Severe segmental left ventricular systolic dysfunction.  Severe hypokinesis/akinesis of the inferior and  inferolateral wall, basal inferoseptum, anterolateral wall.  5. A device wire is noted in the right heart. Decreased  right ventricular systolic function.  (B) Stress echocardiogram reveals:  No significant change in left ventricular systolic  function.  ------------------------------------------------------------------------  Conclusions:  1. Normal hemodynamic response.  2. Non Diagnostic electrocardiographic response.  3. No significant change in left ventricular systolic  function.  4. HR failed to increase appropriately.  5. 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  Discussed with Dr. Kay.  ------------------------------------------------------------------------  Confirmed on  10/10/2018 - 17:48:23 by Jerome Johnson M.D.  ------------------------------------------------------------------------    < end of copied text >      Cath:< from: Cardiac Cath Lab - Adult (10.08.18 @ 15:55) >  CORONARY VESSELS: The coronary circulation is right dominant.  LM:   --  Proximal left main: There was a 40 % stenosis. 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  --  Distal left main: There was a stenosis. The lesion was eccentric.  LAD:   --  LAD: Angiography showed moderate atherosclerosis.  CX:   --  Circumflex: Angiography showed moderate atherosclerosis.  RCA:   --  RCA: Angiography showed moderate atherosclerosis.  --  Proximal RCA: There was a diffuse 30 % stenosis at the site of a prior  stent.  LEFT LOWER EXTREMITY VESSELS: Left external iliac: Angiography showed  aneurysmal dilatation. Left common femoral: The vessel was tortuous.  RIGHT LOWER EXTREMITY VESSELS: Right external iliac: Angiography showed  aneurysmal dilatation. Right common femoral: The vessel was excessively  totuos  < end of copied text >      Interpretation of Telemetry:    Imaging:   < from: Xray Chest 1 View- PORTABLE-Urgent (21 @ 08:38) >  COMPARISON: Chest x-ray from 2018.    FINDINGS:    The lungs are clear.  There is no pneumothorax or large pleural effusion.  Heart size cannot be accurately assessed in this projection. Dual lead pacemaker overlying the left chest wall.  No acute rib fractures or other osseous abnormality. Suture anchors noted in the left humeral head.    IMPRESSION:    Clear lungs.        < end of copied text >    Labs:                                              8.7    6.91  )-----------( 69       ( 2021 02:47 )             26.8     06-07    141  |  104  |  49<H>  ----------------------------<  99  4.1   |  26  |  2.26<H>    Ca    8.8      2021 02:47  Phos  3.5     06-  Mg     2.5     -    TPro  6.1  /  Alb  3.3  /  TBili  0.5  /  DBili  x   /  AST  19  /  ALT  22  /  AlkPhos  72  06-    CARDIAC MARKERS ( 2021 16:22 )  x     / x     / 52 U/L / x     / 2.5 ng/mL  CARDIAC MARKERS ( 2021 07:49 )  x     / x     / 59 U/L / x     / 2.6 ng/mL      PT/INR - ( 2021 16:21 )   PT: 14.9 sec;   INR: 1.26 ratio           Urinalysis Basic - ( 2021 07:58 )    Color: Light Yellow / Appearance: Clear / S.018 / pH: x  Gluc: x / Ketone: Negative  / Bili: Negative / Urobili: Negative   Blood: x / Protein: Trace / Nitrite: Negative   Leuk Esterase: Negative / RBC: 3 /hpf / WBC 2 /HPF   Sq Epi: x / Non Sq Epi: 0 /hpf / Bacteria: Negative           Patient seen and evaluated @835   Chief Complaint: Patient is a 89y old  Male who presents with a chief complaint of syncope (2021 09:36)      HPI:  89 M CAD s/p stents, low-flow low gradient AS, HFrEF 20% s/p MDT Bi-V ICD, COPD who presents with syncope x 2.    Pt reports usual state of health until yesterday night when he experienced an episode of syncope. His wife saw him in bed but he started to slide off the bed onto the floor. He denies any prodrome or feeling any chest pain, sob, palpitations, or shock from his ICD. This AM, the same scenario happened again which prompted the wife to call EMS.     On interrogation, it showed multiple episodes (24) of VT since  leading up to today that required ATP and shocks. The last one was 21 at 617AM which did not break with 2 ATPs and ultimately led to shock. (2021 11:40)    CARDIAC HISTORY: I have known the patient since .  He had an MI and angioplasty in .  Had a cath in  with a 40% aneurysmal left main normal LAD and circumflex with a 95% right lesion and an akinetic inferior wall.  Treated medically and did well other than an admission for cellulitis in  with positive blood cultures.  No endocarditis.  Issues with shortness of breath both from COPD and CHF.  Able to undergo knee replacement in  and then later endovascular repair of an abdominal aortic aneurysm in 2014.  Stress echo in  showed an ejection fraction of 27%.  Cath revealed unchanged coronaries but LVEF 35%.  The right coronary was stented but did not change his LVEF so we had a defibrillator and biventricular pacemaker placed by Dr. Russell in 2015.  I changed his Avapro to Entresto and the patient's CHF seem to improve.   had a syncopal episode in the bathroom.  He had no idea what happened but interrogation showed V. tach followed by V. fib which led to a defibrillator shock.  No recurrence and no AICD shocks since then until this admission.   had a couple of runs of ventricular tachycardia that were paced terminated.  98% of the time biventricular pacing.   episode of atrial fibrillation on interrogation that lasted an hour and 48 minutes.  Progressive heart failure and he was worked up for possible TAVR with low gradient aortic stenosis including a dobutamine echo and was found not to be a candidate.  Has had progressive shortness of breath and LV dysfunction with adjusting of his medications and recently adding Jardiance and then increasing it from 10-25.  Issues with creatinine running between 2 and 2.5.  BNP as high as 18,000.  Most recent echo was  of this year with an aortic gradient of 31 peak and 19 mean, dimensionless index 0.20 and no AI.  Severe segmental LV systolic dysfunction with LVEF 23 to 25%.  Mild LVH.  Only mild MR and mild to moderate TR with RVSP 54.    PMH:   HTN (hypertension)    HLD (hyperlipidemia)    CAD (coronary artery disease)    BPH (Benign Prostatic Hyperplasia)    CHF (congestive heart failure)    Hypothyroid    GERD (gastroesophageal reflux disease)    Gout    COPD (chronic obstructive pulmonary disease)    MI (myocardial infarction)      PSH:   Cataract    Hernia, inguinal, bilateral    Achilles rupture    S/P knee replacement, right    AAA (abdominal aortic aneurysm)    H/O repair of rotator cuff    S/P angioplasty    OUTPATIENT CARDIAC MEDS: Carvedilol 12.5 mg twice daily, Entresto 97/103 twice daily, digoxin 0.125 daily, Jardiance 25 mg daily, furosemide 40 mg daily, Synthroid 0.137 daily, simvastatin 10 daily.    Medications:   allopurinol 300 milliGRAM(s) Oral daily  aMIOdarone    Tablet 400 milliGRAM(s) Oral three times a day  aspirin  chewable 81 milliGRAM(s) Oral daily  chlorhexidine 4% Liquid 1 Application(s) Topical <User Schedule>  digoxin     Tablet 125 MICROGram(s) Oral daily  furosemide   Injectable 40 milliGRAM(s) IV Push two times a day  heparin   Injectable 5000 Unit(s) SubCutaneous every 12 hours  levothyroxine 125 MICROGram(s) Oral daily  lidocaine   Infusion 2 mG/Min IV Continuous <Continuous>  pantoprazole    Tablet 40 milliGRAM(s) Oral before breakfast  simvastatin 10 milliGRAM(s) Oral at bedtime  tamsulosin 0.8 milliGRAM(s) Oral at bedtime    Allergies:  No Known Allergies    FAMILY HISTORY:  Family history of hemolytic uremic syndrome    Family history of CHF (congestive heart failure)      Social History: , fairly active despite restrictions from CHF and COPD  Smoking: Remote  Alcohol: No  Drugs: No    Review of Systems:  Constitutional: no recent weight loss  HEENT: no scleral icterus. Normal mucosa.  Respiratory: (+) COPD followed by Dr. Morris  Cardiovascular: see HPI  Gastrointestinal: No Abdominal Pain, no Diarrhea, no Constipation, no Nausea or Vomiting  Genitourinary: No Nocturia, Dysuria, or Incontinence. No hematuria.  BPH  Extremities: (+) edema. No cyanosis.  Neurologic: No Focal deficit. No Paresthesias. No Syncope. No seizures  Lymphatic: No Swelling. No Lymphadenopathy   Skin: No Rash,  Ecchymoses, Wounds, or Lesions  Psychiatry: No Depression. No Anxiety.    10 point review of systems is otherwise negative except as mentioned above            [ ]Unable to obtain    Physical Exam:  Vital Signs Last 24 Hrs  T(C): 36.5 (2021 03:00), Max: 37 (2021 20:00)  T(F): 97.7 (2021 03:00), Max: 98.6 (2021 20:00)  HR: 68 (2021 06:00) (68 - 188)  BP: 106/55 (2021 06:00) (96/55 - 135/82)  BP(mean): 72 (2021 06:00) (63 - 103)  RR: 12 (2021 06:00) (10 - 24)  SpO2: 97% (2021 06:00) (73% - 100%)    Daily     Appearance: WD, WN, NAD. Heaving some trouble with speech. No weakness etc. No VT or a fib overnight.  Eyes:  No scleral icterus. PERRL, EOMI  HENT: Normal oral mucosa.]NC/AT  Neck:  JVD 1/3 up at 90 degrees. Normal carotid upstrokes without bruits or murmurs.  Cardiovascular: Normal PMI. Normal S1, S2 physiologically split. No S3, (+) S4. 2/6 RODRÍGUEZ, 2-3/6 HSM apex.  Respiratory: Clear to auscultation bilaterally. No wheezes. No rales.  Gastrointestinal: Soft.  Non-tender. No hepatosplenomegally.  Extremities: No clubbing. No edema today. Pulses 2+ bilaterally symmetric except diminished pedal.  Musculoskeletal:  No joint deformity   Neurologic: Non-focal  Lymphatic:  No lymphadenopathy  Psychiatry: [+ ] AAOx3 [ +] Mood & affect appropriate  Skin: No rashes. No ecchymoses. No cyanosis    Cardiovascular Diagnostic Testing:  ECG:    Echo: 3/23/2021 MAC with only mild MR.  Calcified aortic valve with decreased opening.  Peak gradient 31, mean 19, dimensionless index 0.20 and no AI noted this time.  Severe LAE.  Severe segmental LV systolic dysfunction with LVEF 23 to 25%.  Mild LVH.  Normal RV size and function with mild to moderate TR.  RVSP 54.  No pericardial effusion.   ECHO this AM; Results pending    Stress Testing:< from: Stress Echocardiogram-Pharmacologic (10.09.18 @ 17:10) >  Observations:  Mitral Valve: Mitral annular calcification. Moderate mitral  regurgitation.  Aortic Valve/Aorta: Calcified trileaflet aortic valve with  decreased opening. Peak transaortic valve gradient equals  25 mm Hg, mean transaortic valve gradient equals 12 mm Hg,  estimated aortic valve area equals 0.9 sqcm (by continuity  equation), consistent with severe aortic stenosis. Mild  aortic regurgitation.  Peak left ventricular outflow tract  gradient equals 1 mm Hg, mean gradient is equal to 1 mm Hg.  Aortic Root: 3.6 cm.  LVOT diameter: 2.4 cm.  Left Atrium: Severely dilated left atrium.  LA volume index  = 51 cc/m2.  Left Ventricle: Severe segmental left ventricular systolic  dysfunction.  Severe hypokinesis/akinesis of the inferior  and inferolateral wall, basal inferoseptum, anterolateral  wall. Mild left ventricular enlargement.  Right Heart: Normal right atrium. A device wire is noted in  the right heart. Decreased right ventricular systolic  function. Normal tricuspid valve. Minimal tricuspid  regurgitation. Normal pulmonic valve.  Pericardium/Pleura: Normal pericardium with no pericardial  effusion.  Hemodynamic: Estimated right atrial pressure is 8 mm Hg.  Estimated right ventricular systolic pressure equals 33 mm  Hg, assuming right atrial pressure equals 8 mm Hg,  consistent with normal pulmonary pressures.  ------------------------------------------------------------------------  Pharmacologic Stress Test:  Agent:  Dobutamine Dose: 5  mcg/Kg/min over 0 min.  Baseline HR: 71 bpm  Peak HR: 74 bpm  % MPHR: 58 %  Baseline BP: 121/68 mmHg  Peak BP: 134/77 mmHg  Peak RPP: 9,916 (Rate Pressure Product)  Test terminated: Completion of test  Baseline EKG: Paced rhythm  EKG changes: Non diagnostic ECG.  Arrhythmia: None  Heart Rhythm: Paced  HR Response: HR failed to increase appropriately.  BP Response: Appropriate  Symptoms: None  ------------------------------------------------------------------------  Echocardiographic Study:  (A) Baseline echocardiogram reveals:  1. Moderate mitral regurgitation.  2. Calcified trileaflet aortic valve with decreased  opening. Peak transaortic valve gradient equals 25 mm Hg,  mean transaortic valve gradient equals 12 mm Hg, estimated  aortic valve area equals 0.9 sqcm (by continuity equation),  consistent with severe aortic stenosis. Mild aortic  regurgitation.  3. Left ventricular enlargement.  4. Severe segmental left ventricular systolic dysfunction.  Severe hypokinesis/akinesis of the inferior and  inferolateral wall, basal inferoseptum, anterolateral wall.  5. A device wire is noted in the right heart. Decreased  right ventricular systolic function.  (B) Stress echocardiogram reveals:  No significant change in left ventricular systolic  function.  ------------------------------------------------------------------------  Conclusions:  1. Normal hemodynamic response.  2. Non Diagnostic electrocardiographic response.  3. No significant change in left ventricular systolic  function.  4. HR failed to increase appropriately.  5. 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  Discussed with Dr. Kay.  ------------------------------------------------------------------------  Confirmed on  10/10/2018 - 17:48:23 by Jerome Johnson M.D.  ------------------------------------------------------------------------    < end of copied text >      Cath:< from: Cardiac Cath Lab - Adult (10.08.18 @ 15:55) >  CORONARY VESSELS: The coronary circulation is right dominant.  LM:   --  Proximal left main: There was a 40 % stenosis. 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  --  Distal left main: There was a stenosis. The lesion was eccentric.  LAD:   --  LAD: Angiography showed moderate atherosclerosis.  CX:   --  Circumflex: Angiography showed moderate atherosclerosis.  RCA:   --  RCA: Angiography showed moderate atherosclerosis.  --  Proximal RCA: There was a diffuse 30 % stenosis at the site of a prior  stent.  LEFT LOWER EXTREMITY VESSELS: Left external iliac: Angiography showed  aneurysmal dilatation. Left common femoral: The vessel was tortuous.  RIGHT LOWER EXTREMITY VESSELS: Right external iliac: Angiography showed  aneurysmal dilatation. Right common femoral: The vessel was excessively  totuos  < end of copied text >      Interpretation of Telemetry:    Imaging:   < from: Xray Chest 1 View- PORTABLE-Urgent (21 @ 08:38) >  COMPARISON: Chest x-ray from 2018.    FINDINGS:    The lungs are clear.  There is no pneumothorax or large pleural effusion.  Heart size cannot be accurately assessed in this projection. Dual lead pacemaker overlying the left chest wall.  No acute rib fractures or other osseous abnormality. Suture anchors noted in the left humeral head.    IMPRESSION:    Clear lungs.        < end of copied text >    Labs:                                              8.7    6.91  )-----------( 69       ( 2021 02:47 )             26.8     06-07    141  |  104  |  49<H>  ----------------------------<  99  4.1   |  26  |  2.26<H>    Ca    8.8      2021 02:47  Phos  3.5     06-  Mg     2.5     -    TPro  6.1  /  Alb  3.3  /  TBili  0.5  /  DBili  x   /  AST  19  /  ALT  22  /  AlkPhos  72  06-    CARDIAC MARKERS ( 2021 16:22 )  x     / x     / 52 U/L / x     / 2.5 ng/mL  CARDIAC MARKERS ( 2021 07:49 )  x     / x     / 59 U/L / x     / 2.6 ng/mL      PT/INR - ( 2021 16:21 )   PT: 14.9 sec;   INR: 1.26 ratio           Urinalysis Basic - ( 2021 07:58 )    Color: Light Yellow / Appearance: Clear / S.018 / pH: x  Gluc: x / Ketone: Negative  / Bili: Negative / Urobili: Negative   Blood: x / Protein: Trace / Nitrite: Negative   Leuk Esterase: Negative / RBC: 3 /hpf / WBC 2 /HPF   Sq Epi: x / Non Sq Epi: 0 /hpf / Bacteria: Negative

## 2021-06-07 NOTE — PROGRESS NOTE ADULT - SUBJECTIVE AND OBJECTIVE BOX
LUBNA VALLE  MRN-[id number]  Patient is a 89y old  Male who presents with a chief complaint of ICD fired with VT (2021 10:09)    HPI:  89 yo M PMHx COPD (not home O2 dependent), CAD s/p stent  on ASA, AAA repair with stent, HTN, HLD, chronic systolic HF (EF 20%), GERD, hypothyroidism, Bi-V ICD ([employer]), who presented to [facility] ED on 21 for an episode of syncope yesterday night. His wife saw him in bed but then he started sliding off the bed onto the floor. He had denied any prodrome, feeling CP, sob, palpitations, or shock from his ICD. THis morning, a similar episode happened again, which prompted the wife to call EMS.     In the ED, VS: T 97.8, HR 90, /76, RR 18, 95% on RA.   Labs: WBC 6.7, H&H 9.6/30.0, Plt 82, Na 141, K 4.5, Cl 102, HCO3 26, BUN/Cr 50/2.19, Ca 8.7, TNI 95->90, BNP 08703, CK 59, Mg 2.6, Ph 3.1, Digoxin 1.1  CT head negative. Xrays of chest, pelvis, and R knee negative for acute pathology.    EP was consulted and his device was interrogated. It showed multiple episodes of VT since 21 requiring ATP and shocks (last was 21 at 6:17AM). Pt was initially to be admitted to telemetry after being given oral amiodarone 400 mg however, while in the ED, pt had an episode of VT w/ unresponsiveness and AICD terminated the rhythm. Pt was awake and responsive afterwards. Pt was given a lidocaine bolus and was started on a lidocaine gtt and was admitted to the CCU for further monitoring.     (2021 11:40)      Hospital Course:   Presented to the [facility] for an episode of syncope.     24 HOUR EVENTS:    REVIEW OF SYSTEMS:   Constitutional: No weakness, fevers, or chills  Eyes/ENT: No visual changes  Respiratory: No cough, wheezing, hemoptysis  Cardiovascular: No chest pain, no palpitations  Gastrointestinal: No abdominal pain. No nausea, vomiting, hematemesis.   Genitourinary: No dysuria  Neurological: No numbness, no weakness  Skin: No itching, rashes    ICU Vital Signs Last 24 Hrs  T(C): 36.3 (2021 16:00), Max: 36.5 (2021 03:00)  T(F): 97.4 (2021 16:00), Max: 97.7 (2021 03:00)  HR: 68 (2021 20:20) (68 - 188)  BP: 83/50 (2021 20:20) (83/40 - 109/68)  BP(mean): 61 (2021 20:20) (56 - 83)  ABP: --  ABP(mean): --  RR: 16 (2021 20:20) (10 - 27)  SpO2: 96% (2021 20:20) (93% - 100%)        I&O's Summary    2021 07:01  -  2021 07:00  --------------------------------------------------------  IN: 685 mL / OUT: 775 mL / NET: -90 mL    2021 07:01  -  2021 21:45  --------------------------------------------------------  IN: 127.5 mL / OUT: 995 mL / NET: -867.5 mL        CAPILLARY BLOOD GLUCOSE  POCT Blood Glucose.: 111 mg/dL (2021 07:23)      PHYSICAL EXAM:   General: No acute distress  Eyes: EOMI, PERRLA, conjunctiva and sclera clear  Chest/Lung: CTAB, no wheezes, rales, or rhonchi  Heart: Regular rate, regular rhythm. Normal S1/S2. No murmurs, rubs, or gallops.  Abdomen: Soft, nontender, nondistended. Normal bowel sounds.  Extremities: 2+ peripheral pulses B/L. 1+ pitting edema bilaterally up to  mid calf.   Neurology: A&O x3, no focal deficits  Skin: No rashes or lesions  ============================I/O===========================   I&O's Detail    2021 07:01  -  2021 07:00  --------------------------------------------------------  IN:    Lidocaine: 90 mL    Lidocaine: 75 mL    Oral Fluid: 520 mL  Total IN: 685 mL    OUT:    Voided (mL): 775 mL  Total OUT: 775 mL    Total NET: -90 mL      2021 07:01  -  2021 21:45  --------------------------------------------------------  IN:    Lidocaine: 45 mL    Lidocaine: 82.5 mL  Total IN: 127.5 mL    OUT:    Voided (mL): 995 mL  Total OUT: 995 mL    Total NET: -867.5 mL        ============================ LABS =========================                        8.7    6.91  )-----------( 69       ( 2021 02:47 )             26.8     06-07    138  |  101  |  49<H>  ----------------------------<  116<H>  4.3   |  25  |  2.28<H>    Ca    8.5      2021 16:19  Phos  3.3     06-07  Mg     2.4     06-07    TPro  6.2  /  Alb  3.1<L>  /  TBili  0.5  /  DBili  x   /  AST  21  /  ALT  23  /  AlkPhos  71      Troponin T, High Sensitivity Result: 78 ng/L (21 @ 16:21)  Troponin T, High Sensitivity Result: 90 ng/L (21 @ 10:50)  Troponin T, High Sensitivity Result: 95 ng/L (21 @ 07:49)    CKMB Units: 2.5 ng/mL (21 @ 16:22)  CKMB Units: 2.6 ng/mL (21 @ 07:49)    Creatine Kinase, Serum: 52 U/L (21 @ 16:22)  Creatine Kinase, Serum: 59 U/L (21 @ 07:49)    CPK Mass Assay %: 4.4 % (21 @ 07:49)        LIVER FUNCTIONS - ( 2021 16:19 )  Alb: 3.1 g/dL / Pro: 6.2 g/dL / ALK PHOS: 71 U/L / ALT: 23 U/L / AST: 21 U/L / GGT: x           PT/INR - ( 2021 16:21 )   PT: 14.9 sec;   INR: 1.26 ratio             Lactate, Blood: 0.8 mmol/L (21 @ 02:47)  Blood Gas Venous - Lactate: 0.9 mmoL/L (21 @ 07:49)    Urinalysis Basic - ( 2021 07:58 )    Color: Light Yellow / Appearance: Clear / S.018 / pH: x  Gluc: x / Ketone: Negative  / Bili: Negative / Urobili: Negative   Blood: x / Protein: Trace / Nitrite: Negative   Leuk Esterase: Negative / RBC: 3 /hpf / WBC 2 /HPF   Sq Epi: x / Non Sq Epi: 0 /hpf / Bacteria: Negative      ======================Micro/Rad/Cardio=================  Telemetry: Reviewed   EKG: Reviewed  CXR: Reviewed  Culture: Reviewed   Echo: Reviewed  Cath: Reviewed  ======================================================  PAST MEDICAL & SURGICAL HISTORY:  HTN (hypertension)    HLD (hyperlipidemia)    CAD (coronary artery disease)  S/P angioplasty     BPH (Benign Prostatic Hyperplasia)    CHF (congestive heart failure)    Hypothyroid    GERD (gastroesophageal reflux disease)    Gout    COPD (chronic obstructive pulmonary disease)    MI (myocardial infarction)      Cataract    Hernia, inguinal, bilateral    Achilles rupture    S/P knee replacement, right    AAA (abdominal aortic aneurysm)    H/O repair of rotator cuff    S/P angioplasty      ====================ASSESSMENT ==============  COPD   CAD s/p stent  and AAA repair in    HTN  HLD   Chronic systolic HF (EF 20%)  GERD  Hypothyroidism   Syncope    Plan:  ====================== NEUROLOGY=====================  Nonfocal  - AOx3, functional at home with all of his ADLs   - continue to monitor neuro status     ==================== RESPIRATORY======================  Hx of COPD  - not home O2 dependent.  - stable on RA, SpO2 96%   - encourage incentive spirometry, continue pulse ox monitoring, follow ABGs     ====================CARDIOVASCULAR==================  VT Storm  - interrogation revealed multiple episodes of VT since may requiring ATP and shocks  - Continue lidocaine gtt @1   - Cont PO amio 400 BID loading for a total of 10 g   - is on home coreg 12.5 bid but will switch to lopressor 12.5 bid  - monitor lytes and keep K>4 and Mg>2  - f/u EP recs, medical management at this time  - will hold digoxin at this time    Hx CAD with stent  and HFrEF  - repeat echo pending  - continue ASA and statin  - continue diuresis with IV lasix BID, monitor UO and I/Os closely   - holding entresto at this time    aspirin  chewable 81 milliGRAM(s) Oral daily  aMIOdarone    Tablet 400 milliGRAM(s) Oral every 12 hours  lidocaine   Infusion 1 mG/Min (7.5 mL/Hr) IV Continuous <Continuous>  metoprolol tartrate 12.5 milliGRAM(s) Oral two times a day  simvastatin 10 milliGRAM(s) Oral at bedtime      ===================HEMATOLOGIC/ONC ===================  Monitor H&H/Plts   -Continue with Heparin SQ for VTE ppx     heparin   Injectable 5000 Unit(s) SubCutaneous every 12 hours    ===================== RENAL =========================  INGRID, non-oliguric, Baseline Cr 2-2.5  - admission SCr 2.19, now downtrending to 2.28  -Continue with IV Lasix 40mg BID  -Continue to monitor I/Os, BUN/Creatinine, and urine output.   - patient is having incontinent counts so I/Os may not be accurate   -Replete lytes PRN. Keep K> 4 and Mg >2.     BPH  -c/w flomax 0.8 qhs (home)    furosemide   Injectable 40 milliGRAM(s) IV Push two times a day  tamsulosin 0.8 milliGRAM(s) Oral at bedtime    ==================== GASTROINTESTINAL===================  Tolerating PO consistent carb diet.   - stress ulcer prophylaxis with Protonix     pantoprazole    Tablet 40 milliGRAM(s) Oral before breakfast    =======================    ENDOCRINE  =====================  Continue monitoring blood glucose for need to initiate sliding scale     Gout   - c/w allopurinol for management     hypothyroidism   - c/w synthroid     allopurinol 300 milliGRAM(s) Oral daily  levothyroxine 125 MICROGram(s) Oral daily    ========================INFECTIOUS DISEASE================  Afebrile, WBC within normal limits.   -Monitor temperature and trend WBC. Monitor off abx.       Patient requires continuous monitoring with bedside rhythm monitoring, pulse ox monitoring, and intermittent blood gas analysis. Care plan discussed with ICU care team. Patient remained critical and at risk for life threatening decompensation.  Patient seen, examined and plan discussed with CCU team during rounds.     I have personally provided 35 minutes of critical care time excluding time spent on separate procedures.    By signing my name below, I, Angelica Quintero, attest that this documentation has been prepared under the direction and in the presence of LANA Burciaga   Electronically signed: Svetlana Lane, 21 @ 21:45    I, Brooke Clay, personally performed the services described in this documentation. all medical record entries made by the apibmary were at my direction and in my presence. I have reviewed the chart and agree that the record reflects my personal performance and is accurate and complete  Electronically signed: LANA Burciaga        LUBNA VALLE  MRN-[id number]  Patient is a 89y old  Male who presents with a chief complaint of ICD fired with VT (2021 10:09)    HPI:  89 yo M PMHx COPD (not home O2 dependent), CAD s/p stent  on ASA, AAA repair with stent, HTN, HLD, chronic systolic HF (EF 20%), GERD, hypothyroidism, Bi-V ICD ([employer]), who presented to [facility] ED on 21 for an episode of syncope yesterday night. His wife saw him in bed but then he started sliding off the bed onto the floor. He had denied any prodrome, feeling CP, sob, palpitations, or shock from his ICD. THis morning, a similar episode happened again, which prompted the wife to call EMS.     In the ED, VS: T 97.8, HR 90, /76, RR 18, 95% on RA.   Labs: WBC 6.7, H&H 9.6/30.0, Plt 82, Na 141, K 4.5, Cl 102, HCO3 26, BUN/Cr 50/2.19, Ca 8.7, TNI 95->90, BNP 35740, CK 59, Mg 2.6, Ph 3.1, Digoxin 1.1  CT head negative. Xrays of chest, pelvis, and R knee negative for acute pathology.    EP was consulted and his device was interrogated. It showed multiple episodes of VT since 21 requiring ATP and shocks (last was 21 at 6:17AM). Pt was initially to be admitted to telemetry after being given oral amiodarone 400 mg however, while in the ED, pt had an episode of VT w/ unresponsiveness and AICD terminated the rhythm. Pt was awake and responsive afterwards. Pt was given a lidocaine bolus and was started on a lidocaine gtt and was admitted to the CCU for further monitoring.     (2021 11:40)      Hospital Course:   Presented to the [facility] for an episode of syncope.   : ATP threshold adjusted. Coreg transitioned to lopressor. Started PO amio load. Lido gtt decreased to 0.5 mg/min     24 HOUR EVENTS:   ATP threshold adjusted. Coreg transitioned to lopressor. Started PO amio load. Lido gtt decreased to 0.5 mg/min     REVIEW OF SYSTEMS:   Constitutional: No weakness, fevers, or chills  Eyes/ENT: No visual changes  Respiratory: No cough, wheezing, hemoptysis  Cardiovascular: No chest pain, no palpitations  Gastrointestinal: No abdominal pain. No nausea, vomiting, hematemesis.   Genitourinary: No dysuria  Neurological: No numbness, no weakness  Skin: No itching, rashes    ICU Vital Signs Last 24 Hrs  T(C): 36.3 (2021 16:00), Max: 36.5 (2021 03:00)  T(F): 97.4 (2021 16:00), Max: 97.7 (2021 03:00)  HR: 68 (2021 20:20) (68 - 188)  BP: 83/50 (2021 20:20) (83/40 - 109/68)  BP(mean): 61 (2021 20:20) (56 - 83)  RR: 16 (2021 20:20) (10 - 27)  SpO2: 96% (2021 20:20) (93% - 100%)        I&O's Summary    2021 07:01  -  2021 07:00  --------------------------------------------------------  IN: 685 mL / OUT: 775 mL / NET: -90 mL    2021 07:01  -  2021 21:45  --------------------------------------------------------  IN: 127.5 mL / OUT: 995 mL / NET: -867.5 mL        CAPILLARY BLOOD GLUCOSE  POCT Blood Glucose.: 111 mg/dL (2021 07:23)      PHYSICAL EXAM:   General: No acute distress  Eyes: EOMI, PERRLA, conjunctiva and sclera clear  Chest/Lung: CTAB, no wheezes, rales, or rhonchi  Heart: Regular rate, regular rhythm. Normal S1/S2. No murmurs, rubs, or gallops.  Abdomen: Soft, nontender, nondistended. Normal bowel sounds.  Extremities: 2+ peripheral pulses B/L. 1+ pitting edema bilaterally up to  mid calf.   Neurology: A&O x3, no focal deficits  Skin: No rashes or lesions on exposed skin   ============================I/O===========================   I&O's Detail    2021 07:  -  2021 07:00  --------------------------------------------------------  IN:    Lidocaine: 90 mL    Lidocaine: 75 mL    Oral Fluid: 520 mL  Total IN: 685 mL    OUT:    Voided (mL): 775 mL  Total OUT: 775 mL    Total NET: -90 mL      2021 07:01  -  2021 21:45  --------------------------------------------------------  IN:    Lidocaine: 45 mL    Lidocaine: 82.5 mL  Total IN: 127.5 mL    OUT:    Voided (mL): 995 mL  Total OUT: 995 mL    Total NET: -867.5 mL        ============================ LABS =========================                        8.7    6.91  )-----------( 69       ( 2021 02:47 )             26.8     06-07    138  |  101  |  49<H>  ----------------------------<  116<H>  4.3   |  25  |  2.28<H>    Ca    8.5      2021 16:19  Phos  3.3       Mg     2.4         TPro  6.2  /  Alb  3.1<L>  /  TBili  0.5  /  DBili  x   /  AST  21  /  ALT  23  /  AlkPhos  71      Troponin T, High Sensitivity Result: 78 ng/L (21 @ 16:21)  Troponin T, High Sensitivity Result: 90 ng/L (21 @ 10:50)  Troponin T, High Sensitivity Result: 95 ng/L (21 @ 07:49)    CKMB Units: 2.5 ng/mL (21 @ 16:22)  CKMB Units: 2.6 ng/mL (21 @ 07:49)    Creatine Kinase, Serum: 52 U/L (21 @ 16:22)  Creatine Kinase, Serum: 59 U/L (21 @ 07:49)    CPK Mass Assay %: 4.4 % (21 @ 07:49)        LIVER FUNCTIONS - ( 2021 16:19 )  Alb: 3.1 g/dL / Pro: 6.2 g/dL / ALK PHOS: 71 U/L / ALT: 23 U/L / AST: 21 U/L / GGT: x           PT/INR - ( 2021 16:21 )   PT: 14.9 sec;   INR: 1.26 ratio             Lactate, Blood: 0.8 mmol/L (21 @ 02:47)  Blood Gas Venous - Lactate: 0.9 mmoL/L (21 @ 07:49)    Urinalysis Basic - ( 2021 07:58 )    Color: Light Yellow / Appearance: Clear / S.018 / pH: x  Gluc: x / Ketone: Negative  / Bili: Negative / Urobili: Negative   Blood: x / Protein: Trace / Nitrite: Negative   Leuk Esterase: Negative / RBC: 3 /hpf / WBC 2 /HPF   Sq Epi: x / Non Sq Epi: 0 /hpf / Bacteria: Negative      ======================Micro/Rad/Cardio=================  Telemetry: Reviewed   EKG: Reviewed  CXR: Reviewed  Culture: Reviewed   Echo: Reviewed  Cath: Reviewed  ======================================================  PAST MEDICAL & SURGICAL HISTORY:  HTN (hypertension)    HLD (hyperlipidemia)    CAD (coronary artery disease)  S/P angioplasty     BPH (Benign Prostatic Hyperplasia)    CHF (congestive heart failure)    Hypothyroid    GERD (gastroesophageal reflux disease)    Gout    COPD (chronic obstructive pulmonary disease)    MI (myocardial infarction)      Cataract    Hernia, inguinal, bilateral    Achilles rupture    S/P knee replacement, right    AAA (abdominal aortic aneurysm)    H/O repair of rotator cuff    S/P angioplasty      ====================ASSESSMENT ==============  COPD   CAD s/p stent  and AAA repair in    HTN  HLD   Chronic systolic HF (EF 20%)  GERD  Hypothyroidism   Syncope    Plan:  ====================== NEUROLOGY=====================  Nonfocal  - AOx3, functional at home with all of his ADLs   - continue to monitor neuro status as per protocol     ==================== RESPIRATORY======================  Hx of COPD  - not home O2 dependent.  - stable on RA, SpO2 >96%  - continue to monitor SpO2 with goal >94%     ====================CARDIOVASCULAR==================  VT Storm  - interrogation revealed multiple episodes of VT since may requiring ATP and shocks  - Continue lidocaine gtt @0.5 mg/min. Decreased from 1 -> 0.5 overnight as minimal ectopy noted on telemetry. continue to monitor telemetry and consider trial off lido in AM if continues without ectopy.   - Cont PO amio 400 BID loading for a total of 7- 10g. Will then decreased to 400mg qd with eventual transition to 200mg qd as per EP    - is on home coreg 12.5 bid but will switch to lopressor 12.5 bid  - monitor lytes and keep K>4 and Mg>2  - f/u EP recs, medical management at this time. As per Ep- will consider ablation if continues to have ectopy with antiarrythmic drugs.   - will hold digoxin at this time. Dig level wnl     Hx CAD with stent  and HFrEF  - 10/2018 [facility]- nonobstructive CAD  -  TTE: EF 30%. Mild MR. Moderate AS and mild AR. Mod dilated LA. Akinetic basal inferior and inferolateral. Diffuse hypokinesis of the inferior and inferolateral wall, lateral wall. No LV thrombus. Severe diastolic dysfunction. Mild RA enlargement   - continue ASA and statin  - continue diuresis with Lasix 40 IVP BID, monitor UO and I/Os closely with goal net negative   - holding entresto at this time as BP soft with SBP 80s-90s. Consider reinitiation once stabilized     aspirin  chewable 81 milliGRAM(s) Oral daily  aMIOdarone    Tablet 400 milliGRAM(s) Oral every 12 hours  lidocaine   Infusion 1 mG/Min (7.5 mL/Hr) IV Continuous <Continuous>  metoprolol tartrate 12.5 milliGRAM(s) Oral two times a day  simvastatin 10 milliGRAM(s) Oral at bedtime      ===================HEMATOLOGIC/ONC ===================  Monitor H&H/Plts   -Continue with Heparin SQ for VTE ppx     heparin   Injectable 5000 Unit(s) SubCutaneous every 12 hours    ===================== RENAL =========================  CKD, non-oliguric, Baseline Cr 2-2.5  - admission SCr 2.19, most recently at 2.28. continue to monitor and trend   -Continue with IV Lasix 40mg BID  -Continue to monitor I/Os, BUN/Creatinine, and urine output.   -Replete lytes PRN. Keep K> 4 and Mg >2.     BPH  -c/w flomax 0.8 qhs (home med)    furosemide   Injectable 40 milliGRAM(s) IV Push two times a day  tamsulosin 0.8 milliGRAM(s) Oral at bedtime    ==================== GASTROINTESTINAL===================  Tolerating PO consistent carb diet.   - c/w Protonix for GERD    pantoprazole    Tablet 40 milliGRAM(s) Oral before breakfast    =======================    ENDOCRINE  =====================  Continue monitoring blood glucose for need to initiate sliding scale     Gout   - c/w home allopurinol for management     hypothyroidism   - c/w home synthroid     allopurinol 300 milliGRAM(s) Oral daily  levothyroxine 125 MICROGram(s) Oral daily    ========================INFECTIOUS DISEASE================  Afebrile, WBC within normal limits.   -Monitor temperature and trend WBC. Monitor off abx at this time.       Patient requires continuous monitoring with bedside rhythm monitoring, pulse ox monitoring, and intermittent blood gas analysis. Care plan discussed with ICU care team. Patient remained critical and at risk for life threatening decompensation.  Patient seen, examined and plan discussed with CCU team during rounds.     I have personally provided 35 minutes of critical care time excluding time spent on separate procedures.    By signing my name below, I, Angelica Quintero, attest that this documentation has been prepared under the direction and in the presence of LANA Burciaga   Electronically signed: Jasbir Lane, 21 @ 21:45    I, Brooke Clay, personally performed the services described in this documentation. all medical record entries made by the jasbir were at my direction and in my presence. I have reviewed the chart and agree that the record reflects my personal performance and is accurate and complete  Electronically signed: LANA Burciaga

## 2021-06-07 NOTE — REVIEW OF SYSTEMS
[Weight Gain (___ Lbs)] : [unfilled] ~Ulb weight gain [SOB] : shortness of breath [Dyspnea on exertion] : dyspnea during exertion [Lower Ext Edema] : lower extremity edema [Orthopnea] : orthopnea [Negative] : Heme/Lymph [Chest Discomfort] : no chest discomfort [Leg Claudication] : no intermittent leg claudication [Palpitations] : no palpitations [PND] : no PND [Syncope] : no syncope

## 2021-06-07 NOTE — PROGRESS NOTE ADULT - SUBJECTIVE AND OBJECTIVE BOX
Patient seen and examined at bedside.    Overnight Events: Patient doing well, no symptoms of chest pain, SOB, palpitations. Intermittent PVCs on tele.     Current Meds:  allopurinol 300 milliGRAM(s) Oral daily  aMIOdarone    Tablet 400 milliGRAM(s) Oral every 12 hours  aspirin  chewable 81 milliGRAM(s) Oral daily  chlorhexidine 4% Liquid 1 Application(s) Topical <User Schedule>  digoxin     Tablet 125 MICROGram(s) Oral daily  furosemide   Injectable 40 milliGRAM(s) IV Push two times a day  heparin   Injectable 5000 Unit(s) SubCutaneous every 12 hours  levothyroxine 125 MICROGram(s) Oral daily  lidocaine   Infusion 1 mG/Min IV Continuous <Continuous>  pantoprazole    Tablet 40 milliGRAM(s) Oral before breakfast  simvastatin 10 milliGRAM(s) Oral at bedtime  tamsulosin 0.8 milliGRAM(s) Oral at bedtime    Vitals:  T(F): 97.5 (), Max: 98.6 ()  HR: 68 () (68 - 188)  BP: 100/65 () (95/51 - 135/82)  RR: 14 ()  SpO2: 96% ()  I&O's Summary    2021 07:  -  2021 07:00  --------------------------------------------------------  IN: 685 mL / OUT: 775 mL / NET: -90 mL    2021 07:01  -  2021 10:11  --------------------------------------------------------  IN: 30 mL / OUT: 325 mL / NET: -295 mL    Physical Exam:  Appearance: No acute distress; well appearing  Eyes:  EOMI, pink conjunctiva  HENT: Normal oral mucosa  Cardiovascular: RRR, S1, S2, no murmurs, rubs, or gallops; no edema; no JVD  Respiratory: Clear to auscultation bilaterally  Gastrointestinal: soft, non-tender, non-distended with normal bowel sounds  Musculoskeletal: No clubbing; no joint deformity   Neurologic: Non-focal  Lymphatic: No lymphadenopathy  Psychiatry: AAOx3, mood & affect appropriate  Skin: No rashes                      8.7    6.91  )-----------( 69       ( 2021 02:47 )             26.8     06-07    141  |  104  |  49<H>  ----------------------------<  99  4.1   |  26  |  2.26<H>    Ca    8.8      2021 02:47  Phos  3.5     06-07  Mg     2.5     06-07    TPro  6.1  /  Alb  3.3  /  TBili  0.5  /  DBili  x   /  AST  19  /  ALT  22  /  AlkPhos  72  06-07    PT/INR - ( 2021 16:21 )   PT: 14.9 sec;   INR: 1.26 ratio      CARDIAC MARKERS ( 2021 16:22 )  x     / x     / x     / 52 U/L / x     / 2.5 ng/mL  CARDIAC MARKERS ( 2021 16:21 )  78 ng/L / x     / x     / x     / x     / x      CARDIAC MARKERS ( 2021 10:50 )  90 ng/L / x     / x     / x     / x     / x      CARDIAC MARKERS ( 2021 07:49 )  95 ng/L / x     / x     / 59 U/L / x     / 2.6 ng/mL    Serum Pro-Brain Natriuretic Peptide: 75440 pg/mL ( @ 07:49)    Total Cholesterol: 81  LDL: --  HDL: 42  T    New ECG(s): Personally reviewed    Echo: PENDING    Stress Testing:     Cath:    Imaging:    Interpretation of Telemetry: NSR, intermittent PVCs on tele

## 2021-06-08 NOTE — DIETITIAN INITIAL EVALUATION ADULT. - PERTINENT LABORATORY DATA
Labs: 06-08 @ 04:54: Sodium 140, Potassium 4.5, Calcium 8.4, Magnesium 2.4, Phosphorus 3.4, BUN 57<H>, Creatinine 2.47<H>, Glucose 101<H>, Alk Phos 69, ALT/SGPT 21, AST/SGOT 19, Albumin 3.1<L>, Prealbumin --, Total Bilirubin 0.4, Hemoglobin 8.1<L>, Hematocrit 25.0<L>, Ferritin --, C-Reactive Protein --, Creatine Kinase <<27>  06-07 @ 16:19: Sodium 138, Potassium 4.3, Calcium 8.5, Magnesium 2.4, Phosphorus 3.3, BUN 49<H>, Creatinine 2.28<H>, Glucose 116<H>, Alk Phos 71, ALT/SGPT 23, AST/SGOT 21, Albumin 3.1<L>, Prealbumin --, Total Bilirubin 0.5, Hemoglobin --, Hematocrit --, Ferritin --, C-Reactive Protein --, Creatine Kinase <<27>      Triglycerides, Serum: 48 mg/dL (06-06-21 @ 21:54)

## 2021-06-08 NOTE — PROGRESS NOTE ADULT - SUBJECTIVE AND OBJECTIVE BOX
Date of service: 06-08-21 @ 18:27      Patient is a 89y old  Male who presents with a chief complaint of ICD fired with VT (08 Jun 2021 14:12)                                                               INTERVAL HPI/OVERNIGHT EVENTS:    REVIEW OF SYSTEMS:     CONSTITUTIONAL: No weakness, fevers or chills  RESPIRATORY: No cough, wheezing,  No shortness of breath  CARDIOVASCULAR: No chest pain or palpitations  GASTROINTESTINAL: No abdominal pain  . No nausea, vomiting, or hematemesis; No diarrhea or constipation. No melena or hematochezia.  GENITOURINARY: No dysuria, frequency or hematuria  NEUROLOGICAL: No numbness or weakness                                                                                                                                                                                                                                                                                  Medications:  MEDICATIONS  (STANDING):  aMIOdarone    Tablet 400 milliGRAM(s) Oral every 12 hours  aspirin  chewable 81 milliGRAM(s) Oral daily  heparin   Injectable 5000 Unit(s) SubCutaneous every 12 hours  levothyroxine 125 MICROGram(s) Oral daily  pantoprazole    Tablet 40 milliGRAM(s) Oral before breakfast  polyethylene glycol 3350 17 Gram(s) Oral daily  senna 2 Tablet(s) Oral at bedtime  simvastatin 10 milliGRAM(s) Oral at bedtime  tamsulosin 0.8 milliGRAM(s) Oral at bedtime    MEDICATIONS  (PRN):       Allergies    No Known Allergies    Intolerances      Vital Signs Last 24 Hrs  T(C): 36.9 (08 Jun 2021 12:00), Max: 36.9 (08 Jun 2021 12:00)  T(F): 98.4 (08 Jun 2021 12:00), Max: 98.4 (08 Jun 2021 12:00)  HR: 68 (08 Jun 2021 18:00) (68 - 68)  BP: 85/48 (08 Jun 2021 18:00) (78/49 - 117/59)  BP(mean): 59 (08 Jun 2021 18:00) (56 - 81)  RR: 17 (08 Jun 2021 18:00) (12 - 22)  SpO2: 92% (08 Jun 2021 18:00) (92% - 98%)  CAPILLARY BLOOD GLUCOSE          06-07 @ 07:01  -  06-08 @ 07:00  --------------------------------------------------------  IN: 176.6 mL / OUT: 1845 mL / NET: -1668.4 mL    06-08 @ 07:01  -  06-08 @ 18:27  --------------------------------------------------------  IN: 733.8 mL / OUT: 480 mL / NET: 253.8 mL      Physical Exam:    Daily     Daily   General:  elderly in NAD   HEENT:  Nonicteric, PERRLA  CV:  RRR, S1S2   Lungs:  mild crackles at bases   Abdomen:  Soft, non-tender, no distended, positive BS  Extremities: trace edema   Neuro:  AAOx3, non-focal, grossly intact                                                                                                                                                                                                                                                                                                LABS:                               8.1    7.21  )-----------( 83       ( 08 Jun 2021 04:54 )             25.0                      06-08    140  |  102  |  57<H>  ----------------------------<  101<H>  4.5   |  23  |  2.47<H>    Ca    8.4      08 Jun 2021 04:54  Phos  3.4     06-08  Mg     2.4     06-08    TPro  6.2  /  Alb  3.1<L>  /  TBili  0.4  /  DBili  x   /  AST  19  /  ALT  21  /  AlkPhos  69  06-08                       RADIOLOGY & ADDITIONAL TESTS         I personally reviewed: [  ]EKG   [  ]CXR    [  ] CT      A/P:         Discussed with :     Kamlesh consultants' Notes   Time spent :

## 2021-06-08 NOTE — PROGRESS NOTE ADULT - ASSESSMENT
24 episodes of VT since 5/17, most pace-terminated with mild sx, 2 episodes syncope with shock.   Renal function and BNP only slightly worse than recent baseline.  No chest pain--troponin 90s maybe from shock--will trend.    Discussed with Dr. Bhakta of EPS  1. Lidocaine off; oral loading with amiodarone. ?? if patient would consider VT ablation if fails amio.  2. Resume outpatient cardiac meds.--Check echo. Would continue Entresto and Jardiance. On metoprolol in place of carvedilol for now.  I do not think there is a role for structural heart here; reviewed echo in detail.  3. COPD--continue outpatient meds.  (Followed by Dr. Ning Morris.)  4. OOB to chair.  5. Careful not to over diurese.    ? how long on po load with amiodarone before comfortable sending patient home?    Will follow along with EPS/CICU.  Wolf Paiz MD, FACC  (O) [phone]  (C) [phone]

## 2021-06-08 NOTE — CHART NOTE - NSCHARTNOTEFT_GEN_A_CORE
CICU Transfer Note    Transfer from: CICU     Transfer to: (  ) Medicine    (x) Telemetry     (   ) RCU        (    ) Palliative         (   ) Stroke Unit          (   ) [id number]    Accepting Physician: Dr. Davalos  Signout given to:     CICU COURSE:          ASSESSMENT & PLAN:             FOR FOLLOW UP: CICU Transfer Note    Transfer from: CICU     Transfer to: (  ) Medicine    (x) Telemetry     (   ) RCU        (    ) Palliative         (   ) Stroke Unit          (   ) [id number]    Accepting Physician: Dr. Davalos  Signout given to: FANTA Lazar    CICU COURSE: 6/6-6/8  Pt was admitted to the CICU after having been found in VT storm requiring ATP and shocks. In the ED on 6/6, pt was initially given Amio loading dose (10 gram) but had another episode of VT and became unresponsive transiently. Thus, pt was given lidocaine 100 bolus and started on a drip at 1. He was admitted to the CICU. EP was consulted and they had recommended medical management with lidocaine and amiodarone. While in the CICU, he did have an episode of NSVT x2 on the night of 6/6, which required an additional lidocaine bolus and increased his lidocaine gtt to 2. During these episodes, pt was hemodynamically stable and was asymptomatic. Afterwards, pt had his lidocaine weaned with no further episodes of VT however, with occasional PVCs. Pt continued to be hemodynamically stable and is stable for transfer to the floors.      ASSESSMENT & PLAN:      Assessment	  ====================ASSESSMENT ==============  89 M COPD (no home O2), CAD s/p stent 2015, AAA repair w/ stent, HTN, HLD, HFrEF (20%), Bi-V ICD ([employer]) presented with syncope found to be in VT storm requiring ATP and shocks, started on lido gtt and amio oral load.    Plan:  ====================== NEUROLOGY=====================  Nonfocal  - AOx3, functional at home with all of his ADLs   - continue to monitor neuro status as per protocol     ==================== RESPIRATORY======================  Hx of COPD  - not home O2 dependent.  - stable on RA, SpO2 >96%  - continue to monitor SpO2 with goal >94%     ====================CARDIOVASCULAR==================  VT Storm  - interrogation revealed multiple episodes of VT since may requiring ATP and shocks  -weaned off lidocaine gtt this AM  -c/w monitor for ectopy/VT  -c/w amio 400 bid (loading dose total 7-10 g)  -per EP, after load, can decrease to 400 mg qd then eventual transition to 200 mg qd  -is on home coreg 12.5 bid, switched to lopressor 12.5 bid but held due to soft BPs  - monitor lytes and keep K>4 and Mg>2  - f/u EP recs, medical management at this time. As per Ep- will consider ablation if continues to have ectopy with antiarrythmic drugs.   - will hold digoxin at this time. Dig level wnl     Hx CAD with stent 2015 and HFrEF  - 10/2018 [facility]- nonobstructive CAD  - 6/7 TTE: EF 30%. Mild MR. Moderate AS and mild AR. Mod dilated LA. Akinetic basal inferior and inferolateral. Diffuse hypokinesis of the inferior and inferolateral wall, lateral wall. No LV thrombus. Severe diastolic dysfunction. Mild RA enlargement   - continue ASA and statin  - on home lasix 40 bid but will hold given soft BPs  - holding home entresto and jardiance given soft BPs    ===================HEMATOLOGIC/ONC ===================  Monitor H&H/Plts   -Continue with Heparin SQ for VTE ppx     ===================== RENAL =========================  CKD, non-oliguric, Baseline Cr 2-2.5  - admission SCr 2.19, now 2.47 likely in setting of mild dehydration (had been putting out about 1 L/shift)  -hold lasix for now, will resume when BPs improve  -Continue to monitor I/Os, BUN/Creatinine, and urine output.   -Replete lytes PRN. Keep K> 4 and Mg >2.     BPH  -c/w flomax 0.8 qhs (home med)    ==================== GASTROINTESTINAL===================  Tolerating PO consistent carb diet.   - c/w Protonix for GERD  -senna/miralax    =======================    ENDOCRINE  =====================  Continue monitoring blood glucose for need to initiate sliding scale     Gout   - c/w home allopurinol for management     hypothyroidism   - c/w home synthroid     ========================INFECTIOUS DISEASE================  Afebrile, WBC within normal limits.   -Monitor temperature and trend WBC. Monitor off abx at this time.           FOR FOLLOW UP:    [ ] completing Amio 10 gram loading (currently 400 bid), then to continue 400 qd  [ ] takes home coreg 12.5 BID, which is on hold for soft BPs  [ ] home Digoxin on hold given bump in Creatinine  [ ] home entresto and jardiance on hold due to soft BPs  [ ] lasix 40 bid held  [ ] resume home lasix, BB and entreso when able  [ ] f/u EP recs CICU Transfer Note    Transfer from: CICU     Transfer to: (  ) Medicine    (x) Telemetry     (   ) RCU        (    ) Palliative         (   ) Stroke Unit          (   ) [id number]    Accepting Physician: Dr. Davalos  Signout given to: FANTA Lazar    CICU COURSE: 6/6-6/8  Pt was admitted to the CICU after having been found in VT storm requiring ATP and shocks. In the ED on 6/6, pt was initially given Amio loading dose (10 gram) but had another episode of VT and became unresponsive transiently. Thus, pt was given lidocaine 100 bolus and started on a drip at 1. He was admitted to the CICU. EP was consulted and they had recommended medical management with lidocaine and amiodarone. While in the CICU, he did have an episode of NSVT x2 on the night of 6/6, which required an additional lidocaine bolus and increased his lidocaine gtt to 2. During these episodes, pt was hemodynamically stable and was asymptomatic. Afterwards, pt had his lidocaine weaned with no further episodes of VT however, with occasional PVCs. Pt continued to be hemodynamically stable and is stable for transfer to the floors.      ASSESSMENT & PLAN:      Assessment	  ====================ASSESSMENT ==============  89 M COPD (no home O2), CAD s/p stent 2015, AAA repair w/ stent, HTN, HLD, HFrEF (20%), Bi-V ICD ([employer]tr[employer]) presented with syncope found to be in VT storm requiring ATP and shocks, started on lido gtt and amio oral load.    Plan:  ====================== NEUROLOGY=====================  Nonfocal  - AOx3, functional at home with all of his ADLs   - continue to monitor neuro status as per protocol     ==================== RESPIRATORY======================  Hx of COPD  - not home O2 dependent.  - stable on RA, SpO2 >96%  - continue to monitor SpO2 with goal >94%     ====================CARDIOVASCULAR==================  VT Storm  - interrogation revealed multiple episodes of VT since may requiring ATP and shocks  -weaned off lidocaine gtt yesterday AM, occasional NSVT runs without symptoms since  -c/w monitor for ectopy/VT  -c/w amio 400 bid for total loading dose fo 7-10 g  -per EP, after load, can decrease to 400 mg qd then eventual transition to 200 mg qd  -is on home coreg 12.5 bid, switched to lopressor 12.5 bid but held due to soft BPs  - monitor lytes and keep K>4 and Mg>2  - f/u EP recs, medical management at this time. As per Ep- will consider ablation if continues to have ectopy with antiarrythmic drugs.   - will hold digoxin at this time. Dig level wnl     Hx CAD with stent 2015 and HFrEF  - 10/2018 [facility]- nonobstructive CAD  - 6/7 TTE: EF 30%. Mild MR. Moderate AS and mild AR. Mod dilated LA. Akinetic basal inferior and inferolateral. Diffuse hypokinesis of the inferior and inferolateral wall, lateral wall. No LV thrombus. Severe diastolic dysfunction. Mild RA enlargement   - continue ASA and statin  - Dry on exam, s/p 250mL albumin x2 and 500mL bolus of LR. d/c lasix for now. continue to monitor fluid status closely on exam with goal net positive 500cc  - holding entresto at this time as BP soft with SBP 90s-100s. Consider reinitiation once stabilized   ===================HEMATOLOGIC/ONC ===================  Monitor H&H/Plts   -Continue with Heparin SQ for VTE ppx     heparin   Injectable 5000 Unit(s) SubCutaneous every 12 hours  ===================== RENAL =========================  CKD, non-oliguric, Baseline Cr 2-2.5  - admission SCr 2.19, most recently at 2.47. continue to monitor and trend   -Continue to monitor I/Os, BUN/Creatinine, and urine output.   -Replete lytes PRN. Keep K> 4 and Mg >2.     BPH  -c/w flomax 0.8 qhs (home med)    furosemide   Injectable 40 milliGRAM(s) IV Push two times a day  tamsulosin 0.8 milliGRAM(s) Oral at bedtime    ==================== GASTROINTESTINAL===================  Tolerating PO consistent carb diet.   - c/w Protonix for GERD  -senna/miralax    =======================    ENDOCRINE  =====================  Continue monitoring blood glucose for need to initiate sliding scale     Gout   - c/w home allopurinol for management     hypothyroidism   - c/w home synthroid     ========================INFECTIOUS DISEASE================  Afebrile, WBC within normal limits.   -Monitor temperature and trend WBC. Monitor off abx at this time.           FOR FOLLOW UP:    [ ] completing Amio 10 gram loading (currently 400 bid), then to continue 400 qd  [ ] takes home coreg 12.5 BID, which is on hold for soft BPs  [ ] home Digoxin on hold given bump in Creatinine  [ ] home entresto and jardiance on hold due to soft BPs  [ ] lasix 40 bid held  [ ] resume home lasix, BB and entreso when able  [ ] f/u EP recs CICU Transfer Note    Transfer from: CICU     Transfer to: (  ) Medicine    (x) Telemetry     (   ) RCU        (    ) Palliative         (   ) Stroke Unit          (   ) [id number]    Accepting Physician: Dr. Davalos  Signout given to: FANTA Lazar    CICU COURSE: 6/6-6/8  Pt was admitted to the CICU after having been found in VT storm requiring ATP and shocks. In the ED on 6/6, pt was initially given Amio loading dose (10 gram) but had another episode of VT and became unresponsive transiently. Thus, pt was given lidocaine 100 bolus and started on a drip at 1. He was admitted to the CICU. EP was consulted and they had recommended medical management with lidocaine and amiodarone. While in the CICU, he did have an episode of NSVT x2 on the night of 6/6, which required an additional lidocaine bolus and increased his lidocaine gtt to 2. During these episodes, pt was hemodynamically stable and was asymptomatic. Afterwards, pt had his lidocaine weaned with no further episodes of VT however, with occasional PVCs. Pt continued to be hemodynamically stable and is stable for transfer to the floors.      ASSESSMENT & PLAN:      Assessment	  ====================ASSESSMENT ==============  89 M COPD (no home O2), CAD s/p stent 2015, AAA repair w/ stent, HTN, HLD, HFrEF (20%), Bi-V ICD ([employer]) presented with syncope found to be in VT storm requiring ATP and shocks, started on lido gtt and amio oral load.    Plan:  ====================== NEUROLOGY=====================  Nonfocal  - AOx3, functional at home with all of his ADLs   - continue to monitor neuro status as per protocol     ==================== RESPIRATORY======================  Hx of COPD  - not home O2 dependent.  - stable on RA, SpO2 >96%  - continue to monitor SpO2 with goal >94%     ====================CARDIOVASCULAR==================  VT Storm  - interrogation revealed multiple episodes of VT since may requiring ATP and shocks  -weaned off lidocaine gtt yesterday AM, occasional NSVT runs without symptoms since  -c/w monitor for ectopy/VT  -c/w amio 400 bid for total loading dose fo 7-10 g  -per EP, after load, can decrease to 400 mg qd then eventual transition to 200 mg qd  -is on home coreg 12.5 bid, switched to lopressor 12.5 bid but held due to soft BPs  - monitor lytes and keep K>4 and Mg>2  - f/u EP recs, medical management at this time. As per Ep- will consider ablation if continues to have ectopy with antiarrythmic drugs.   - will hold digoxin at this time. Dig level wnl     Hx CAD with stent 2015 and HFrEF  - 10/2018 [facility]- nonobstructive CAD  - 6/7 TTE: EF 30%. Mild MR. Moderate AS and mild AR. Mod dilated LA. Akinetic basal inferior and inferolateral. Diffuse hypokinesis of the inferior and inferolateral wall, lateral wall. No LV thrombus. Severe diastolic dysfunction. Mild RA enlargement   - continue ASA and statin  - Dry on exam, s/p 250mL albumin x2 and 500mL bolus of LR. d/c lasix for now. continue to monitor fluid status closely on exam with goal net positive 500cc  - holding entresto at this time as BP soft with SBP 90s-100s. Consider reinitiation once stabilized   ===================HEMATOLOGIC/ONC ===================  Monitor H&H/Plts   -Continue with Heparin SQ for VTE ppx     heparin   Injectable 5000 Unit(s) SubCutaneous every 12 hours  ===================== RENAL =========================  CKD, non-oliguric, Baseline Cr 2-2.5  - admission SCr 2.19, most recently at 2.47. continue to monitor and trend   -Continue to monitor I/Os, BUN/Creatinine, and urine output.   -Replete lytes PRN. Keep K> 4 and Mg >2.     BPH  -c/w flomax 0.8 qhs (home med)    furosemide   Injectable 40 milliGRAM(s) IV Push two times a day  tamsulosin 0.8 milliGRAM(s) Oral at bedtime    ==================== GASTROINTESTINAL===================  Tolerating PO consistent carb diet.   - c/w Protonix for GERD  -senna/miralax    =======================    ENDOCRINE  =====================  Continue monitoring blood glucose for need to initiate sliding scale     Gout   - c/w home allopurinol for management     hypothyroidism   - c/w home synthroid     ========================INFECTIOUS DISEASE================  Afebrile, WBC within normal limits.   -Monitor temperature and trend WBC. Monitor off abx at this time.           FOR FOLLOW UP:    [ ] completing Amio 10 gram loading (currently 400 bid), then to continue 400 qd  [] f/u CBC at 10pm  [ ] takes home coreg 12.5 BID, which is on hold for soft BPs  [ ] home Digoxin on hold given bump in Creatinine  [ ] home entresto and jardiance on hold due to soft BPs  [ ] lasix 40 bid held  [ ] resume home lasix, BB and entreso when able  [ ] f/u EP recs

## 2021-06-08 NOTE — CHART NOTE - NSCHARTNOTEFT_GEN_A_CORE
MAR Accept Note  Transfer to:  Medicine  Accepting Attending Physician:  Dr. Davalos  Assigned Room:  617W    Patient seen and examined.   Labs and data reviewed.   No findings precluding transfer of service.       HPI/MICU COURSE:   Please refer to CCU transfer note for full details. Briefly, this is a89 yo M w/ HTn, CAD s/p stent, AAA, biventrivular AICD addmitted for syncopal w/u. Found to be have intermittend episdoes of VT after interrogation of device. Pt found to be in VT on admission, placed on lidocaine gtt and and broght to CCU. In the CICU, patient was started on 10g amiodarone load as per EP recs and weaned off lidocaine drip. Given age, EP plans on medically managing VT w/o plans for ablation. Patient stable for floots.       FOR FOLLOW-UP:  [  ] EP recs  [  ] F/u completetion of 10g Amio load    Zaheer Roman PGY3  MAR [id number] MAR Accept Note  Transfer to:  Medicine  Accepting Attending Physician:  Dr. Davalos  Assigned Room:  617W    Patient seen and examined.   Labs and data reviewed.   No findings precluding transfer of service.       HPI/MICU COURSE:   Please refer to CCU transfer note for full details. Briefly, this is a89 yo M w/ HTn, CAD s/p stent, AAA, biventricular AICD admitted for syncopal w/u. Found to be have intermittent episodes of VT after interrogation of device. Pt found to be in VT on admission, placed on lidocaine gtt and brought to CCU. In the CICU, patient was started on 10g amiodarone load as per EP recs and weaned off lidocaine drip. Given age, EP plans on medically managing VT w/o plans for ablation. Patient stable for floors'.       FOR FOLLOW-UP:  [  ] EP recs  [  ] F/u completion of 10g Amio load    Zaheer Roman PGY3  MAR [id number] MAR Accept Note  Transfer to:  Medicine  Accepting Attending Physician:  Dr. Davalos  Assigned Room:  617W    Patient seen and examined.   Labs and data reviewed.   No findings precluding transfer of service.       HPI/CICU COURSE:   Please refer to CCU transfer note for full details. Briefly, this is a89 yo M w/ HTn, CAD s/p stent, AAA, biventricular AICD admitted for syncopal w/u. Found to be have intermittent episodes of VT after interrogation of device. Pt found to be in VT on admission, placed on lidocaine gtt and brought to CCU. In the CICU, patient was started on 10g amiodarone load as per EP recs and weaned off lidocaine drip. Given age, EP plans on medically managing VT w/o plans for ablation. Patient stable for floors'.       FOR FOLLOW-UP:  [  ] EP recs  [  ] F/u completion of 10g Amio load    Zaheer Roman PGY3  MAR [id number]

## 2021-06-08 NOTE — DIETITIAN INITIAL EVALUATION ADULT. - PERTINENT MEDS FT
MEDICATIONS  (STANDING):  aMIOdarone    Tablet 400 milliGRAM(s) Oral every 12 hours  aspirin  chewable 81 milliGRAM(s) Oral daily  chlorhexidine 4% Liquid 1 Application(s) Topical <User Schedule>  heparin   Injectable 5000 Unit(s) SubCutaneous every 12 hours  levothyroxine 125 MICROGram(s) Oral daily  metoprolol tartrate 12.5 milliGRAM(s) Oral two times a day  pantoprazole    Tablet 40 milliGRAM(s) Oral before breakfast  polyethylene glycol 3350 17 Gram(s) Oral daily  senna 2 Tablet(s) Oral at bedtime  simvastatin 10 milliGRAM(s) Oral at bedtime  tamsulosin 0.8 milliGRAM(s) Oral at bedtime    MEDICATIONS  (PRN):

## 2021-06-08 NOTE — PROGRESS NOTE ADULT - ASSESSMENT
89 M COPD (no home O2), CAD s/p stent 2015, AAA repair w/ stent, HTN, HLD, HFrEF (20%), Bi-V ICD ([employer]) presented with syncope found to be in VT storm requiring ATP and shocks, started on lido gtt and amio oral load.      VT Storm  - interrogation revealed multiple episodes of VT since may requiring ATP and shocks  -weaned off lidocaine gtt this AM  -c/w monitor for ectopy/VT  -c/w amio 400 bid (loading dose total 7-10 g)  -per EP, after load, can decrease to 400 mg qd then eventual transition to 200 mg qd  -is on home coreg 12.5 bid, switched to lopressor 12.5 bid but held due to soft BPs  - monitor lytes and keep K>4 and Mg>2  - f/u EP recs, medical management at this time. As per Ep- will consider ablation if continues to have ectopy with antiarrythmic drugs.   - will hold digoxin at this time. Dig level wnl     Hx CAD with stent 2015 and HFrEF  - 10/2018 [facility]- nonobstructive CAD  - 6/7 TTE: EF 30%. Mild MR. Moderate AS and mild AR. Mod dilated LA. Akinetic basal inferior and inferolateral. Diffuse hypokinesis of the inferior and inferolateral wall, lateral wall. No LV thrombus. Severe diastolic dysfunction. Mild RA enlargement   - continue ASA and statin  - on home lasix 40 bid but will hold given soft BPs  - holding home entresto and jardiance given soft BPs  - completing Amio 10 gram loading (currently 400 bid), then to continue 400 qd  -  coreg 12.5 BID, which is on hold for soft BPs   - home Digoxin on hold given bump in Creatinine   home entresto and jardiance on hold due to soft BPs   f/u EP recs.        CKD, non-oliguric, Baseline Cr 2-2.5  - admission SCr 2.19, now 2.47 likely in setting of mild dehydration (had been putting out about 1 L/shift)  -hold lasix for now, will resume when BPs improve  -Continue to monitor I/Os, BUN/Creatinine, and urine output.   -Replete lytes PRN. Keep K> 4 and Mg >2.     BPH  -c/w flomax 0.8 qhs (home med)        Gout   - c/w home allopurinol for management     hypothyroidism   - c/w home synthroid         appreciate CCU input and help    consider palliative and GOC

## 2021-06-08 NOTE — PROGRESS NOTE ADULT - SUBJECTIVE AND OBJECTIVE BOX
Patient seen and examined today in bed with family at bedside. Telemetry reviewed personally. The patient feels well.     TELE: paced. Occasional unifocal PVCs recorded. A second PVC morphology does occur but is rare.     MEDICATIONS  (STANDING):  aMIOdarone    Tablet 400 milliGRAM(s) Oral every 12 hours  aspirin  chewable 81 milliGRAM(s) Oral daily  chlorhexidine 4% Liquid 1 Application(s) Topical <User Schedule>  heparin   Injectable 5000 Unit(s) SubCutaneous every 12 hours  levothyroxine 125 MICROGram(s) Oral daily  pantoprazole    Tablet 40 milliGRAM(s) Oral before breakfast  polyethylene glycol 3350 17 Gram(s) Oral daily  senna 2 Tablet(s) Oral at bedtime  simvastatin 10 milliGRAM(s) Oral at bedtime  tamsulosin 0.8 milliGRAM(s) Oral at bedtime    MEDICATIONS  (PRN):    Allergies  No Known Allergies    PAST MEDICAL & SURGICAL HISTORY:  HTN (hypertension)  HLD (hyperlipidemia)  CAD (coronary artery disease)  S/P angioplasty 2000  BPH (Benign Prostatic Hyperplasia)  CHF (congestive heart failure)  Hypothyroid  GERD (gastroesophageal reflux disease)  Gout  COPD (chronic obstructive pulmonary disease)  MI (myocardial infarction)  2000  Cataract  Hernia, inguinal, bilateral  Achilles rupture  S/P knee replacement, right  AAA (abdominal aortic aneurysm)  H/O repair of rotator cuff  S/P angioplasty    Vital Signs Last 24 Hrs  T(C): 36.9 (08 Jun 2021 12:00), Max: 36.9 (08 Jun 2021 12:00)  T(F): 98.4 (08 Jun 2021 12:00), Max: 98.4 (08 Jun 2021 12:00)  HR: 68 (08 Jun 2021 14:00) (68 - 68)  BP: 96/53 (08 Jun 2021 14:00) (81/48 - 117/59)  BP(mean): 74 (08 Jun 2021 14:00) (56 - 81)  RR: 19 (08 Jun 2021 14:00) (12 - 22)  SpO2: 92% (08 Jun 2021 14:00) (92% - 99%)    Physical Exam:  Constitutional: NAD, AAOx3, thin and kyphotic  Cardiovascular: +S1S2 RRR, 3/6 [id number] at LSB  Pulmonary: CTA b/l, unlabored  GI: soft NTND +BS  Extremities: no pedal edema,   Neuro: non focal, ALVARADO x4    LABS:                        8.1    7.21  )-----------( 83       ( 08 Jun 2021 04:54 )             25.0     140  |  102  |  57<H>  ----------------------------<  101<H>  4.5   |  23  |  2.47<H>  Ca    8.4      08 Jun 2021 04:54  Phos  3.4     06-08  Mg     2.4     06-08  TPro  6.2  /  Alb  3.1<L>  /  TBili  0.4  /  DBili  x   /  AST  19  /  ALT  21  /  AlkPhos  69  06-08  PT/INR - ( 06 Jun 2021 16:21 )   PT: 14.9 sec;   INR: 1.26 ratio      RADIOLOGY & ADDITIONAL TESTS:  TTE 6/7/2021  Conclusions:  1. Calcified aortic valve with decreased opening. Peak  transaortic valve gradient equals 24 mm Hg, mean  transaortic valve gradient equals 12 mm Hg, estimated  aortic valve area equals 1 sqcm (by continuity equation),  aortic valve velocity time integral equals 47 cm,  consistent with moderate aortic stenosis.  2. Moderately dilated left atrium.  LA volume index = 48  cc/m2.  3. Mild left ventricular enlargement.  4. Severe segmental left ventricular systolic dysfunction.  Akinetic basalinferior and inferolateral.  Diffuse  hypokinesis of the inferior and inferolateral wall, lateral  wall.   Endocardial visualization enhanced with intravenous  injection of Ultrasonic Enhancing Agent (Definity). No left  ventricular thrombus.  5. Severe  diastolic dysfunction (Stage III).  6. Mild right atrial enlargement.  7. Right ventricular enlargement with decreased right  ventricular systolic function. A device wire is noted in  the right heart.    CXR 6/6/21  IMPRESSION:  Clear lungs.    A/P  89 year old male patient with a history of ICM, CAD s/p stents, low-flow low gradient AS, HFrEF 20% s/p MDT Bi-V ICD, COPD who presents with syncope x 2 found to have multiple episodes of VT on interrogation requiring ATP and shock. Admitted with VT storm     Great reduction in VT burden since starting Amiodarone  Currently has received 2G total of Amiodarone  Off Lidocaine gtt    - May resume beta blockers as clinically tolerated.   - No further sustained events on telemetry  - Keep K>4, Mg>2  - Tolerating Amiodarone thus far. Patient should have routine LFT, TFTs, yearly eye exam and pulmonary function testing if remains on long term therapy  - If remains stable overnight and no sustained events of VT occur then may consider discharge planning tomorrow from EP perspective.     Storm Gilman, PAC CCDS  [id number]

## 2021-06-08 NOTE — PROGRESS NOTE ADULT - SUBJECTIVE AND OBJECTIVE BOX
Patient seen and evaluated @855   Chief Complaint: Patient is a 89y old  Male who presents with a chief complaint of syncope (06 Jun 2021 09:36)      HPI:  89 M CAD s/p stents, low-flow low gradient AS, HFrEF 20% s/p MDT Bi-V ICD, COPD who presents with syncope x 2.    Pt reports usual state of health until yesterday night when he experienced an episode of syncope. His wife saw him in bed but he started to slide off the bed onto the floor. He denies any prodrome or feeling any chest pain, sob, palpitations, or shock from his ICD. This AM, the same scenario happened again which prompted the wife to call EMS.     On interrogation, it showed multiple episodes (24) of VT since 5/17 leading up to today that required ATP and shocks. The last one was 6/6/21 at 617AM which did not break with 2 ATPs and ultimately led to shock. (06 Jun 2021 11:40)    CARDIAC HISTORY: I have known the patient since 2006.  He had an MI and angioplasty in 1994.  Had a cath in 2011 with a 40% aneurysmal left main normal LAD and circumflex with a 95% right lesion and an akinetic inferior wall.  Treated medically and did well other than an admission for cellulitis in 2013 with positive blood cultures.  No endocarditis.  Issues with shortness of breath both from COPD and CHF.  Able to undergo knee replacement in 2014 and then later endovascular repair of an abdominal aortic aneurysm in October 2014.  Stress echo in 2015 showed an ejection fraction of 27%.  Cath revealed unchanged coronaries but LVEF 35%.  The right coronary was stented but did not change his LVEF so we had a defibrillator and biventricular pacemaker placed by Dr. Russell in August 2015.  I changed his Avapro to Entresto and the patient's CHF seem to improve.  August 25 of 2017 had a syncopal episode in the bathroom.  He had no idea what happened but interrogation showed V. tach followed by V. fib which led to a defibrillator shock.  No recurrence and no AICD shocks since then until this admission.  2019 had a couple of runs of ventricular tachycardia that were paced terminated.  98% of the time biventricular pacing.  2021 episode of atrial fibrillation on interrogation that lasted an hour and 48 minutes.  Progressive heart failure and he was worked up for possible TAVR with low gradient aortic stenosis including a dobutamine echo and was found not to be a candidate.  Has had progressive shortness of breath and LV dysfunction with adjusting of his medications and recently adding Jardiance and then increasing it from 10-25.  Issues with creatinine running between 2 and 2.5.  BNP as high as 18,000.  Most recent echo was March 23 of this year with an aortic gradient of 31 peak and 19 mean, dimensionless index 0.20 and no AI.  Severe segmental LV systolic dysfunction with LVEF 23 to 25%.  Mild LVH.  Only mild MR and mild to moderate TR with RVSP 54.    PMH:   HTN (hypertension)    HLD (hyperlipidemia)    CAD (coronary artery disease)    BPH (Benign Prostatic Hyperplasia)    CHF (congestive heart failure)    Hypothyroid    GERD (gastroesophageal reflux disease)    Gout    COPD (chronic obstructive pulmonary disease)    MI (myocardial infarction)      PSH:   Cataract    Hernia, inguinal, bilateral    Achilles rupture    S/P knee replacement, right    AAA (abdominal aortic aneurysm)    H/O repair of rotator cuff    S/P angioplasty    OUTPATIENT CARDIAC MEDS: Carvedilol 12.5 mg twice daily, Entresto 97/103 twice daily, digoxin 0.125 daily, Jardiance 25 mg daily, furosemide 40 mg daily, Synthroid 0.137 daily, simvastatin 10 daily.    Medications:   aMIOdarone    Tablet 400 milliGRAM(s) Oral every 12 hours  aspirin  chewable 81 milliGRAM(s) Oral daily  chlorhexidine 4% Liquid 1 Application(s) Topical <User Schedule>  heparin   Injectable 5000 Unit(s) SubCutaneous every 12 hours  levothyroxine 125 MICROGram(s) Oral daily  metoprolol tartrate 12.5 milliGRAM(s) Oral two times a day  pantoprazole    Tablet 40 milliGRAM(s) Oral before breakfast  polyethylene glycol 3350 17 Gram(s) Oral daily  senna 2 Tablet(s) Oral at bedtime  simvastatin 10 milliGRAM(s) Oral at bedtime  tamsulosin 0.8 milliGRAM(s) Oral at bedtime    Allergies:  No Known Allergies    FAMILY HISTORY:  Family history of hemolytic uremic syndrome    Family history of CHF (congestive heart failure)      Social History: , fairly active despite restrictions from CHF and COPD  Smoking: Remote  Alcohol: No  Drugs: No    Review of Systems:  Constitutional: no recent weight loss  HEENT: no scleral icterus. Normal mucosa.  Respiratory: (+) COPD followed by Dr. Morris  Cardiovascular: see HPI  Gastrointestinal: No Abdominal Pain, no Diarrhea, no Constipation, no Nausea or Vomiting  Genitourinary: No Nocturia, Dysuria, or Incontinence. No hematuria.  BPH  Extremities: (+) edema. No cyanosis.  Neurologic: No Focal deficit. No Paresthesias. No Syncope. No seizures  Lymphatic: No Swelling. No Lymphadenopathy   Skin: No Rash,  Ecchymoses, Wounds, or Lesions  Psychiatry: No Depression. No Anxiety.    10 point review of systems is otherwise negative except as mentioned above            [ ]Unable to obtain    Physical Exam:  Vital Signs Last 24 Hrs  T(C): 36.4 (08 Jun 2021 08:00), Max: 36.7 (08 Jun 2021 00:10)  T(F): 97.6 (08 Jun 2021 08:00), Max: 98 (08 Jun 2021 00:10)  HR: 68 (08 Jun 2021 08:00) (68 - 68)  BP: 98/69 (08 Jun 2021 08:00) (83/40 - 117/59)  BP(mean): 77 (08 Jun 2021 08:00) (56 - 81)  RR: 15 (08 Jun 2021 08:00) (13 - 22)  SpO2: 98% (08 Jun 2021 08:00) (93% - 100%)  Appearance: WD, WN, NAD. No more trouble with speech. No weakness etc. No VT or a fib overnight.  Eyes:  No scleral icterus. PERRL, EOMI  HENT: Normal oral mucosa.]NC/AT  Neck:  JVD 1/3 up at 90 degrees. Normal carotid upstrokes without bruits or murmurs.  Cardiovascular: Normal PMI. Normal S1, S2 physiologically split. No S3, (+) S4. 2/6 RODRÍGUEZ, 2-3/6 HSM apex.  Respiratory: Clear to auscultation bilaterally. No wheezes. No rales.  Gastrointestinal: Soft.  Non-tender. No hepatosplenomegally.  Extremities: No clubbing. No edema today. Pulses 2+ bilaterally symmetric except diminished pedal.  Musculoskeletal:  No joint deformity   Neurologic: Non-focal  Lymphatic:  No lymphadenopathy  Psychiatry: [+ ] AAOx3 [ +] Mood & affect appropriate  Skin: No rashes. No ecchymoses. No cyanosis    Cardiovascular Diagnostic Testing:  ECG:< from: 12 Lead ECG (06.07.21 @ 07:34) >  Ventricular Rate 69 BPM    Atrial Rate 69 BPM    QRS Duration 138 ms    Q-T Interval 421 ms    QTC Calculation(Bazett) 451 ms    P Axis 35 degrees    R Axis -74 degrees    T Axis 119 degrees    Diagnosis Line ELECTRONIC VENTRICULAR PACEMAKER  WHEN COMPARED WITH ECG OF `6/6/21 14:50  NO SIGNIFICANT CHANGE WAS FOUND  Confirmed by KAM Woodward Zlata ([id number]) on 6/7/2021 10:45:59 AM    < end of copied text >    < from: TTE with Doppler (w/Cont) (06.07.21 @ 07:37) >  YOB: 1925   Age: 89 (M)   MR#: [id number]  Study Date: 6/7/2021  [facility] RDCS  Study quality: Technically fair  Referring Physician: Yvonne Brown MD  Blood Pressure: 106/55 mmHg  Height: 175 cm  Weight: 70 kg  BSA: 1.9 m2  ------------------------------------------------------------------------  PROCEDURE: Transthoracic echocardiogram with 2-D, M-Mode  and complete spectral and color flow Doppler. Verbal  consent was obtained for injection of  Ultrasonic Enhancing  Agent following a discussion of risks and benefits.  Following intravenous injection of Ultrasonic Enhancing  Agent , harmonic imaging was performed.  INDICATION: Syncope and collapse (R55)  ------------------------------------------------------------------------  Dimensions:    Normal Values:  LA:     5.2    2.0 - 4.0 cm  Ao:     3.4    2.0 - 3.8 cm  SEPTUM: 1.4    0.6 - 1.2 cm  PWT:    0.7    0.6 - 1.1 cm  LVIDd:  5.2    3.0 - 5.6 cm  LVIDs:  4.7    1.8 - 4.0 cm  Derived variables:  LVMI: 122 g/m2  RWT: 0.28  Fractional short: 10 %  EF (Visual Estimate): 30 %  Doppler Peak Velocity (m/sec): MV=1.4 AoV=2.5  ------------------------------------------------------------------------  Observations:  Mitral Valve: Tethered mitral valve leaflets with normal  opening. Mild mitral regurgitation.  Aortic Valve/Aorta: Calcified aortic valve with decreased  opening. Peak transaortic valve gradient equals 24 mm Hg,  mean transaortic valve gradient equals 12 mm Hg, estimated  aortic valve area equals 1 sqcm (by continuity equation),  aortic valve velocity time integral equals 47 cm,  consistent with moderate aortic stenosis. Mild aortic  regurgitation.  Peak left ventricular outflow tract  gradient equals 1 mm Hg, mean gradient is equal to 1 mm Hg,  LVOT velocity time integral equals 9 cm.  Aortic Root: 3.4 cm.  LVOT diameter: 2.6 cm.  Left Atrium: Moderately dilated left atrium.  LA volume  index = 48 cc/m2.  Left Ventricle: Severe segmental left ventricular systolic  dysfunction.  Akinetic basal inferior and inferolateral.  Diffuse hypokinesis of the inferior and inferolateral wall,  lateral wall.   Endocardial visualization enhanced with  intravenous injection of Ultrasonic Enhancing Agent  (Definity). No left ventricular thrombus. Mild left  ventricular enlargement. Severe  diastolic dysfunction  (Stage III).  Right Heart: Mild right atrial enlargement. Right  ventricular enlargement with decreased right ventricular  systolicfunction. A device wire is noted in the right  heart. Normal tricuspid valve. Minimal tricuspid  regurgitation. Normal pulmonic valve.  Pericardium/Pleura: Normal pericardium with no pericardial  effusion.  Hemodynamic: Estimated right atrial pressure is 8 mm Hg.  Estimated right ventricular systolic pressure equals 31 mm  Hg, assuming right atrial pressure equals 8 mm Hg,  consistent with normal pulmonary pressures.  ------------------------------------------------------------------------  Conclusions:  1. Calcified aortic valve with decreased opening. Peak  transaortic valve gradient equals 24 mm Hg, mean  transaortic valve gradient equals 12 mm Hg, estimated  aortic valve area equals 1 sqcm (by continuity equation),  aortic valve velocity time integral equals 47 cm,  consistent with moderate aortic stenosis.  2. Moderately dilated left atrium.  LA volume index = 48  cc/m2.  3. Mild left ventricular enlargement.  4. Severe segmental left ventricular systolic dysfunction.  Akinetic basalinferior and inferolateral.  Diffuse  hypokinesis of the inferior and inferolateral wall, lateral  wall.   Endocardial visualization enhanced with intravenous  injection of Ultrasonic Enhancing Agent (Definity). No left  ventricular thrombus.  5. Severe  diastolic dysfunction (Stage III).  6. Mild right atrial enlargement.  7. Right ventricular enlargement with decreased right  ventricular systolic function. A device wire is noted in  the right heart.  ------------------------------------------------------------------------  Confirmed on  6/7/2021 - 14:32:38 by TIERRA Ovalles  ------------------------------------------------------------------------    < end of copied text >    Echo: 3/23/2021 MAC with only mild MR.  Calcified aortic valve with decreased opening.  Peak gradient 31, mean 19, dimensionless index 0.20 and no AI noted this time.  Severe LAE.  Severe segmental LV systolic dysfunction with LVEF 23 to 25%.  Mild LVH.  Normal RV size and function with mild to moderate TR.  RVSP 54.  No pericardial effusion.       Stress Testing:< from: Stress Echocardiogram-Pharmacologic (10.09.18 @ 17:10) >  Observations:  Mitral Valve: Mitral annular calcification. Moderate mitral  regurgitation.  Aortic Valve/Aorta: Calcified trileaflet aortic valve with  decreased opening. Peak transaortic valve gradient equals  25 mm Hg, mean transaortic valve gradient equals 12 mm Hg,  estimated aortic valve area equals 0.9 sqcm (by continuity  equation), consistent with severe aortic stenosis. Mild  aortic regurgitation.  Peak left ventricular outflow tract  gradient equals 1 mm Hg, mean gradient is equal to 1 mm Hg.  Aortic Root: 3.6 cm.  LVOT diameter: 2.4 cm.  Left Atrium: Severely dilated left atrium.  LA volume index  = 51 cc/m2.  Left Ventricle: Severe segmental left ventricular systolic  dysfunction.  Severe hypokinesis/akinesis of the inferior  and inferolateral wall, basal inferoseptum, anterolateral  wall. Mild left ventricular enlargement.  Right Heart: Normal right atrium. A device wire is noted in  the right heart. Decreased right ventricular systolic  function. Normal tricuspid valve. Minimal tricuspid  regurgitation. Normal pulmonic valve.  Pericardium/Pleura: Normal pericardium with no pericardial  effusion.  Hemodynamic: Estimated right atrial pressure is 8 mm Hg.  Estimated right ventricular systolic pressure equals 33 mm  Hg, assuming right atrial pressure equals 8 mm Hg,  consistent with normal pulmonary pressures.  ------------------------------------------------------------------------  Pharmacologic Stress Test:  Agent:  Dobutamine Dose: 5  mcg/Kg/min over 0 min.  Baseline HR: 71 bpm  Peak HR: 74 bpm  % MPHR: 58 %  Baseline BP: 121/68 mmHg  Peak BP: 134/77 mmHg  Peak RPP: 9,916 (Rate Pressure Product)  Test terminated: Completion of test  Baseline EKG: Paced rhythm  EKG changes: Non diagnostic ECG.  Arrhythmia: None  Heart Rhythm: Paced  HR Response: HR failed to increase appropriately.  BP Response: Appropriate  Symptoms: None  ------------------------------------------------------------------------  Echocardiographic Study:  (A) Baseline echocardiogram reveals:  1. Moderate mitral regurgitation.  2. Calcified trileaflet aortic valve with decreased  opening. Peak transaortic valve gradient equals 25 mm Hg,  mean transaortic valve gradient equals 12 mm Hg, estimated  aortic valve area equals 0.9 sqcm (by continuity equation),  consistent with severe aortic stenosis. Mild aortic  regurgitation.  3. Left ventricular enlargement.  4. Severe segmental left ventricular systolic dysfunction.  Severe hypokinesis/akinesis of the inferior and  inferolateral wall, basal inferoseptum, anterolateral wall.  5. A device wire is noted in the right heart. Decreased  right ventricular systolic function.  (B) Stress echocardiogram reveals:  No significant change in left ventricular systolic  function.  ------------------------------------------------------------------------  Conclusions:  1. Normal hemodynamic response.  2. Non Diagnostic electrocardiographic response.  3. No significant change in left ventricular systolic  function.  4. HR failed to increase appropriately.  5. 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  Discussed with Dr. Kay.  ------------------------------------------------------------------------  Confirmed on  10/10/2018 - 17:48:23 by Jerome Johnson M.D.  ------------------------------------------------------------------------    < end of copied text >      Cath:< from: Cardiac Cath Lab - Adult (10.08.18 @ 15:55) >  CORONARY VESSELS: The coronary circulation is right dominant.  LM:   --  Proximal left main: There was a 40 % stenosis. 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  --  Distal left main: There was a stenosis. The lesion was eccentric.  LAD:   --  LAD: Angiography showed moderate atherosclerosis.  CX:   --  Circumflex: Angiography showed moderate atherosclerosis.  RCA:   --  RCA: Angiography showed moderate atherosclerosis.  --  Proximal RCA: There was a diffuse 30 % stenosis at the site of a prior  stent.  LEFT LOWER EXTREMITY VESSELS: Left external iliac: Angiography showed  aneurysmal dilatation. Left common femoral: The vessel was tortuous.  RIGHT LOWER EXTREMITY VESSELS: Right external iliac: Angiography showed  aneurysmal dilatation. Right common femoral: The vessel was excessively  totuos  < end of copied text >      Interpretation of Telemetry:    Imaging:   < from: Xray Chest 1 View- PORTABLE-Urgent (06.06.21 @ 08:38) >  COMPARISON: Chest x-ray from 5/14/2018.    FINDINGS:    The lungs are clear.  There is no pneumothorax or large pleural effusion.  Heart size cannot be accurately assessed in this projection. Dual lead pacemaker overlying the left chest wall.  No acute rib fractures or other osseous abnormality. Suture anchors noted in the left humeral head.    IMPRESSION:    Clear lungs.        < end of copied text >    Labs:                                              8.1    7.21  )-----------( 83       ( 08 Jun 2021 04:54 )             25.0     06-08    140  |  102  |  57<H>  ----------------------------<  101<H>  4.5   |  23  |  2.47<H>    Ca    8.4      08 Jun 2021 04:54  Phos  3.4     06-08  Mg     2.4     06-08    TPro  6.2  /  Alb  3.1<L>  /  TBili  0.4  /  DBili  x   /  AST  19  /  ALT  21  /  AlkPhos  69  06-08    CARDIAC MARKERS ( 06 Jun 2021 16:22 )  x     / x     / 52 U/L / x     / 2.5 ng/mL      PT/INR - ( 06 Jun 2021 16:21 )   PT: 14.9 sec;   INR: 1.26 ratio

## 2021-06-08 NOTE — PROGRESS NOTE ADULT - ASSESSMENT
====================ASSESSMENT ==============  89 M COPD (no home O2), CAD s/p stent 2015, AAA repair w/ stent, HTN, HLD, HFrEF (20%), Bi-V ICD ([employer]) presented with syncope found to be in VT storm requiring ATP and shocks, started on lido gtt and amio oral load.    Plan:  ====================== NEUROLOGY=====================  Nonfocal  - AOx3, functional at home with all of his ADLs   - continue to monitor neuro status as per protocol     ==================== RESPIRATORY======================  Hx of COPD  - not home O2 dependent.  - stable on RA, SpO2 >96%  - continue to monitor SpO2 with goal >94%     ====================CARDIOVASCULAR==================  VT Storm  - interrogation revealed multiple episodes of VT since may requiring ATP and shocks  -weaned off lidocaine gtt this AM  -c/w monitor for ectopy/VT  -c/w amio 400 bid (loading dose total 7-10 g)  -per EP, after load, can decrease to 400 mg qd then eventual transition to 200 mg qd  -is on home coreg 12.5 bid, switched to lopressor 12.5 bid but held due to soft BPs  - monitor lytes and keep K>4 and Mg>2  - f/u EP recs, medical management at this time. As per Ep- will consider ablation if continues to have ectopy with antiarrythmic drugs.   - will hold digoxin at this time. Dig level wnl     Hx CAD with stent 2015 and HFrEF  - 10/2018 [facility]- nonobstructive CAD  - 6/7 TTE: EF 30%. Mild MR. Moderate AS and mild AR. Mod dilated LA. Akinetic basal inferior and inferolateral. Diffuse hypokinesis of the inferior and inferolateral wall, lateral wall. No LV thrombus. Severe diastolic dysfunction. Mild RA enlargement   - continue ASA and statin  - on home lasix 40 bid but will hold given soft BPs  - holding home entresto and jardiance given soft BPs    ===================HEMATOLOGIC/ONC ===================  Monitor H&H/Plts   -Continue with Heparin SQ for VTE ppx     ===================== RENAL =========================  CKD, non-oliguric, Baseline Cr 2-2.5  - admission SCr 2.19, now 2.47 likely in setting of mild dehydration (had been putting out about 1 L/shift)  -hold lasix for now, will resume when BPs improve  -Continue to monitor I/Os, BUN/Creatinine, and urine output.   -Replete lytes PRN. Keep K> 4 and Mg >2.     BPH  -c/w flomax 0.8 qhs (home med)      ==================== GASTROINTESTINAL===================  Tolerating PO consistent carb diet.   - c/w Protonix for GERD  -senna/miralax    =======================    ENDOCRINE  =====================  Continue monitoring blood glucose for need to initiate sliding scale     Gout   - c/w home allopurinol for management     hypothyroidism   - c/w home synthroid     ========================INFECTIOUS DISEASE================  Afebrile, WBC within normal limits.   -Monitor temperature and trend WBC. Monitor off abx at this time.

## 2021-06-08 NOTE — DIETITIAN INITIAL EVALUATION ADULT. - FACTORS AFF FOOD INTAKE
Dislikes hospital foods. Endorses that sitting in bed makes it difficult to eat. Feels constipated, last BM 2 days ago. Ordered for Miralax, senna; RD added prunes to meal trays. Denies nausea/vomiting, chewing/swallowing issues.

## 2021-06-08 NOTE — DIETITIAN INITIAL EVALUATION ADULT. - OTHER INFO
Denies recent weight changes or changes in appearance. -160 pounds, consistent with dosing weight 155 pounds (6/6).    Pt with fair PO intake in-house, declined oral nutrition supplements or to provide further food preferences at this time. RD verbally reviewed low sodium diet in setting of history of HF; written materials declined. Consistent carbohydrate diet not indicated given no hx DM, BG WNL. RD remains available PRN.

## 2021-06-08 NOTE — PROGRESS NOTE ADULT - ATTENDING COMMENTS
The ventricular tachycardia has settled on amiodarone and he is off lidocaine. Plan to move to telemetry miles and continue amiodarone 400 bid for a week before reducing to 200 daily.

## 2021-06-08 NOTE — PROGRESS NOTE ADULT - ATTENDING COMMENTS
Admitted with syncope in the setting of VT  Medically managed on Lidocaine infusion, Amiodarone PO load and Lopressor  Borderline hypotensive, may need to hold Lopressor  Holding outpatient Entresto due to BP  Frequent ventricular ectopy on tele without runs of VT  O2 sats mid to high 90s on room air  Diabetic diet  CKD with unclear baseline, dry on exam - target even   H/H low but acceptable on HSQ for DVT prophylaxis   Afebrile, no antibiotics  Sugars controlled  No central access Admitted with syncope in the setting of VT  Medically managed on Lidocaine infusion, Amiodarone PO load and Lopressor  Borderline hypotensive, hold Lopressor  Also holding outpatient Entresto due to BP  Frequent ventricular ectopy on tele without runs of VT - wean off Lidocaine infusion  O2 sats mid to high 90s on room air  Diabetic diet  CKD with unclear baseline, dry on exam, ? if overdiuresed - target 500 cc positive   H/H low but acceptable on HSQ for DVT prophylaxis   Afebrile, no antibiotics  Sugars controlled  No central access

## 2021-06-08 NOTE — DIETITIAN INITIAL EVALUATION ADULT. - ORAL INTAKE PTA/DIET HISTORY
Reports good appetite/ PO intake PTA. Family assists in cooking. Has hx of HF, COPD. Limits intake of high sodium foods, does not add salt when cooking. NKFA. PTA micronutrient supplementation includes vitamin D3, ferrous sulfate, Lutein. No hx of DM, A1c 5.1%.

## 2021-06-08 NOTE — PROGRESS NOTE ADULT - SUBJECTIVE AND OBJECTIVE BOX
LUBNA VALLE  MRN-[id number]  Patient is a 89y old  Male who presents with a chief complaint of ICD fired with VT (08 Jun 2021 18:27)    HPI:  89 yo M PMHx COPD (not home O2 dependent), CAD s/p stent 2015 on ASA, AAA repair with stent, HTN, HLD, chronic systolic HF (EF 20%), GERD, hypothyroidism, Bi-V ICD ([employer]), who presented to [facility] ED on 6/6/21 for an episode of syncope yesterday night. His wife saw him in bed but then he started sliding off the bed onto the floor. He had denied any prodrome, feeling CP, sob, palpitations, or shock from his ICD. THis morning, a similar episode happened again, which prompted the wife to call EMS.     In the ED, VS: T 97.8, HR 90, /76, RR 18, 95% on RA.   Labs: WBC 6.7, H&H 9.6/30.0, Plt 82, Na 141, K 4.5, Cl 102, HCO3 26, BUN/Cr 50/2.19, Ca 8.7, TNI 95->90, BNP 39567, CK 59, Mg 2.6, Ph 3.1, Digoxin 1.1  CT head negative. Xrays of chest, pelvis, and R knee negative for acute pathology.    EP was consulted and his device was interrogated. It showed multiple episodes of VT since 5/17/21 requiring ATP and shocks (last was 6/6/21 at 6:17AM). Pt was initially to be admitted to telemetry after being given oral amiodarone 400 mg however, while in the ED, pt had an episode of VT w/ unresponsiveness and AICD terminated the rhythm. Pt was awake and responsive afterwards. Pt was given a lidocaine bolus and was started on a lidocaine gtt and was admitted to the CCU for further monitoring.     (06 Jun 2021 11:40)      Hospital course:    Today:    Physical Exam:  Vital Signs Last 24 Hrs  T(C): 36.7 (08 Jun 2021 19:00), Max: 36.9 (08 Jun 2021 12:00)  T(F): 98 (08 Jun 2021 19:00), Max: 98.4 (08 Jun 2021 12:00)  HR: 68 (08 Jun 2021 21:00) (68 - 72)  BP: 109/53 (08 Jun 2021 21:00) (74/42 - 117/59)  BP(mean): 77 (08 Jun 2021 21:00) (52 - 102)  RR: 17 (08 Jun 2021 21:00) (12 - 22)  SpO2: 96% (08 Jun 2021 21:00) (92% - 98%)    PHYSICAL EXAM:  Constitutional: Patient laying in bed, NAD  Head: Atraumatic, normocephalic  Eyes: No scleral icterus. PERRLA. EOMI  ENMT: Moist mucous membrane. Uvula midline  Neck: Supple, No JVD  Respiratory: CTA B/L. No wheezes, rales or rhonchi   Cardiovascular: S1/S2. No murmurs, rubs, or gallops.  Gastrointestinal: Nondistended, BSx4, soft, nontender.  Extremities: Moves all extremities. Warm, no edema, nontender  Vascular: 2+ DP/PT pulses B/L   Neurological: A&Ox3. Follows commands. No focal deficits.   Skin: No rashes on exposed skin     ============================I/O===========================   I&O's Detail    07 Jun 2021 07:01  -  08 Jun 2021 07:00  --------------------------------------------------------  IN:    Lidocaine: 45 mL    Lidocaine: 105 mL    Lidocaine: 26.6 mL  Total IN: 176.6 mL    OUT:    Voided (mL): 1845 mL  Total OUT: 1845 mL    Total NET: -1668.4 mL      08 Jun 2021 07:01  -  08 Jun 2021 21:45  --------------------------------------------------------  IN:    Albumin 5%  - 250 mL: 500 mL    Lactated Ringers: 1000 mL    Lidocaine: 3.8 mL    Oral Fluid: 540 mL  Total IN: 2043.8 mL    OUT:    Voided (mL): 615 mL  Total OUT: 615 mL    Total NET: 1428.8 mL        ============================ LABS =========================                        8.1    7.21  )-----------( 83       ( 08 Jun 2021 04:54 )             25.0     06-08    140  |  102  |  57<H>  ----------------------------<  101<H>  4.5   |  23  |  2.47<H>    Ca    8.4      08 Jun 2021 04:54  Phos  3.4     06-08  Mg     2.4     06-08    TPro  6.2  /  Alb  3.1<L>  /  TBili  0.4  /  DBili  x   /  AST  19  /  ALT  21  /  AlkPhos  69  06-08    LIVER FUNCTIONS - ( 08 Jun 2021 04:54 )  Alb: 3.1 g/dL / Pro: 6.2 g/dL / ALK PHOS: 69 U/L / ALT: 21 U/L / AST: 19 U/L / GGT: x                 ======================Micro/Rad/Cardio=================  Culture: Reviewed   CXR: Reviewed  Echo:Reviewed  ======================================================  PAST MEDICAL & SURGICAL HISTORY:  HTN (hypertension)    HLD (hyperlipidemia)    CAD (coronary artery disease)  S/P angioplasty 2000    BPH (Benign Prostatic Hyperplasia)    CHF (congestive heart failure)    Hypothyroid    GERD (gastroesophageal reflux disease)    Gout    COPD (chronic obstructive pulmonary disease)    MI (myocardial infarction)  2000    Cataract    Hernia, inguinal, bilateral    Achilles rupture    S/P knee replacement, right    AAA (abdominal aortic aneurysm)    H/O repair of rotator cuff    S/P angioplasty      ====================ASSESSMENT ==============  CNS:  RES:  CVS:  Hem:  Kushal:  GI:  Endo:  ID:  Skin  Plan:  ====================== NEUROLOGY=====================    ==================== RESPIRATORY======================  Mechanical Ventilation:      ====================CARDIOVASCULAR==================  aMIOdarone    Tablet 400 milliGRAM(s) Oral every 12 hours  tamsulosin 0.8 milliGRAM(s) Oral at bedtime    ===================HEMATOLOGIC/ONC ===================  aspirin  chewable 81 milliGRAM(s) Oral daily  heparin   Injectable 5000 Unit(s) SubCutaneous every 12 hours    ===================== RENAL =========================  Continue monitoring urine output    ==================== GASTROINTESTINAL===================  lactated ringers. 1000 milliLiter(s) (500 mL/Hr) IV Continuous <Continuous>  pantoprazole    Tablet 40 milliGRAM(s) Oral before breakfast  polyethylene glycol 3350 17 Gram(s) Oral daily  senna 2 Tablet(s) Oral at bedtime    =======================    ENDOCRINE  =====================  levothyroxine 125 MICROGram(s) Oral daily  simvastatin 10 milliGRAM(s) Oral at bedtime    ========================INFECTIOUS DISEASE================      ========================PROPHYLACTIC MEASURE================  DVT  GI Protonix  Graft patency   Beta blocker      Patient requires continuous monitoring with bedside rhythm monitoring, arterial line, pulse oximetry, ventilator monitoring and intermittent blood gas analysis.  Care plan discussed with ICU care team.  Patient remained critical; required more than usual post op care; I have spent 35 minutes providing non-routine post op care, revaluated multiple times during the day.         LUBNA VALLE  MRN-[id number]  Patient is a 89y old  Male who presents with a chief complaint of ICD fired with VT (08 Jun 2021 18:27)    HPI:  89 yo M PMHx COPD (not home O2 dependent), CAD s/p stent 2015 on ASA, AAA repair with stent, HTN, HLD, chronic systolic HF (EF 20%), GERD, hypothyroidism, Bi-V ICD ([employer]), who presented to [facility] ED on 6/6/21 for an episode of syncope yesterday night. His wife saw him in bed but then he started sliding off the bed onto the floor. He had denied any prodrome, feeling CP, sob, palpitations, or shock from his ICD. THis morning, a similar episode happened again, which prompted the wife to call EMS.     In the ED, VS: T 97.8, HR 90, /76, RR 18, 95% on RA.   Labs: WBC 6.7, H&H 9.6/30.0, Plt 82, Na 141, K 4.5, Cl 102, HCO3 26, BUN/Cr 50/2.19, Ca 8.7, TNI 95->90, BNP 23415, CK 59, Mg 2.6, Ph 3.1, Digoxin 1.1  CT head negative. Xrays of chest, pelvis, and R knee negative for acute pathology.    EP was consulted and his device was interrogated. It showed multiple episodes of VT since 5/17/21 requiring ATP and shocks (last was 6/6/21 at 6:17AM). Pt was initially to be admitted to telemetry after being given oral amiodarone 400 mg however, while in the ED, pt had an episode of VT w/ unresponsiveness and AICD terminated the rhythm. Pt was awake and responsive afterwards. Pt was given a lidocaine bolus and was started on a lidocaine gtt and was admitted to the CCU for further monitoring.     (06 Jun 2021 11:40)      Hospital course:  6/6 Presented to the [facility] for an episode of syncope.   6/7: ATP threshold adjusted. Coreg transitioned to lopressor. Started PO amio load. Lido gtt decreased to 0.5 mg/min   6/8: lido gtt d/c. Lasix IVP d/c and given 250mL of albumin x2 and 500mL bolus of LR for soft BP and uptrending creatinine.     Today:  lido gtt d/c. Lasix IVP d/c and given 250mL of albumin x2 and 500mL bolus of LR for soft BP with good response. Poor PO intake, likely dehydrated     Physical Exam:  Vital Signs Last 24 Hrs  T(C): 36.7 (08 Jun 2021 19:00), Max: 36.9 (08 Jun 2021 12:00)  T(F): 98 (08 Jun 2021 19:00), Max: 98.4 (08 Jun 2021 12:00)  HR: 68 (08 Jun 2021 21:00) (68 - 72)  BP: 109/53 (08 Jun 2021 21:00) (74/42 - 117/59)  BP(mean): 77 (08 Jun 2021 21:00) (52 - 102)  RR: 17 (08 Jun 2021 21:00) (12 - 22)  SpO2: 96% (08 Jun 2021 21:00) (92% - 98%)    PHYSICAL EXAM:  Constitutional: Patient laying in bed, NAD  Head: Atraumatic, normocephalic  Eyes: No scleral icterus. PERRLA. EOMI  ENMT: Moist mucous membrane. Uvula midline  Neck: Supple, No JVD  Respiratory: CTA B/L. No wheezes, rales or rhonchi   Cardiovascular: S1/S2. No murmurs, rubs, or gallops.  Gastrointestinal: Nondistended, BSx4, soft, nontender.  Extremities: Moves all extremities. Warm, no edema, nontender  Vascular: 2+ DP/PT pulses B/L   Neurological: A&Ox3. Follows commands. No focal deficits.   Skin: No rashes on exposed skin on exposed skin    ============================I/O===========================   I&O's Detail    07 Jun 2021 07:01  -  08 Jun 2021 07:00  --------------------------------------------------------  IN:    Lidocaine: 45 mL    Lidocaine: 105 mL    Lidocaine: 26.6 mL  Total IN: 176.6 mL    OUT:    Voided (mL): 1845 mL  Total OUT: 1845 mL    Total NET: -1668.4 mL      08 Jun 2021 07:01  -  08 Jun 2021 21:45  --------------------------------------------------------  IN:    Albumin 5%  - 250 mL: 500 mL    Lactated Ringers: 1000 mL    Lidocaine: 3.8 mL    Oral Fluid: 540 mL  Total IN: 2043.8 mL    OUT:    Voided (mL): 615 mL  Total OUT: 615 mL    Total NET: 1428.8 mL        ============================ LABS =========================                        8.1    7.21  )-----------( 83       ( 08 Jun 2021 04:54 )             25.0     06-08    140  |  102  |  57<H>  ----------------------------<  101<H>  4.5   |  23  |  2.47<H>    Ca    8.4      08 Jun 2021 04:54  Phos  3.4     06-08  Mg     2.4     06-08    TPro  6.2  /  Alb  3.1<L>  /  TBili  0.4  /  DBili  x   /  AST  19  /  ALT  21  /  AlkPhos  69  06-08    LIVER FUNCTIONS - ( 08 Jun 2021 04:54 )  Alb: 3.1 g/dL / Pro: 6.2 g/dL / ALK PHOS: 69 U/L / ALT: 21 U/L / AST: 19 U/L / GGT: x                 ======================Micro/Rad/Cardio=================  Culture: Reviewed   CXR: Reviewed  Echo:Reviewed  ======================================================  PAST MEDICAL & SURGICAL HISTORY:  HTN (hypertension)    HLD (hyperlipidemia)    CAD (coronary artery disease)  S/P angioplasty 2000    BPH (Benign Prostatic Hyperplasia)    CHF (congestive heart failure)    Hypothyroid    GERD (gastroesophageal reflux disease)    Gout    COPD (chronic obstructive pulmonary disease)    MI (myocardial infarction)  2000    Cataract    Hernia, inguinal, bilateral    Achilles rupture    S/P knee replacement, right    AAA (abdominal aortic aneurysm)    H/O repair of rotator cuff    S/P angioplasty      ====================ASSESSMENT ==============  COPD   CAD s/p stent  and AAA repair in 2015   HTN  HLD   Chronic systolic HF (EF 20%)  GERD  Hypothyroidism   Syncope    Plan:  ====================== NEUROLOGY=====================  Nonfocal  - AOx3, functional at home with all of his ADLs   - continue to monitor neuro status as per protocol     ==================== RESPIRATORY======================  Hx of COPD  - not home O2 dependent.  - stable on RA, SpO2 >96%  - continue to monitor SpO2 with goal >94%     ====================CARDIOVASCULAR==================  VT  - interrogation revealed multiple episodes of VT since may requiring ATP and shocks  - Lido gtt d/c. continue to monitor telemetry. Currently with Freq PVCs only.   - Cont PO amio 400 BID loading for a total of 7- 10g. Will then decreased to 400mg qd with eventual transition to 200mg qd as per EP    - is on home coreg 12.5 bid but switched to lopressor 12.5 bid  - monitor lytes and keep K>4 and Mg>2  - f/u EP recs, medical management at this time. As per Ep- will consider ablation if continues to have ectopy with antiarrythmic drugs.   - will hold digoxin at this time. Dig level wnl     Hx CAD with stent 2015 and HFrEF  - 10/2018 [facility]- nonobstructive CAD  - 6/7 TTE: EF 30%. Mild MR. Moderate AS and mild AR. Mod dilated LA. Akinetic basal inferior and inferolateral. Diffuse hypokinesis of the inferior and inferolateral wall, lateral wall. No LV thrombus. Severe diastolic dysfunction. Mild RA enlargement   - continue ASA and statin  - Dry on exam, s/p 250mL albumin x2 and 500mL bolus of LR. d/c lasix for now. continue to monitor fluid status closely on exam.   - holding entresto at this time as BP soft with SBP 90s-100s. Consider reinitiation once stabilized     aspirin  chewable 81 milliGRAM(s) Oral daily  aMIOdarone    Tablet 400 milliGRAM(s) Oral every 12 hours  lidocaine   Infusion 1 mG/Min (7.5 mL/Hr) IV Continuous <Continuous>  metoprolol tartrate 12.5 milliGRAM(s) Oral two times a day  simvastatin 10 milliGRAM(s) Oral at bedtime      ===================HEMATOLOGIC/ONC ===================  Monitor H&H/Plts   -Continue with Heparin SQ for VTE ppx     heparin   Injectable 5000 Unit(s) SubCutaneous every 12 hours    ===================== RENAL =========================  CKD, non-oliguric, Baseline Cr 2-2.5  - admission SCr 2.19, most recently at 2.47. continue to monitor and trend   -Continue to monitor I/Os, BUN/Creatinine, and urine output.   -Replete lytes PRN. Keep K> 4 and Mg >2.     BPH  -c/w flomax 0.8 qhs (home med)    furosemide   Injectable 40 milliGRAM(s) IV Push two times a day  tamsulosin 0.8 milliGRAM(s) Oral at bedtime    ==================== GASTROINTESTINAL===================  Tolerating PO consistent carb diet.   - c/w Protonix for GERD    pantoprazole    Tablet 40 milliGRAM(s) Oral before breakfast    =======================    ENDOCRINE  =====================  Continue monitoring blood glucose for need to initiate sliding scale     Gout   - c/w home allopurinol for management     hypothyroidism   - c/w home synthroid     allopurinol 300 milliGRAM(s) Oral daily  levothyroxine 125 MICROGram(s) Oral daily    ========================INFECTIOUS DISEASE================  Afebrile, WBC within normal limits.   -Monitor temperature and trend WBC. Monitor off abx at this time.       Stable for downgrade to telemetry floor     Patient requires continuous monitoring with bedside rhythm monitoring, arterial line, pulse oximetry, ventilator monitoring and intermittent blood gas analysis.  Care plan discussed with ICU care team.  Patient remained critical; required more than usual post op care; I have spent 35 minutes providing non-routine post op care, revaluated multiple times during the day.

## 2021-06-08 NOTE — PROGRESS NOTE ADULT - SUBJECTIVE AND OBJECTIVE BOX
CHIEF COMPLAINT:    Interval Events:    REVIEW OF SYSTEMS:  Constitutional: [ ] negative [ ] fevers [ ] chills [ ] weight loss [ ] weight gain  HEENT: [ ] negative [ ] dry eyes [ ] eye irritation [ ] postnasal drip [ ] nasal congestion  CV: [ ] negative  [ ] chest pain [ ] orthopnea [ ] palpitations [ ] murmur  Resp: [ ] negative [ ] cough [ ] shortness of breath [ ] dyspnea [ ] wheezing [ ] sputum [ ] hemoptysis  GI: [ ] negative [ ] nausea [ ] vomiting [ ] diarrhea [ ] constipation [ ] abd pain [ ] dysphagia   : [ ] negative [ ] dysuria [ ] nocturia [ ] hematuria [ ] increased urinary frequency  Musculoskeletal: [ ] negative [ ] back pain [ ] myalgias [ ] arthralgias [ ] fracture  Skin: [ ] negative [ ] rash [ ] itch  Neurological: [ ] negative [ ] headache [ ] dizziness [ ] syncope [ ] weakness [ ] numbness  Psychiatric: [ ] negative [ ] anxiety [ ] depression  Endocrine: [ ] negative [ ] diabetes [ ] thyroid problem  Hematologic/Lymphatic: [ ] negative [ ] anemia [ ] bleeding problem  Allergic/Immunologic: [ ] negative [ ] itchy eyes [ ] nasal discharge [ ] hives [ ] angioedema  [ ] All other systems negative  [ ] Unable to assess ROS because ________    OBJECTIVE:  ICU Vital Signs Last 24 Hrs  T(C): 36.7 (2021 00:10), Max: 36.7 (2021 00:10)  T(F): 98 (2021 00:10), Max: 98 (2021 00:10)  HR: 68 (2021 06:00) (68 - 78)  BP: 117/59 (2021 06:00) (83/40 - 117/59)  BP(mean): 81 (2021 06:00) (56 - 81)  ABP: --  ABP(mean): --  RR: 19 (2021 06:00) (13 - 27)  SpO2: 94% (2021 06:00) (93% - 100%)        06-06 @ 07:01  -  06-07 @ 07:00  --------------------------------------------------------  IN: 685 mL / OUT: 775 mL / NET: -90 mL     @ 07:01  -   @ 06:56  --------------------------------------------------------  IN: 176.6 mL / OUT: 1845 mL / NET: -1668.4 mL      CAPILLARY BLOOD GLUCOSE      POCT Blood Glucose.: 111 mg/dL (2021 07:23)      PHYSICAL EXAM:  General:   HEENT:   Lymph Nodes:  Neck:   Respiratory:   Cardiovascular:   Abdomen:   Extremities:   Skin:   Neurological:  Psychiatry:    LINES:    HOSPITAL MEDICATIONS:  Standing Meds:  allopurinol 300 milliGRAM(s) Oral daily  aMIOdarone    Tablet 400 milliGRAM(s) Oral every 12 hours  aspirin  chewable 81 milliGRAM(s) Oral daily  chlorhexidine 4% Liquid 1 Application(s) Topical <User Schedule>  furosemide   Injectable 40 milliGRAM(s) IV Push two times a day  heparin   Injectable 5000 Unit(s) SubCutaneous every 12 hours  levothyroxine 125 MICROGram(s) Oral daily  lidocaine   Infusion 0.5 mG/Min IV Continuous <Continuous>  metoprolol tartrate 12.5 milliGRAM(s) Oral two times a day  pantoprazole    Tablet 40 milliGRAM(s) Oral before breakfast  polyethylene glycol 3350 17 Gram(s) Oral daily  senna 2 Tablet(s) Oral at bedtime  simvastatin 10 milliGRAM(s) Oral at bedtime  tamsulosin 0.8 milliGRAM(s) Oral at bedtime      PRN Meds:      LABS:                        8.1    7.21  )-----------( 83       ( 2021 04:54 )             25.0     Hgb Trend: 8.1<--, 8.7<--, 9.1<--, 9.6<--      138  |  101  |  49<H>  ----------------------------<  116<H>  4.3   |  25  |  2.28<H>    Ca    8.5      2021 16:19  Phos  3.3     06-07  Mg     2.4     06-07    TPro  6.2  /  Alb  3.1<L>  /  TBili  0.5  /  DBili  x   /  AST  21  /  ALT  23  /  AlkPhos  71  06-07    Creatinine Trend: 2.28<--, 2.26<--, 1.96<--, 2.19<--  PT/INR - ( 2021 16:21 )   PT: 14.9 sec;   INR: 1.26 ratio           Urinalysis Basic - ( 2021 07:58 )    Color: Light Yellow / Appearance: Clear / S.018 / pH: x  Gluc: x / Ketone: Negative  / Bili: Negative / Urobili: Negative   Blood: x / Protein: Trace / Nitrite: Negative   Leuk Esterase: Negative / RBC: 3 /hpf / WBC 2 /HPF   Sq Epi: x / Non Sq Epi: 0 /hpf / Bacteria: Negative        Venous Blood Gas:   @ 07:49  7.40/49/<20/30/16  VBG Lactate: 0.9      MICROBIOLOGY:     Culture - Urine (collected 2021 10:11)  Source: .Urine Clean Catch (Midstream)  Final Report (2021 12:27):    <10,000 CFU/mL Normal Urogenital Maria C      RADIOLOGY:  [ ] Reviewed and interpreted by me    EKG:   CHIEF COMPLAINT: syncope    Interval Events: lidocaine weaned to 0.5 overnight, had frequent PVCs but no VT. pt denies complaints. this morning, turned off lidocaine gtt.     REVIEW OF SYSTEMS:  Constitutional: no fevers; no chills  HEENT: no visual changes, no sore throat, no rhinorrhea  CV: no cp; no palpitations  Resp: no sob; no cough  GI: no abd pain, no nausea, no vomiting, no diarrhea, no constipation  : no dysuria, no hematuria  MSK: no myalgais; no arthralgias  skin: no rashes  neuro: no HA, no numbness; no weakness, no tingling  ROS statement: all other ROS negative except as per HPI     OBJECTIVE:  ICU Vital Signs Last 24 Hrs  T(C): 36.7 (2021 00:10), Max: 36.7 (2021 00:10)  T(F): 98 (2021 00:10), Max: 98 (2021 00:10)  HR: 68 (2021 06:00) (68 - 78)  BP: 117/59 (2021 06:00) (83/40 - 117/59)  BP(mean): 81 (2021 06:00) (56 - 81)  ABP: --  ABP(mean): --  RR: 19 (2021 06:00) (13 - 27)  SpO2: 94% (2021 06:00) (93% - 100%)         @ 07: @ 07:00  --------------------------------------------------------  IN: 685 mL / OUT: 775 mL / NET: -90 mL     @ 07:  -  08 @ 06:56  --------------------------------------------------------  IN: 176.6 mL / OUT: 1845 mL / NET: -1668.4 mL      CAPILLARY BLOOD GLUCOSE      POCT Blood Glucose.: 111 mg/dL (2021 07:23)      PHYSICAL EXAM:  General:   HEENT:   Lymph Nodes:  Neck:   Respiratory:   Cardiovascular:   Abdomen:   Extremities:   Skin:   Neurological:  Psychiatry:    LINES:    HOSPITAL MEDICATIONS:  Standing Meds:  allopurinol 300 milliGRAM(s) Oral daily  aMIOdarone    Tablet 400 milliGRAM(s) Oral every 12 hours  aspirin  chewable 81 milliGRAM(s) Oral daily  chlorhexidine 4% Liquid 1 Application(s) Topical <User Schedule>  furosemide   Injectable 40 milliGRAM(s) IV Push two times a day  heparin   Injectable 5000 Unit(s) SubCutaneous every 12 hours  levothyroxine 125 MICROGram(s) Oral daily  lidocaine   Infusion 0.5 mG/Min IV Continuous <Continuous>  metoprolol tartrate 12.5 milliGRAM(s) Oral two times a day  pantoprazole    Tablet 40 milliGRAM(s) Oral before breakfast  polyethylene glycol 3350 17 Gram(s) Oral daily  senna 2 Tablet(s) Oral at bedtime  simvastatin 10 milliGRAM(s) Oral at bedtime  tamsulosin 0.8 milliGRAM(s) Oral at bedtime      PRN Meds:      LABS:                        8.1    7.21  )-----------( 83       ( 2021 04:54 )             25.0     Hgb Trend: 8.1<--, 8.7<--, 9.1<--, 9.6<--  06    138  |  101  |  49<H>  ----------------------------<  116<H>  4.3   |  25  |  2.28<H>    Ca    8.5      2021 16:19  Phos  3.3     06-07  Mg     2.4     06-07    TPro  6.2  /  Alb  3.1<L>  /  TBili  0.5  /  DBili  x   /  AST  21  /  ALT  23  /  AlkPhos  71      Creatinine Trend: 2.28<--, 2.26<--, 1.96<--, 2.19<--  PT/INR - ( 2021 16:21 )   PT: 14.9 sec;   INR: 1.26 ratio           Urinalysis Basic - ( 2021 07:58 )    Color: Light Yellow / Appearance: Clear / S.018 / pH: x  Gluc: x / Ketone: Negative  / Bili: Negative / Urobili: Negative   Blood: x / Protein: Trace / Nitrite: Negative   Leuk Esterase: Negative / RBC: 3 /hpf / WBC 2 /HPF   Sq Epi: x / Non Sq Epi: 0 /hpf / Bacteria: Negative        Venous Blood Gas:   @ 07:49  7.40/49/<20/30/16  VBG Lactate: 0.9      MICROBIOLOGY:     Culture - Urine (collected 2021 10:11)  Source: .Urine Clean Catch (Midstream)  Final Report (2021 12:27):    <10,000 CFU/mL Normal Urogenital Maria C      RADIOLOGY:  [ ] Reviewed and interpreted by me    EKG:

## 2021-06-09 NOTE — PROGRESS NOTE ADULT - ASSESSMENT
89M CAD s/p stents, low-flow low gradient AS, HFrEF 20% s/p MDT Bi-V ICD, COPD who presents with syncope x 2 found to have multiple episodes of VT on interrogation requiring ATP and shock. He had another episode of VT in the ED that was successfully treated with ATP and another that required shock. He is currently Bi-V paced and hemodynamically stable. Given his age, will opt for medical management with amiodarone to see if this will suppress his VT for now.    Monomorphic VT s/p ATP and defibrillation: No further sustained VT episodes  - Added a Ramp (1) to allow for more ATP therapy after Burst (2)  - continue amiodarone 400 BID for total of 7-10 g load, and then 400 QD (eventually decrease to 200 QD)  - s/p lidocaine 100mg bolus, was on lido drip, however discontinued 6/8  - home coreg 12.5 q12h held currently for hypotension  - patient stable to leave CICU, will continue to follow for any recurrence of VT  - Tolerating Amiodarone thus far. Patient should have routine LFT, TFTs, yearly eye exam and pulmonary function testing if remains on long term therapy    Junie Lr MD  Cardiology Fellow, PGY-4  [phone]  For all other Cardiology service contact information, go to [email or web address] and use "[email or web address]" to login.

## 2021-06-09 NOTE — PHYSICAL THERAPY INITIAL EVALUATION ADULT - GENERAL OBSERVATIONS, REHAB EVAL
pt howard 30 min eval well. seen with LAURIE Fishman, cleared with MD Osei. pt rec'd in chair, ICU monitors, a-line, NAD, agreed to session, A&Ox4, following all commands. pt reports h/o chronic R knee injury. RN present for chair follow and monitoring vitals. see initial evaluation document for functional assessment. activity limited by pt fatigue. Pt left in chair, RN aware, VSS, NAD, all lines intact, cb in reach, all needs met/5Ps.

## 2021-06-09 NOTE — PROGRESS NOTE ADULT - SUBJECTIVE AND OBJECTIVE BOX
CHIEF COMPLAINT:    Interval Events:    REVIEW OF SYSTEMS:  Constitutional: [ ] negative [ ] fevers [ ] chills [ ] weight loss [ ] weight gain  HEENT: [ ] negative [ ] dry eyes [ ] eye irritation [ ] postnasal drip [ ] nasal congestion  CV: [ ] negative  [ ] chest pain [ ] orthopnea [ ] palpitations [ ] murmur  Resp: [ ] negative [ ] cough [ ] shortness of breath [ ] dyspnea [ ] wheezing [ ] sputum [ ] hemoptysis  GI: [ ] negative [ ] nausea [ ] vomiting [ ] diarrhea [ ] constipation [ ] abd pain [ ] dysphagia   : [ ] negative [ ] dysuria [ ] nocturia [ ] hematuria [ ] increased urinary frequency  Musculoskeletal: [ ] negative [ ] back pain [ ] myalgias [ ] arthralgias [ ] fracture  Skin: [ ] negative [ ] rash [ ] itch  Neurological: [ ] negative [ ] headache [ ] dizziness [ ] syncope [ ] weakness [ ] numbness  Psychiatric: [ ] negative [ ] anxiety [ ] depression  Endocrine: [ ] negative [ ] diabetes [ ] thyroid problem  Hematologic/Lymphatic: [ ] negative [ ] anemia [ ] bleeding problem  Allergic/Immunologic: [ ] negative [ ] itchy eyes [ ] nasal discharge [ ] hives [ ] angioedema  [ ] All other systems negative  [ ] Unable to assess ROS because ________    OBJECTIVE:  ICU Vital Signs Last 24 Hrs  T(C): 36.7 (09 Jun 2021 05:00), Max: 36.9 (08 Jun 2021 12:00)  T(F): 98 (09 Jun 2021 05:00), Max: 98.4 (08 Jun 2021 12:00)  HR: 72 (09 Jun 2021 06:00) (68 - 72)  BP: 93/53 (09 Jun 2021 06:00) (74/42 - 113/83)  BP(mean): 67 (09 Jun 2021 06:00) (52 - 102)  ABP: --  ABP(mean): --  RR: 19 (09 Jun 2021 06:00) (12 - 22)  SpO2: 93% (09 Jun 2021 06:00) (91% - 98%)        06-07 @ 07:01  -  06-08 @ 07:00  --------------------------------------------------------  IN: 176.6 mL / OUT: 1845 mL / NET: -1668.4 mL    06-08 @ 07:01  -  06-09 @ 06:56  --------------------------------------------------------  IN: 2293.8 mL / OUT: 840 mL / NET: 1453.8 mL      CAPILLARY BLOOD GLUCOSE          PHYSICAL EXAM:  General:   HEENT:   Lymph Nodes:  Neck:   Respiratory:   Cardiovascular:   Abdomen:   Extremities:   Skin:   Neurological:  Psychiatry:    LINES:    HOSPITAL MEDICATIONS:  Standing Meds:  albumin human  5% IVPB 250 milliLiter(s) IV Intermittent once  allopurinol 100 milliGRAM(s) Oral daily  aMIOdarone    Tablet 400 milliGRAM(s) Oral every 12 hours  aspirin  chewable 81 milliGRAM(s) Oral daily  heparin   Injectable 5000 Unit(s) SubCutaneous every 12 hours  levothyroxine 125 MICROGram(s) Oral daily  pantoprazole    Tablet 40 milliGRAM(s) Oral before breakfast  polyethylene glycol 3350 17 Gram(s) Oral daily  senna 2 Tablet(s) Oral at bedtime  simvastatin 10 milliGRAM(s) Oral at bedtime  tamsulosin 0.8 milliGRAM(s) Oral at bedtime      PRN Meds:      LABS:                        6.7    5.74  )-----------( 69       ( 09 Jun 2021 03:26 )             20.4     Hgb Trend: 6.7<--, 6.9<--, 8.1<--, 8.7<--, 9.1<--  06-09    140  |  103  |  57<H>  ----------------------------<  98  4.1   |  24  |  2.38<H>    Ca    7.9<L>      09 Jun 2021 02:43  Phos  3.2     06-09  Mg     2.2     06-09    TPro  5.8<L>  /  Alb  3.3  /  TBili  0.4  /  DBili  x   /  AST  10  /  ALT  16  /  AlkPhos  59  06-09    Creatinine Trend: 2.38<--, 2.47<--, 2.28<--, 2.26<--, 1.96<--, 2.19<--            MICROBIOLOGY:     Culture - Urine (collected 06 Jun 2021 10:11)  Source: .Urine Clean Catch (Midstream)  Final Report (07 Jun 2021 12:27):    <10,000 CFU/mL Normal Urogenital Maria C      RADIOLOGY:  [ ] Reviewed and interpreted by me    EKG:   CHIEF COMPLAINT: syncope    Interval Events: soft BPs to SBP 70s-80s responded to total 500 cc LR bolus and albumin 250 x4. On tele, did have NSVT x2. Was HD stable. pt feels otherwise well and denies cp, sob, dizziness, palpitations. H&H also noted to be 6.7 from 8.1, ordered for 1 unit pRBCs    Constitutional: no fevers; no chills  HEENT: no visual changes, no sore throat, no rhinorrhea  CV: no cp; no palpitations  Resp: no sob; no cough  GI: no abd pain, no nausea, no vomiting, no diarrhea, no constipation  : no dysuria, no hematuria  MSK: no myalgais; no arthralgias  skin: no rashes  neuro: no HA, no numbness; no weakness, no tingling  ROS statement: all other ROS negative except as per HPI     OBJECTIVE:  ICU Vital Signs Last 24 Hrs  T(C): 36.7 (09 Jun 2021 05:00), Max: 36.9 (08 Jun 2021 12:00)  T(F): 98 (09 Jun 2021 05:00), Max: 98.4 (08 Jun 2021 12:00)  HR: 72 (09 Jun 2021 06:00) (68 - 72)  BP: 93/53 (09 Jun 2021 06:00) (74/42 - 113/83)  BP(mean): 67 (09 Jun 2021 06:00) (52 - 102)  ABP: --  ABP(mean): --  RR: 19 (09 Jun 2021 06:00) (12 - 22)  SpO2: 93% (09 Jun 2021 06:00) (91% - 98%)        06-07 @ 07:01  -  06-08 @ 07:00  --------------------------------------------------------  IN: 176.6 mL / OUT: 1845 mL / NET: -1668.4 mL    06-08 @ 07:01  -  06-09 @ 06:56  --------------------------------------------------------  IN: 2293.8 mL / OUT: 840 mL / NET: 1453.8 mL      CAPILLARY BLOOD GLUCOSE    PHYSICAL EXAM:  GENERAL: non-toxic appearing; in no respiratory distress  HEAD Atraumatic, Normocephalic  NECK: No JVD; FROM  EYES: PERRL, EOMs intact b/l w/out deficits; normal conjunctiva  CHEST/LUNG: CTAB no wheezes/rhonchi/rales  HEART: RRR no murmur/gallops/rubs  ABDOMEN: +BS, soft, NT, ND  EXTREMITIES: No LE edema, +2 radial pulses b/l, +2 DP/PT pulses b/l  MUSCULOSKELETAL: FROM of all 4 extremities;   NERVOUS SYSTEM:  A&Ox3, No motor deficits or sensory deficits; CNII-XII intact; no focal neurologic deficits  SKIN:  No new rashes     HOSPITAL MEDICATIONS:  Standing Meds:  albumin human  5% IVPB 250 milliLiter(s) IV Intermittent once  allopurinol 100 milliGRAM(s) Oral daily  aMIOdarone    Tablet 400 milliGRAM(s) Oral every 12 hours  aspirin  chewable 81 milliGRAM(s) Oral daily  heparin   Injectable 5000 Unit(s) SubCutaneous every 12 hours  levothyroxine 125 MICROGram(s) Oral daily  pantoprazole    Tablet 40 milliGRAM(s) Oral before breakfast  polyethylene glycol 3350 17 Gram(s) Oral daily  senna 2 Tablet(s) Oral at bedtime  simvastatin 10 milliGRAM(s) Oral at bedtime  tamsulosin 0.8 milliGRAM(s) Oral at bedtime      PRN Meds:      LABS:                        6.7    5.74  )-----------( 69       ( 09 Jun 2021 03:26 )             20.4     Hgb Trend: 6.7<--, 6.9<--, 8.1<--, 8.7<--, 9.1<--  06-09    140  |  103  |  57<H>  ----------------------------<  98  4.1   |  24  |  2.38<H>    Ca    7.9<L>      09 Jun 2021 02:43  Phos  3.2     06-09  Mg     2.2     06-09    TPro  5.8<L>  /  Alb  3.3  /  TBili  0.4  /  DBili  x   /  AST  10  /  ALT  16  /  AlkPhos  59  06-09    Creatinine Trend: 2.38<--, 2.47<--, 2.28<--, 2.26<--, 1.96<--, 2.19<--            MICROBIOLOGY:     Culture - Urine (collected 06 Jun 2021 10:11)  Source: .Urine Clean Catch (Midstream)  Final Report (07 Jun 2021 12:27):    <10,000 CFU/mL Normal Urogenital Maria C      RADIOLOGY:  [ ] Reviewed and interpreted by me    EKG:

## 2021-06-09 NOTE — PROGRESS NOTE ADULT - ATTENDING COMMENTS
Admitted with syncope in the setting of VT  Medically managed on Amiodarone PO load   Have been holding beta blocker due to hypotension  Also holding outpatient Entresto due to BP  Frequent ventricular ectopy on tele with a few 15 beat runs of VT - EP following  O2 sats mid to high 90s on room air  Diabetic diet  CKD with unclear baseline, dry on exam, ? if over-diuresed; BP did improve with hydration - target 500 cc positive   H/H low without signs of active bleeding - transfuse 1 unit PRBC  Afebrile, no antibiotics  Sugars controlled  No central access Admitted with syncope in the setting of VT  Medically managed with Amiodarone PO load   Have been holding beta blocker due to hypotension  Also holding outpatient Entresto due to BP  Frequent ventricular ectopy on tele with a few 15 beat runs of VT - EP following  O2 sats mid to high 90s on room air  Diabetic diet  CKD with unclear baseline, dry on exam, ? if over-diuresed; BP did improve with hydration - target 500 cc positive   H/H low without signs of active bleeding - transfuse 1 unit PRBC  Afebrile, no antibiotics  Sugars controlled  No central access

## 2021-06-09 NOTE — PROGRESS NOTE ADULT - ASSESSMENT
24 episodes of VT since 5/17, most pace-terminated with mild sx, 2 episodes syncope with shock.   Renal function and BNP only slightly worse than recent baseline.  No chest pain--troponin 90s maybe from shock--will trend.    Discussed with Dr. Bhakta of EPS  1. Lidocaine off; oral loading with amiodarone. ?? if patient would consider VT ablation if fails amio.  2. Resume outpatient cardiac meds--being held now as overdiuresed plus anemic.-- Once stabilized would resume Entresto and ? Jardiance and ? add V. On metoprolol in place of carvedilol for now.  I do not think there is a role for structural heart here; reviewed echo in detail.  3. COPD--continue outpatient meds.  (Followed by Dr. Ning Morris.)  4. OOB to chair.  5. Tranfuse today.    Continue po load with amiodarone--moniter for more VT. If none, possibly tomorrow can send patient home?    Will follow along with EPS/CICU.  Wolf Paiz MD, FACC  O) [phone]  (C) [phone] 24 episodes of VT since 5/17, most pace-terminated with mild sx, 2 episodes syncope with shock.   Renal function and BNP only slightly worse than recent baseline.  No chest pain--troponin 90s maybe from shock--will trend.    Discussed with Dr. Bhakta of EPS  1. Lidocaine off; oral loading with amiodarone. ?? if patient would consider VT ablation if fails amio.  2. Resume outpatient cardiac meds--being held now as overdiuresed plus anemic.-- Once stabilized would resume Entresto and ? Jardiance and ? add Vericiguat. On metoprolol in place of carvedilol for now.  I do not think there is a role for structural heart here; reviewed echo in detail.  3. COPD--continue outpatient meds.  (Followed by Dr. Ning Morris.)  4. OOB to chair. PT.  5. Tranfuse today.    Continue po load with amiodarone--moniter for more VT. If none, possibly tomorrow can send patient home?    Will follow along with EPS/CICU.  Wolf Paiz MD, FACC  (O) [phone]  (C) [phone]

## 2021-06-09 NOTE — CHART NOTE - NSCHARTNOTEFT_GEN_A_CORE
Consult placed for calorie count. Sheet hung and calorie count initiated. Discussed with RN and patient. Pt reports fair PO intake. Continues to decline oral nutrition supplements. Had BM after receiving bowel regimen and prunes. Overall feels he is eating at baseline and denies GI distress.    Recommendations:   1) 3-day Calorie Count initiated, RD to f/u upon completion to assess adequacy of PO intake  2) Continue current diet order of Low Sodium; monitor for acceptance of supplements  3) Encourage PO intake at meal times and obtain/honor food preferences PRN    Opal Comer RD, CDN, [facility]  #[phone]

## 2021-06-09 NOTE — PHYSICAL THERAPY INITIAL EVALUATION ADULT - TRANSFER SAFETY CONCERNS NOTED: SIT/STAND, REHAB EVAL
noted reliance on hip hinge to complete transfer/decreased sequencing ability/decreased weight-shifting ability

## 2021-06-09 NOTE — PROCEDURE NOTE - NSPROCNAME_GEN_A_CORE
Arterial Puncture/Cannulation
CRT-D (Cardiac Resynchronization Therapy with Defibrillation Capabilities) Interrogation Note

## 2021-06-09 NOTE — PROGRESS NOTE ADULT - SUBJECTIVE AND OBJECTIVE BOX
Patient seen and evaluated @855   Chief Complaint: Patient is a 89y old  Male who presents with a chief complaint of syncope (06 Jun 2021 09:36)      HPI:  89 M CAD s/p stents, low-flow low gradient AS, HFrEF 20% s/p MDT Bi-V ICD, COPD who presents with syncope x 2.    Pt reports usual state of health until yesterday night when he experienced an episode of syncope. His wife saw him in bed but he started to slide off the bed onto the floor. He denies any prodrome or feeling any chest pain, sob, palpitations, or shock from his ICD. This AM, the same scenario happened again which prompted the wife to call EMS.     On interrogation, it showed multiple episodes (24) of VT since 5/17 leading up to today that required ATP and shocks. The last one was 6/6/21 at 617AM which did not break with 2 ATPs and ultimately led to shock. (06 Jun 2021 11:40)    CARDIAC HISTORY: I have known the patient since 2006.  He had an MI and angioplasty in 1994.  Had a cath in 2011 with a 40% aneurysmal left main normal LAD and circumflex with a 95% right lesion and an akinetic inferior wall.  Treated medically and did well other than an admission for cellulitis in 2013 with positive blood cultures.  No endocarditis.  Issues with shortness of breath both from COPD and CHF.  Able to undergo knee replacement in 2014 and then later endovascular repair of an abdominal aortic aneurysm in October 2014.  Stress echo in 2015 showed an ejection fraction of 27%.  Cath revealed unchanged coronaries but LVEF 35%.  The right coronary was stented but did not change his LVEF so we had a defibrillator and biventricular pacemaker placed by Dr. Russell in August 2015.  I changed his Avapro to Entresto and the patient's CHF seem to improve.  August 25 of 2017 had a syncopal episode in the bathroom.  He had no idea what happened but interrogation showed V. tach followed by V. fib which led to a defibrillator shock.  No recurrence and no AICD shocks since then until this admission.  2019 had a couple of runs of ventricular tachycardia that were paced terminated.  98% of the time biventricular pacing.  2021 episode of atrial fibrillation on interrogation that lasted an hour and 48 minutes.  Progressive heart failure and he was worked up for possible TAVR with low gradient aortic stenosis including a dobutamine echo and was found not to be a candidate.  Has had progressive shortness of breath and LV dysfunction with adjusting of his medications and recently adding Jardiance and then increasing it from 10-25.  Issues with creatinine running between 2 and 2.5.  BNP as high as 18,000.  Most recent echo was March 23 of this year with an aortic gradient of 31 peak and 19 mean, dimensionless index 0.20 and no AI.  Severe segmental LV systolic dysfunction with LVEF 23 to 25%.  Mild LVH.  Only mild MR and mild to moderate TR with RVSP 54.    PMH:   HTN (hypertension)    HLD (hyperlipidemia)    CAD (coronary artery disease)    BPH (Benign Prostatic Hyperplasia)    CHF (congestive heart failure)    Hypothyroid    GERD (gastroesophageal reflux disease)    Gout    COPD (chronic obstructive pulmonary disease)    MI (myocardial infarction)      PSH:   Cataract    Hernia, inguinal, bilateral    Achilles rupture    S/P knee replacement, right    AAA (abdominal aortic aneurysm)    H/O repair of rotator cuff    S/P angioplasty    OUTPATIENT CARDIAC MEDS: Carvedilol 12.5 mg twice daily, Entresto 97/103 twice daily, digoxin 0.125 daily, Jardiance 25 mg daily, furosemide 40 mg daily, Synthroid 0.137 daily, simvastatin 10 daily.    Medications:   allopurinol 100 milliGRAM(s) Oral daily  aMIOdarone    Tablet 400 milliGRAM(s) Oral every 12 hours  aspirin  chewable 81 milliGRAM(s) Oral daily  heparin   Injectable 5000 Unit(s) SubCutaneous every 12 hours  levothyroxine 125 MICROGram(s) Oral daily  pantoprazole    Tablet 40 milliGRAM(s) Oral before breakfast  polyethylene glycol 3350 17 Gram(s) Oral daily  senna 2 Tablet(s) Oral at bedtime  simvastatin 10 milliGRAM(s) Oral at bedtime  tamsulosin 0.8 milliGRAM(s) Oral at bedtime    Allergies:  No Known Allergies    FAMILY HISTORY:  Family history of hemolytic uremic syndrome    Family history of CHF (congestive heart failure)      Social History: , fairly active despite restrictions from CHF and COPD  Smoking: Remote  Alcohol: No  Drugs: No    Review of Systems:  Constitutional: no recent weight loss  HEENT: no scleral icterus. Normal mucosa.  Respiratory: (+) COPD followed by Dr. Morris  Cardiovascular: see HPI  Gastrointestinal: No Abdominal Pain, no Diarrhea, no Constipation, no Nausea or Vomiting  Genitourinary: No Nocturia, Dysuria, or Incontinence. No hematuria.  BPH  Extremities: (+) edema. No cyanosis.  Neurologic: No Focal deficit. No Paresthesias. No Syncope. No seizures  Lymphatic: No Swelling. No Lymphadenopathy   Skin: No Rash,  Ecchymoses, Wounds, or Lesions  Psychiatry: No Depression. No Anxiety.    10 point review of systems is otherwise negative except as mentioned above            [ ]Unable to obtain    Physical Exam:  Vital Signs Last 24 Hrs  T(C): 36.6 (09 Jun 2021 07:00), Max: 36.9 (08 Jun 2021 12:00)  T(F): 97.9 (09 Jun 2021 07:00), Max: 98.4 (08 Jun 2021 12:00)  HR: 68 (09 Jun 2021 08:00) (68 - 72)  BP: 97/55 (09 Jun 2021 08:00) (74/42 - 113/83)  BP(mean): 72 (09 Jun 2021 08:00) (52 - 102)  RR: 19 (09 Jun 2021 08:00) (12 - 19)  SpO2: 93% (09 Jun 2021 08:00) (89% - 98%)  Appearance: WD, WN, NAD. No more trouble with speech. No weakness etc. (+) Runs of  VT overnight.  Eyes:  No scleral icterus. PERRL, EOMI  HENT: Normal oral mucosa.]NC/AT  Neck:  JVD 1/3 up at 90 degrees. Normal carotid upstrokes without bruits or murmurs.  Cardiovascular: Normal PMI. Normal S1, S2 physiologically split. No S3, (+) S4. 2/6 RODRÍGUEZ, 2-3/6 HSM apex.  Respiratory: Clear to auscultation bilaterally. No wheezes. No rales.  Gastrointestinal: Soft.  Non-tender. No hepatosplenomegally.  Extremities: No clubbing. No edema today. Pulses 2+ bilaterally symmetric except diminished pedal.  Musculoskeletal:  No joint deformity   Neurologic: Non-focal  Lymphatic:  No lymphadenopathy  Psychiatry: [+ ] AAOx3 [ +] Mood & affect appropriate  Skin: No rashes. No ecchymoses. No cyanosis    Cardiovascular Diagnostic Testing:  ECG:< from: 12 Lead ECG (06.07.21 @ 07:34) >  Ventricular Rate 69 BPM    Atrial Rate 69 BPM    QRS Duration 138 ms    Q-T Interval 421 ms    QTC Calculation(Bazett) 451 ms    P Axis 35 degrees    R Axis -74 degrees    T Axis 119 degrees    Diagnosis Line ELECTRONIC VENTRICULAR PACEMAKER  WHEN COMPARED WITH ECG OF `6/6/21 14:50  NO SIGNIFICANT CHANGE WAS FOUND  Confirmed by KAM Woodward Zlata ([id number]) on 6/7/2021 10:45:59 AM    < end of copied text >    < from: TTE with Doppler (w/Cont) (06.07.21 @ 07:37) >  YOB: 1925   Age: 89 (M)   MR#: [id number]  Study Date: 6/7/2021  [facility]onographer: Bhupendra Keller RDCS  Study quality: Technically fair  Referring Physician: Yvonne Brown MD  Blood Pressure: 106/55 mmHg  Height: 175 cm  Weight: 70 kg  BSA: 1.9 m2  ------------------------------------------------------------------------  PROCEDURE: Transthoracic echocardiogram with 2-D, M-Mode  and complete spectral and color flow Doppler. Verbal  consent was obtained for injection of  Ultrasonic Enhancing  Agent following a discussion of risks and benefits.  Following intravenous injection of Ultrasonic Enhancing  Agent , harmonic imaging was performed.  INDICATION: Syncope and collapse (R55)  ------------------------------------------------------------------------  Dimensions:    Normal Values:  LA:     5.2    2.0 - 4.0 cm  Ao:     3.4    2.0 - 3.8 cm  SEPTUM: 1.4    0.6 - 1.2 cm  PWT:    0.7    0.6 - 1.1 cm  LVIDd:  5.2    3.0 - 5.6 cm  LVIDs:  4.7    1.8 - 4.0 cm  Derived variables:  LVMI: 122 g/m2  RWT: 0.28  Fractional short: 10 %  EF (Visual Estimate): 30 %  Doppler Peak Velocity (m/sec): MV=1.4 AoV=2.5  ------------------------------------------------------------------------  Observations:  Mitral Valve: Tethered mitral valve leaflets with normal  opening. Mild mitral regurgitation.  Aortic Valve/Aorta: Calcified aortic valve with decreased  opening. Peak transaortic valve gradient equals 24 mm Hg,  mean transaortic valve gradient equals 12 mm Hg, estimated  aortic valve area equals 1 sqcm (by continuity equation),  aortic valve velocity time integral equals 47 cm,  consistent with moderate aortic stenosis. Mild aortic  regurgitation.  Peak left ventricular outflow tract  gradient equals 1 mm Hg, mean gradient is equal to 1 mm Hg,  LVOT velocity time integral equals 9 cm.  Aortic Root: 3.4 cm.  LVOT diameter: 2.6 cm.  Left Atrium: Moderately dilated left atrium.  LA volume  index = 48 cc/m2.  Left Ventricle: Severe segmental left ventricular systolic  dysfunction.  Akinetic basal inferior and inferolateral.  Diffuse hypokinesis of the inferior and inferolateral wall,  lateral wall.   Endocardial visualization enhanced with  intravenous injection of Ultrasonic Enhancing Agent  (Definity). No left ventricular thrombus. Mild left  ventricular enlargement. Severe  diastolic dysfunction  (Stage III).  Right Heart: Mild right atrial enlargement. Right  ventricular enlargement with decreased right ventricular  systolicfunction. A device wire is noted in the right  heart. Normal tricuspid valve. Minimal tricuspid  regurgitation. Normal pulmonic valve.  Pericardium/Pleura: Normal pericardium with no pericardial  effusion.  Hemodynamic: Estimated right atrial pressure is 8 mm Hg.  Estimated right ventricular systolic pressure equals 31 mm  Hg, assuming right atrial pressure equals 8 mm Hg,  consistent with normal pulmonary pressures.  ------------------------------------------------------------------------  Conclusions:  1. Calcified aortic valve with decreased opening. Peak  transaortic valve gradient equals 24 mm Hg, mean  transaortic valve gradient equals 12 mm Hg, estimated  aortic valve area equals 1 sqcm (by continuity equation),  aortic valve velocity time integral equals 47 cm,  consistent with moderate aortic stenosis.  2. Moderately dilated left atrium.  LA volume index = 48  cc/m2.  3. Mild left ventricular enlargement.  4. Severe segmental left ventricular systolic dysfunction.  Akinetic basalinferior and inferolateral.  Diffuse  hypokinesis of the inferior and inferolateral wall, lateral  wall.   Endocardial visualization enhanced with intravenous  injection of Ultrasonic Enhancing Agent (Definity). No left  ventricular thrombus.  5. Severe  diastolic dysfunction (Stage III).  6. Mild right atrial enlargement.  7. Right ventricular enlargement with decreased right  ventricular systolic function. A device wire is noted in  the right heart.  ------------------------------------------------------------------------  Confirmed on  6/7/2021 - 14:32:38 by TIERRA Ovalles  ------------------------------------------------------------------------    < end of copied text >    Echo: 3/23/2021 MAC with only mild MR.  Calcified aortic valve with decreased opening.  Peak gradient 31, mean 19, dimensionless index 0.20 and no AI noted this time.  Severe LAE.  Severe segmental LV systolic dysfunction with LVEF 23 to 25%.  Mild LVH.  Normal RV size and function with mild to moderate TR.  RVSP 54.  No pericardial effusion.       Stress Testing:< from: Stress Echocardiogram-Pharmacologic (10.09.18 @ 17:10) >  Observations:  Mitral Valve: Mitral annular calcification. Moderate mitral  regurgitation.  Aortic Valve/Aorta: Calcified trileaflet aortic valve with  decreased opening. Peak transaortic valve gradient equals  25 mm Hg, mean transaortic valve gradient equals 12 mm Hg,  estimated aortic valve area equals 0.9 sqcm (by continuity  equation), consistent with severe aortic stenosis. Mild  aortic regurgitation.  Peak left ventricular outflow tract  gradient equals 1 mm Hg, mean gradient is equal to 1 mm Hg.  Aortic Root: 3.6 cm.  LVOT diameter: 2.4 cm.  Left Atrium: Severely dilated left atrium.  LA volume index  = 51 cc/m2.  Left Ventricle: Severe segmental left ventricular systolic  dysfunction.  Severe hypokinesis/akinesis of the inferior  and inferolateral wall, basal inferoseptum, anterolateral  wall. Mild left ventricular enlargement.  Right Heart: Normal right atrium. A device wire is noted in  the right heart. Decreased right ventricular systolic  function. Normal tricuspid valve. Minimal tricuspid  regurgitation. Normal pulmonic valve.  Pericardium/Pleura: Normal pericardium with no pericardial  effusion.  Hemodynamic: Estimated right atrial pressure is 8 mm Hg.  Estimated right ventricular systolic pressure equals 33 mm  Hg, assuming right atrial pressure equals 8 mm Hg,  consistent with normal pulmonary pressures.  ------------------------------------------------------------------------  Pharmacologic Stress Test:  Agent:  Dobutamine Dose: 5  mcg/Kg/min over 0 min.  Baseline HR: 71 bpm  Peak HR: 74 bpm  % MPHR: 58 %  Baseline BP: 121/68 mmHg  Peak BP: 134/77 mmHg  Peak RPP: 9,916 (Rate Pressure Product)  Test terminated: Completion of test  Baseline EKG: Paced rhythm  EKG changes: Non diagnostic ECG.  Arrhythmia: None  Heart Rhythm: Paced  HR Response: HR failed to increase appropriately.  BP Response: Appropriate  Symptoms: None  ------------------------------------------------------------------------  Echocardiographic Study:  (A) Baseline echocardiogram reveals:  1. Moderate mitral regurgitation.  2. Calcified trileaflet aortic valve with decreased  opening. Peak transaortic valve gradient equals 25 mm Hg,  mean transaortic valve gradient equals 12 mm Hg, estimated  aortic valve area equals 0.9 sqcm (by continuity equation),  consistent with severe aortic stenosis. Mild aortic  regurgitation.  3. Left ventricular enlargement.  4. Severe segmental left ventricular systolic dysfunction.  Severe hypokinesis/akinesis of the inferior and  inferolateral wall, basal inferoseptum, anterolateral wall.  5. A device wire is noted in the right heart. Decreased  right ventricular systolic function.  (B) Stress echocardiogram reveals:  No significant change in left ventricular systolic  function.  ------------------------------------------------------------------------  Conclusions:  1. Normal hemodynamic response.  2. Non Diagnostic electrocardiographic response.  3. No significant change in left ventricular systolic  function.  4. HR failed to increase appropriately.  5. 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  Discussed with Dr. Kay.  ------------------------------------------------------------------------  Confirmed on  10/10/2018 - 17:48:23 by Jerome Johnson M.D.  ------------------------------------------------------------------------    < end of copied text >      Cath:< from: Cardiac Cath Lab - Adult (10.08.18 @ 15:55) >  CORONARY VESSELS: The coronary circulation is right dominant.  LM:   --  Proximal left main: There was a 40 % stenosis. 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  --  Distal left main: There was a stenosis. The lesion was eccentric.  LAD:   --  LAD: Angiography showed moderate atherosclerosis.  CX:   --  Circumflex: Angiography showed moderate atherosclerosis.  RCA:   --  RCA: Angiography showed moderate atherosclerosis.  --  Proximal RCA: There was a diffuse 30 % stenosis at the site of a prior  stent.  LEFT LOWER EXTREMITY VESSELS: Left external iliac: Angiography showed  aneurysmal dilatation. Left common femoral: The vessel was tortuous.  RIGHT LOWER EXTREMITY VESSELS: Right external iliac: Angiography showed  aneurysmal dilatation. Right common femoral: The vessel was excessively  totuos  < end of copied text >      Interpretation of Telemetry:    Imaging:   < from: Xray Chest 1 View- PORTABLE-Urgent (06.06.21 @ 08:38) >  COMPARISON: Chest x-ray from 5/14/2018.    FINDINGS:    The lungs are clear.  There is no pneumothorax or large pleural effusion.  Heart size cannot be accurately assessed in this projection. Dual lead pacemaker overlying the left chest wall.  No acute rib fractures or other osseous abnormality. Suture anchors noted in the left humeral head.    IMPRESSION:    Clear lungs.        < end of copied text >    Labs:                                 6.7    5.74  )-----------( 69       ( 09 Jun 2021 03:26 )             20.4     06-09    140  |  103  |  57<H>  ----------------------------<  98  4.1   |  24  |  2.38<H>    Ca    7.9<L>      09 Jun 2021 02:43  Phos  3.2     06-09  Mg     2.2     06-09    TPro  5.8<L>  /  Alb  3.3  /  TBili  0.4  /  DBili  x   /  AST  10  /  ALT  16  /  AlkPhos  59  06-09

## 2021-06-09 NOTE — PROGRESS NOTE ADULT - ASSESSMENT
89 M COPD (no home O2), CAD s/p stent 2015, AAA repair w/ stent, HTN, HLD, HFrEF (20%), Bi-V ICD ([employer]) presented with syncope found to be in VT storm requiring ATP and shocks, started on lido gtt and amio oral load.      VT Storm  - interrogation revealed multiple episodes of VT since may requiring ATP and shocks  -weaned off lidocaine gtt this AM  -c/w monitor for ectopy/VT  -c/w amio 400 bid (loading dose total 7-10 g)  -per EP, after load, can decrease to 400 mg qd then eventual transition to 200 mg qd  -is on home coreg 12.5 bid, switched to lopressor 12.5 bid but held due to soft BPs  - monitor lytes and keep K>4 and Mg>2  - f/u EP recs, medical management at this time. As per Ep- will consider ablation if continues to have ectopy with antiarrythmic drugs.   - will hold digoxin at this time. Dig level wnl     Hx CAD with stent 2015 and HFrEF  - 10/2018 [facility]- nonobstructive CAD  - 6/7 TTE: EF 30%. Mild MR. Moderate AS and mild AR. Mod dilated LA. Akinetic basal inferior and inferolateral. Diffuse hypokinesis of the inferior and inferolateral wall, lateral wall. No LV thrombus. Severe diastolic dysfunction. Mild RA enlargement   - continue ASA and statin  - on home lasix 40 bid but will hold given soft BPs  - holding home entresto and jardiance given soft BPs  - completing Amio 10 gram loading (currently 400 bid), then to continue 400 qd  -  coreg 12.5 BID, which is on hold for soft BPs   - home Digoxin on hold given bump in Creatinine   home entresto and jardiance on hold due to soft BPs   f/u EP recs.        CKD, non-oliguric, Baseline Cr 2-2.5  - admission SCr 2.19, now 2.47 likely in setting of mild dehydration (had been putting out about 1 L/shift)  -hold lasix for now, will resume when BPs improve  -Continue to monitor I/Os, BUN/Creatinine, and urine output.   -Replete lytes PRN. Keep K> 4 and Mg >2.     BPH  -c/w flomax 0.8 qhs (home med)        Gout   - c/w home allopurinol for management     hypothyroidism   - c/w home synthroid         appreciate CCU input and help    consider palliative and GOC

## 2021-06-09 NOTE — PHYSICAL THERAPY INITIAL EVALUATION ADULT - ADDITIONAL COMMENTS
pt states he lives with spouse in a private home with 1 steps to enter and a chair lift inside. Pt states prior to admission being independent with all functional mobility and ADLs. pt states he was ambulatory with a RW (owns 3 of them).

## 2021-06-09 NOTE — PROGRESS NOTE ADULT - SUBJECTIVE AND OBJECTIVE BOX
Date of service: 21 @ 23:13      Patient is a 89y old  Male who presents with a chief complaint of ICD fired with VT (2021 11:12)                                                               INTERVAL HPI/OVERNIGHT EVENTS:    REVIEW OF SYSTEMS:     CONSTITUTIONAL: No weakness, fevers or chills  EYES/ENT: No visual changes , no ear ache   NECK: No pain or stiffness  RESPIRATORY: No cough, wheezing,  No shortness of breath  CARDIOVASCULAR: No chest pain or palpitations  GASTROINTESTINAL: No abdominal pain  . No nausea, vomiting, or hematemesis; No diarrhea or constipation. No melena or hematochezia.  GENITOURINARY: No dysuria, frequency or hematuria  NEUROLOGICAL: No numbness or weakness  SKIN: No itching, burning, rashes, or lesions                                                                                                                                                                                                                                                                                 Medications:  MEDICATIONS  (STANDING):  allopurinol 100 milliGRAM(s) Oral daily  aMIOdarone    Tablet 400 milliGRAM(s) Oral every 12 hours  ferrous    sulfate 325 milliGRAM(s) Oral daily  levothyroxine 125 MICROGram(s) Oral daily  metoprolol tartrate 12.5 milliGRAM(s) Oral two times a day  pantoprazole    Tablet 40 milliGRAM(s) Oral before breakfast  polyethylene glycol 3350 17 Gram(s) Oral daily  senna 2 Tablet(s) Oral at bedtime  simvastatin 10 milliGRAM(s) Oral at bedtime  tamsulosin 0.8 milliGRAM(s) Oral at bedtime    MEDICATIONS  (PRN):       Allergies    No Known Allergies    Intolerances      Vital Signs Last 24 Hrs  T(C): 36.6 (2021 19:00), Max: 36.9 (2021 11:00)  T(F): 97.9 (2021 19:00), Max: 98.4 (2021 11:00)  HR: 68 (2021 21:00) (68 - 72)  BP: 95/56 (2021 12:00) (74/39 - 103/55)  BP(mean): 69 (2021 12:00) (52 - 72)  RR: 19 (2021 21:00) (14 - 25)  SpO2: 97% (2021 22:00) (89% - 97%)  CAPILLARY BLOOD GLUCOSE          -08 @ 07:01  -  06- @ 07:00  --------------------------------------------------------  IN: 2293.8 mL / OUT: 840 mL / NET: 1453.8 mL     @ 07:01  -   @ 23:13  --------------------------------------------------------  IN: 1570 mL / OUT: 940 mL / NET: 630 mL      Physical Exam:    Daily     Daily Weight in k.8 (2021 06:00)  General:  Well appearing, NAD, not cachetic  HEENT:  Nonicteric, PERRLA  CV:  RRR, S1S2   Lungs:  CTA B/L, no wheezes, rales, rhonchi  Abdomen:  Soft, non-tender, no distended, positive BS  Extremities:  2+ pulses, no c/c, no edema  Skin:  Warm and dry, no rashes  :  No rios  Neuro:  AAOx3, non-focal, grossly intact                                                                                                                                                                                                                                                                                                LABS:                               7.1    5.19  )-----------( 69       ( 2021 14:07 )             21.9                          138  |  102  |  56<H>  ----------------------------<  91  4.0   |  23  |  2.27<H>    Ca    8.0<L>      2021 08:20  Phos  2.9       Mg     2.2         TPro  5.9<L>  /  Alb  3.4  /  TBili  0.6  /  DBili  x   /  AST  9<L>  /  ALT  13  /  AlkPhos  56                         RADIOLOGY & ADDITIONAL TESTS         I personally reviewed: [  ]EKG   [  ]CXR    [  ] CT      A/P:         Discussed with :     Kamlesh consultants' Notes   Time spent :

## 2021-06-09 NOTE — PHYSICAL THERAPY INITIAL EVALUATION ADULT - PRECAUTIONS/LIMITATIONS, REHAB EVAL
He had denied any prodrome, feeling CP, sob, palpitations, or shock from his ICD. THis morning, a similar episode happened again, which prompted the wife to call EMS. CT head negative. Xrays of chest, pelvis, and R knee negative for acute pathology. EP was consulted and his device was interrogated. It showed multiple episodes of VT since 5/17/21 requiring ATP and shocks (last was 6/6/21 at 6:17AM). Pt was initially to be admitted to telemetry after being given oral amiodarone 400 mg however, while in the ED, pt had an episode of VT w/ unresponsiveness and AICD terminated the rhythm. Pt was awake and responsive afterwards. Pt was given a lidocaine bolus and was started on a lidocaine gtt and was admitted to the CCU for further monitoring. He had denied any prodrome, feeling CP, sob, palpitations, or shock from his ICD. THis morning, a similar episode happened again, which prompted the wife to call EMS. CT head negative. Xrays of chest, pelvis, and R knee negative for acute pathology. EP was consulted and his device was interrogated. It showed multiple episodes of VT since 5/17/21 requiring ATP and shocks (last was 6/6/21 at 6:17AM). Pt was initially to be admitted to telemetry after being given oral amiodarone 400 mg however, while in the ED, pt had an episode of VT w/ unresponsiveness and AICD terminated the rhythm. Pt was awake and responsive afterwards. Pt was given a lidocaine bolus and was started on a lidocaine gtt and was admitted to the CCU for further monitoring./fall precautions

## 2021-06-09 NOTE — PHYSICAL THERAPY INITIAL EVALUATION ADULT - PERTINENT HX OF CURRENT PROBLEM, REHAB EVAL
89 yo M PMHx COPD, CAD s/p stent 2015 on ASA, AAA repair with stent, HTN, HLD, chronic systolic HF (EF 20%), GERD, hypothyroidism, Bi-V ICD ([employer]tr[employer]), who presented to [facility] ED on 6/6/21 for an episode of syncope yesterday night. His wife saw him in bed but then he started sliding off the bed onto the floor.

## 2021-06-09 NOTE — PROGRESS NOTE ADULT - ASSESSMENT
====================ASSESSMENT ==============  COPD   CAD s/p stent  and AAA repair in 2015   HTN  HLD   Chronic systolic HF (EF 20%)  GERD  Hypothyroidism   Syncope    Plan:  ====================== NEUROLOGY=====================  Nonfocal  - AOx3, functional at home with all of his ADLs   - continue to monitor neuro status as per protocol     ==================== RESPIRATORY======================  Hx of COPD  - not home O2 dependent.  - stable on RA, SpO2 >96%  - continue to monitor SpO2 with goal >94%     ====================CARDIOVASCULAR==================  VT  - interrogation revealed multiple episodes of VT since may requiring ATP and shocks  - Lido gtt d/c. continue to monitor telemetry. Currently with Freq PVCs only.   - Cont PO amio 400 BID loading for a total of 7- 10g. Will then decreased to 400mg qd with eventual transition to 200mg qd as per EP    - is on home coreg 12.5 bid but switched to lopressor 12.5 bid  - monitor lytes and keep K>4 and Mg>2  - f/u EP recs, medical management at this time. As per Ep- will consider ablation if continues to have ectopy with antiarrythmic drugs.   - will hold digoxin at this time. Dig level wnl     Hx CAD with stent 2015 and HFrEF  - 10/2018 [facility]- nonobstructive CAD  - 6/7 TTE: EF 30%. Mild MR. Moderate AS and mild AR. Mod dilated LA. Akinetic basal inferior and inferolateral. Diffuse hypokinesis of the inferior and inferolateral wall, lateral wall. No LV thrombus. Severe diastolic dysfunction. Mild RA enlargement   - continue ASA and statin  - Dry on exam, s/p 250mL albumin x2 and 500mL bolus of LR. d/c lasix for now. continue to monitor fluid status closely on exam.   - holding entresto at this time as BP soft with SBP 90s-100s. Consider reinitiation once stabilized     aspirin  chewable 81 milliGRAM(s) Oral daily  aMIOdarone    Tablet 400 milliGRAM(s) Oral every 12 hours  lidocaine   Infusion 1 mG/Min (7.5 mL/Hr) IV Continuous <Continuous>  metoprolol tartrate 12.5 milliGRAM(s) Oral two times a day  simvastatin 10 milliGRAM(s) Oral at bedtime      ===================HEMATOLOGIC/ONC ===================  Monitor H&H/Plts   -Continue with Heparin SQ for VTE ppx     heparin   Injectable 5000 Unit(s) SubCutaneous every 12 hours    ===================== RENAL =========================  CKD, non-oliguric, Baseline Cr 2-2.5  - admission SCr 2.19, most recently at 2.47. continue to monitor and trend   -Continue to monitor I/Os, BUN/Creatinine, and urine output.   -Replete lytes PRN. Keep K> 4 and Mg >2.     BPH  -c/w flomax 0.8 qhs (home med)    furosemide   Injectable 40 milliGRAM(s) IV Push two times a day  tamsulosin 0.8 milliGRAM(s) Oral at bedtime    ==================== GASTROINTESTINAL===================  Tolerating PO consistent carb diet.   - c/w Protonix for GERD    pantoprazole    Tablet 40 milliGRAM(s) Oral before breakfast    =======================    ENDOCRINE  =====================  Continue monitoring blood glucose for need to initiate sliding scale     Gout   - c/w home allopurinol for management     hypothyroidism   - c/w home synthroid     allopurinol 300 milliGRAM(s) Oral daily  levothyroxine 125 MICROGram(s) Oral daily    ========================INFECTIOUS DISEASE================  Afebrile, WBC within normal limits.   -Monitor temperature and trend WBC. Monitor off abx at this time.  ====================ASSESSMENT ==============  COPD   CAD s/p stent  and AAA repair in 2015   HTN  HLD   Chronic systolic HF (EF 20%)  GERD  Hypothyroidism   Syncope    Plan:  ====================== NEUROLOGY=====================  Nonfocal  - AOx3, functional at home with all of his ADLs   - continue to monitor neuro status as per protocol     ==================== RESPIRATORY======================  Hx of COPD  - not home O2 dependent.  - stable on RA, SpO2 >96%  - continue to monitor SpO2 with goal >94%     ====================CARDIOVASCULAR==================  VT  # interrogation revealed multiple episodes of VT since may requiring ATP and shocks  - 6/9 had NSVTs, asymptomatic  - Cont PO amio 400 BID loading for a total of 7- 10g. Will then decreased to 400mg qd with eventual transition to 200mg qd as per EP    - is on home coreg 12.5 bid, holding BB given soft BPs  - monitor lytes and keep K>4 and Mg>2  - f/u EP recs, medical management at this time. As per Ep- will consider ablation if continues to have ectopy with antiarrythmic drugs.   - will hold digoxin at this time. Dig level wnl     Hx CAD with stent 2015 and HFrEF  - 10/2018 [facility]- nonobstructive CAD  - 6/7 TTE: EF 30%. Mild MR. Moderate AS and mild AR. Mod dilated LA. Akinetic basal inferior and inferolateral. Diffuse hypokinesis of the inferior and inferolateral wall, lateral wall. No LV thrombus. Severe diastolic dysfunction. Mild RA enlargement   - continue ASA and statin  - Dry on exam, s/p 250mL albumin x2 and 500mL bolus of LR. d/c lasix for now. continue to monitor fluid status closely on exam with goal net positive 500cc  - holding entresto at this time as BP soft with SBP 90s-100s. Consider reinitiation once stabilized     ===================HEMATOLOGIC/ONC ===================  Monitor H&H/Plts   -Continue with Heparin SQ for VTE ppx     heparin   Injectable 5000 Unit(s) SubCutaneous every 12 hours    ===================== RENAL =========================  CKD, non-oliguric, Baseline Cr 2-2.5  - admission SCr 2.19, most recently at 2.47. continue to monitor and trend   -Continue to monitor I/Os, BUN/Creatinine, and urine output.   -Replete lytes PRN. Keep K> 4 and Mg >2.     BPH  -c/w flomax 0.8 qhs (home med)    furosemide   Injectable 40 milliGRAM(s) IV Push two times a day  tamsulosin 0.8 milliGRAM(s) Oral at bedtime    ==================== GASTROINTESTINAL===================  Tolerating PO consistent carb diet.   - c/w Protonix for GERD    pantoprazole    Tablet 40 milliGRAM(s) Oral before breakfast    =======================    ENDOCRINE  =====================  Continue monitoring blood glucose for need to initiate sliding scale     Gout   - c/w home allopurinol for management     hypothyroidism   - c/w home synthroid     allopurinol 300 milliGRAM(s) Oral daily  levothyroxine 125 MICROGram(s) Oral daily    ========================INFECTIOUS DISEASE================  Afebrile, WBC within normal limits.   -Monitor temperature and trend WBC. Monitor off abx at this time.

## 2021-06-09 NOTE — PROGRESS NOTE ADULT - SUBJECTIVE AND OBJECTIVE BOX
Patient seen and examined at bedside.    Overnight Events: Patient feels well. Tele reviewed, no VT or NSVT, rare PVCs.     Current Meds:  allopurinol 100 milliGRAM(s) Oral daily  aMIOdarone    Tablet 400 milliGRAM(s) Oral every 12 hours  aspirin  chewable 81 milliGRAM(s) Oral daily  heparin   Injectable 5000 Unit(s) SubCutaneous every 12 hours  levothyroxine 125 MICROGram(s) Oral daily  pantoprazole    Tablet 40 milliGRAM(s) Oral before breakfast  polyethylene glycol 3350 17 Gram(s) Oral daily  senna 2 Tablet(s) Oral at bedtime  simvastatin 10 milliGRAM(s) Oral at bedtime  sodium chloride 0.9% Bolus 500 milliLiter(s) IV Bolus once  tamsulosin 0.8 milliGRAM(s) Oral at bedtime    Vitals:  T(F): 97.9 (06-09), Max: 98.4 (06-08)  HR: 68 (06-09) (68 - 72)  BP: 78/40 (06-09) (74/39 - 113/83)  RR: 17 (06-09)  SpO2: 91% (06-09)  I&O's Summary    08 Jun 2021 07:01  -  09 Jun 2021 07:00  --------------------------------------------------------  IN: 2293.8 mL / OUT: 840 mL / NET: 1453.8 mL    09 Jun 2021 07:01  -  09 Jun 2021 11:13  --------------------------------------------------------  IN: 1150 mL / OUT: 200 mL / NET: 950 mL    Physical Exam:  Appearance: No acute distress; well appearing  Eyes:  EOMI, pink conjunctiva  HENT: Normal oral mucosa  Cardiovascular: RRR, S1, S2, 3/6 systolic murmur, rubs, or gallops; no edema; no JVD  Respiratory: Clear to auscultation bilaterally  Gastrointestinal: soft, non-tender, non-distended with normal bowel sounds  Musculoskeletal: No clubbing; no joint deformity   Neurologic: Non-focal  Lymphatic: No lymphadenopathy  Psychiatry: AAOx3, mood & affect appropriate  Skin: No rashes                          6.6    5.49  )-----------( 69       ( 09 Jun 2021 08:20 )             20.1     06-09    138  |  102  |  56<H>  ----------------------------<  91  4.0   |  23  |  2.27<H>    Ca    8.0<L>      09 Jun 2021 08:20  Phos  2.9     06-09  Mg     2.2     06-09    TPro  5.9<L>  /  Alb  3.4  /  TBili  0.6  /  DBili  x   /  AST  9<L>  /  ALT  13  /  AlkPhos  56  06-09      CARDIAC MARKERS ( 06 Jun 2021 16:22 )  x     / x     / x     / 52 U/L / x     / 2.5 ng/mL  CARDIAC MARKERS ( 06 Jun 2021 16:21 )  78 ng/L / x     / x     / x     / x     / x      CARDIAC MARKERS ( 06 Jun 2021 10:50 )  90 ng/L / x     / x     / x     / x     / x      CARDIAC MARKERS ( 06 Jun 2021 07:49 )  95 ng/L / x     / x     / 59 U/L / x     / 2.6 ng/mL      Serum Pro-Brain Natriuretic Peptide: 77175 pg/mL (06-06 @ 07:49)          New ECG(s): Personally reviewed    Echo:  < from: TTE with Doppler (w/Cont) (06.07.21 @ 07:37) >  Conclusions:  1. Calcified aortic valve with decreased opening. Peak  transaortic valve gradient equals 24 mm Hg, mean  transaortic valve gradient equals 12 mm Hg, estimated  aortic valve area equals 1 sqcm (by continuity equation),  aortic valve velocity time integral equals 47 cm,  consistent with moderate aortic stenosis.  2. Moderately dilated left atrium.  LA volume index = 48  cc/m2.  3. Mild left ventricular enlargement.  4. Severe segmental left ventricular systolic dysfunction.  Akinetic basalinferior and inferolateral.  Diffuse  hypokinesis of the inferior and inferolateral wall, lateral  wall.   Endocardial visualization enhanced with intravenous  injection of Ultrasonic Enhancing Agent (Definity). No left  ventricular thrombus.  5. Severe  diastolic dysfunction (Stage III).  6. Mild right atrial enlargement.  7. Right ventricular enlargement with decreased right  ventricular systolic function. A device wire is noted in  the right heart.  ------------------------------------------------------------------------  Confirmed on  6/7/2021 - 14:32:38 by TIERRA Ovalles  ------------------------------------------------------------------------    < end of copied text >      Stress Testing:     Cath:    Imaging:    Interpretation of Telemetry:

## 2021-06-09 NOTE — PROGRESS NOTE ADULT - ATTENDING COMMENTS
His VT has mostly settled although he still has long runs of monomorphic VT at 180-200 bpm. Will add quinidine 324 bid and see if he settles. If not, ablation may have to be considered to allow for better quality of life.

## 2021-06-09 NOTE — PHYSICAL THERAPY INITIAL EVALUATION ADULT - DISCHARGE DISPOSITION, PT EVAL
Subacute Rehab. pt agreeable. MD Osei aware. DC plan communicated with CM/SW through Teams DC plan sheet./rehabilitation facility

## 2021-06-10 NOTE — PROVIDER CONTACT NOTE (OTHER) - RECOMMENDATIONS
Assess patient. Review patient's orders, labs, history, plan & heart rate & rhythm on the monitor.
Notify the provider. Continue to monitor.

## 2021-06-10 NOTE — PROGRESS NOTE ADULT - SUBJECTIVE AND OBJECTIVE BOX
Date of service: 06-10-21 @ 22:45      Patient is a 89y old  Male who presents with a chief complaint of ICD fired with VT (10 Yobain 2021 11:14)                                                               INTERVAL HPI/OVERNIGHT EVENTS:    REVIEW OF SYSTEMS:     CONSTITUTIONAL: No weakness, fevers or chills  EYES/ENT: No visual changes , no ear ache   NECK: No pain or stiffness  RESPIRATORY: No cough, wheezing,  No shortness of breath  CARDIOVASCULAR: No chest pain or palpitations  GASTROINTESTINAL: No abdominal pain  . No nausea, vomiting, or hematemesis; No diarrhea or constipation. No melena or hematochezia.  GENITOURINARY: No dysuria, frequency or hematuria  NEUROLOGICAL: No numbness or weakness  SKIN: No itching, burning, rashes, or lesions                                                                                                                                                                                                                                                                                 Medications:  MEDICATIONS  (STANDING):  allopurinol 100 milliGRAM(s) Oral daily  aMIOdarone    Tablet 400 milliGRAM(s) Oral every 12 hours  ferrous    sulfate 325 milliGRAM(s) Oral daily  levothyroxine 125 MICROGram(s) Oral daily  metoprolol tartrate 12.5 milliGRAM(s) Oral two times a day  pantoprazole    Tablet 40 milliGRAM(s) Oral before breakfast  polyethylene glycol 3350 17 Gram(s) Oral daily  quiNIDine gluconate  milliGRAM(s) Oral every 12 hours  senna 2 Tablet(s) Oral at bedtime  simvastatin 10 milliGRAM(s) Oral at bedtime  tamsulosin 0.8 milliGRAM(s) Oral at bedtime    MEDICATIONS  (PRN):       Allergies    No Known Allergies    Intolerances      Vital Signs Last 24 Hrs  T(C): 36.6 (10 Yobani 2021 20:45), Max: 36.7 (10 Yobani 2021 00:30)  T(F): 97.9 (10 Yobani 2021 20:45), Max: 98.1 (10 Yobani 2021 00:30)  HR: 70 (10 Yobani 2021 20:45) (69 - 74)  BP: 105/63 (10 Yobani 2021 20:45) (100/62 - 108/71)  BP(mean): --  RR: 18 (10 Yobani 2021 20:45) (18 - 18)  SpO2: 95% (10 Yobani 2021 20:45) (95% - 96%)  CAPILLARY BLOOD GLUCOSE          - @ 07:01  -  -10 @ 07:00  --------------------------------------------------------  IN: 1570 mL / OUT: 1580 mL / NET: -10 mL    06-10 @ 07:01  -  10 @ 22:45  --------------------------------------------------------  IN: 1440 mL / OUT: 1750 mL / NET: -310 mL      Physical Exam:    Daily     Daily Weight in k.1 (10 Yobani 2021 04:48)  General:  Well appearing, NAD, not cachetic  HEENT:  Nonicteric, PERRLA  CV:  RRR, S1S2   Lungs:  CTA B/L, no wheezes, rales, rhonchi  Abdomen:  Soft, non-tender, no distended, positive BS  Extremities:  2+ pulses, no c/c, no edema  Skin:  Warm and dry, no rashes  :  No rios  Neuro:  AAOx3, non-focal, grossly intact                                                                                                                                                                                                                                                                                                LABS:                               7.4    6.08  )-----------( 76       ( 10 Yobani 2021 06:06 )             22.5                      06-10    140  |  105  |  56<H>  ----------------------------<  92  4.0   |  24  |  2.27<H>    Ca    8.2<L>      10 Yobani 2021 06:19  Phos  2.9     06-10  Mg     2.2     06-10    TPro  5.7<L>  /  Alb  3.3  /  TBili  0.8  /  DBili  x   /  AST  11  /  ALT  12  /  AlkPhos  54  06-10                       RADIOLOGY & ADDITIONAL TESTS         I personally reviewed: [  ]EKG   [  ]CXR    [  ] CT      A/P:         Discussed with :     Kamlesh consultants' Notes   Time spent :

## 2021-06-10 NOTE — PROVIDER CONTACT NOTE (OTHER) - ACTION/TREATMENT ORDERED:
NP aware & assessed patient & reviewed orders, labs, history, plan & heart rate & rhythm on the monitor. No new orders at this time per NP. NP consulted with Electrophysiology Team.
NP aware & assessed patient & reviewed orders, labs, history, plan & heart rate & rhythm on the monitor. No new orders at this time per NP. NP consulted with Electrophysiology Team & at bedside.
NP aware & assessed patient & reviewed orders, labs, history, plan & heart rate & rhythm on the monitor. No new orders at this time per NP. NP consulted with Electrophysiology Team.
Provider notified. Will continue to monitor and maintain safety.

## 2021-06-10 NOTE — PROGRESS NOTE ADULT - ASSESSMENT
89 M COPD (no home O2), CAD s/p stent 2015, AAA repair w/ stent, HTN, HLD, HFrEF (20%), Bi-V ICD ([employer]) presented with syncope found to be in VT storm requiring ATP and shocks, started on lido gtt and amio oral load.      VT Storm  - interrogation revealed multiple episodes of VT since may requiring ATP and shocks  -weaned off lidocaine gtt this AM  -c/w monitor for ectopy/VT  -c/w amio 400 bid (loading dose total 7-10 g)  -per EP, after load, can decrease to 400 mg qd then eventual transition to 200 mg qd  -is on home coreg 12.5 bid, switched to lopressor 12.5 bid but held due to soft BPs  - monitor lytes and keep K>4 and Mg>2  - f/u EP recs, medical management at this time. As per Ep- will consider ablation if continues to have ectopy with antiarrythmic drugs.   - will hold digoxin at this time. Dig level wnl     Hx CAD with stent 2015 and HFrEF  - 10/2018 [facility]- nonobstructive CAD  - 6/7 TTE: EF 30%. Mild MR. Moderate AS and mild AR. Mod dilated LA. Akinetic basal inferior and inferolateral. Diffuse hypokinesis of the inferior and inferolateral wall, lateral wall. No LV thrombus. Severe diastolic dysfunction. Mild RA enlargement   - continue ASA and statin  - on home lasix 40 bid but will hold given soft BPs  - holding home entresto and jardiance given soft BPs  - completing Amio 10 gram loading (currently 400 bid), then to continue 400 qd  -  coreg 12.5 BID, which is on hold for soft BPs   - home Digoxin on hold given bump in Creatinine   home entresto and jardiance on hold due to soft BPs   f/u EP recs.        CKD, non-oliguric, Baseline Cr 2-2.5  - admission SCr 2.19, now 2.47 likely in setting of mild dehydration (had been putting out about 1 L/shift)  -hold lasix for now, will resume when BPs improve  -Continue to monitor I/Os, BUN/Creatinine, and urine output.   -Replete lytes PRN. Keep K> 4 and Mg >2.     BPH  -c/w flomax 0.8 qhs (home med)        Gout   - c/w home allopurinol for management     hypothyroidism   - c/w home synthroid         appreciate CCU input and help    consider palliative and GOC

## 2021-06-10 NOTE — PROVIDER CONTACT NOTE (OTHER) - SITUATION
WCT 15 beats on tele, HR: 170s. Pt. experienced multiple episodes of non-sustaining VT in CICU before transfer to unit/ asymptomatic.
Patient had 20 Beats of Wide Complex Tachycardia on the cardiac monitor lasting 6.5 seconds & Heart Rate increased to 150's on the monitor at that time.
Patient had 21 & 22 beats of Wide Complex Tachycardia on the cardiac monitor lasting 7.2 seconds & Heart Rate increased to 160's on the monitor at that time.
Patient is having runs of Wide Complex Tachycardia the longest is 22 beats and patient's Heart Rate increases to 160's at these times.

## 2021-06-10 NOTE — PROGRESS NOTE ADULT - ASSESSMENT
89M CAD s/p stents, low-flow low gradient AS, HFrEF 20% s/p MDT Bi-V ICD, COPD who presents with syncope x 2 found to have multiple episodes of VT on interrogation requiring ATP and shock. He had another episode of VT in the ED that was successfully treated with ATP and another that required shock. He is currently Bi-V paced and hemodynamically stable. Given his age, will opt for medical management with amiodarone to see if this will suppress his VT for now.    Monomorphic VT s/p ATP and defibrillation: No further sustained VT episodes  - Added a Ramp (1) to allow for more ATP therapy after Burst (2)  - continue amiodarone 400 BID for total of 7-10 g load, and then 400 QD (eventually decrease to 200 QD)  - s/p lidocaine 100mg bolus, was on lido drip, however discontinued 6/8  - patient on metoprolol 12.5 BID (likely changed from coreg 12.5 BID due to hypotension)  - Tolerating Amiodarone thus far. Patient should have routine LFT, TFTs, yearly eye exam and pulmonary function testing if remains on long term therapy  - patient now having more ectopy - will discuss today mexiletine vs ablation    Junie Lr MD  Cardiology Fellow, PGY-4  [phone]  For all other Cardiology service contact information, go to [email or web address] and use "[email or web address]" to login. 89M CAD s/p stents, low-flow low gradient AS, HFrEF 20% s/p MDT Bi-V ICD, COPD who presents with syncope x 2 found to have multiple episodes of VT on interrogation requiring ATP and shock. He had another episode of VT in the ED that was successfully treated with ATP and another that required shock. He is currently Bi-V paced and hemodynamically stable. Given his age, will opt for medical management with amiodarone to see if this will suppress his VT for now.    Monomorphic VT s/p ATP and defibrillation: No further sustained VT episodes  - Added a Ramp (1) to allow for more ATP therapy after Burst (2)  - continue amiodarone 400 BID for total of 7-10 g load, and then 400 QD (eventually decrease to 200 QD)  - s/p lidocaine 100mg bolus, was on lido drip, however discontinued 6/8  - patient on metoprolol 12.5 BID (likely changed from coreg 12.5 BID due to hypotension)  - Tolerating Amiodarone thus far. Patient should have routine LFT, TFTs, yearly eye exam and pulmonary function testing if remains on long term therapy  - patient now having more ectopy - please start quinidine 324 mg BID    Junie Lr MD  Cardiology Fellow, PGY-4  [phone]  For all other Cardiology service contact information, go to [email or web address] and use "[email or web address]" to login. 89M CAD s/p stents, low-flow low gradient AS, HFrEF 20% s/p MDT Bi-V ICD, COPD who presents with syncope x 2 found to have multiple episodes of VT on interrogation requiring ATP and shock. He had another episode of VT in the ED that was successfully treated with ATP and another that required shock. He is currently Bi-V paced and hemodynamically stable. Given his age, will opt for medical management with amiodarone to see if this will suppress his VT for now.    Monomorphic VT s/p ATP and defibrillation: No further sustained VT episodes  - Added a Ramp (1) to allow for more ATP therapy after Burst (2)  - continue amiodarone 400 BID for total of 7-10 g load, and then 400 QD (eventually decrease to 200 QD)  - s/p lidocaine 100mg bolus, was on lido drip, however discontinued 6/8  - patient on metoprolol 12.5 BID (likely changed from coreg 12.5 BID due to hypotension) - can increase metoprolol to 25 BID  - Tolerating Amiodarone thus far. Patient should have routine LFT, TFTs, yearly eye exam and pulmonary function testing if remains on long term therapy  - patient now having more ectopy - please start quinidine 324 mg BID    Junie Lr MD  Cardiology Fellow, PGY-4  [phone]  For all other Cardiology service contact information, go to [email or web address] and use "[email or web address]" to login.

## 2021-06-10 NOTE — PROVIDER CONTACT NOTE (OTHER) - BACKGROUND
Patient admitted for Ventricular Tachycardia and Syncope.
Patient admitted for Ventricular Tachycardia and Syncope.
Pt. admitted for VTach and syncope. PMH: AAA, COPD, CAD, HTN, MI. Pt. is being PO amio loaded.
Patient admitted for Ventricular Tachycardia and Syncope.

## 2021-06-10 NOTE — PROGRESS NOTE ADULT - SUBJECTIVE AND OBJECTIVE BOX
Patient seen and examined at bedside.    Overnight Events: Patient transferred from CICU to floors. He has had more NSVT since, however asymptomatic.     Current Meds:  allopurinol 100 milliGRAM(s) Oral daily  aMIOdarone    Tablet 400 milliGRAM(s) Oral every 12 hours  ferrous    sulfate 325 milliGRAM(s) Oral daily  levothyroxine 125 MICROGram(s) Oral daily  metoprolol tartrate 12.5 milliGRAM(s) Oral two times a day  pantoprazole    Tablet 40 milliGRAM(s) Oral before breakfast  polyethylene glycol 3350 17 Gram(s) Oral daily  senna 2 Tablet(s) Oral at bedtime  simvastatin 10 milliGRAM(s) Oral at bedtime  tamsulosin 0.8 milliGRAM(s) Oral at bedtime    Vitals:  T(F): 97.5 (06-10), Max: 98.2 (06-09)  HR: 72 (06-10) (68 - 74)  BP: 103/64 (06-10) (95/56 - 108/71)  RR: 18 (06-10)  SpO2: 95% (06-10)  I&O's Summary    09 Jun 2021 07:01  -  10 Yobani 2021 07:00  --------------------------------------------------------  IN: 1570 mL / OUT: 1580 mL / NET: -10 mL    10 Yobani 2021 07:01  -  10 Yobani 2021 11:14  --------------------------------------------------------  IN: 540 mL / OUT: 400 mL / NET: 140 mL    Physical Exam:  Appearance: No acute distress; well appearing  Eyes:  EOMI, pink conjunctiva  HENT: Normal oral mucosa  Cardiovascular: RRR, S1, S2, no murmurs, rubs, or gallops; no edema; no JVD  Respiratory: Clear to auscultation bilaterally  Gastrointestinal: soft, non-tender, non-distended with normal bowel sounds  Musculoskeletal: No clubbing; no joint deformity   Neurologic: Non-focal  Lymphatic: No lymphadenopathy  Psychiatry: AAOx3, mood & affect appropriate  Skin: No rashes                          7.4    6.08  )-----------( 76       ( 10 Yobani 2021 06:06 )             22.5     06-10    140  |  105  |  56<H>  ----------------------------<  92  4.0   |  24  |  2.27<H>    Ca    8.2<L>      10 Yobani 2021 06:19  Phos  2.9     06-10  Mg     2.2     06-10    TPro  5.7<L>  /  Alb  3.3  /  TBili  0.8  /  DBili  x   /  AST  11  /  ALT  12  /  AlkPhos  54  06-10      CARDIAC MARKERS ( 06 Jun 2021 16:22 )  x     / x     / x     / 52 U/L / x     / 2.5 ng/mL  CARDIAC MARKERS ( 06 Jun 2021 16:21 )  78 ng/L / x     / x     / x     / x     / x      CARDIAC MARKERS ( 06 Jun 2021 10:50 )  90 ng/L / x     / x     / x     / x     / x      CARDIAC MARKERS ( 06 Jun 2021 07:49 )  95 ng/L / x     / x     / 59 U/L / x     / 2.6 ng/mL    Serum Pro-Brain Natriuretic Peptide: 74488 pg/mL (06-06 @ 07:49)    New ECG(s): Personally reviewed    Echo:< from: TTE with Doppler (w/Cont) (06.07.21 @ 07:37) >  Conclusions:  1. Calcified aortic valve with decreased opening. Peak  transaortic valve gradient equals 24 mm Hg, mean  transaortic valve gradient equals 12 mm Hg, estimated  aortic valve area equals 1 sqcm (by continuity equation),  aortic valve velocity time integral equals 47 cm,  consistent with moderate aortic stenosis.  2. Moderately dilated left atrium.  LA volume index = 48  cc/m2.  3. Mild left ventricular enlargement.  4. Severe segmental left ventricular systolic dysfunction.  Akinetic basalinferior and inferolateral.  Diffuse  hypokinesis of the inferior and inferolateral wall, lateral  wall.   Endocardial visualization enhanced with intravenous  injection of Ultrasonic Enhancing Agent (Definity). No left  ventricular thrombus.  5. Severe  diastolic dysfunction (Stage III).  6. Mild right atrial enlargement.  7. Right ventricular enlargement with decreased right  ventricular systolic function. A device wire is noted in  the right heart.  ------------------------------------------------------------------------  Confirmed on  6/7/2021 - 14:32:38 by TIERRA Ovalles  ------------------------------------------------------------------------    < end of copied text >    Stress Testing:     Cath:    Imaging:    Interpretation of Telemetry: NSR, with NSVT with  - 22 beats at 6:29, 21 beats at 8:09, 20 beats at 8:12, 23 beats at 9:09, 21 beats at 9:27

## 2021-06-10 NOTE — PROGRESS NOTE ADULT - ASSESSMENT
24 episodes of VT since 5/17, most pace-terminated with mild sx, 2 episodes syncope with shock.   Renal function and BNP only slightly worse than recent baseline.  No chest pain--troponin 90s maybe from shock--will trend.    Discussed with Dr. Bhakta of EPS  1. Lidocaine off; oral loading with amiodarone. ?? if patient would consider VT ablation if fails amio.  2. Resume outpatient cardiac meds--being held now as overdiuresed plus anemic.-- Once stabilized would resume Entresto and ? Jardiance and ? add Vericiguat. On metoprolol in place of carvedilol for now.  I do not think there is a role for structural heart here; reviewed echo in detail.  3. COPD--continue outpatient meds.  (Followed by Dr. Ning Morris.)  4. OOB to chair. PT.  5. Tranfuse today.    Continue po load with amiodarone--moniter for more VT. If none, possibly tomorrow can send patient home?    Will follow along with EPS/CICU.  Wolf Paiz MD, FACC  (O) [phone]  (C) [phone] 24 episodes of VT since 5/17, most pace-terminated with mild sx, 2 episodes syncope with shock.   Renal function and BNP only slightly worse than recent baseline.  No chest pain--troponin 90s maybe from shock--will trend.    Discussed with Dr. Bhakta of EPS  1. Lidocaine off; oral loading with amiodarone. patient would consider VT ablation if fails amio. Back on beta-blocker (prefer change back to Carvedilol). Transfuse. Check with EP re: just continue Amio, ?? add others such as Mexilitene or schedule ablation.  2. Resume outpatient cardiac meds--being held now as overdiuresed plus anemic.-- Transfuse, then try o reintroduce Entresto. ? Jardiance and ? add Vericiguat. On metoprolol in place of carvedilol for now-consider change back.  No role for structural heart here; reviewed echo in detail.  3. COPD--continue outpatient meds.  (Followed by Dr. Ning Morris.)  4. OOB to chair. PT.  5. Transfuse one more unit.    Continue po load with amiodarone--moniter for more VT. Await f/u by EP as noted above.      Wolf Paiz MD, FACC  (O) [phone]  (C) [phone]

## 2021-06-10 NOTE — PROGRESS NOTE ADULT - SUBJECTIVE AND OBJECTIVE BOX
Patient seen and evaluated @855   Chief Complaint: Patient is a 89y old  Male who presents with a chief complaint of syncope (06 Jun 2021 09:36)      HPI:  89 M CAD s/p stents, low-flow low gradient AS, HFrEF 20% s/p MDT Bi-V ICD, COPD who presents with syncope x 2.    Pt reports usual state of health until yesterday night when he experienced an episode of syncope. His wife saw him in bed but he started to slide off the bed onto the floor. He denies any prodrome or feeling any chest pain, sob, palpitations, or shock from his ICD. This AM, the same scenario happened again which prompted the wife to call EMS.     On interrogation, it showed multiple episodes (24) of VT since 5/17 leading up to today that required ATP and shocks. The last one was 6/6/21 at 617AM which did not break with 2 ATPs and ultimately led to shock. (06 Jun 2021 11:40)    CARDIAC HISTORY: I have known the patient since 2006.  He had an MI and angioplasty in 1994.  Had a cath in 2011 with a 40% aneurysmal left main normal LAD and circumflex with a 95% right lesion and an akinetic inferior wall.  Treated medically and did well other than an admission for cellulitis in 2013 with positive blood cultures.  No endocarditis.  Issues with shortness of breath both from COPD and CHF.  Able to undergo knee replacement in 2014 and then later endovascular repair of an abdominal aortic aneurysm in October 2014.  Stress echo in 2015 showed an ejection fraction of 27%.  Cath revealed unchanged coronaries but LVEF 35%.  The right coronary was stented but did not change his LVEF so we had a defibrillator and biventricular pacemaker placed by Dr. Russell in August 2015.  I changed his Avapro to Entresto and the patient's CHF seem to improve.  August 25 of 2017 had a syncopal episode in the bathroom.  He had no idea what happened but interrogation showed V. tach followed by V. fib which led to a defibrillator shock.  No recurrence and no AICD shocks since then until this admission.  2019 had a couple of runs of ventricular tachycardia that were paced terminated.  98% of the time biventricular pacing.  2021 episode of atrial fibrillation on interrogation that lasted an hour and 48 minutes.  Progressive heart failure and he was worked up for possible TAVR with low gradient aortic stenosis including a dobutamine echo and was found not to be a candidate.  Has had progressive shortness of breath and LV dysfunction with adjusting of his medications and recently adding Jardiance and then increasing it from 10-25.  Issues with creatinine running between 2 and 2.5.  BNP as high as 18,000.  Most recent echo was March 23 of this year with an aortic gradient of 31 peak and 19 mean, dimensionless index 0.20 and no AI.  Severe segmental LV systolic dysfunction with LVEF 23 to 25%.  Mild LVH.  Only mild MR and mild to moderate TR with RVSP 54.    PMH:   HTN (hypertension)    HLD (hyperlipidemia)    CAD (coronary artery disease)    BPH (Benign Prostatic Hyperplasia)    CHF (congestive heart failure)    Hypothyroid    GERD (gastroesophageal reflux disease)    Gout    COPD (chronic obstructive pulmonary disease)    MI (myocardial infarction)      PSH:   Cataract    Hernia, inguinal, bilateral    Achilles rupture    S/P knee replacement, right    AAA (abdominal aortic aneurysm)    H/O repair of rotator cuff    S/P angioplasty    OUTPATIENT CARDIAC MEDS: Carvedilol 12.5 mg twice daily, Entresto 97/103 twice daily, digoxin 0.125 daily, Jardiance 25 mg daily, furosemide 40 mg daily, Synthroid 0.137 daily, simvastatin 10 daily.    Medications:   allopurinol 100 milliGRAM(s) Oral daily  aMIOdarone    Tablet 400 milliGRAM(s) Oral every 12 hours  ferrous    sulfate 325 milliGRAM(s) Oral daily  levothyroxine 125 MICROGram(s) Oral daily  metoprolol tartrate 12.5 milliGRAM(s) Oral two times a day  pantoprazole    Tablet 40 milliGRAM(s) Oral before breakfast  polyethylene glycol 3350 17 Gram(s) Oral daily  senna 2 Tablet(s) Oral at bedtime  simvastatin 10 milliGRAM(s) Oral at bedtime  tamsulosin 0.8 milliGRAM(s) Oral at bedtime    Allergies:  No Known Allergies    FAMILY HISTORY:  Family history of hemolytic uremic syndrome    Family history of CHF (congestive heart failure)      Social History: , fairly active despite restrictions from CHF and COPD  Smoking: Remote  Alcohol: No  Drugs: No    Review of Systems:  Constitutional: no recent weight loss  HEENT: no scleral icterus. Normal mucosa.  Respiratory: (+) COPD followed by Dr. Morris  Cardiovascular: see HPI  Gastrointestinal: No Abdominal Pain, no Diarrhea, no Constipation, no Nausea or Vomiting  Genitourinary: No Nocturia, Dysuria, or Incontinence. No hematuria.  BPH  Extremities: (+) edema. No cyanosis.  Neurologic: No Focal deficit. No Paresthesias. No Syncope. No seizures  Lymphatic: No Swelling. No Lymphadenopathy   Skin: No Rash,  Ecchymoses, Wounds, or Lesions  Psychiatry: No Depression. No Anxiety.    10 point review of systems is otherwise negative except as mentioned above            [ ]Unable to obtain    Physical Exam:  Vital Signs Last 24 Hrs  T(C): 36.6 (10 Yobani 2021 04:48), Max: 36.9 (09 Jun 2021 11:00)  T(F): 97.8 (10 Yobani 2021 04:48), Max: 98.4 (09 Jun 2021 11:00)  HR: 70 (10 Yobani 2021 04:48) (68 - 72)  BP: 101/61 (10 Yobani 2021 04:48) (74/39 - 108/66)  BP(mean): 69 (09 Jun 2021 12:00) (52 - 69)  RR: 18 (10 Yobani 2021 04:48) (16 - 25)  SpO2: 95% (10 Yobani 2021 04:48) (89% - 97%)  Appearance: WD, WN, NAD. No more trouble with speech. No weakness etc. (+) Runs of  VT overnight.  Eyes:  No scleral icterus. PERRL, EOMI  HENT: Normal oral mucosa.]NC/AT  Neck:  JVD 1/3 up at 90 degrees. Normal carotid upstrokes without bruits or murmurs.  Cardiovascular: Normal PMI. Normal S1, S2 physiologically split. No S3, (+) S4. 2/6 RODRÍGEUZ, 2-3/6 HSM apex.  Respiratory: Clear to auscultation bilaterally. No wheezes. No rales.  Gastrointestinal: Soft.  Non-tender. No hepatosplenomegally.  Extremities: No clubbing. No edema today. Pulses 2+ bilaterally symmetric except diminished pedal.  Musculoskeletal:  No joint deformity   Neurologic: Non-focal  Lymphatic:  No lymphadenopathy  Psychiatry: [+ ] AAOx3 [ +] Mood & affect appropriate  Skin: No rashes. No ecchymoses. No cyanosis    Cardiovascular Diagnostic Testing:  ECG:< from: 12 Lead ECG (06.07.21 @ 07:34) >  Ventricular Rate 69 BPM    Atrial Rate 69 BPM    QRS Duration 138 ms    Q-T Interval 421 ms    QTC Calculation(Bazett) 451 ms    P Axis 35 degrees    R Axis -74 degrees    T Axis 119 degrees    Diagnosis Line ELECTRONIC VENTRICULAR PACEMAKER  WHEN COMPARED WITH ECG OF `6/6/21 14:50  NO SIGNIFICANT CHANGE WAS FOUND  Confirmed by KAM Woodward Zlata ([id number]) on 6/7/2021 10:45:59 AM    < end of copied text >    < from: TTE with Doppler (w/Cont) (06.07.21 @ 07:37) >  YOB: 1925   Age: 89 (M)   MR#: [id number]  Study Date: 6/7/2021  [facility]upendra [facility] RDCS  Study quality: Technically fair  Referring Physician: Yvonne Brown MD  Blood Pressure: 106/55 mmHg  Height: 175 cm  Weight: 70 kg  BSA: 1.9 m2  ------------------------------------------------------------------------  PROCEDURE: Transthoracic echocardiogram with 2-D, M-Mode  and complete spectral and color flow Doppler. Verbal  consent was obtained for injection of  Ultrasonic Enhancing  Agent following a discussion of risks and benefits.  Following intravenous injection of Ultrasonic Enhancing  Agent , harmonic imaging was performed.  INDICATION: Syncope and collapse (R55)  ------------------------------------------------------------------------  Dimensions:    Normal Values:  LA:     5.2    2.0 - 4.0 cm  Ao:     3.4    2.0 - 3.8 cm  SEPTUM: 1.4    0.6 - 1.2 cm  PWT:    0.7    0.6 - 1.1 cm  LVIDd:  5.2    3.0 - 5.6 cm  LVIDs:  4.7    1.8 - 4.0 cm  Derived variables:  LVMI: 122 g/m2  RWT: 0.28  Fractional short: 10 %  EF (Visual Estimate): 30 %  Doppler Peak Velocity (m/sec): MV=1.4 AoV=2.5  ------------------------------------------------------------------------  Observations:  Mitral Valve: Tethered mitral valve leaflets with normal  opening. Mild mitral regurgitation.  Aortic Valve/Aorta: Calcified aortic valve with decreased  opening. Peak transaortic valve gradient equals 24 mm Hg,  mean transaortic valve gradient equals 12 mm Hg, estimated  aortic valve area equals 1 sqcm (by continuity equation),  aortic valve velocity time integral equals 47 cm,  consistent with moderate aortic stenosis. Mild aortic  regurgitation.  Peak left ventricular outflow tract  gradient equals 1 mm Hg, mean gradient is equal to 1 mm Hg,  LVOT velocity time integral equals 9 cm.  Aortic Root: 3.4 cm.  LVOT diameter: 2.6 cm.  Left Atrium: Moderately dilated left atrium.  LA volume  index = 48 cc/m2.  Left Ventricle: Severe segmental left ventricular systolic  dysfunction.  Akinetic basal inferior and inferolateral.  Diffuse hypokinesis of the inferior and inferolateral wall,  lateral wall.   Endocardial visualization enhanced with  intravenous injection of Ultrasonic Enhancing Agent  (Definity). No left ventricular thrombus. Mild left  ventricular enlargement. Severe  diastolic dysfunction  (Stage III).  Right Heart: Mild right atrial enlargement. Right  ventricular enlargement with decreased right ventricular  systolicfunction. A device wire is noted in the right  heart. Normal tricuspid valve. Minimal tricuspid  regurgitation. Normal pulmonic valve.  Pericardium/Pleura: Normal pericardium with no pericardial  effusion.  Hemodynamic: Estimated right atrial pressure is 8 mm Hg.  Estimated right ventricular systolic pressure equals 31 mm  Hg, assuming right atrial pressure equals 8 mm Hg,  consistent with normal pulmonary pressures.  ------------------------------------------------------------------------  Conclusions:  1. Calcified aortic valve with decreased opening. Peak  transaortic valve gradient equals 24 mm Hg, mean  transaortic valve gradient equals 12 mm Hg, estimated  aortic valve area equals 1 sqcm (by continuity equation),  aortic valve velocity time integral equals 47 cm,  consistent with moderate aortic stenosis.  2. Moderately dilated left atrium.  LA volume index = 48  cc/m2.  3. Mild left ventricular enlargement.  4. Severe segmental left ventricular systolic dysfunction.  Akinetic basalinferior and inferolateral.  Diffuse  hypokinesis of the inferior and inferolateral wall, lateral  wall.   Endocardial visualization enhanced with intravenous  injection of Ultrasonic Enhancing Agent (Definity). No left  ventricular thrombus.  5. Severe  diastolic dysfunction (Stage III).  6. Mild right atrial enlargement.  7. Right ventricular enlargement with decreased right  ventricular systolic function. A device wire is noted in  the right heart.  ------------------------------------------------------------------------  Confirmed on  6/7/2021 - 14:32:38 by TIERRA Ovalles  ------------------------------------------------------------------------    < end of copied text >    Echo: 3/23/2021 MAC with only mild MR.  Calcified aortic valve with decreased opening.  Peak gradient 31, mean 19, dimensionless index 0.20 and no AI noted this time.  Severe LAE.  Severe segmental LV systolic dysfunction with LVEF 23 to 25%.  Mild LVH.  Normal RV size and function with mild to moderate TR.  RVSP 54.  No pericardial effusion.       Stress Testing:< from: Stress Echocardiogram-Pharmacologic (10.09.18 @ 17:10) >  Observations:  Mitral Valve: Mitral annular calcification. Moderate mitral  regurgitation.  Aortic Valve/Aorta: Calcified trileaflet aortic valve with  decreased opening. Peak transaortic valve gradient equals  25 mm Hg, mean transaortic valve gradient equals 12 mm Hg,  estimated aortic valve area equals 0.9 sqcm (by continuity  equation), consistent with severe aortic stenosis. Mild  aortic regurgitation.  Peak left ventricular outflow tract  gradient equals 1 mm Hg, mean gradient is equal to 1 mm Hg.  Aortic Root: 3.6 cm.  LVOT diameter: 2.4 cm.  Left Atrium: Severely dilated left atrium.  LA volume index  = 51 cc/m2.  Left Ventricle: Severe segmental left ventricular systolic  dysfunction.  Severe hypokinesis/akinesis of the inferior  and inferolateral wall, basal inferoseptum, anterolateral  wall. Mild left ventricular enlargement.  Right Heart: Normal right atrium. A device wire is noted in  the right heart. Decreased right ventricular systolic  function. Normal tricuspid valve. Minimal tricuspid  regurgitation. Normal pulmonic valve.  Pericardium/Pleura: Normal pericardium with no pericardial  effusion.  Hemodynamic: Estimated right atrial pressure is 8 mm Hg.  Estimated right ventricular systolic pressure equals 33 mm  Hg, assuming right atrial pressure equals 8 mm Hg,  consistent with normal pulmonary pressures.  ------------------------------------------------------------------------  Pharmacologic Stress Test:  Agent:  Dobutamine Dose: 5  mcg/Kg/min over 0 min.  Baseline HR: 71 bpm  Peak HR: 74 bpm  % MPHR: 58 %  Baseline BP: 121/68 mmHg  Peak BP: 134/77 mmHg  Peak RPP: 9,916 (Rate Pressure Product)  Test terminated: Completion of test  Baseline EKG: Paced rhythm  EKG changes: Non diagnostic ECG.  Arrhythmia: None  Heart Rhythm: Paced  HR Response: HR failed to increase appropriately.  BP Response: Appropriate  Symptoms: None  ------------------------------------------------------------------------  Echocardiographic Study:  (A) Baseline echocardiogram reveals:  1. Moderate mitral regurgitation.  2. Calcified trileaflet aortic valve with decreased  opening. Peak transaortic valve gradient equals 25 mm Hg,  mean transaortic valve gradient equals 12 mm Hg, estimated  aortic valve area equals 0.9 sqcm (by continuity equation),  consistent with severe aortic stenosis. Mild aortic  regurgitation.  3. Left ventricular enlargement.  4. Severe segmental left ventricular systolic dysfunction.  Severe hypokinesis/akinesis of the inferior and  inferolateral wall, basal inferoseptum, anterolateral wall.  5. A device wire is noted in the right heart. Decreased  right ventricular systolic function.  (B) Stress echocardiogram reveals:  No significant change in left ventricular systolic  function.  ------------------------------------------------------------------------  Conclusions:  1. Normal hemodynamic response.  2. Non Diagnostic electrocardiographic response.  3. No significant change in left ventricular systolic  function.  4. HR failed to increase appropriately.  5. 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  Discussed with Dr. Kay.  ------------------------------------------------------------------------  Confirmed on  10/10/2018 - 17:48:23 by Jerome Johnson M.D.  ------------------------------------------------------------------------    < end of copied text >      Cath:< from: Cardiac Cath Lab - Adult (10.08.18 @ 15:55) >  CORONARY VESSELS: The coronary circulation is right dominant.  LM:   --  Proximal left main: There was a 40 % stenosis. 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  --  Distal left main: There was a stenosis. The lesion was eccentric.  LAD:   --  LAD: Angiography showed moderate atherosclerosis.  CX:   --  Circumflex: Angiography showed moderate atherosclerosis.  RCA:   --  RCA: Angiography showed moderate atherosclerosis.  --  Proximal RCA: There was a diffuse 30 % stenosis at the site of a prior  stent.  LEFT LOWER EXTREMITY VESSELS: Left external iliac: Angiography showed  aneurysmal dilatation. Left common femoral: The vessel was tortuous.  RIGHT LOWER EXTREMITY VESSELS: Right external iliac: Angiography showed  aneurysmal dilatation. Right common femoral: The vessel was excessively  totuos  < end of copied text >      Interpretation of Telemetry:    Imaging:   < from: Xray Chest 1 View- PORTABLE-Urgent (06.06.21 @ 08:38) >  COMPARISON: Chest x-ray from 5/14/2018.    FINDINGS:    The lungs are clear.  There is no pneumothorax or large pleural effusion.  Heart size cannot be accurately assessed in this projection. Dual lead pacemaker overlying the left chest wall.  No acute rib fractures or other osseous abnormality. Suture anchors noted in the left humeral head.    IMPRESSION:    Clear lungs.        < end of copied text >    Labs:                                                       7.4    6.08  )-----------( 76       ( 10 Yobani 2021 06:06 )             22.5     06-10    140  |  105  |  56<H>  ----------------------------<  92  4.0   |  24  |  2.27<H>    Ca    8.2<L>      10 Yobani 2021 06:19  Phos  2.9     06-10  Mg     2.2     06-10    TPro  5.7<L>  /  Alb  3.3  /  TBili  0.8  /  DBili  x   /  AST  11  /  ALT  12  /  AlkPhos  54  06-10                                                Patient seen and evaluated @850  Chief Complaint: Patient is a 89y old  Male who presents with a chief complaint of syncope (06 Jun 2021 09:36)      HPI:  89 M CAD s/p stents, low-flow low gradient AS, HFrEF 20% s/p MDT Bi-V ICD, COPD who presents with syncope x 2.    Pt reports usual state of health until yesterday night when he experienced an episode of syncope. His wife saw him in bed but he started to slide off the bed onto the floor. He denies any prodrome or feeling any chest pain, sob, palpitations, or shock from his ICD. This AM, the same scenario happened again which prompted the wife to call EMS.     On interrogation, it showed multiple episodes (24) of VT since 5/17 leading up to today that required ATP and shocks. The last one was 6/6/21 at 617AM which did not break with 2 ATPs and ultimately led to shock. (06 Jun 2021 11:40)    CARDIAC HISTORY: I have known the patient since 2006.  He had an MI and angioplasty in 1994.  Had a cath in 2011 with a 40% aneurysmal left main normal LAD and circumflex with a 95% right lesion and an akinetic inferior wall.  Treated medically and did well other than an admission for cellulitis in 2013 with positive blood cultures.  No endocarditis.  Issues with shortness of breath both from COPD and CHF.  Able to undergo knee replacement in 2014 and then later endovascular repair of an abdominal aortic aneurysm in October 2014.  Stress echo in 2015 showed an ejection fraction of 27%.  Cath revealed unchanged coronaries but LVEF 35%.  The right coronary was stented but did not change his LVEF so we had a defibrillator and biventricular pacemaker placed by Dr. Russell in August 2015.  I changed his Avapro to Entresto and the patient's CHF seem to improve.  August 25 of 2017 had a syncopal episode in the bathroom.  He had no idea what happened but interrogation showed V. tach followed by V. fib which led to a defibrillator shock.  No recurrence and no AICD shocks since then until this admission.  2019 had a couple of runs of ventricular tachycardia that were paced terminated.  98% of the time biventricular pacing.  2021 episode of atrial fibrillation on interrogation that lasted an hour and 48 minutes.  Progressive heart failure and he was worked up for possible TAVR with low gradient aortic stenosis including a dobutamine echo and was found not to be a candidate.  Has had progressive shortness of breath and LV dysfunction with adjusting of his medications and recently adding Jardiance and then increasing it from 10-25.  Issues with creatinine running between 2 and 2.5.  BNP as high as 18,000.  Most recent echo was March 23 of this year with an aortic gradient of 31 peak and 19 mean, dimensionless index 0.20 and no AI.  Severe segmental LV systolic dysfunction with LVEF 23 to 25%.  Mild LVH.  Only mild MR and mild to moderate TR with RVSP 54.    PMH:   HTN (hypertension)    HLD (hyperlipidemia)    CAD (coronary artery disease)    BPH (Benign Prostatic Hyperplasia)    CHF (congestive heart failure)    Hypothyroid    GERD (gastroesophageal reflux disease)    Gout    COPD (chronic obstructive pulmonary disease)    MI (myocardial infarction)      PSH:   Cataract    Hernia, inguinal, bilateral    Achilles rupture    S/P knee replacement, right    AAA (abdominal aortic aneurysm)    H/O repair of rotator cuff    S/P angioplasty    OUTPATIENT CARDIAC MEDS: Carvedilol 12.5 mg twice daily, Entresto 97/103 twice daily, digoxin 0.125 daily, Jardiance 25 mg daily, furosemide 40 mg daily, Synthroid 0.137 daily, simvastatin 10 daily.    Medications:   allopurinol 100 milliGRAM(s) Oral daily  aMIOdarone    Tablet 400 milliGRAM(s) Oral every 12 hours  ferrous    sulfate 325 milliGRAM(s) Oral daily  levothyroxine 125 MICROGram(s) Oral daily  metoprolol tartrate 12.5 milliGRAM(s) Oral two times a day  pantoprazole    Tablet 40 milliGRAM(s) Oral before breakfast  polyethylene glycol 3350 17 Gram(s) Oral daily  senna 2 Tablet(s) Oral at bedtime  simvastatin 10 milliGRAM(s) Oral at bedtime  tamsulosin 0.8 milliGRAM(s) Oral at bedtime    Allergies:  No Known Allergies    FAMILY HISTORY:  Family history of hemolytic uremic syndrome    Family history of CHF (congestive heart failure)      Social History: , fairly active despite restrictions from CHF and COPD  Smoking: Remote  Alcohol: No  Drugs: No    Review of Systems:  Constitutional: no recent weight loss  HEENT: no scleral icterus. Normal mucosa.  Respiratory: (+) COPD followed by Dr. Morris  Cardiovascular: see HPI  Gastrointestinal: No Abdominal Pain, no Diarrhea, no Constipation, no Nausea or Vomiting  Genitourinary: No Nocturia, Dysuria, or Incontinence. No hematuria.  BPH  Extremities: (+) edema. No cyanosis.  Neurologic: No Focal deficit. No Paresthesias. No Syncope. No seizures  Lymphatic: No Swelling. No Lymphadenopathy   Skin: No Rash,  Ecchymoses, Wounds, or Lesions  Psychiatry: No Depression. No Anxiety.    10 point review of systems is otherwise negative except as mentioned above            [ ]Unable to obtain    Physical Exam:  Vital Signs Last 24 Hrs  T(C): 36.6 (10 Yobani 2021 04:48), Max: 36.9 (09 Jun 2021 11:00)  T(F): 97.8 (10 Yobani 2021 04:48), Max: 98.4 (09 Jun 2021 11:00)  HR: 70 (10 Yobani 2021 04:48) (68 - 72)  BP: 101/61 (10 Yobani 2021 04:48) (74/39 - 108/66)  BP(mean): 69 (09 Jun 2021 12:00) (52 - 69)  RR: 18 (10 Yobani 2021 04:48) (16 - 25)  SpO2: 95% (10 Yobani 2021 04:48) (89% - 97%)  Appearance: WD, WN, NAD. No more trouble with speech. No weakness etc. (+) Runs of  VT overnight and this AM up to 20 beats.  Eyes:  No scleral icterus. PERRL, EOMI  HENT: Normal oral mucosa.]NC/AT  Neck:  JVD 1/3 up at 90 degrees. Normal carotid upstrokes without bruits or murmurs.  Cardiovascular: Normal PMI. Normal S1, S2 physiologically split. No S3, (+) S4. 2/6 RODRÍGUEZ, 2-3/6 HSM apex.  Respiratory: Clear to auscultation bilaterally. No wheezes. No rales.  Gastrointestinal: Soft.  Non-tender. No hepatosplenomegally.  Extremities: No clubbing. No edema today. Pulses 2+ bilaterally symmetric except diminished pedal.  Musculoskeletal:  No joint deformity   Neurologic: Non-focal  Lymphatic:  No lymphadenopathy  Psychiatry: [+ ] AAOx3 [ +] Mood & affect appropriate  Skin: No rashes. No ecchymoses. No cyanosis    Cardiovascular Diagnostic Testing:  ECG:< from: 12 Lead ECG (06.07.21 @ 07:34) >  Ventricular Rate 69 BPM    Atrial Rate 69 BPM    QRS Duration 138 ms    Q-T Interval 421 ms    QTC Calculation(Bazett) 451 ms    P Axis 35 degrees    R Axis -74 degrees    T Axis 119 degrees    Diagnosis Line ELECTRONIC VENTRICULAR PACEMAKER  WHEN COMPARED WITH ECG OF `6/6/21 14:50  NO SIGNIFICANT CHANGE WAS FOUND  Confirmed by KAM Woodward Zlata ([id number]) on 6/7/2021 10:45:59 AM    < end of copied text >    < from: TTE with Doppler (w/Cont) (06.07.21 @ 07:37) >  YOB: 1925   Age: 89 (M)   MR#: [id number]  Study Date: 6/7/2021  [facility]endra [facility] RDCS  Study quality: Technically fair  Referring Physician: Yvonne Brown MD  Blood Pressure: 106/55 mmHg  Height: 175 cm  Weight: 70 kg  BSA: 1.9 m2  ------------------------------------------------------------------------  PROCEDURE: Transthoracic echocardiogram with 2-D, M-Mode  and complete spectral and color flow Doppler. Verbal  consent was obtained for injection of  Ultrasonic Enhancing  Agent following a discussion of risks and benefits.  Following intravenous injection of Ultrasonic Enhancing  Agent , harmonic imaging was performed.  INDICATION: Syncope and collapse (R55)  ------------------------------------------------------------------------  Dimensions:    Normal Values:  LA:     5.2    2.0 - 4.0 cm  Ao:     3.4    2.0 - 3.8 cm  SEPTUM: 1.4    0.6 - 1.2 cm  PWT:    0.7    0.6 - 1.1 cm  LVIDd:  5.2    3.0 - 5.6 cm  LVIDs:  4.7    1.8 - 4.0 cm  Derived variables:  LVMI: 122 g/m2  RWT: 0.28  Fractional short: 10 %  EF (Visual Estimate): 30 %  Doppler Peak Velocity (m/sec): MV=1.4 AoV=2.5  ------------------------------------------------------------------------  Observations:  Mitral Valve: Tethered mitral valve leaflets with normal  opening. Mild mitral regurgitation.  Aortic Valve/Aorta: Calcified aortic valve with decreased  opening. Peak transaortic valve gradient equals 24 mm Hg,  mean transaortic valve gradient equals 12 mm Hg, estimated  aortic valve area equals 1 sqcm (by continuity equation),  aortic valve velocity time integral equals 47 cm,  consistent with moderate aortic stenosis. Mild aortic  regurgitation.  Peak left ventricular outflow tract  gradient equals 1 mm Hg, mean gradient is equal to 1 mm Hg,  LVOT velocity time integral equals 9 cm.  Aortic Root: 3.4 cm.  LVOT diameter: 2.6 cm.  Left Atrium: Moderately dilated left atrium.  LA volume  index = 48 cc/m2.  Left Ventricle: Severe segmental left ventricular systolic  dysfunction.  Akinetic basal inferior and inferolateral.  Diffuse hypokinesis of the inferior and inferolateral wall,  lateral wall.   Endocardial visualization enhanced with  intravenous injection of Ultrasonic Enhancing Agent  (Definity). No left ventricular thrombus. Mild left  ventricular enlargement. Severe  diastolic dysfunction  (Stage III).  Right Heart: Mild right atrial enlargement. Right  ventricular enlargement with decreased right ventricular  systolicfunction. A device wire is noted in the right  heart. Normal tricuspid valve. Minimal tricuspid  regurgitation. Normal pulmonic valve.  Pericardium/Pleura: Normal pericardium with no pericardial  effusion.  Hemodynamic: Estimated right atrial pressure is 8 mm Hg.  Estimated right ventricular systolic pressure equals 31 mm  Hg, assuming right atrial pressure equals 8 mm Hg,  consistent with normal pulmonary pressures.  ------------------------------------------------------------------------  Conclusions:  1. Calcified aortic valve with decreased opening. Peak  transaortic valve gradient equals 24 mm Hg, mean  transaortic valve gradient equals 12 mm Hg, estimated  aortic valve area equals 1 sqcm (by continuity equation),  aortic valve velocity time integral equals 47 cm,  consistent with moderate aortic stenosis.  2. Moderately dilated left atrium.  LA volume index = 48  cc/m2.  3. Mild left ventricular enlargement.  4. Severe segmental left ventricular systolic dysfunction.  Akinetic basalinferior and inferolateral.  Diffuse  hypokinesis of the inferior and inferolateral wall, lateral  wall.   Endocardial visualization enhanced with intravenous  injection of Ultrasonic Enhancing Agent (Definity). No left  ventricular thrombus.  5. Severe  diastolic dysfunction (Stage III).  6. Mild right atrial enlargement.  7. Right ventricular enlargement with decreased right  ventricular systolic function. A device wire is noted in  the right heart.  ------------------------------------------------------------------------  Confirmed on  6/7/2021 - 14:32:38 by TIERRA Ovalles  ------------------------------------------------------------------------    < end of copied text >    Echo: 3/23/2021 MAC with only mild MR.  Calcified aortic valve with decreased opening.  Peak gradient 31, mean 19, dimensionless index 0.20 and no AI noted this time.  Severe LAE.  Severe segmental LV systolic dysfunction with LVEF 23 to 25%.  Mild LVH.  Normal RV size and function with mild to moderate TR.  RVSP 54.  No pericardial effusion.       Stress Testing:< from: Stress Echocardiogram-Pharmacologic (10.09.18 @ 17:10) >  Observations:  Mitral Valve: Mitral annular calcification. Moderate mitral  regurgitation.  Aortic Valve/Aorta: Calcified trileaflet aortic valve with  decreased opening. Peak transaortic valve gradient equals  25 mm Hg, mean transaortic valve gradient equals 12 mm Hg,  estimated aortic valve area equals 0.9 sqcm (by continuity  equation), consistent with severe aortic stenosis. Mild  aortic regurgitation.  Peak left ventricular outflow tract  gradient equals 1 mm Hg, mean gradient is equal to 1 mm Hg.  Aortic Root: 3.6 cm.  LVOT diameter: 2.4 cm.  Left Atrium: Severely dilated left atrium.  LA volume index  = 51 cc/m2.  Left Ventricle: Severe segmental left ventricular systolic  dysfunction.  Severe hypokinesis/akinesis of the inferior  and inferolateral wall, basal inferoseptum, anterolateral  wall. Mild left ventricular enlargement.  Right Heart: Normal right atrium. A device wire is noted in  the right heart. Decreased right ventricular systolic  function. Normal tricuspid valve. Minimal tricuspid  regurgitation. Normal pulmonic valve.  Pericardium/Pleura: Normal pericardium with no pericardial  effusion.  Hemodynamic: Estimated right atrial pressure is 8 mm Hg.  Estimated right ventricular systolic pressure equals 33 mm  Hg, assuming right atrial pressure equals 8 mm Hg,  consistent with normal pulmonary pressures.  ------------------------------------------------------------------------  Pharmacologic Stress Test:  Agent:  Dobutamine Dose: 5  mcg/Kg/min over 0 min.  Baseline HR: 71 bpm  Peak HR: 74 bpm  % MPHR: 58 %  Baseline BP: 121/68 mmHg  Peak BP: 134/77 mmHg  Peak RPP: 9,916 (Rate Pressure Product)  Test terminated: Completion of test  Baseline EKG: Paced rhythm  EKG changes: Non diagnostic ECG.  Arrhythmia: None  Heart Rhythm: Paced  HR Response: HR failed to increase appropriately.  BP Response: Appropriate  Symptoms: None  ------------------------------------------------------------------------  Echocardiographic Study:  (A) Baseline echocardiogram reveals:  1. Moderate mitral regurgitation.  2. Calcified trileaflet aortic valve with decreased  opening. Peak transaortic valve gradient equals 25 mm Hg,  mean transaortic valve gradient equals 12 mm Hg, estimated  aortic valve area equals 0.9 sqcm (by continuity equation),  consistent with severe aortic stenosis. Mild aortic  regurgitation.  3. Left ventricular enlargement.  4. Severe segmental left ventricular systolic dysfunction.  Severe hypokinesis/akinesis of the inferior and  inferolateral wall, basal inferoseptum, anterolateral wall.  5. A device wire is noted in the right heart. Decreased  right ventricular systolic function.  (B) Stress echocardiogram reveals:  No significant change in left ventricular systolic  function.  ------------------------------------------------------------------------  Conclusions:  1. Normal hemodynamic response.  2. Non Diagnostic electrocardiographic response.  3. No significant change in left ventricular systolic  function.  4. HR failed to increase appropriately.  5. 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  Discussed with Dr. Kay.  ------------------------------------------------------------------------  Confirmed on  10/10/2018 - 17:48:23 by Jerome Johnson M.D.  ------------------------------------------------------------------------    < end of copied text >      Cath:< from: Cardiac Cath Lab - Adult (10.08.18 @ 15:55) >  CORONARY VESSELS: The coronary circulation is right dominant.  LM:   --  Proximal left main: There was a 40 % stenosis. 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  --  Distal left main: There was a stenosis. The lesion was eccentric.  LAD:   --  LAD: Angiography showed moderate atherosclerosis.  CX:   --  Circumflex: Angiography showed moderate atherosclerosis.  RCA:   --  RCA: Angiography showed moderate atherosclerosis.  --  Proximal RCA: There was a diffuse 30 % stenosis at the site of a prior  stent.  LEFT LOWER EXTREMITY VESSELS: Left external iliac: Angiography showed  aneurysmal dilatation. Left common femoral: The vessel was tortuous.  RIGHT LOWER EXTREMITY VESSELS: Right external iliac: Angiography showed  aneurysmal dilatation. Right common femoral: The vessel was excessively  totuos  < end of copied text >      Interpretation of Telemetry:    Imaging:   < from: Xray Chest 1 View- PORTABLE-Urgent (06.06.21 @ 08:38) >  COMPARISON: Chest x-ray from 5/14/2018.    FINDINGS:    The lungs are clear.  There is no pneumothorax or large pleural effusion.  Heart size cannot be accurately assessed in this projection. Dual lead pacemaker overlying the left chest wall.  No acute rib fractures or other osseous abnormality. Suture anchors noted in the left humeral head.    IMPRESSION:    Clear lungs.        < end of copied text >    Labs:                                                       7.4    6.08  )-----------( 76       ( 10 Yobani 2021 06:06 )             22.5     06-10    140  |  105  |  56<H>  ----------------------------<  92  4.0   |  24  |  2.27<H>    Ca    8.2<L>      10 Yobani 2021 06:19  Phos  2.9     06-10  Mg     2.2     06-10    TPro  5.7<L>  /  Alb  3.3  /  TBili  0.8  /  DBili  x   /  AST  11  /  ALT  12  /  AlkPhos  54  06-10

## 2021-06-10 NOTE — PROVIDER CONTACT NOTE (OTHER) - ASSESSMENT
Patient is asymptomatic. Patient is talking to his wife on the phone. Patient is Alert and Oriented times Four. Patient denies chest pain, discomfort, shortness of breath, dizziness and lightheadedness. Patient's Heart Rhythm is Normal Sinus Rhythm on the cardiac monitor. Patient's Vital Signs: Temperature 97.5 F Oral, Heart Rate 70, Blood Pressure 100/62, Respiratory Rate 18 and O2 Saturation 95% Room Air. Patient has an active order for PO Amiodarone 400 mg every 12 hours.
Pt. AxOx4. VSS. Pt. denies cp, sob, palpitations, nausea, dizziness.
Patient is asymptomatic. Patient is Alert and Oriented times Four. Patient denies chest pain, discomfort, shortness of breath, dizziness and lightheadedness. Patient's Heart Rhythm is Normal Sinus Rhythm on the cardiac monitor. Patient's Vital Signs: Temperature 97.5 F Oral, Heart Rate 72, Blood Pressure 103/64, Respiratory Rate 18 and O2 Saturation 95% Room Air.
Patient is asymptomatic. Patient is eating breakfast. Patient is Alert and Oriented times Four. Patient denies chest pain, discomfort, shortness of breath, dizziness and lightheadedness. Patient's Heart Rhythm is Normal Sinus Rhythm on the cardiac monitor. Patient's Vital Signs: Temperature 97.6 F Oral, Heart Rate 74, Blood Pressure 105/63, Respiratory Rate 18 and O2 Saturation 95% Room Air.

## 2021-06-11 NOTE — PROGRESS NOTE ADULT - SUBJECTIVE AND OBJECTIVE BOX
Date of service: 21 @ 14:10      Patient is a 89y old  Male who presents with a chief complaint of ICD fired with VT (2021 10:05)                                                               INTERVAL HPI/OVERNIGHT EVENTS:    REVIEW OF SYSTEMS:     CONSTITUTIONAL: No weakness, fevers or chills  RESPIRATORY: No cough, wheezing,  No shortness of breath  CARDIOVASCULAR: No chest pain or palpitations  GASTROINTESTINAL: No abdominal pain  . No nausea, vomiting, or hematemesis; No diarrhea or constipation. No melena or hematochezia.  GENITOURINARY: No dysuria, frequency or hematuria  NEUROLOGICAL: No numbness or weakness                                                                                                                                                                                                                                                                                  Medications:  MEDICATIONS  (STANDING):  allopurinol 100 milliGRAM(s) Oral daily  aMIOdarone    Tablet 400 milliGRAM(s) Oral every 12 hours  ferrous    sulfate 325 milliGRAM(s) Oral daily  levothyroxine 125 MICROGram(s) Oral daily  metoprolol tartrate 25 milliGRAM(s) Oral every 12 hours  pantoprazole    Tablet 40 milliGRAM(s) Oral before breakfast  polyethylene glycol 3350 17 Gram(s) Oral daily  quiNIDine gluconate  milliGRAM(s) Oral every 12 hours  sacubitril 24 mG/valsartan 26 mG 1 Tablet(s) Oral two times a day  senna 2 Tablet(s) Oral at bedtime  simvastatin 10 milliGRAM(s) Oral at bedtime  tamsulosin 0.8 milliGRAM(s) Oral at bedtime    MEDICATIONS  (PRN):       Allergies    No Known Allergies    Intolerances      Vital Signs Last 24 Hrs  T(C): 36.3 (2021 12:30), Max: 36.6 (10 Yobani 2021 20:45)  T(F): 97.3 (2021 12:30), Max: 97.9 (10 Yobani 2021 20:45)  HR: 70 (2021 13:00) (69 - 72)  BP: 98/60 (2021 13:00) (94/55 - 113/69)  BP(mean): --  RR: 18 (2021 12:30) (16 - 18)  SpO2: 97% (2021 12:30) (94% - 97%)  CAPILLARY BLOOD GLUCOSE          -10 @ 07:01  -  - @ 07:00  --------------------------------------------------------  IN: 1440 mL / OUT: 2150 mL / NET: -710 mL    11 @ 07:01  -  11 @ 14:10  --------------------------------------------------------  IN: 360 mL / OUT: 600 mL / NET: -240 mL      Physical Exam:    Daily     Daily Weight in k.5 (2021 04:37)  General:  NAD   HEENT:  Nonicteric, PERRLA  CV:  RRR, S1S2   Lungs:  CTA B/L, no wheezes, rales, rhonchi  Abdomen:  Soft, non-tender, no distended, positive BS  Extremities:  no edema   Neuro:  AAOx3, non-focal, grossly intact                                                                                                                                                                                                                                                                                                LABS:                               8.2    6.30  )-----------( 84       ( 2021 07:09 )             24.9                      06-11    140  |  106  |  57<H>  ----------------------------<  85  4.1   |  23  |  2.19<H>    Ca    8.4      2021 07:09  Phos  2.5     06-  Mg     2.2     11    TPro  5.7<L>  /  Alb  3.3  /  TBili  0.8  /  DBili  x   /  AST  11  /  ALT  12  /  AlkPhos  54  06-10                       RADIOLOGY & ADDITIONAL TESTS         I personally reviewed: [  ]EKG   [  ]CXR    [  ] CT      A/P:         Discussed with :     Kamlesh consultants' Notes   Time spent :

## 2021-06-11 NOTE — PROGRESS NOTE ADULT - ASSESSMENT
24 episodes of VT since 5/17, most pace-terminated with mild sx, 2 episodes syncope with shock.   Renal function and BNP only slightly worse than recent baseline.  No chest pain--troponin 90s maybe from shock--will trend.    Discussed with Dr. Bhakta of EPS  1. Lidocaine off; oral loading with amiodarone. patient would consider VT ablation if fails amio. Back on beta-blocker (prefer change back to Carvedilol). Transfuse. Check with EP re: just continue Amio, added Quinaglute for now  2. Resume outpatient cardiac meds--being held now as overdiuresed plus anemic.-- Transfuse, then try to reintroduce Entresto. ? Jardiance and ? add Vericiguat. On metoprolol in place of carvedilol for now-consider change back.  No role for structural heart here; reviewed echo in detail.  3. COPD--continue outpatient meds.  (Followed by Dr. Ning Morris.)  4. OOB to chair. PT.  5. Transfused one more unit yesterday--check H/H.    Continue po load with amiodarone plus quinaglute--moniter for more VT. Await f/u by EP as noted above.      Wolf Paiz MD, FACC  (O) [phone]  (C) [phone] 24 episodes of VT since 5/17, most pace-terminated with mild sx, 2 episodes syncope with shock.   Renal function and BNP only slightly worse than recent baseline.  No chest pain--troponin 90s maybe from shock--will trend.    Discussed with Dr. Bhakta of EPS  1. Lidocaine off; oral loading with amiodarone. patient would consider VT ablation if fails meds. Back on beta-blocker (prefer change back to Carvedilol). Transfused-Hb 8.2. Checked with EP- continue Amio, added Quinaglute for now  2. Resume outpatient cardiac meds--being held now as overdiuresed plus anemic.-- Transfused, now slowly reintroduce Entresto (24/26 q12h). ? Jardiance and ? add Vericiguat. On metoprolol in place of carvedilol for now-consider change back.  No role for structural heart here; reviewed echo in detail.  Holding diuretic and digoxin.  3. COPD--continue outpatient meds.  (Followed by Dr. Ning Morris.)  4. OOB to chair. PT.      Continue po load with amiodarone plus quinaglute--moniter for more VT. Await f/u by EP as noted above.      Wolf Paiz MD, FACC  (O) [phone]  (C) [phone]

## 2021-06-11 NOTE — PROGRESS NOTE ADULT - ASSESSMENT
89 M COPD (no home O2), CAD s/p stent 2015, AAA repair w/ stent, HTN, HLD, HFrEF (20%), Bi-V ICD ([employer]) presented with syncope found to be in VT storm requiring ATP and shocks, started on lido gtt and amio oral load.      VT Storm  - interrogation revealed multiple episodes of VT since may requiring ATP and shocks  -weaned off lidocaine gtt this AM  -c/w monitor for ectopy/VT  -c/w amio 400 bid (loading dose total 7-10 g)   -per EP, after load, can decrease to 400 mg qd then eventual transition to 200 mg qd.. now added quindine   -is on home coreg 12.5 bid, switched to lopressor 12.5 bid but held due to soft BPs  - monitor lytes and keep K>4 and Mg>2  - f/u EP recs, medical management at this time. As per Ep- will consider ablation if continues to have ectopy with antiarrythmic drugs.   - will hold digoxin at this time. Dig level wnl     Hx CAD with stent 2015 and HFrEF  - 10/2018 [facility]- nonobstructive CAD  - 6/7 TTE: EF 30%. Mild MR. Moderate AS and mild AR. Mod dilated LA. Akinetic basal inferior and inferolateral. Diffuse hypokinesis of the inferior and inferolateral wall, lateral wall. No LV thrombus. Severe diastolic dysfunction. Mild RA enlargement   - continue ASA and statin  - on home lasix 40 bid but will hold given soft BPs  - holding home entresto and jardiance given soft BPs  - completing Amio 10 gram loading (currently 400 bid), then to continue 400 qd  -  coreg 12.5 BID, which is on hold for soft BPs   - home Digoxin on hold given bump in Creatinine   home entresto and jardiance on hold due to soft BPs   f/u EP recs.        CKD, non-oliguric, Baseline Cr 2-2.5  - admission SCr 2.19, now 2.47 likely in setting of mild dehydration (had been putting out about 1 L/shift)  -hold lasix for now, will resume when BPs improve  -Continue to monitor I/Os, BUN/Creatinine, and urine output.   -Replete lytes PRN. Keep K> 4 and Mg >2.     BPH  -c/w flomax 0.8 qhs (home med)        Gout   - c/w home allopurinol for management     hypothyroidism   - c/w home synthroid         appreciate CCU input and help    consider palliative and GOC

## 2021-06-11 NOTE — PROGRESS NOTE ADULT - ASSESSMENT
89M CAD s/p stents, low-flow low gradient AS, HFrEF 20% s/p MDT Bi-V ICD, COPD who presents with syncope x 2 found to have multiple episodes of VT on interrogation requiring ATP and shock. He had another episode of VT in the ED that was successfully treated with ATP and another that required shock. He is currently Bi-V paced and hemodynamically stable. Given his age, will opt for medical management to see if this will suppress his VT for now.    Monomorphic VT s/p ATP and defibrillation: No further sustained VT episodes  - Added a Ramp (1) to allow for more ATP therapy after Burst (2)  - continue amiodarone 400 BID for total of 7-10 g load, and then 400 QD (eventually decrease to 200 QD). Tolerating Amiodarone thus far. Patient should have routine LFT, TFTs, yearly eye exam and pulmonary function testing if remains on long term therapy  - s/p lidocaine 100mg bolus, was on lido drip, however discontinued 6/8  - continue metoprolol 25 BID (likely changed from coreg 12.5 BID due to hypotension)   - continue quinidine 324 mg BID - patient with improved ectopy    Junie Lr MD  Cardiology Fellow, PGY-4  [phone]  For all other Cardiology service contact information, go to [email or web address] and use "[email or web address]" to login.

## 2021-06-11 NOTE — PROGRESS NOTE ADULT - SUBJECTIVE AND OBJECTIVE BOX
Patient seen and examined at bedside.    Overnight Events: Patient without CP or SOB. Still does not feel palpitations. Tele printout reviewed (electronic tele events deleted), 11 beat NSVT at 3:50 AM and 16 beat NSVT     Current Meds:  allopurinol 100 milliGRAM(s) Oral daily  aMIOdarone    Tablet 400 milliGRAM(s) Oral every 12 hours  ferrous    sulfate 325 milliGRAM(s) Oral daily  levothyroxine 125 MICROGram(s) Oral daily  metoprolol tartrate 25 milliGRAM(s) Oral every 12 hours  pantoprazole    Tablet 40 milliGRAM(s) Oral before breakfast  polyethylene glycol 3350 17 Gram(s) Oral daily  quiNIDine gluconate  milliGRAM(s) Oral every 12 hours  sacubitril 24 mG/valsartan 26 mG 1 Tablet(s) Oral two times a day  senna 2 Tablet(s) Oral at bedtime  simvastatin 10 milliGRAM(s) Oral at bedtime  tamsulosin 0.8 milliGRAM(s) Oral at bedtime    Vitals:  T(F): 97.6 (06-11), Max: 97.9 (06-10)  HR: 70 (06-11) (69 - 71)  BP: 100/65 (06-11) (100/64 - 113/69)  RR: 16 (06-11)  SpO2: 97% (06-11)  I&O's Summary    10 Yobani 2021 07:01  -  11 Jun 2021 07:00  --------------------------------------------------------  IN: 1440 mL / OUT: 2150 mL / NET: -710 mL    Physical Exam:  Appearance: No acute distress; well appearing  Eyes:  EOMI, pink conjunctiva  HENT: Normal oral mucosa  Cardiovascular: RRR, S1, S2, 3/6 systolic murmur, rubs, or gallops; no edema; no JVD  Respiratory: Clear to auscultation bilaterally  Gastrointestinal: soft, non-tender, non-distended with normal bowel sounds  Musculoskeletal: No clubbing; no joint deformity   Neurologic: Non-focal  Lymphatic: No lymphadenopathy  Psychiatry: AAOx3, mood & affect appropriate  Skin: No rashes                        8.2    6.30  )-----------( 84       ( 11 Jun 2021 07:09 )             24.9     06-11    140  |  106  |  57<H>  ----------------------------<  85  4.1   |  23  |  2.19<H>    Ca    8.4      11 Jun 2021 07:09  Phos  2.5     06-11  Mg     2.2     06-11    TPro  5.7<L>  /  Alb  3.3  /  TBili  0.8  /  DBili  x   /  AST  11  /  ALT  12  /  AlkPhos  54  06-10    CARDIAC MARKERS ( 06 Jun 2021 16:22 )  x     / x     / x     / 52 U/L / x     / 2.5 ng/mL  CARDIAC MARKERS ( 06 Jun 2021 16:21 )  78 ng/L / x     / x     / x     / x     / x      CARDIAC MARKERS ( 06 Jun 2021 10:50 )  90 ng/L / x     / x     / x     / x     / x      CARDIAC MARKERS ( 06 Jun 2021 07:49 )  95 ng/L / x     / x     / 59 U/L / x     / 2.6 ng/mL    Serum Pro-Brain Natriuretic Peptide: 31196 pg/mL (06-06 @ 07:49)    New ECG(s): Personally reviewed    Echo:< from: TTE with Doppler (w/Cont) (06.07.21 @ 07:37) >  Conclusions:  1. Calcified aortic valve with decreased opening. Peak  transaortic valve gradient equals 24 mm Hg, mean  transaortic valve gradient equals 12 mm Hg, estimated  aortic valve area equals 1 sqcm (by continuity equation),  aortic valve velocity time integral equals 47 cm,  consistent with moderate aortic stenosis.  2. Moderately dilated left atrium.  LA volume index = 48  cc/m2.  3. Mild left ventricular enlargement.  4. Severe segmental left ventricular systolic dysfunction.  Akinetic basalinferior and inferolateral.  Diffuse  hypokinesis of the inferior and inferolateral wall, lateral  wall.   Endocardial visualization enhanced with intravenous  injection of Ultrasonic Enhancing Agent (Definity). No left  ventricular thrombus.  5. Severe  diastolic dysfunction (Stage III).  6. Mild right atrial enlargement.  7. Right ventricular enlargement with decreased right  ventricular systolic function. A device wire is noted in  the right heart.  ------------------------------------------------------------------------  Confirmed on  6/7/2021 - 14:32:38 by TIERRA Ovalles  ------------------------------------------------------------------------    < end of copied text >    Stress Testing:     Cath:    Imaging:    Interpretation of Telemetry: 11 beat NSVT at 3:50 AM and 16 beat NSVT, overall V paced at rate 70

## 2021-06-12 NOTE — CHART NOTE - NSCHARTNOTEFT_GEN_A_CORE
Nutrition Follow Up Note  Patient seen for: calorie count follow up     Chart reviewed, events noted.    Source: [x] Patient       [x] EMR        [x] RN        [] Family at bedside       [x] Other: NP    -If unable to interview patient: [] Trach/Vent/BiPAP  [] Disoriented/confused/inappropriate to interview    Diet Order:   Diet, Regular:   Low Sodium (21)    - Is current order appropriate/adequate? [x] Yes  []  No:     - PO intake meals :   [x] >75%  Adequate    [] 50-75%  Fair       [] <50%  Poor  - PO intake of supplements if pt receiving: []>75% []50% []25%   as per   [x]flow sheet  [x]patient  []family/aide  []PCA  []Nurse    - Nutrition-related concerns: pt consuming >75% of meals. RD spoke with NP -will monitor pt po intake and follow to determine if calorie count needs to be initiated.    pt seen by SLP []Yes [x]No    GI:  Last BM ___.   Bowel Regimen? [x] Yes   [] No      Weights:   Daily Weight in k.7 (), Weight in k.5 (), Weight in k.1 (06-10), Weight in k.8 (), Weight in k.5 ()    Nutritionally Pertinent MEDICATIONS  (STANDING):  allopurinol  aMIOdarone    Tablet  ferrous    sulfate  levothyroxine  metoprolol tartrate  pantoprazole    Tablet  polyethylene glycol 3350  quiNIDine gluconate ER  sacubitril 24 mG/valsartan 26 mG  senna  simvastatin  tamsulosin    Pertinent Labs:  @ 07:49: Na --, BUN --, Cr --, BG --, K+ --, Phos --, Mg 2.3, Alk Phos --, ALT/SGPT --, AST/SGOT --, HbA1c --    A1C with Estimated Average Glucose Result: 5.1 % (21 @ 17:57)          Pressure Injuries as per nursing documentation: none  Edema: +1 right leg    Estimated Needs:   [x] no change since previous assessment  [] recalculated:     Previous Nutrition Diagnosis: Inadequate Oral Intake.   Nutrition Diagnosis is: [] ongoing  [x] resolved [] not applicable     New Nutrition Diagnosis: [x] Not applicable    Nutrition Care Plan:  [] In Progress  [x] Achieved  [] Not applicable    Nutrition Interventions:     Education Provided:       [] Yes:  [x] No:   pt was educated on the importance of supplements to increase calorie and protein intake in light of RD's nutritional findings []Yes [x]N/A         Recommendations:         [x] Continue current diet order            [] continue oral nutrition supplement:     [] Change diet to:      [] Add micronutrient supplementation:      [] Continue current micronutrient supplementation:         []Discussed recommendations with provider  [x] Need to escalate to provider: spoke with NP     []Placed pending verification     []Placed sticker (malnutrition/BMI/underweight)     []Recommend swallow evaluation  [] Other:     Monitoring and Evaluation:   Continue to monitor nutritional intake, tolerance to diet prescription, weights, labs, skin integrity      RD remains available upon request and will follow up per protocol  Lula Lim MA, RD, CDN #[phone]

## 2021-06-12 NOTE — PROVIDER CONTACT NOTE (MEDICATION) - BACKGROUND
Pt. admitted with VT, currently Bi-V paced and hemodynamically stable. Metoprolol and Entresto prescribed.

## 2021-06-12 NOTE — PROGRESS NOTE ADULT - ASSESSMENT
89 year old Male with CAD s/p stents, low-flow low gradient AS, HFrEF 20% s/p MDT Bi-V ICD, COPD who presents with syncope x 2 found to have multiple episodes of VT on interrogation requiring ATP and shock. He had another episode of VT in the ED that was successfully treated with ATP and another that required shock. He is currently Bi-V paced and hemodynamically stable. Given his age, will opt for medical management to see if this will suppress his VT for now.    1.  Monomorphic VT s/p ATP and defibrillation: No further sustained VT episodes  - continue amiodarone 400 BID for total of 7-10 g load, and then 400 QD (eventually decrease to 200 QD). Tolerating Amiodarone thus far.   While on Amiodarone will require routine LFT, TFTs, yearly eye exam and pulmonary function testing    - continue metoprolol 25 BID    - continue quinidine 324 mg BID    - Monitor closely on telemetry    MADDY Salazar [facility]  [phone]  89 year old Male with CAD s/p stents, low-flow low gradient AS, HFrEF 20% s/p MDT Bi-V ICD, COPD who presents with syncope x 2 found to have multiple episodes of VT on interrogation requiring ATP and shock. He had another episode of VT in the ED that was successfully treated with ATP and another that required shock. He is currently Bi-V paced and hemodynamically stable. Given his age, will opt for medical management to see if this will suppress his VT for now.    1.  Monomorphic VT s/p ATP and defibrillation: No further sustained VT episodes  - continue amiodarone 400 BID for total of 7-10 g load, and then 400 QD (eventually decrease to 200 QD). Has received 5.2 grams thus far.   While on Amiodarone will require routine LFT, TFTs, yearly eye exam and pulmonary function testing.  AST 11, ALT 9 today.   - continue metoprolol 25 BID    - continue quinidine 324 mg BID    - Monitor closely on telemetry    MADDY Salazar [facility]  [phone]  89 year old Male with CAD s/p stents, low-flow low gradient AS, HFrEF 20% s/p MDT Bi-V ICD, COPD who presents with syncope x 2 found to have multiple episodes of VT on interrogation requiring ATP and shock. He had another episode of VT in the ED that was successfully treated with ATP and another that required shock. He is currently Bi-V paced and hemodynamically stable. Given his age, will opt for medical management to see if this will suppress his VT for now.    1.  Monomorphic VT s/p ATP and defibrillation: No further sustained VT episodes  - continue amiodarone 400 BID for total of 7-10 g load, patient has received 5.2 grams thus far. When load is complete, EP will determine maintenance dose.  While on Amiodarone will require routine LFT, TFTs, yearly eye exam and pulmonary function testing.  AST 11, ALT 9 today.   - continue metoprolol 25 BID    - continue quinidine 324 mg BID    - Monitor closely on telemetry    MADDY Salazar [facility]  [phone]

## 2021-06-12 NOTE — PROGRESS NOTE ADULT - SUBJECTIVE AND OBJECTIVE BOX
24H hour events: Patient resting comfortably in bed without complaints of CP, palpitations or SOB.     MEDICATIONS:  aMIOdarone    Tablet 400 milliGRAM(s) Oral every 12 hours  metoprolol tartrate 25 milliGRAM(s) Oral every 12 hours  quiNIDine gluconate  milliGRAM(s) Oral every 12 hours  sacubitril 24 mG/valsartan 26 mG 1 Tablet(s) Oral two times a day  tamsulosin 0.8 milliGRAM(s) Oral at bedtime    acetaminophen   Tablet .. 650 milliGRAM(s) Oral every 6 hours PRN    pantoprazole    Tablet 40 milliGRAM(s) Oral before breakfast  polyethylene glycol 3350 17 Gram(s) Oral daily  senna 2 Tablet(s) Oral at bedtime    allopurinol 100 milliGRAM(s) Oral daily  levothyroxine 125 MICROGram(s) Oral daily  simvastatin 10 milliGRAM(s) Oral at bedtime    ferrous    sulfate 325 milliGRAM(s) Oral daily      REVIEW OF SYSTEMS:  Complete 10point ROS negative.    PHYSICAL EXAM:  T(C): 36.5 (06-12-21 @ 12:05), Max: 36.6 (06-12-21 @ 04:00)  HR: 73 (06-12-21 @ 12:05) (68 - 73)  BP: 98/63 (06-12-21 @ 12:05) (92/58 - 105/64)  RR: 18 (06-12-21 @ 12:05) (18 - 18)  SpO2: 95% (06-12-21 @ 12:05) (95% - 96%)    11 Jun 2021 07:01 - 12 Jun 2021 07:00  --------------------------------------------------------  IN: 990 mL / OUT: 1150 mL / NET: -160 mL    12 Jun 2021 07:01  -  12 Jun 2021 14:36  --------------------------------------------------------  IN: 120 mL / OUT: 0 mL / NET: 120 mL    Appearance: Normal	  Cardiovascular: Normal S1 S2, regular.  No JVD, No murmurs, No edema  Respiratory: Lungs clear to auscultation	  Psychiatry: A & O x 3, Mood & affect appropriate  Gastrointestinal:  Soft, Non-tender, + BS	  Extremities: Normal range of motion, No clubbing, cyanosis or edema  Vascular: Peripheral pulses palpable 2+ bilaterally      LABS:	 	    CBC Full  -  ( 11 Jun 2021 07:09 )  WBC Count : 6.30 K/uL  Hemoglobin : 8.2 g/dL  Hematocrit : 24.9 %  Platelet Count - Automated : 84 K/uL  Mean Cell Volume : 99.2 fl  Mean Cell Hemoglobin : 32.7 pg  Mean Cell Hemoglobin Concentration : 32.9 gm/dL  Auto Neutrophil # : x  Auto Lymphocyte # : x  Auto Monocyte # : x  Auto Eosinophil # : x  Auto Basophil # : x  Auto Neutrophil % : x  Auto Lymphocyte % : x  Auto Monocyte % : x  Auto Eosinophil % : x  Auto Basophil % : x    06-12    141  |  107  |  58<H>  ----------------------------<  83  4.2   |  22  |  2.08<H>  06-11    140  |  106  |  57<H>  ----------------------------<  85  4.1   |  23  |  2.19<H>    Ca    8.3<L>      12 Jun 2021 07:49  Ca    8.4      11 Jun 2021 07:09  Phos  2.5     06-11  Mg     2.3     06-12  Mg     2.2     06-11    TPro  5.9<L>  /  Alb  3.3  /  TBili  0.7  /  DBili  x   /  AST  11  /  ALT  9<L>  /  AlkPhos  57  06-12      TELEMETRY: 	Vpaced 70-80bpm, NSVT 7 beats

## 2021-06-12 NOTE — PROVIDER CONTACT NOTE (MEDICATION) - ASSESSMENT
Pt. sitting on chair, blood pressure 76/40; HR 72; all other vitals normal; pt. denies chest pain, discomfort, and syncope.

## 2021-06-12 NOTE — PROGRESS NOTE ADULT - SUBJECTIVE AND OBJECTIVE BOX
Date of service: 21 @ 23:57      Patient is a 89y old  Male who presents with a chief complaint of ICD fired with VT (2021 09:40)                                                               INTERVAL HPI/OVERNIGHT EVENTS:    REVIEW OF SYSTEMS:     CONSTITUTIONAL: No weakness, fevers or chills  EYES/ENT: No visual changes , no ear ache   NECK: No pain or stiffness  RESPIRATORY: No cough, wheezing,  No shortness of breath  CARDIOVASCULAR: No chest pain or palpitations  GASTROINTESTINAL: No abdominal pain  . No nausea, vomiting, or hematemesis; No diarrhea or constipation. No melena or hematochezia.  GENITOURINARY: No dysuria, frequency or hematuria  NEUROLOGICAL: No numbness or weakness  SKIN: No itching, burning, rashes, or lesions                                                                                                                                                                                                                                                                                 Medications:  MEDICATIONS  (STANDING):  allopurinol 100 milliGRAM(s) Oral daily  aMIOdarone    Tablet 400 milliGRAM(s) Oral every 12 hours  ferrous    sulfate 325 milliGRAM(s) Oral daily  levothyroxine 125 MICROGram(s) Oral daily  metoprolol tartrate 25 milliGRAM(s) Oral every 12 hours  pantoprazole    Tablet 40 milliGRAM(s) Oral before breakfast  polyethylene glycol 3350 17 Gram(s) Oral daily  quiNIDine gluconate  milliGRAM(s) Oral every 12 hours  sacubitril 24 mG/valsartan 26 mG 1 Tablet(s) Oral two times a day  senna 2 Tablet(s) Oral at bedtime  simvastatin 10 milliGRAM(s) Oral at bedtime  tamsulosin 0.8 milliGRAM(s) Oral at bedtime    MEDICATIONS  (PRN):  acetaminophen   Tablet .. 650 milliGRAM(s) Oral every 6 hours PRN Temp greater or equal to 38C (100.4F), Mild Pain (1 - 3)       Allergies    No Known Allergies    Intolerances      Vital Signs Last 24 Hrs  T(C): 36.3 (2021 21:01), Max: 36.6 (2021 04:00)  T(F): 97.4 (2021 21:01), Max: 97.9 (2021 04:00)  HR: 76 (2021 21:01) (70 - 76)  BP: 99/58 (2021 21:01) (76/40 - 99/58)  BP(mean): --  RR: 18 (2021 21:01) (18 - 18)  SpO2: 96% (2021 21:01) (95% - 96%)  CAPILLARY BLOOD GLUCOSE           @ 07:01  -   @ 07:00  --------------------------------------------------------  IN: 990 mL / OUT: 1150 mL / NET: -160 mL     @ 07:01  -   @ 23:57  --------------------------------------------------------  IN: 680 mL / OUT: 400 mL / NET: 280 mL      Physical Exam:    Daily     Daily Weight in k.7 (2021 04:00)  General:  Well appearing, NAD, not cachetic  HEENT:  Nonicteric, PERRLA  CV:  RRR, S1S2   Lungs:  CTA B/L, no wheezes, rales, rhonchi  Abdomen:  Soft, non-tender, no distended, positive BS  Extremities:  2+ pulses, no c/c, no edema  Skin:  Warm and dry, no rashes  :  No rios  Neuro:  AAOx3, non-focal, grossly intact                                                                                                                                                                                                                                                                                                LABS:                               8.2    6.30  )-----------( 84       ( 2021 07:09 )             24.9                      06-12    141  |  107  |  58<H>  ----------------------------<  83  4.2   |  22  |  2.08<H>    Ca    8.3<L>      2021 07:49  Phos  2.5     06-  Mg     2.3         TPro  5.9<L>  /  Alb  3.3  /  TBili  0.7  /  DBili  x   /  AST  11  /  ALT  9<L>  /  AlkPhos  57                         RADIOLOGY & ADDITIONAL TESTS         I personally reviewed: [  ]EKG   [  ]CXR    [  ] CT      A/P:         Discussed with :     Kamlesh consultants' Notes   Time spent :

## 2021-06-12 NOTE — PROGRESS NOTE ADULT - ASSESSMENT
89 M COPD (no home O2), CAD s/p stent 2015, AAA repair w/ stent, HTN, HLD, HFrEF (20%), Bi-V ICD (Medtronic) presented with syncope found to be in VT storm requiring ATP and shocks, started on lido gtt and amio oral load.      VT Storm  - interrogation revealed multiple episodes of VT since may requiring ATP and shocks  -weaned off lidocaine gtt this AM  -c/w monitor for ectopy/VT  -c/w amio 400 bid (loading dose total 7-10 g)   -per EP, after load, can decrease to 400 mg qd then eventual transition to 200 mg qd.. now added quindine   -is on home coreg 12.5 bid, switched to lopressor 12.5 bid but held due to soft BPs  - monitor lytes and keep K>4 and Mg>2  - f/u EP recs, medical management at this time. As per Ep- will consider ablation if continues to have ectopy with antiarrythmic drugs.   - will hold digoxin at this time. Dig level wnl     Hx CAD with stent 2015 and HFrEF  - 10/2018 [facility]- nonobstructive CAD  - 6/7 TTE: EF 30%. Mild MR. Moderate AS and mild AR. Mod dilated LA. Akinetic basal inferior and inferolateral. Diffuse hypokinesis of the inferior and inferolateral wall, lateral wall. No LV thrombus. Severe diastolic dysfunction. Mild RA enlargement   - continue ASA and statin  - on home lasix 40 bid but will hold given soft BPs  - holding home entresto and jardiance given soft BPs  - completing Amio 10 gram loading (currently 400 bid), then to continue 400 qd  -  coreg 12.5 BID, which is on hold for soft BPs   - home Digoxin on hold given bump in Creatinine   home entresto and jardiance on hold due to soft BPs   f/u EP recs.        CKD, non-oliguric, Baseline Cr 2-2.5  - admission SCr 2.19, now 2.47 likely in setting of mild dehydration (had been putting out about 1 L/shift)  -hold lasix for now, will resume when BPs improve  -Continue to monitor I/Os, BUN/Creatinine, and urine output.   -Replete lytes PRN. Keep K> 4 and Mg >2.     BPH  -c/w flomax 0.8 qhs (home med)        Gout   - c/w home allopurinol for management     hypothyroidism   - c/w home synthroid         appreciate CCU input and help    consider palliative and GOC

## 2021-06-12 NOTE — PROGRESS NOTE ADULT - ASSESSMENT
89 yo M hx. CAD s/p stent MI and angioplasty in 1994 and RCA stent in 2014, moderate A.S.,  HFrEF, HTN, HLD, s/p Bi-V ICD (Medtronic) in 2015 by Dr. Russell; hx. also of COPD.   CRF with baseline Cr 2.0 - 2.5.  Presented to [facility] ED on 6/6/21 for syncope. ICD interrogation showed multiple episodes of VT since 5/17/21, requiring ATP and shocks (last was 6/6/21 at 6:17AM).    Currently on amiodarone, Quiniglute & metoprolol.  Dr. Paiz started to resume meds for LV dysfunction yesterday.      REC:        Discussed with Dr. Bhakta of EPS  1. Lidocaine off; oral loading with amiodarone. patient would consider VT ablation if fails meds. Back on beta-blocker (prefer change back to Carvedilol). Transfused-Hb 8.2. Checked with EP- continue Amio, added Quinaglute for now  2. Resume outpatient cardiac meds--being held now as overdiuresed plus anemic.-- Transfused, now slowly reintroduce Entresto (24/26 q12h). ? Jardiance and ? add Vericiguat. On metoprolol in place of carvedilol for now-consider change back.  No role for structural heart here; reviewed echo in detail.  Holding diuretic and digoxin.  3. COPD--continue outpatient meds.  (Followed by Dr. Ning Morris.)  4. OOB to chair. PT.      Continue po load with amiodarone plus quinaglute--moniter for more VT. Await f/u by EP as noted above. 89 yo M hx. CAD s/p stent MI and angioplasty in 1994 and RCA stent in 2014, moderate A.S.,  HFrEF, HTN, HLD, s/p Bi-V ICD (Medtronic) in 2015 by Dr. Russell; hx. also of COPD.   CRF with baseline Cr 2.0 - 2.5.  Presented to [facility] ED on 6/6/21 for syncope. ICD interrogation showed multiple episodes of VT since 5/17/21, requiring ATP and shocks (last was 6/6/21 at 6:17AM).    Currently on amiodarone, quinidine gluconate & metoprolol.  Dr. Paiz started to resume meds for LV dysfunction yesterday.      REC:  1.  VT-   - continue amiodarone, quinidine and metoprolol.  - continue tele  - consider VT ablation if fails meds.    2.  HFrEF  - continue Entresto at current dose for now  - will hold off on resuming diuretic at this time  - continue metoprolol for now; will consider switch back to Coreg at later date  - continue to hold digoxin for now    3.  Anemia  - repeat CBC in AM    4. COPD  -continue outpatient meds.  (Followed by Dr. Ning Morris.)    5. OOB to chair.  PT following; may need rehab post discharge, depending on PT assessment.      Mukul Mccarty M.D.  (covering for Dr. Wolf Paiz)  Office: [phone]  89 yo M hx. CAD s/p stent MI and angioplasty in 1994 and RCA stent in 2014, moderate A.S.,  HFrEF, HTN, HLD, s/p Bi-V ICD (Medtronic) in 2015 by Dr. Russell; hx. also of COPD.   CRF with baseline Cr 2.0 - 2.5.  Presented to [facility] ED on 6/6/21 for syncope. ICD interrogation showed multiple episodes of VT since 5/17/21, requiring ATP and shocks (last was 6/6/21 at 6:17AM).    Currently on amiodarone, quinidine gluconate & metoprolol.  Dr. Paiz started to resume meds for LV dysfunction yesterday.      REC:  1.  VT-   - continue amiodarone, quinidine and metoprolol.  - continue tele  - consider VT ablation if VT recurs despite meds.    2.  HFrEF  - continue Entresto at current dose for now  - will hold off on resuming diuretic at this time  - continue metoprolol for now; will consider switch back to Coreg at later date  - continue to hold digoxin for now    3.  Anemia  - repeat CBC in AM    4. COPD  -continue outpatient meds.  (Followed by Dr. Ning Morris.)    5. OOB to chair.  PT following; may need rehab post discharge, depending on PT assessment.      Mukul Mccarty M.D.  (covering for Dr. Wolf Paiz)  Office: [phone]

## 2021-06-12 NOTE — PROGRESS NOTE ADULT - SUBJECTIVE AND OBJECTIVE BOX
89 yo M PMHx CAD s/p stent MI and angioplasty in  and placement of a RCA stent in , low gradient A.S., not a candidate for TAVR, HFrEF of 20%, endovascular AAA repair , HTN, HLD, chronic systolic HF (EF 20%), Bi-V ICD (Medtronic) in  by Dr. Russell; hx. also of COPD.  Baseline creatinine runs between 2 and 2.5.  Most recent echo was  of this year with an aortic gradient of 31 peak and 19 mean, dimensionless index 0.20 and no AI.  Severe segmental LV systolic dysfunction with LVEF 23 to 25%.  Mild LVH seen, with only mild MR and mild to moderate TR with RVSP 54.    Presented to [facility] ED on 21 for syncope. EP was consulted; ICD interrogation showed multiple episodes of VT since 21, requiring ATP and shocks (last was 21 at 6:17AM).  While in the ED, pt had an episode of VT w/ unresponsiveness and AICD terminated the rhythm. Pt was  given a lidocaine bolus and was started on a lidocaine gtt and was admitted to the CCU for further monitoring.    Currently on amiodarone, Quiniglute & metoprolol.  Dr. Paiz started to resume meds for LV dysfunction yesterday.      Medications:  acetaminophen   Tablet .. 650 milliGRAM(s) Oral every 6 hours PRN  allopurinol 100 milliGRAM(s) Oral daily  aMIOdarone    Tablet 400 milliGRAM(s) Oral every 12 hours  ferrous    sulfate 325 milliGRAM(s) Oral daily  levothyroxine 125 MICROGram(s) Oral daily  metoprolol tartrate 25 milliGRAM(s) Oral every 12 hours  pantoprazole    Tablet 40 milliGRAM(s) Oral before breakfast  polyethylene glycol 3350 17 Gram(s) Oral daily  quiNIDine gluconate  milliGRAM(s) Oral every 12 hours  sacubitril 24 mG/valsartan 26 mG 1 Tablet(s) Oral two times a day  senna 2 Tablet(s) Oral at bedtime  simvastatin 10 milliGRAM(s) Oral at bedtime  tamsulosin 0.8 milliGRAM(s) Oral at bedtime    PMH/PSH/FH/SH:  Unchanged    ROS:  Unchanged    Vitals:  T(C): 36.6 (21 @ 04:00), Max: 36.6 (21 @ 04:00)  HR: 70 (21 @ 04:00) (68 - 72)  BP: 98/60 (21 @ 04:00) (92/58 - 105/64)  RR: 18 (21 @ 04:00) (18 - 18)  SpO2: 95% (21 @ 04:00) (95% - 96%)  Daily Weight in k.7 (2021 04:00)      Appearance: WD, WN, NAD.   Eyes:   PERRL, EOMI  HENT: Normal oral mucosa.]NC/AT  Neck:  JVD 1/3 up at 90 degrees. Normal carotid upstrokes without bruits or murmurs.  Cardiovascular: Normal PMI. Normal S1, S2 physiologically split. No S3, (+) S4. 2/6 RODRÍGUEZ, 2-3/6 HSM apex.  Respiratory: Clear to auscultation bilaterally. No wheezes. No rales.  Gastrointestinal: Soft.  Non-tender. No hepatosplenomegally.  Extremities: No clubbing. No edema today. Pulses 2+ bilaterally symmetric except diminished pedal.  Musculoskeletal:  No joint deformity   Neurologic: Non-focal  Lymphatic:  No lymphadenopathy  Psychiatry: [+ ] AAOx3 [ +] Mood & affect appropriate  Skin: No rashes. No ecchymoses. No cyanosis        TELE:      I&O's Summary  2021 07:  -  2021 07:00  --------------------------------------------------------  IN: 990 mL / OUT: 1150 mL / NET: -160 mL    2021 07:  -  2021 12:54  --------------------------------------------------------  IN: 120 mL / OUT: 0 mL / NET: 120 mL        LABS:                      8.2    6.30  )-----------( 84       ( 2021 07:09 )             24.9     06-12  141  |  107  |  58<H>  ----------------------------<  83  4.2   |  22  |  2.08<H>    Ca    8.3<L>      2021 07:49  Phos  2.5     06-  Mg     2.3     -12    TPro  5.9<L>  /  Alb  3.3  /  TBili  0.7  /  DBili  x   /  AST  11  /  ALT  9<L>  /  AlkPhos  57  06-12    Serum Pro-Brain Natriuretic Peptide: 72040 pg/mL (21 @ 07:49)        TTE with Doppler (w/Cont) (21 @ 07:37) >  YOB: 1925   Age: 89 (M)   MR#: [id number]  Study Date: 2021  [facility] RDCS  Study quality: Technically fair  ------------------------------------------------------------------------  Dimensions:    Normal Values:  LA:     5.2    2.0 - 4.0 cm  Ao:     3.4    2.0 - 3.8 cm  SEPTUM: 1.4    0.6 - 1.2 cm  PWT:    0.7    0.6 - 1.1 cm  LVIDd:  5.2    3.0 - 5.6 cm  LVIDs:  4.7    1.8 - 4.0 cm  Derived variables:  LVMI: 122 g/m2  RWT: 0.28  Fractional short: 10 %  EF (Visual Estimate): 30 %  Doppler Peak Velocity (m/sec): MV=1.4 AoV=2.5  ---------------  Conclusions:  1. Calcified aortic valve with decreased opening. Peak transaortic valve gradient equals 24 mm Hg, mean transaortic valve gradient equals 12 mm Hg, estimated aortic valve area equals 1 sqcm (by continuity equation), aortic valve velocity time integral equals 47 cm, consistent with moderate aortic stenosis.  2. Moderately dilated left atrium.  LA volume index = 48 cc/m2.  3. Mild left ventricular enlargement.  4. Severe segmental left ventricular systolic dysfunction. Akinetic basal inferior and inferolateral.  Diffuse hypokinesis of the inferior and inferolateral wall, lateral wall.   Endocardial visualization enhanced with intravenous injection of Ultrasonic Enhancing Agent (Definity). No left ventricular thrombus.  5. Severe  diastolic dysfunction (Stage III).  6. Mild right atrial enlargement.  7. Right ventricular enlargement with decreased right ventricular systolic function. A device wire is noted in the right heart.  ------------------------------------------------------------------------  Confirmed on  2021 - 14:32:38 by Jerome Johnson [facility].D. [facility]      Cardiac Cath Lab - Adult (10.08.18 @ 15:55)   CORONARY VESSELS: The coronary circulation is right dominant.  LM:   --  Proximal left main: There was a 40 % stenosis. There is evidence of an old, focal, linear dissection in the LMCA that appears  angiographically unchanged as compared to the prior study in .  --  Distal left main: There was a stenosis. The lesion was eccentric.  LAD:   --  LAD: Angiography showed moderate atherosclerosis.  CX:   --  Circumflex: Angiography showed moderate atherosclerosis.  RCA:   --  RCA: Angiography showed moderate atherosclerosis.  --  Proximal RCA: There was a diffuse 30 % stenosis at the site of a prior stent. 89 yo M PMHx CAD s/p stent MI and angioplasty in  and placement of a RCA stent in , low gradient A.S., not a candidate for TAVR, HFrEF of 20%, endovascular AAA repair , HTN, HLD, chronic systolic HF (EF 20%), Bi-V ICD (Medtronic) in  by Dr. Russell; hx. also of COPD.  Baseline creatinine runs between 2 and 2.5.  Most recent echo was  of this year with an aortic gradient of 31 peak and 19 mean, dimensionless index 0.20 and no AI.  Severe segmental LV systolic dysfunction with LVEF 23 to 25%.  Mild LVH seen, with only mild MR and mild to moderate TR with RVSP 54.    Presented to [facility] ED on 21 for syncope. EP was consulted; ICD interrogation showed multiple episodes of VT since 21, requiring ATP and shocks (last was 21 at 6:17AM).  While in the ED, pt had an episode of VT w/ unresponsiveness and AICD terminated the rhythm. Pt was  given a lidocaine bolus and was started on a lidocaine gtt and was admitted to the CCU for further monitoring.    Currently on amiodarone, Quiniglute & metoprolol.  Dr. Paiz started to resume meds for LV dysfunction yesterday.    Alert, no distress.  No cardiac sxs; no CP, palps or dyspnea.  Sitting up eating lunch.       Medications:  acetaminophen   Tablet .. 650 milliGRAM(s) Oral every 6 hours PRN  allopurinol 100 milliGRAM(s) Oral daily  aMIOdarone    Tablet 400 milliGRAM(s) Oral every 12 hours  ferrous    sulfate 325 milliGRAM(s) Oral daily  levothyroxine 125 MICROGram(s) Oral daily  metoprolol tartrate 25 milliGRAM(s) Oral every 12 hours  pantoprazole    Tablet 40 milliGRAM(s) Oral before breakfast  polyethylene glycol 3350 17 Gram(s) Oral daily  quiNIDine gluconate  milliGRAM(s) Oral every 12 hours  sacubitril 24 mG/valsartan 26 mG 1 Tablet(s) Oral two times a day  senna 2 Tablet(s) Oral at bedtime  simvastatin 10 milliGRAM(s) Oral at bedtime  tamsulosin 0.8 milliGRAM(s) Oral at bedtime    PMH/PSH/FH/SH:  Unchanged    ROS:  Unchanged    Vitals:  T(C): 36.6 (21 @ 04:00), Max: 36.6 (21 @ 04:00)  HR: 70 (21 @ 04:00) (68 - 72)  BP: 98/60 (21 @ 04:00) (92/58 - 105/64)  RR: 18 (21 @ 04:00) (18 - 18)  SpO2: 95% (21 @ 04:00) (95% - 96%)  Daily Weight in k.7 (2021 04:00)      Appearance: WD, WN, NAD.   Eyes:   PERRL, EOMI  HENT: Normal oral mucosa.]NC/AT  Neck:  Mild JVDs. Normal carotid upstrokes without bruits or murmurs.  Cardiovascular: Normal PMI. Normal S1, S2 physiologically split. No S3, (+) S4. 2/6 RODRÍGUEZ, 2-3/6 HSM apex.  Respiratory: Clear to auscultation bilaterally. No wheezes. No rales.  Gastrointestinal: Soft.  Non-tender. No hepatosplenomegally.  Extremities: No clubbing. No edema today. Pulses 2+ bilaterally symmetric except diminished pedal.  Musculoskeletal:  No joint deformity   Neurologic: Non-focal  Lymphatic:  No lymphadenopathy  Psychiatry: [+ ] AAOx3 [ +] Mood & affect appropriate  Skin: No rashes. No ecchymoses. No cyanosis        TELE:  Paced rhythm; one 7 beat run of VT overnight.      I&O's Summary  2021 07:  -  2021 07:00  --------------------------------------------------------  IN: 990 mL / OUT: 1150 mL / NET: -160 mL    2021 07:01  -  2021 12:54  --------------------------------------------------------  IN: 120 mL / OUT: 0 mL / NET: 120 mL        LABS:                      8.2    6.30  )-----------( 84       ( 2021 07:09 )             24.9     06-12  141  |  107  |  58<H>  ----------------------------<  83  4.2   |  22  |  2.08<H>    Ca    8.3<L>      2021 07:49  Phos  2.5     06-  Mg     2.3         TPro  5.9<L>  /  Alb  3.3  /  TBili  0.7  /  DBili  x   /  AST  11  /  ALT  9<L>  /  AlkPhos  57  0612    Serum Pro-Brain Natriuretic Peptide: 52329 pg/mL (21 @ 07:49)        TTE with Doppler (w/Cont) (21 @ 07:37) >  YOB: 1925   Age: 89 (M)   MR#: [id number]  Study Date: 2021  [facility] RDCS  Study quality: Technically fair  ------------------------------------------------------------------------  Dimensions:    Normal Values:  LA:     5.2    2.0 - 4.0 cm  Ao:     3.4    2.0 - 3.8 cm  SEPTUM: 1.4    0.6 - 1.2 cm  PWT:    0.7    0.6 - 1.1 cm  LVIDd:  5.2    3.0 - 5.6 cm  LVIDs:  4.7    1.8 - 4.0 cm  Derived variables:  LVMI: 122 g/m2  RWT: 0.28  Fractional short: 10 %  EF (Visual Estimate): 30 %  Doppler Peak Velocity (m/sec): MV=1.4 AoV=2.5  ---------------  Conclusions:  1. Calcified aortic valve with decreased opening. Peak transaortic valve gradient equals 24 mm Hg, mean transaortic valve gradient equals 12 mm Hg, estimated aortic valve area equals 1 sqcm (by continuity equation), aortic valve velocity time integral equals 47 cm, consistent with moderate aortic stenosis.  2. Moderately dilated left atrium.  LA volume index = 48 cc/m2.  3. Mild left ventricular enlargement.  4. Severe segmental left ventricular systolic dysfunction. Akinetic basal inferior and inferolateral.  Diffuse hypokinesis of the inferior and inferolateral wall, lateral wall.   Endocardial visualization enhanced with intravenous injection of Ultrasonic Enhancing Agent (Definity). No left ventricular thrombus.  5. Severe  diastolic dysfunction (Stage III).  6. Mild right atrial enlargement.  7. Right ventricular enlargement with decreased right ventricular systolic function. A device wire is noted in the right heart.  ------------------------------------------------------------------------  Confirmed on  2021 - 14:32:38 by Jerome Johnson M.D. [facility]      Cardiac Cath Lab - Adult (10.08.18 @ 15:55)   CORONARY VESSELS: The coronary circulation is right dominant.  LM:   --  Proximal left main: There was a 40 % stenosis. There is evidence of an old, focal, linear dissection in the LMCA that appears  angiographically unchanged as compared to the prior study in .  --  Distal left main: There was a stenosis. The lesion was eccentric.  LAD:   --  LAD: Angiography showed moderate atherosclerosis.  CX:   --  Circumflex: Angiography showed moderate atherosclerosis.  RCA:   --  RCA: Angiography showed moderate atherosclerosis.  --  Proximal RCA: There was a diffuse 30 % stenosis at the site of a prior stent. 89 yo M PMHx CAD s/p stent MI and angioplasty in  and placement of a RCA stent in , low gradient A.S., HFrEF, endovascular AAA repair , HTN, HLD, Bi-V ICD (Medtronic) in  by Dr. Russell; hx. also of COPD.  Baseline creatinine runs between 2 and 2.5.  Most recent echo was  of this year with an aortic gradient of 31 peak and 19 mean, dimensionless index 0.20 and no AI.  Severe segmental LV systolic dysfunction with LVEF 23 to 25%.  Mild LVH seen, with only mild MR and mild to moderate TR with RVSP 54.    Presented to [facility] ED on 21 for syncope. EP was consulted; ICD interrogation showed multiple episodes of VT since 21, requiring ATP and shocks (last was 21 at 6:17AM).  While in the ED, pt had an episode of VT w/ unresponsiveness and AICD terminated the rhythm. Pt was  given a lidocaine bolus and was started on a lidocaine gtt and was admitted to the CCU for further monitoring.    Currently on amiodarone, quinidine gluconate & metoprolol.  Dr. Paiz started to resume meds for LV dysfunction yesterday.    Alert, no distress.  No cardiac sxs; no CP, palps or dyspnea.  Sitting up eating lunch.       Medications:  acetaminophen   Tablet .. 650 milliGRAM(s) Oral every 6 hours PRN  allopurinol 100 milliGRAM(s) Oral daily  aMIOdarone    Tablet 400 milliGRAM(s) Oral every 12 hours  ferrous    sulfate 325 milliGRAM(s) Oral daily  levothyroxine 125 MICROGram(s) Oral daily  metoprolol tartrate 25 milliGRAM(s) Oral every 12 hours  pantoprazole    Tablet 40 milliGRAM(s) Oral before breakfast  polyethylene glycol 3350 17 Gram(s) Oral daily  quiNIDine gluconate  milliGRAM(s) Oral every 12 hours  sacubitril 24 mG/valsartan 26 mG 1 Tablet(s) Oral two times a day  senna 2 Tablet(s) Oral at bedtime  simvastatin 10 milliGRAM(s) Oral at bedtime  tamsulosin 0.8 milliGRAM(s) Oral at bedtime    PMH/PSH/FH/SH:  Unchanged    ROS:  Unchanged    Vitals:  T(C): 36.6 (21 @ 04:00), Max: 36.6 (21 @ 04:00)  HR: 70 (21 @ 04:00) (68 - 72)  BP: 98/60 (21 @ 04:00) (92/58 - 105/64)  RR: 18 (21 @ 04:00) (18 - 18)  SpO2: 95% (21 @ 04:00) (95% - 96%)  Daily Weight in k.7 (2021 04:00)      Appearance: WD, WN, NAD.   Eyes:   PERRL, EOMI  HENT: Normal oral mucosa.]NC/AT  Neck:  Mild JVDs. Normal carotid upstrokes without bruits or murmurs.  Cardiovascular: Normal PMI. Normal S1, S2 physiologically split. No S3, (+) S4. 2/6 RODRÍGUEZ, 2-3/6 HSM apex.  Respiratory: Clear to auscultation bilaterally. No wheezes. No rales.  Gastrointestinal: Soft.  Non-tender. No hepatosplenomegally.  Extremities: No clubbing. No edema today. Pulses 2+ bilaterally symmetric except diminished pedal.  Musculoskeletal:  No joint deformity   Neurologic: Non-focal  Lymphatic:  No lymphadenopathy  Psychiatry: [+ ] AAOx3 [ +] Mood & affect appropriate  Skin: No rashes. No ecchymoses. No cyanosis      TELE:  Paced rhythm; one 7 beat run of VT overnight.      12 Lead ECG (21 @ 08:47)   AV paced at 70 BPM    P-R Interval 106 ms, QRS Duration 170 ms, Q-T Interval 468 ms  Axis -65 degrees    WHEN COMPARED WITH ECG OF 2021 20:55, NO SIGNIFICANT CHANGE WAS FOUND  Confirmed by MD Rajeev, Zion ([id number]) on 2021 10:52:54 AM      I&O's Summary  2021 07:01  -  2021 07:00  --------------------------------------------------------  IN: 990 mL / OUT: 1150 mL / NET: -160 mL    2021 07:01  -  2021 12:54  --------------------------------------------------------  IN: 120 mL / OUT: 0 mL / NET: 120 mL        LABS:                      8.2    6.30  )-----------( 84       ( 2021 07:09 )             24.9     06-12  141  |  107  |  58<H>  ----------------------------<  83  4.2   |  22  |  2.08<H>    Ca    8.3<L>      2021 07:49  Phos  2.5     06-11  Mg     2.3     06-12    TPro  5.9<L>  /  Alb  3.3  /  TBili  0.7  /  DBili  x   /  AST  11  /  ALT  9<L>  /  AlkPhos  57  06-12    Serum Pro-Brain Natriuretic Peptide: 55538 pg/mL (21 @ 07:49)        TTE with Doppler (w/Cont) (21 @ 07:37) >  YOB: 1925   Age: 89 (M)   MR#: [id number]  Study Date: 2021  [facility] Bhupendra Keller RDCS  Study quality: Technically fair  ------------------------------------------------------------------------  Dimensions:    Normal Values:  LA:     5.2    2.0 - 4.0 cm  Ao:     3.4    2.0 - 3.8 cm  SEPTUM: 1.4    0.6 - 1.2 cm  PWT:    0.7    0.6 - 1.1 cm  LVIDd:  5.2    3.0 - 5.6 cm  LVIDs:  4.7    1.8 - 4.0 cm  Derived variables:  LVMI: 122 g/m2  RWT: 0.28  Fractional short: 10 %  EF (Visual Estimate): 30 %  Doppler Peak Velocity (m/sec): MV=1.4 AoV=2.5  ---------------  Conclusions:  1. Calcified aortic valve with decreased opening. Peak transaortic valve gradient equals 24 mm Hg, mean transaortic valve gradient equals 12 mm Hg, estimated aortic valve area equals 1 sqcm (by continuity equation), aortic valve velocity time integral equals 47 cm, consistent with moderate aortic stenosis.  2. Moderately dilated left atrium.  LA volume index = 48 cc/m2.  3. Mild left ventricular enlargement.  4. Severe segmental left ventricular systolic dysfunction. Akinetic basal inferior and inferolateral.  Diffuse hypokinesis of the inferior and inferolateral wall, lateral wall.   Endocardial visualization enhanced with intravenous injection of Ultrasonic Enhancing Agent (Definity). No left ventricular thrombus.  5. Severe  diastolic dysfunction (Stage III).  6. Mild right atrial enlargement.  7. Right ventricular enlargement with decreased right ventricular systolic function. A device wire is noted in the right heart.  ------------------------------------------------------------------------  Confirmed on  2021 - 14:32:38 by TIERRA Ovalles      Cardiac Cath Lab - Adult (10.08.18 @ 15:55)   CORONARY VESSELS: The coronary circulation is right dominant.  LM:   --  Proximal left main: There was a 40 % stenosis. There is evidence of an old, focal, linear dissection in the LMCA that appears  angiographically unchanged as compared to the prior study in 2015.  --  Distal left main: There was a stenosis. The lesion was eccentric.  LAD:   --  LAD: Angiography showed moderate atherosclerosis.  CX:   --  Circumflex: Angiography showed moderate atherosclerosis.  RCA:   --  RCA: Angiography showed moderate atherosclerosis.  --  Proximal RCA: There was a diffuse 30 % stenosis at the site of a prior stent.

## 2021-06-13 NOTE — PROGRESS NOTE ADULT - ASSESSMENT
89 M COPD (no home O2), CAD s/p stent 2015, AAA repair w/ stent, HTN, HLD, HFrEF (20%), Bi-V ICD (Medtronic) presented with syncope found to be in VT storm requiring ATP and shocks, started on lido gtt and amio oral load.      VT Storm  - interrogation revealed multiple episodes of VT since may requiring ATP and shocks  -weaned off lidocaine gtt   -c/w monitor for ectopy/VT  -c/w amio 400 bid (loading dose total 7-10 g)   -per EP, after load, can decrease to 400 mg qd then eventual transition to 200 mg qd.. now added quindine   -is on home coreg 12.5 bid, switched to lopressor 12.5 bid but held due to soft BPs  - monitor lytes and keep K>4 and Mg>2  - f/u EP recs, medical management at this time. As per Ep- will consider ablation if continues to have ectopy with antiarrythmic drugs.   - will hold digoxin at this time. Dig level wnl     Hx CAD with stent 2015 and HFrEF  - 10/2018 [facility]- nonobstructive CAD  - 6/7 TTE: EF 30%. Mild MR. Moderate AS and mild AR. Mod dilated LA. Akinetic basal inferior and inferolateral. Diffuse hypokinesis of the inferior and inferolateral wall, lateral wall. No LV thrombus. Severe diastolic dysfunction. Mild RA enlargement   - continue ASA and statin  - on home lasix 40 bid but will hold given soft BPs  - holding home entresto and jardiance given soft BPs  - completing Amio 10 gram loading (currently 400 bid), then to continue 400 qd  -  coreg 12.5 BID, which is on hold for soft BPs   - home Digoxin on hold given bump in Creatinine   home entresto and jardiance on hold due to soft BPs   f/u EP recs.        CKD, non-oliguric, Baseline Cr 2-2.5  - admission SCr 2.19, now 2.47 likely in setting of mild dehydration (had been putting out about 1 L/shift)  -hold lasix for now, will resume when BPs improve  -Continue to monitor I/Os, BUN/Creatinine, and urine output.   -Replete lytes PRN. Keep K> 4 and Mg >2.     BPH  -c/w flomax 0.8 qhs (home med)        Gout   - c/w home allopurinol for management     hypothyroidism   - c/w home synthroid       acute on chronic anemia :   s/p transfusion   multifactorial including CKD and element of iron def         appreciate CCU input and help    consider palliative and GOC

## 2021-06-13 NOTE — PROGRESS NOTE ADULT - SUBJECTIVE AND OBJECTIVE BOX
Medications:  acetaminophen   Tablet .. 650 milliGRAM(s) Oral every 6 hours PRN  allopurinol 100 milliGRAM(s) Oral daily  aMIOdarone    Tablet 400 milliGRAM(s) Oral every 12 hours  ferrous    sulfate 325 milliGRAM(s) Oral daily  levothyroxine 125 MICROGram(s) Oral daily  metoprolol tartrate 25 milliGRAM(s) Oral every 12 hours  pantoprazole    Tablet 40 milliGRAM(s) Oral before breakfast  polyethylene glycol 3350 17 Gram(s) Oral daily  quiNIDine gluconate  milliGRAM(s) Oral every 12 hours  sacubitril 24 mG/valsartan 26 mG 1 Tablet(s) Oral two times a day  senna 2 Tablet(s) Oral at bedtime  simvastatin 10 milliGRAM(s) Oral at bedtime  tamsulosin 0.8 milliGRAM(s) Oral at bedtime    PMH/PSH/FH/SH:  Unchanged    ROS:  Unchanged    Vitals:  T(C): 36.3 (21 @ 12:56), Max: 36.4 (21 @ 00:08)  HR: 70 (21 @ 08:59) (69 - 76)  BP: 88/57 (21 @ 08:59) (76/40 - 99/58)  RR: 14 (21 @ 12:56) (14 - 18)  SpO2: 96% (21 @ 12:56) (96% - 96%)  Daily Weight in k.1 (2021 04:12)                I&O's Summary  2021 07:  -  2021 07:00  --------------------------------------------------------  IN: 830 mL / OUT: 700 mL / NET: 130 mL    2021 07:01  -  2021 13:27  --------------------------------------------------------  IN: 360 mL / OUT: 150 mL / NET: 210 mL      TELE:      LABS:                      7.7    8.52  )-----------( 85       ( 2021 06:55 )             23.8       137  |  105  |  58<H>  ----------------------------<  89  4.2   |  21<L>  |  2.21<H>    Ca    8.4      2021 06:51  Mg     2.3         TPro  5.9<L>  /  Alb  3.3  /  TBili  0.7  /  DBili  x   /  AST  11  /  ALT  9<L>  /  AlkPhos  57     Alert, no distress.  No cardiac sxs; no CP, palps or dyspnea.  OOB to chair for lunch.  Took just 2-3 steps yesterday      Medications:  acetaminophen   Tablet .. 650 milliGRAM(s) Oral every 6 hours PRN  allopurinol 100 milliGRAM(s) Oral daily  aMIOdarone    Tablet 400 milliGRAM(s) Oral every 12 hours  ferrous    sulfate 325 milliGRAM(s) Oral daily  levothyroxine 125 MICROGram(s) Oral daily  metoprolol tartrate 25 milliGRAM(s) Oral every 12 hours  pantoprazole    Tablet 40 milliGRAM(s) Oral before breakfast  polyethylene glycol 3350 17 Gram(s) Oral daily  quiNIDine gluconate  milliGRAM(s) Oral every 12 hours  sacubitril 24 mG/valsartan 26 mG 1 Tablet(s) Oral two times a day  senna 2 Tablet(s) Oral at bedtime  simvastatin 10 milliGRAM(s) Oral at bedtime  tamsulosin 0.8 milliGRAM(s) Oral at bedtime    PMH/PSH/FH/SH:  Unchanged    ROS:  Unchanged    Vitals:  T(C): 36.3 (21 @ 12:56), Max: 36.4 (21 @ 00:08)  HR: 70 (21 @ 08:59) (69 - 76)  BP: 88/57 (21 @ 08:59) (76/40 - 99/58)  RR: 14 (21 @ 12:56) (14 - 18)  SpO2: 96% (21 @ 12:56) (96% - 96%)  Daily Weight in k.1 (2021 04:12)      Appearance: WD, WN, NAD.   Eyes:   PERRL, EOMI  HENT: Normal oral mucosa.]NC/AT  Neck:  Mild JVDs. Normal carotid upstrokes without bruits or murmurs.  Cardiovascular: Normal PMI. Normal S1, S2 physiologically split. No S3, (+) S4. 2/6 RODRÍGUEZ, 2-3/6 HSM apex.  Respiratory: Clear to auscultation bilaterally. No wheezes. No rales.  Gastrointestinal: Soft.  Non-tender. No hepatosplenomegally.  Extremities: No clubbing. No edema today. Pulses 2+ bilaterally symmetric except diminished pedal.  Musculoskeletal:  No joint deformity   Neurologic: Non-focal  Lymphatic:  No lymphadenopathy  Psychiatry: [+ ] AAOx3 [ +] Mood & affect appropriate  Skin: No rashes. No ecchymoses. No cyanosis        TELE:  NSR with V-pacing; no significant ectopy.        I&O's Summary  2021 07:01  -  2021 07:00  --------------------------------------------------------  IN: 830 mL / OUT: 700 mL / NET: 130 mL    2021 07:01  -  2021 13:27  --------------------------------------------------------  IN: 360 mL / OUT: 150 mL / NET: 210 mL        LABS:                      7.7    8.52  )-----------( 85       ( 2021 06:55 )             23.8       137  |  105  |  58<H>  ----------------------------<  89  4.2   |  21<L>  |  2.21<H>    Ca    8.4      2021 06:51  Mg     2.3         TPro  5.9<L>  /  Alb  3.3  /  TBili  0.7  /  DBili  x   /  AST  11  /  ALT  9<L>  /  AlkPhos  57

## 2021-06-13 NOTE — PROGRESS NOTE ADULT - ASSESSMENT
89 yo M hx. CAD s/p stent MI and angioplasty in 1994 and RCA stent in 2014, moderate A.S.,  HFrEF, HTN, HLD, s/p Bi-V ICD ([employer]) in 2015; hx. also of COPD.   CRF with baseline Cr 2.0 - 2.5.  Presented to [facility] ED on 6/6/21 for syncope. ICD interrogation showed multiple episodes of VT since 5/17/21, requiring ATP and shocks (last was 6/6/21 at 6:17AM).    Currently on amiodarone, quinidine gluconate & metoprolol for VT.        REC:  1.  VT-   - continue amiodarone, quinidine and metoprolol.  - continue tele  - consider VT ablation if VT recurs despite meds.    2.  HFrEF  - continue Entresto at current dose for now  - will hold off on resuming diuretic at this time  - continue metoprolol for now; will consider switch back to Coreg at later date  - continue to hold digoxin for now    3.  Anemia  - repeat CBC in AM    4. COPD  -continue outpatient meds.  (Followed by Dr. Ning Morris.)    5. OOB to chair.  PT following; may need rehab post discharge, depending on PT assessment.      Mukul Mccarty M.D.  (covering for Dr. Wolf Paiz)  Office: [phone]  89 yo M hx. CAD s/p stent MI and angioplasty in 1994 and RCA stent in 2014, moderate A.S.,  HFrEF, HTN, HLD, s/p Bi-V ICD (Medtronic) in 2015; hx. also of COPD.   CRF with baseline Cr 2.0 - 2.5.  Presented to [facility] ED on 6/6/21 for syncope. ICD interrogation showed multiple episodes of VT since 5/17/21, requiring ATP and shocks (last was 6/6/21 at 6:17AM).    Currently on amiodarone, quinidine gluconate & metoprolol for VT.        REC:  1.  VT-   - continue amiodarone, quinidine and metoprolol.  - continue tele.    2.  HFrEF  - continue Entresto at current dose for now  - would to resume diuretic at this time  - continue metoprolol for now; consider switch back to Coreg at later date if BP permits  - continue to hold digoxin for now    3.  Anemia  - continue to monitor H/H; repeat CBC in AM    4. COPD  -continue outpatient meds.  (Followed by Dr. Ning Morris.)    5. OOB to chair.    - will need stay at rehab post discharge.    Discussed with daughter, son at bedside.      Mukul Mccarty M.D.  (covering for Dr. Wolf Paiz)  Office: [phone]

## 2021-06-13 NOTE — PROGRESS NOTE ADULT - SUBJECTIVE AND OBJECTIVE BOX
Date of service: 21 @ 21:34      Patient is a 89y old  Male who presents with a chief complaint of ICD fired with VT (2021 23:57)                                                               INTERVAL HPI/OVERNIGHT EVENTS:    REVIEW OF SYSTEMS:     CONSTITUTIONAL: No weakness, fevers or chills  RESPIRATORY: No cough, wheezing,  No shortness of breath  CARDIOVASCULAR: No chest pain or palpitations  GASTROINTESTINAL: No abdominal pain  . No nausea, vomiting, or hematemesis; No diarrhea or constipation. No melena or hematochezia.  GENITOURINARY: No dysuria, frequency or hematuria  NEUROLOGICAL: No numbness or weakness                                                                                                                                                                                                                                                                               Medications:  MEDICATIONS  (STANDING):  allopurinol 100 milliGRAM(s) Oral daily  aMIOdarone    Tablet 400 milliGRAM(s) Oral every 12 hours  ferrous    sulfate 325 milliGRAM(s) Oral daily  levothyroxine 125 MICROGram(s) Oral daily  metoprolol tartrate 25 milliGRAM(s) Oral every 12 hours  pantoprazole    Tablet 40 milliGRAM(s) Oral before breakfast  polyethylene glycol 3350 17 Gram(s) Oral daily  quiNIDine gluconate  milliGRAM(s) Oral every 12 hours  sacubitril 24 mG/valsartan 26 mG 1 Tablet(s) Oral two times a day  senna 2 Tablet(s) Oral at bedtime  simvastatin 10 milliGRAM(s) Oral at bedtime  tamsulosin 0.8 milliGRAM(s) Oral at bedtime    MEDICATIONS  (PRN):  acetaminophen   Tablet .. 650 milliGRAM(s) Oral every 6 hours PRN Temp greater or equal to 38C (100.4F), Mild Pain (1 - 3)       Allergies    No Known Allergies    Intolerances      Vital Signs Last 24 Hrs  T(C): 36.3 (2021 20:30), Max: 36.4 (2021 00:08)  T(F): 97.4 (2021 20:30), Max: 97.6 (2021 04:12)  HR: 70 (2021 20:30) (69 - 71)  BP: 98/62 (2021 20:30) (88/57 - 98/65)  BP(mean): --  RR: 18 (2021 20:30) (14 - 18)  SpO2: 96% (2021 20:30) (96% - 96%)  CAPILLARY BLOOD GLUCOSE           @ 07:01  -   @ 07:00  --------------------------------------------------------  IN: 830 mL / OUT: 700 mL / NET: 130 mL     @ 07: @ 21:34  --------------------------------------------------------  IN: 720 mL / OUT: 475 mL / NET: 245 mL      Physical Exam:    Daily     Daily Weight in k.1 (2021 04:12)  General:  NAD  HEENT:  Nonicteric, PERRLA  CV:  RRR, S1S2   Lungs:  CTA B/L, no wheezes, rales, rhonchi  Abdomen:  Soft, non-tender, no distended, positive BS  Extremities: trace edema   Neuro:  AAOx3, non-focal, grossly intact                                                                                                                                                                                                                                                                                                LABS:                               7.7    8.52  )-----------( 85       ( 2021 06:55 )             23.8                          137  |  105  |  58<H>  ----------------------------<  89  4.2   |  21<L>  |  2.21<H>    Ca    8.4      2021 06:51  Mg     2.3         TPro  5.9<L>  /  Alb  3.3  /  TBili  0.7  /  DBili  x   /  AST  11  /  ALT  9<L>  /  AlkPhos  57                         RADIOLOGY & ADDITIONAL TESTS         I personally reviewed: [  ]EKG   [  ]CXR    [  ] CT      A/P:         Discussed with :     Kamlesh consultants' Notes   Time spent :

## 2021-06-14 NOTE — CONSULT NOTE ADULT - ASSESSMENT
89 M CAD s/p stents, low-flow low gradient AS, HFrEF 20% s/p MDT Bi-V ICD, COPD who presents with syncope x 2. AICD shock during his stay for VT. Nephrology consulted for INGRID on CKD     INGRID on CKD IV  SCr 1.4 in 2018  SCr fluctuating at present  INGRID likely from low BP's + ARB vs. Anemia  Check urine electrolytes   UA bland   Bladder scan   CBC w/ Diff in AM   avoid nephrotoxins     Anemia  no iron def  monitor hb     Hypotension  optimize hemodynamics

## 2021-06-14 NOTE — PROGRESS NOTE ADULT - SUBJECTIVE AND OBJECTIVE BOX
Date of service: 21 @ 21:01      Patient is a 89y old  Male who presents with a chief complaint of ICD fired with VT (2021 19:29)                                                               INTERVAL HPI/OVERNIGHT EVENTS:    REVIEW OF SYSTEMS:     CONSTITUTIONAL: no weakness when laying in bed   RESPIRATORY: No cough, wheezing,  No shortness of breath  CARDIOVASCULAR: No chest pain or palpitations  GASTROINTESTINAL: No abdominal pain  . No nausea, vomiting, or hematemesis; No diarrhea or constipation. No melena or hematochezia.  GENITOURINARY: No dysuria, frequency or hematuria  NEUROLOGICAL: No numbness or weakness                                                                                                                                                                                                                                                                                   Medications:  MEDICATIONS  (STANDING):  allopurinol 100 milliGRAM(s) Oral daily  aMIOdarone    Tablet 400 milliGRAM(s) Oral every 12 hours  ferrous    sulfate 325 milliGRAM(s) Oral daily  levothyroxine 125 MICROGram(s) Oral daily  metoprolol tartrate 12.5 milliGRAM(s) Oral two times a day  pantoprazole    Tablet 40 milliGRAM(s) Oral before breakfast  polyethylene glycol 3350 17 Gram(s) Oral daily  quiNIDine gluconate  milliGRAM(s) Oral every 12 hours  sacubitril 24 mG/valsartan 26 mG 1 Tablet(s) Oral two times a day  senna 2 Tablet(s) Oral at bedtime  simvastatin 10 milliGRAM(s) Oral at bedtime  tamsulosin 0.4 milliGRAM(s) Oral at bedtime    MEDICATIONS  (PRN):  acetaminophen   Tablet .. 650 milliGRAM(s) Oral every 6 hours PRN Temp greater or equal to 38C (100.4F), Mild Pain (1 - 3)       Allergies    No Known Allergies    Intolerances      Vital Signs Last 24 Hrs  T(C): 36.7 (2021 20:41), Max: 36.9 (2021 04:07)  T(F): 98 (2021 20:41), Max: 98.4 (2021 04:07)  HR: 70 (2021 20:41) (70 - 75)  BP: 98/55 (2021 20:41) (86/46 - 106/66)  BP(mean): --  RR: 18 (2021 20:41) (18 - 18)  SpO2: 95% (2021 20:41) (94% - 96%)  CAPILLARY BLOOD GLUCOSE           @ 07:01  -   @ 07:00  --------------------------------------------------------  IN: 840 mL / OUT: 675 mL / NET: 165 mL     @ 07:01  -   @ 21:01  --------------------------------------------------------  IN: 420 mL / OUT: 0 mL / NET: 420 mL      Physical Exam:    Daily     Daily Weight in k.5 (2021 04:07)  General: NAD   cachectic   HEENT:  Nonicteric, PERRLA  CV:  RRR, S1S2   Lungs:  mild crackles   Abdomen:  Soft, non-tender, no distended, positive BS  Extremities:  trace edema   Neuro:  nf                                                                                                                                                                                                                                                                                             LABS:                               7.2    7.18  )-----------( 84       ( 2021 06:58 )             22.1                          136  |  103  |  69<H>  ----------------------------<  91  4.5   |  20<L>  |  2.49<H>    Ca    8.3<L>      2021 06:58  Mg     2.3         TPro  5.8<L>  /  Alb  3.2<L>  /  TBili  0.7  /  DBili  0.2  /  AST  20  /  ALT  14  /  AlkPhos  65                         RADIOLOGY & ADDITIONAL TESTS         I personally reviewed: [  ]EKG   [  ]CXR    [  ] CT      A/P:         Discussed with :     Kamlesh consultants' Notes   Time spent :

## 2021-06-14 NOTE — PROGRESS NOTE ADULT - SUBJECTIVE AND OBJECTIVE BOX
Patient seen and examined at bedside.    Overnight Events: Patient feeling fine. No palpitations. Tele reviewed, V paced @ 70, no ectopy.    Current Meds:  acetaminophen   Tablet .. 650 milliGRAM(s) Oral every 6 hours PRN  allopurinol 100 milliGRAM(s) Oral daily  aMIOdarone    Tablet 400 milliGRAM(s) Oral every 12 hours  ferrous    sulfate 325 milliGRAM(s) Oral daily  levothyroxine 125 MICROGram(s) Oral daily  metoprolol tartrate 25 milliGRAM(s) Oral every 12 hours  pantoprazole    Tablet 40 milliGRAM(s) Oral before breakfast  polyethylene glycol 3350 17 Gram(s) Oral daily  quiNIDine gluconate  milliGRAM(s) Oral every 12 hours  sacubitril 24 mG/valsartan 26 mG 1 Tablet(s) Oral two times a day  senna 2 Tablet(s) Oral at bedtime  simvastatin 10 milliGRAM(s) Oral at bedtime  tamsulosin 0.8 milliGRAM(s) Oral at bedtime      Vitals:  T(F): 98.4 (06-14), Max: 98.4 (06-14)  HR: 72 (06-14) (70 - 72)  BP: 93/60 (06-14) (93/60 - 98/62)  RR: 18 (06-14)  SpO2: 94% (06-14)  I&O's Summary    13 Jun 2021 07:01  -  14 Jun 2021 07:00  --------------------------------------------------------  IN: 840 mL / OUT: 675 mL / NET: 165 mL        Physical Exam:  Appearance: No acute distress; well appearing  Eyes:  EOMI, pink conjunctiva  HENT: Normal oral mucosa  Cardiovascular: RRR, S1, S2, no murmurs, rubs, or gallops; trace edema; no JVD  Respiratory: Clear to auscultation bilaterally  Gastrointestinal: soft, non-tender, non-distended with normal bowel sounds  Musculoskeletal: No clubbing; no joint deformity   Neurologic: Non-focal  Lymphatic: No lymphadenopathy  Psychiatry: AAOx3, mood & affect appropriate  Skin: No rashes                          7.2    7.18  )-----------( 84       ( 14 Jun 2021 06:58 )             22.1     06-14    136  |  103  |  69<H>  ----------------------------<  91  4.5   |  20<L>  |  2.49<H>    Ca    8.3<L>      14 Jun 2021 06:58  Mg     2.3     06-14    TPro  5.8<L>  /  Alb  3.2<L>  /  TBili  0.7  /  DBili  0.2  /  AST  20  /  ALT  14  /  AlkPhos  65  06-14    New ECG(s): Personally reviewed    Echo:  < from: TTE with Doppler (w/Cont) (06.07.21 @ 07:37) >  Conclusions:  1. Calcified aortic valve with decreased opening. Peak  transaortic valve gradient equals 24 mm Hg, mean  transaortic valve gradient equals 12 mm Hg, estimated  aortic valve area equals 1 sqcm (by continuity equation),  aortic valve velocity time integral equals 47 cm,  consistent with moderate aortic stenosis.  2. Moderately dilated left atrium.  LA volume index = 48  cc/m2.  3. Mild left ventricular enlargement.  4. Severe segmental left ventricular systolic dysfunction.  Akinetic basalinferior and inferolateral.  Diffuse  hypokinesis of the inferior and inferolateral wall, lateral  wall.   Endocardial visualization enhanced with intravenous  injection of Ultrasonic Enhancing Agent (Definity). No left  ventricular thrombus.  5. Severe  diastolic dysfunction (Stage III).  6. Mild right atrial enlargement.  7. Right ventricular enlargement with decreased right  ventricular systolic function. A device wire is noted in  the right heart.  ------------------------------------------------------------------------  Confirmed on  6/7/2021 - 14:32:38 by TIERRA Ovalles  ------------------------------------------------------------------------    < end of copied text >    Stress Testing:     Cath:    Imaging:    Interpretation of Telemetry: V paced @ 70, no ectopy

## 2021-06-14 NOTE — CONSULT NOTE ADULT - SUBJECTIVE AND OBJECTIVE BOX
Reason for consult: Anemia    HPI:  89 yo M PMHx COPD (not home O2 dependent), CAD s/p stent 2015 on ASA, AAA repair with stent, HTN, HLD, chronic systolic HF (EF 20%), GERD, hypothyroidism, Bi-V ICD ([employer]), who presented to [facility] ED on 6/6/21 for an episode of syncope yesterday night. His wife saw him in bed but then he started sliding off the bed onto the floor. He had denied any prodrome, feeling CP, sob, palpitations, or shock from his ICD. THis morning, a similar episode happened again, which prompted the wife to call EMS.     In the ED, VS: T 97.8, HR 90, /76, RR 18, 95% on RA.   Labs: WBC 6.7, H&H 9.6/30.0, Plt 82, Na 141, K 4.5, Cl 102, HCO3 26, BUN/Cr 50/2.19, Ca 8.7, TNI 95->90, BNP 51984, CK 59, Mg 2.6, Ph 3.1, Digoxin 1.1  CT head negative. Xrays of chest, pelvis, and R knee negative for acute pathology.    EP was consulted and his device was interrogated. It showed multiple episodes of VT since 5/17/21 requiring ATP and shocks (last was 6/6/21 at 6:17AM). Pt was initially to be admitted to telemetry after being given oral amiodarone 400 mg however, while in the ED, pt had an episode of VT w/ unresponsiveness and AICD terminated the rhythm. Pt was awake and responsive afterwards. Pt was given a lidocaine bolus and was started on a lidocaine gtt and was admitted to the CCU for further monitoring.     (06 Jun 2021 11:40)    Hematology/Oncology consulted for patient  presenting with anemia    PAST MEDICAL & SURGICAL HISTORY:  HTN (hypertension)    HLD (hyperlipidemia)    CAD (coronary artery disease)  S/P angioplasty 2000    BPH (Benign Prostatic Hyperplasia)    CHF (congestive heart failure)    Hypothyroid    GERD (gastroesophageal reflux disease)    Gout    COPD (chronic obstructive pulmonary disease)    MI (myocardial infarction)  2000    Cataract    Hernia, inguinal, bilateral    Achilles rupture    S/P knee replacement, right    AAA (abdominal aortic aneurysm)    H/O repair of rotator cuff    S/P angioplasty        FAMILY HISTORY:  Family history of hemolytic uremic syndrome    Family history of CHF (congestive heart failure)        Alcohol Denied  Smoking: Nonsmoker  Drug Use: Denied  Marital Status:         Allergies    No Known Allergies    Intolerances        MEDICATIONS  (STANDING):  allopurinol 100 milliGRAM(s) Oral daily  aMIOdarone    Tablet 400 milliGRAM(s) Oral every 12 hours  ferrous    sulfate 325 milliGRAM(s) Oral daily  levothyroxine 125 MICROGram(s) Oral daily  metoprolol tartrate 25 milliGRAM(s) Oral every 12 hours  pantoprazole    Tablet 40 milliGRAM(s) Oral before breakfast  polyethylene glycol 3350 17 Gram(s) Oral daily  quiNIDine gluconate  milliGRAM(s) Oral every 12 hours  sacubitril 24 mG/valsartan 26 mG 1 Tablet(s) Oral two times a day  senna 2 Tablet(s) Oral at bedtime  simvastatin 10 milliGRAM(s) Oral at bedtime  tamsulosin 0.8 milliGRAM(s) Oral at bedtime    MEDICATIONS  (PRN):  acetaminophen   Tablet .. 650 milliGRAM(s) Oral every 6 hours PRN Temp greater or equal to 38C (100.4F), Mild Pain (1 - 3)      ROS  No fever, sweats, chills  No epistaxis, HA, sore throat  No CP, SOB, cough, sputum  No n/v/d, abd pain, melena, hematochezia  No edema  No rash  No anxiety  No back pain, joint pain  No bleeding, bruising  No dysuria, hematuria    T(C): 36.9 (06-14-21 @ 04:07), Max: 36.9 (06-14-21 @ 04:07)  HR: 72 (06-14-21 @ 04:07) (70 - 72)  BP: 93/60 (06-14-21 @ 04:07) (88/57 - 98/62)  RR: 18 (06-14-21 @ 04:07) (14 - 18)  SpO2: 94% (06-14-21 @ 04:07) (94% - 96%)  Wt(kg): --    PE  NAD  Awake, alert  Anicteric, MMM  RRR  CTAB  Abd soft, NT, ND  No c/c/e  No rash grossly  FROM                          7.7    8.52  )-----------( 85       ( 13 Jun 2021 06:55 )             23.8       06-13    137  |  105  |  58<H>  ----------------------------<  89  4.2   |  21<L>  |  2.21<H>    Ca    8.4      13 Jun 2021 06:51  Mg     2.3     06-13    TPro  5.9<L>  /  Alb  3.3  /  TBili  0.7  /  DBili  x   /  AST  11  /  ALT  9<L>  /  AlkPhos  57  06-12   Reason for consult: Anemia    HPI:  89 yo M PMHx COPD (not home O2 dependent), CAD s/p stent 2015 on ASA, AAA repair with stent, HTN, HLD, chronic systolic HF (EF 20%), GERD, hypothyroidism, Bi-V ICD ([employer]), who presented to [facility] ED on 6/6/21 for an episode of syncope yesterday night. His wife saw him in bed but then he started sliding off the bed onto the floor. He had denied any prodrome, feeling CP, sob, palpitations, or shock from his ICD. THis morning, a similar episode happened again, which prompted the wife to call EMS.     In the ED, VS: T 97.8, HR 90, /76, RR 18, 95% on RA.   Labs: WBC 6.7, H&H 9.6/30.0, Plt 82, Na 141, K 4.5, Cl 102, HCO3 26, BUN/Cr 50/2.19, Ca 8.7, TNI 95->90, BNP 47496, CK 59, Mg 2.6, Ph 3.1, Digoxin 1.1  CT head negative. Xrays of chest, pelvis, and R knee negative for acute pathology.    EP was consulted and his device was interrogated. It showed multiple episodes of VT since 5/17/21 requiring ATP and shocks (last was 6/6/21 at 6:17AM). Pt was initially to be admitted to telemetry after being given oral amiodarone 400 mg however, while in the ED, pt had an episode of VT w/ unresponsiveness and AICD terminated the rhythm. Pt was awake and responsive afterwards. Pt was given a lidocaine bolus and was started on a lidocaine gtt and was admitted to the CCU for further monitoring.     (06 Jun 2021 11:40)    Hematology/Oncology consulted for patient  presenting with anemia    PAST MEDICAL & SURGICAL HISTORY:  HTN (hypertension)    HLD (hyperlipidemia)    CAD (coronary artery disease)  S/P angioplasty 2000    BPH (Benign Prostatic Hyperplasia)    CHF (congestive heart failure)    Hypothyroid    GERD (gastroesophageal reflux disease)    Gout    COPD (chronic obstructive pulmonary disease)    MI (myocardial infarction)  2000    Cataract    Hernia, inguinal, bilateral    Achilles rupture    S/P knee replacement, right    AAA (abdominal aortic aneurysm)    H/O repair of rotator cuff    S/P angioplasty        FAMILY HISTORY:  Family history of hemolytic uremic syndrome    Family history of CHF (congestive heart failure)        Alcohol Denied  Smoking: Nonsmoker  Drug Use: Denied  Marital Status:         Allergies    No Known Allergies    Intolerances        MEDICATIONS  (STANDING):  allopurinol 100 milliGRAM(s) Oral daily  aMIOdarone    Tablet 400 milliGRAM(s) Oral every 12 hours  ferrous    sulfate 325 milliGRAM(s) Oral daily  levothyroxine 125 MICROGram(s) Oral daily  metoprolol tartrate 25 milliGRAM(s) Oral every 12 hours  pantoprazole    Tablet 40 milliGRAM(s) Oral before breakfast  polyethylene glycol 3350 17 Gram(s) Oral daily  quiNIDine gluconate  milliGRAM(s) Oral every 12 hours  sacubitril 24 mG/valsartan 26 mG 1 Tablet(s) Oral two times a day  senna 2 Tablet(s) Oral at bedtime  simvastatin 10 milliGRAM(s) Oral at bedtime  tamsulosin 0.8 milliGRAM(s) Oral at bedtime    MEDICATIONS  (PRN):  acetaminophen   Tablet .. 650 milliGRAM(s) Oral every 6 hours PRN Temp greater or equal to 38C (100.4F), Mild Pain (1 - 3)      ROS  No fever, sweats, chills  No epistaxis, HA, sore throat  Cardiac issues per HPI  No n/v/d, abd pain, melena, hematochezia  No edema  No rash  No anxiety  No back pain, joint pain  No bleeding, bruising  No dysuria, hematuria    T(C): 36.9 (06-14-21 @ 04:07), Max: 36.9 (06-14-21 @ 04:07)  HR: 72 (06-14-21 @ 04:07) (70 - 72)  BP: 93/60 (06-14-21 @ 04:07) (88/57 - 98/62)  RR: 18 (06-14-21 @ 04:07) (14 - 18)  SpO2: 94% (06-14-21 @ 04:07) (94% - 96%)  Wt(kg): --    PE  NAD  Awake, alert  Anicteric, MMM  RRR  CTAB  Abd soft, NT, ND  No c/c/e - legs bilaterally tender  No rash grossly                            7.7    8.52  )-----------( 85       ( 13 Jun 2021 06:55 )             23.8       06-13    137  |  105  |  58<H>  ----------------------------<  89  4.2   |  21<L>  |  2.21<H>    Ca    8.4      13 Jun 2021 06:51  Mg     2.3     06-13    TPro  5.9<L>  /  Alb  3.3  /  TBili  0.7  /  DBili  x   /  AST  11  /  ALT  9<L>  /  AlkPhos  57  06-12

## 2021-06-14 NOTE — CONSULT NOTE ADULT - SUBJECTIVE AND OBJECTIVE BOX
[facility]   MD Mani Ayala MD, D.O. Ruoru Wong, PA    From 7 AM - 5 PM:  OFFICE: [phone]  Dr. Shultz cell: [phone]  Dr. Laird Cell: [phone]  Dr. Beltran cell: [phone]  LANA Ellis cell: [phone]    From 5 PM - 7 AM: Answering Service: [phone]  Date of service 06-14-21 @ 19:29    -- INITIAL RENAL CONSULT NOTE  --------------------------------------------------------------------------------  HPI:  89 M CAD s/p stents, low-flow low gradient AS, HFrEF 20% s/p MDT Bi-V ICD, COPD who presents with syncope x 2. AICD shock during his stay for VT. Nephrology consulted for INGRID on CKD     PAST HISTORY  --------------------------------------------------------------------------------  PAST MEDICAL & SURGICAL HISTORY:  HTN (hypertension)    HLD (hyperlipidemia)    CAD (coronary artery disease)  S/P angioplasty 2000    BPH (Benign Prostatic Hyperplasia)    CHF (congestive heart failure)    Hypothyroid    GERD (gastroesophageal reflux disease)    Gout    COPD (chronic obstructive pulmonary disease)    MI (myocardial infarction)  2000    Cataract    Hernia, inguinal, bilateral    Achilles rupture    S/P knee replacement, right    AAA (abdominal aortic aneurysm)    H/O repair of rotator cuff    S/P angioplasty      FAMILY HISTORY:  Family history of hemolytic uremic syndrome    Family history of CHF (congestive heart failure)      PAST SOCIAL HISTORY:    ALLERGIES & MEDICATIONS  --------------------------------------------------------------------------------  Allergies    No Known Allergies    Intolerances      Standing Inpatient Medications  allopurinol 100 milliGRAM(s) Oral daily  aMIOdarone    Tablet 400 milliGRAM(s) Oral every 12 hours  ferrous    sulfate 325 milliGRAM(s) Oral daily  levothyroxine 125 MICROGram(s) Oral daily  metoprolol tartrate 12.5 milliGRAM(s) Oral two times a day  pantoprazole    Tablet 40 milliGRAM(s) Oral before breakfast  polyethylene glycol 3350 17 Gram(s) Oral daily  quiNIDine gluconate  milliGRAM(s) Oral every 12 hours  sacubitril 24 mG/valsartan 26 mG 1 Tablet(s) Oral two times a day  senna 2 Tablet(s) Oral at bedtime  simvastatin 10 milliGRAM(s) Oral at bedtime  tamsulosin 0.4 milliGRAM(s) Oral at bedtime    PRN Inpatient Medications  acetaminophen   Tablet .. 650 milliGRAM(s) Oral every 6 hours PRN      REVIEW OF SYSTEMS  --------------------------------------------------------------------------------  Gen: No fevers/chills  Skin: No rashes  Head/Eyes/Ears: Normal hearing,  Normal vision   Respiratory: No dyspnea, cough  CV: No chest pain  GI: No abdominal pain, diarrhea, constipation, nausea, vomiting  : No dysuria, hematuria  MSK: No  edema  Heme: No easy bruising or bleeding  Psych: No significant depression    All other systems were reviewed and are negative, except as noted.    VITALS/PHYSICAL EXAM  --------------------------------------------------------------------------------  T(C): 36.7 (06-14-21 @ 18:53), Max: 36.9 (06-14-21 @ 04:07)  HR: 75 (06-14-21 @ 18:53) (70 - 75)  BP: 106/66 (06-14-21 @ 18:53) (86/46 - 106/66)  RR: 18 (06-14-21 @ 18:53) (18 - 18)  SpO2: 96% (06-14-21 @ 18:53) (94% - 96%)  Wt(kg): --      06-13-21 @ 07:01  -  06-14-21 @ 07:00  --------------------------------------------------------  IN: 840 mL / OUT: 675 mL / NET: 165 mL    06-14-21 @ 07:01  -  06-14-21 @ 19:29  --------------------------------------------------------  IN: 420 mL / OUT: 0 mL / NET: 420 mL      Physical Exam:  	Gen: NAD  	HEENT: MMM  	Pulm: CTA B/L  	CV: S1S2  	Abd: Soft, +BS   	Ext: No LE edema B/L  	Neuro: Awake  	Skin: Warm and dry      LABS/STUDIES  --------------------------------------------------------------------------------              7.2    7.18  >-----------<  84       [06-14-21 @ 06:58]              22.1     136  |  103  |  69  ----------------------------<  91      [06-14-21 @ 06:58]  4.5   |  20  |  2.49        Ca     8.3     [06-14-21 @ 06:58]      Mg     2.3     [06-14-21 @ 06:58]    TPro  5.8  /  Alb  3.2  /  TBili  0.7  /  DBili  0.2  /  AST  20  /  ALT  14  /  AlkPhos  65  [06-14-21 @ 06:58]    Creatinine Trend:  SCr 2.49 [06-14 @ 06:58]  SCr 2.21 [06-13 @ 06:51]  SCr 2.08 [06-12 @ 07:49]  SCr 2.19 [06-11 @ 07:09]  SCr 2.27 [06-10 @ 06:19]    Urinalysis - [06-06-21 @ 07:58]      Color Light Yellow / Appearance Clear / SG 1.018 / pH 6.0      Gluc 500 mg/dL / Ketone Negative  / Bili Negative / Urobili Negative       Blood Negative / Protein Trace / Leuk Est Negative / Nitrite Negative      RBC 3 / WBC 2 / Hyaline 1 / Gran  / Sq Epi  / Non Sq Epi 0 / Bacteria Negative      Iron 55, TIBC 232, %sat 24      [06-10-21 @ 11:59]  Ferritin 110      [06-10-21 @ 10:36]  HbA1c 5.2      [05-15-18 @ 07:37]  TSH 2.62      [06-14-21 @ 08:36]  Lipid: chol 81, TG 48, HDL 42, LDL --      [06-06-21 @ 21:54]

## 2021-06-14 NOTE — DISCHARGE NOTE PROVIDER - CARE PROVIDERS DIRECT ADDRESSES
[email or web address]_Unknown

## 2021-06-14 NOTE — DISCHARGE NOTE PROVIDER - NSDCFUADDAPPT_GEN_ALL_CORE_FT
. Patient should have routine LFT, TFTs, yearly eye exam and pulmonary function testing if remains on long term therapy   . Patient should have routine LFT, TFTs, yearly eye exam and pulmonary function testing if remains on long term therapy    Follow up with Dr. Wolf Paiz to determine when to resume lasix and jardiance.    . Patient should have routine LFT, TFTs, yearly eye exam and pulmonary function testing if remains on long term therapy    Follow up with Dr. Wolf Paiz to determine when to resume lasix and jardiance.    Pt's blood pressure is  mmhg.  Pt's orthostasis has improved.      . Patient should have routine LFT, TFTs, yearly eye exam and pulmonary function testing if remains on long term therapy    Follow up with Dr. Wolf Paiz to determine when to resume lasix and jardiance.    Pt's blood pressure is  mmhg.  Pt's orthostasis has improved.     Follow up with Dr. Panchal, Hematology, to be worked up for MDS once discharged from Rehab.

## 2021-06-14 NOTE — CHART NOTE - NSCHARTNOTEFT_GEN_A_CORE
TORI LUBNA  Patient                       7.2    7.18  )-----------( 84       ( 14 Jun 2021 06:58 )             22.1   Patient without bleeding   Packed RBC ordered 1 unit   patient with orthostatic continue to encourage hydration   medications adjusted   Vital Signs Last 24 Hrs  T(C): 36.7 (14 Jun 2021 16:18), Max: 36.9 (14 Jun 2021 04:07)  T(F): 98 (14 Jun 2021 16:18), Max: 98.4 (14 Jun 2021 04:07)  HR: 75 (14 Jun 2021 16:18) (70 - 75)  BP: 88/50 (14 Jun 2021 16:18) (86/46 - 98/62)  BP(mean): --  RR: 18 (14 Jun 2021 16:18) (18 - 18)  SpO2: 96% (14 Jun 2021 16:18) (94% - 96%)    # Blood loss r/o etiology   Interventions taken   Monitor  CBC  repeat in am   Monitor vital signs   medication adjusted    Discussed With Dr. Davalos agreed with above                        Nicolasa Mccarthy NP-C

## 2021-06-14 NOTE — PROGRESS NOTE ADULT - ASSESSMENT
24 episodes of VT since 5/17, most pace-terminated with mild sx, 2 episodes syncope with shock.   Renal function and BNP only slightly worse than recent baseline.  No chest pain--troponin 90s maybe from shock--will trend.    Discussed with EPS  1. Oral loading with amiodarone. patient would consider VT ablation if fails meds. Back on beta-blocker (prefer change back to Carvedilol). Transfused-Hb 8.2. (Down to 7.2) Checked with EP- continue Amio, Quinaglute for now  2. Resume outpatient cardiac meds--being held now as overdiuresed plus anemic.-- Transfused, now slowly reintroduced Entresto (24/26 q12h). Holding Jardiance and ? add Vericiguat. On metoprolol in place of carvedilol for now-consider change back.  No role for structural heart here; reviewed echo in detail.  Holding diuretic and digoxin.  3. Heme consult appreciated.  4. COPD--continue outpatient meds.  (Followed by Dr. Ning Morris.)  5. OOB to chair. PT.      Continue po load with amiodarone plus quinaglute--moniter for more VT.       Wolf Pazi MD, FACC  (O) [phone]  (C) [phone] 24 episodes of VT since 5/17, most pace-terminated with mild sx, 2 episodes syncope with shock.   Renal function and BNP only slightly worse than recent baseline.  No chest pain--troponin 90s maybe from shock--will trend.    Discussed with EPS  1. Oral loading with amiodarone. patient would consider VT ablation if fails meds. Back on beta-blocker (prefer change back to Carvedilol). Transfused-Hb 8.2. (Down to 7.2) Checked with EP- continue Amio, Quinaglute for now  2. Resume outpatient cardiac meds--being held now as overdiuresed plus anemic.-- Transfused, now slowly reintroduced Entresto (24/26 q12h). Holding Jardiance and ? add Vericiguat. On metoprolol in place of carvedilol for now-consider change back.  No role for structural heart here; reviewed echo in detail.  Holding diuretic and digoxin.  3. Heme consult appreciated.  4. COPD--continue outpatient meds.  (Followed by Dr. Ning Morris.)  5. OOB to chair. PT. Agrees to rehab.      Continue po load with amiodarone plus quinaglute--moniter for more VT.   Await heme w/u.  ? when dispo to rehab.      Wolf Paiz MD, FACC  (O) [phone]  (C) [phone]

## 2021-06-14 NOTE — DISCHARGE NOTE PROVIDER - CARE PROVIDER_API CALL
Wolf Paiz)  Cardiovascular Disease; Internal Medicine  [address], [address]  [place] [address]  Phone: [phone]  Fax: [phone]  Established Patient  Follow Up Time: 1 week   Wolf Paiz)  Cardiovascular Disease; Internal Medicine  [address], [address]  [place] [address]  Phone: [phone]  Fax: [phone]  Established Patient  Follow Up Time: 1 week    William Enriquez)  Internal Medicine  [address] [facility], [address]  [place] [address]  Phone: [phone]  Fax: [phone]  Follow Up Time: 1 week

## 2021-06-14 NOTE — DISCHARGE NOTE PROVIDER - NSDCFUSCHEDAPPT_GEN_ALL_CORE_FT
LUBNA VALLE ; 06/28/2021 ; [facility] [address] [facility]  LUBNA VALLE ; 06/28/2021 ; [facility] [address] [facility]

## 2021-06-14 NOTE — DISCHARGE NOTE PROVIDER - NSDCCPCAREPLAN_GEN_ALL_CORE_FT
PRINCIPAL DISCHARGE DIAGNOSIS  Diagnosis: Ventricular tachycardia  Assessment and Plan of Treatment:       SECONDARY DISCHARGE DIAGNOSES  Diagnosis: Syncope  Assessment and Plan of Treatment:      PRINCIPAL DISCHARGE DIAGNOSIS  Diagnosis: Ventricular tachycardia  Assessment and Plan of Treatment: continue amidarone and quinidine         SECONDARY DISCHARGE DIAGNOSES  Diagnosis: Syncope  Assessment and Plan of Treatment:     Diagnosis: CAD (coronary artery disease)  Assessment and Plan of Treatment: Low salt, low fat diet.   Weight management.   Take medications as prescribed.    No smoking.  Follow up appointments with your doctor(s)  as instruced.      Diagnosis: COPD (chronic obstructive pulmonary disease)  Assessment and Plan of Treatment: Call your Health Care provider upon arrival home to make a follow up appointment within one week.  Take all inhalers as prescribed by your Health Care Provider.  Take steroids as prescribed by your Health Care Provider.  If your cough increases infrequency and severity and/or you have shortness of breath or increased shortness of breath call your Health Care Provider.  If you develop fever, chills, night sweats, malaise, and/or change in mental status call your Health care Provider.  Nutrition is very important.  Eat small frequent meals.  Increase your activity as tolerated.  Do not stay in bed all day      Diagnosis: Hypothyroidism  Assessment and Plan of Treatment: you do not make enough thyroid hormone  signs & symptoms of low levels of thyroid hormone - tired, getting cold easily, coarse or thin hair, constipation, shortness of breath, swelling, irregular periods  your doctor will do thyroid hormone blood tests at least once a year to monitor if medication dose is adequate  take your thyroid medicine as directed by your doctor       PRINCIPAL DISCHARGE DIAGNOSIS  Diagnosis: Ventricular tachycardia  Assessment and Plan of Treatment: continue amidarone and quinidine         SECONDARY DISCHARGE DIAGNOSES  Diagnosis: Syncope  Assessment and Plan of Treatment:     Diagnosis: CAD (coronary artery disease)  Assessment and Plan of Treatment: Low salt, low fat diet.   Weight management.   Take medications as prescribed.    No smoking.  Follow up appointments with your doctor(s)  as instruced.      Diagnosis: COPD (chronic obstructive pulmonary disease)  Assessment and Plan of Treatment: Call your Health Care provider upon arrival home to make a follow up appointment within one week.  Take all inhalers as prescribed by your Health Care Provider.  Take steroids as prescribed by your Health Care Provider.  If your cough increases infrequency and severity and/or you have shortness of breath or increased shortness of breath call your Health Care Provider.  If you develop fever, chills, night sweats, malaise, and/or change in mental status call your Health care Provider.  Nutrition is very important.  Eat small frequent meals.  Increase your activity as tolerated.  Do not stay in bed all day      Diagnosis: Hypothyroidism  Assessment and Plan of Treatment: you do not make enough thyroid hormone  signs & symptoms of low levels of thyroid hormone - tired, getting cold easily, coarse or thin hair, constipation, shortness of breath, swelling, irregular periods  your doctor will do thyroid hormone blood tests at least once a year to monitor if medication dose is adequate  take your thyroid medicine as directed by your doctor      Diagnosis: Acute on chronic systolic congestive heart failure  Assessment and Plan of Treatment: Weigh yourself daily.  If you gain 3lbs in 3 days, or 5lbs in a week call your Health Care Provider.  Do not eat or drink foods containing more than 2000mg of salt (sodium) in your diet every day.  Call your Health Care Provider if you have any swelling or increased swelling in your feet, ankles, and/or stomach.  Take all of your medication as directed.  If you become dizzy call your Health Care Provider.       PRINCIPAL DISCHARGE DIAGNOSIS  Diagnosis: Ventricular tachycardia  Assessment and Plan of Treatment: continue amidarone and quinidine         SECONDARY DISCHARGE DIAGNOSES  Diagnosis: Syncope  Assessment and Plan of Treatment: HOME CARE INSTRUCTIONS  Have someone stay with you until you feel stable.  Do not drive, operate machinery, or play sports until your caregiver says it is okay.  Keep all follow-up appointments as directed by your caregiver.   Lie down right away if you start feeling like you might faint. Breathe deeply and steadily. Wait until all the symptoms have passed.Drink enough fluids to keep your urine clear or pale yellow.  If you are taking blood pressure or heart medicine, get up slowly, taking several minutes to sit and then stand. This can reduce dizziness.  SEEK IMMEDIATE MEDICAL CARE IF:  You have a severe headache.  You have unusual pain in the chest, abdomen, or back.  You are bleeding from the mouth or rectum, or you have black or tarry stool.  You have an irregular or very fast heartbeat.  You have pain with breathing.  You have repeated fainting or seizure-like jerking during an episode.  You faint when sitting or lying down.  You have confusion.  You have difficulty walking.  You have severe weakness.  You have vision problems.  If you fainted, call your local emergency services (_____________________). Do not drive yourself to the hospital      Diagnosis: CAD (coronary artery disease)  Assessment and Plan of Treatment: Low salt, low fat diet.   Weight management.   Take medications as prescribed.    No smoking.  Follow up appointments with your doctor(s)  as instruced.      Diagnosis: COPD (chronic obstructive pulmonary disease)  Assessment and Plan of Treatment: Call your Health Care provider upon arrival home to make a follow up appointment within one week.  Take all inhalers as prescribed by your Health Care Provider.  Take steroids as prescribed by your Health Care Provider.  If your cough increases infrequency and severity and/or you have shortness of breath or increased shortness of breath call your Health Care Provider.  If you develop fever, chills, night sweats, malaise, and/or change in mental status call your Health care Provider.  Nutrition is very important.  Eat small frequent meals.  Increase your activity as tolerated.  Do not stay in bed all day      Diagnosis: Hypothyroidism  Assessment and Plan of Treatment: you do not make enough thyroid hormone  signs & symptoms of low levels of thyroid hormone - tired, getting cold easily, coarse or thin hair, constipation, shortness of breath, swelling, irregular periods  your doctor will do thyroid hormone blood tests at least once a year to monitor if medication dose is adequate  take your thyroid medicine as directed by your doctor      Diagnosis: Acute on chronic systolic congestive heart failure  Assessment and Plan of Treatment: Weigh yourself daily.  If you gain 3lbs in 3 days, or 5lbs in a week call your Health Care Provider.  Do not eat or drink foods containing more than 2000mg of salt (sodium) in your diet every day.  Call your Health Care Provider if you have any swelling or increased swelling in your feet, ankles, and/or stomach.  Take all of your medication as directed.  If you become dizzy call your Health Care Provider.       PRINCIPAL DISCHARGE DIAGNOSIS  Diagnosis: Ventricular tachycardia  Assessment and Plan of Treatment: continue amidarone and quinidine   Continue Amiodarone load 400 mg bid until last dose on 6/18/21 then decrease to Amiodarone 400 mg daily x 7 days then decreased to 200 mg daily for maintenance dose.    Will follow up with Dr. Wolf Paiz once out of rehab.   Check LFT, and TFTs weekly while on Amiodarone.         SECONDARY DISCHARGE DIAGNOSES  Diagnosis: Syncope  Assessment and Plan of Treatment: HOME CARE INSTRUCTIONS  Have someone stay with you until you feel stable.  Do not drive, operate machinery, or play sports until your caregiver says it is okay.  Keep all follow-up appointments as directed by your caregiver.   Lie down right away if you start feeling like you might faint. Breathe deeply and steadily. Wait until all the symptoms have passed.Drink enough fluids to keep your urine clear or pale yellow.  If you are taking blood pressure or heart medicine, get up slowly, taking several minutes to sit and then stand. This can reduce dizziness.  SEEK IMMEDIATE MEDICAL CARE IF:  You have a severe headache.  You have unusual pain in the chest, abdomen, or back.  You are bleeding from the mouth or rectum, or you have black or tarry stool.  You have an irregular or very fast heartbeat.  You have pain with breathing.  You have repeated fainting or seizure-like jerking during an episode.  You faint when sitting or lying down.  You have confusion.  You have difficulty walking.  You have severe weakness.  You have vision problems.  If you fainted, call your local emergency services (_____________________). Do not drive yourself to the hospital      Diagnosis: CAD (coronary artery disease)  Assessment and Plan of Treatment: Low salt, low fat diet.   Weight management.   Take medications as prescribed.    No smoking.  Follow up appointments with your doctor(s)  as instruced.      Diagnosis: COPD (chronic obstructive pulmonary disease)  Assessment and Plan of Treatment: Call your Health Care provider upon arrival home to make a follow up appointment within one week.  Take all inhalers as prescribed by your Health Care Provider.  Take steroids as prescribed by your Health Care Provider.  If your cough increases infrequency and severity and/or you have shortness of breath or increased shortness of breath call your Health Care Provider.  If you develop fever, chills, night sweats, malaise, and/or change in mental status call your Health care Provider.  Nutrition is very important.  Eat small frequent meals.  Increase your activity as tolerated.  Do not stay in bed all day      Diagnosis: Hypothyroidism  Assessment and Plan of Treatment: you do not make enough thyroid hormone  signs & symptoms of low levels of thyroid hormone - tired, getting cold easily, coarse or thin hair, constipation, shortness of breath, swelling, irregular periods  your doctor will do thyroid hormone blood tests at least once a year to monitor if medication dose is adequate  take your thyroid medicine as directed by your doctor      Diagnosis: Acute on chronic systolic congestive heart failure  Assessment and Plan of Treatment: Weigh yourself daily.  If you gain 3lbs in 3 days, or 5lbs in a week call your Health Care Provider.  Do not eat or drink foods containing more than 2000mg of salt (sodium) in your diet every day.  Call your Health Care Provider if you have any swelling or increased swelling in your feet, ankles, and/or stomach.  Take all of your medication as directed.  If you become dizzy call your Health Care Provider.

## 2021-06-14 NOTE — CONSULT NOTE ADULT - ASSESSMENT
89 yo M PMHx COPD (not home O2 dependent), CAD s/p stent 2015 on ASA, AAA repair with stent, HTN, HLD, chronic systolic HF (EF 20%), GERD, hypothyroidism, Bi-V ICD ([employer]tr[employer]), who presented to [facility] ED on 6/6/21 for an episode of syncope yesterday night. His wife saw him in bed but then he started sliding off the bed onto the floor. He had denied any prodrome, feeling CP, sob, palpitations, or shock from his ICD. THis morning, a similar episode happened again, which prompted the wife to call EMS.     In the ED, VS: T 97.8, HR 90, /76, RR 18, 95% on RA.   Labs: WBC 6.7, H&H 9.6/30.0, Plt 82, Na 141, K 4.5, Cl 102, HCO3 26, BUN/Cr 50/2.19, Ca 8.7, TNI 95->90, BNP 27337, CK 59, Mg 2.6, Ph 3.1, Digoxin 1.1  CT head negative. Xrays of chest, pelvis, and R knee negative for acute pathology.    EP was consulted and his device was interrogated. It showed multiple episodes of VT since 5/17/21 requiring ATP and shocks (last was 6/6/21 at 6:17AM). Pt was initially to be admitted to telemetry after being given oral amiodarone 400 mg however, while in the ED, pt had an episode of VT w/ unresponsiveness and AICD terminated the rhythm. Pt was awake and responsive afterwards. Pt was given a lidocaine bolus and was started on a lidocaine gtt and was admitted to the CCU for further monitoring.     (06 Jun 2021 11:40)    Hematology/Oncology consulted for patient  presenting with anemia    Anemia, Thrombocytopenia  --Unclear etiology at present  --Patient does have renal disease  --Very possibly patient has MDS  --Patient  was anemic on presentation at [facility]  --Iron, B12, folate, LDH normal, will get myeloma studies, haptoglobin, Erythropoietin levels, TSH, NAHOMI, RF, Hepatitis profile  --If all negative would consider getting flow cytometry  --Please transfuse PRBCs for Hgb <7.0 grams or <8.0 grams if required by cardiology  --Please transfuse platelets for <10K or <50K with bleeding    VT, HF  --Management per Cardiology, primary team    After discharge patient may follow with Dr. William Enriquez    Thank you for the opportunity to participate in Mr. Oakley's care.    Rajinder Luevano PA-C  Hematology/Oncology  [facility]   [phone] (cell)  [phone] (office)  [phone] (alt office)  Evenings and weekends please call MD on call or office

## 2021-06-14 NOTE — DISCHARGE NOTE PROVIDER - PROVIDER TOKENS
PROVIDER:[TOKEN:[[id number]],FOLLOWUP:[1 week],ESTABLISHEDPATIENT:[T]] PROVIDER:[TOKEN:[[id number]],FOLLOWUP:[1 week],ESTABLISHEDPATIENT:[T]],PROVIDER:[TOKEN:[[id number]],FOLLOWUP:[1 week]]

## 2021-06-14 NOTE — DISCHARGE NOTE PROVIDER - HOSPITAL COURSE
89 yo M PMHx COPD (not home O2 dependent), CAD s/p stent 2015 on ASA, AAA repair with stent, HTN, HLD, chronic systolic HF (EF 20%), GERD, hypothyroidism, Bi-V ICD ([employer]), who presented to [facility] ED on 6/6/21 for an episode of syncope yesterday night. His wife saw him in bed but then he started sliding off the bed onto the floor. He had denied any prodrome, feeling CP, sob, palpitations, or shock from his ICD. THis morning, a similar episode happened again, which prompted the wife to call EMS.    89 yo M PMHx COPD (not home O2 dependent), CAD s/p stent 2015 on ASA, AAA repair with stent, HTN, HLD, chronic systolic HF (EF 20%), GERD, hypothyroidism, Bi-V ICD ([employer]), who presented to [facility] ED on 6/6/21 for an episode of syncope yesterday night. His wife saw him in bed but then he started sliding off the bed onto the floor. He had denied any prodrome, feeling CP, sob, palpitations, or shock from his ICD. THis morning, a similar episode happened again, which prompted the wife to call EMS.   Admitted-   VT Storm -  interrogation revealed multiple episodes of VT since may requiring ATP and shocks  weaned off lidocaine gtt   started on amiodarone 400 bid (loading dose total 7-10 g)  per EP, after load, can decrease to 400 mg qd then eventual transition to 200 mg qd.. now added quindine   -is on home coreg 12.5 bid, switched to lopressor 12.5 bid but held due to soft BPs   monitor lytes and keep K>4 and Mg>2  - f/u EP recs, medical management at this time. As per Ep- will consider ablation if continues to have ectopy with antiarrythmic drugs.   - will hold digoxin at this time. Dig level wnl   Hx CAD with stent 2015 and HFrEF  - 10/2018 [facility]- nonobstructive CAD  - 6/7 TTE: EF 30%. Mild MR. Moderate AS and mild AR. Mod dilated LA. Akinetic basal inferior and inferolateral. Diffuse hypokinesis of the inferior and inferolateral wall, lateral wall. No LV thrombus. Severe diastolic dysfunction. Mild RA enlargement   - continue ASA and statin  - on home lasix 40 bid but will hold given soft BPs  - holding home entresto and jardiance given soft BPs  - completing Amio 10 gram loading (currently 400 bid), then to continue 400 qd  -  coreg 12.5 BID, which is on hold for soft BPs   - home Digoxin on hold given bump in Creatinine   home entresto and jardiance on hold due to soft BPs   f/u EP recs.  CKD, non-oliguric, Baseline Cr 2-2.5  - admission SCr 2.19, now 2.47 likely in setting of mild dehydration (had been putting out about 1 L/shift)  -hold lasix for now, will resume when BPs improve  -Continue to monitor I/Os, BUN/Creatinine, and urine output.   -Replete lytes PRN. Keep K> 4 and Mg >2.            89 yo M PMHx COPD (not home O2 dependent), CAD s/p stent 2015 on ASA, AAA repair with stent, HTN, HLD, chronic systolic HF (EF 20%), GERD, hypothyroidism, Bi-V ICD ([employer]), who presented to [facility] ED on 6/6/21 for an episode of syncope yesterday night. His wife saw him in bed but then he started sliding off the bed onto the floor. He had denied any prodrome, feeling CP, sob, palpitations, or shock from his ICD. THis morning, a similar episode happened again, which prompted the wife to call EMS.   Admitted-   VT Storm -  interrogation revealed multiple episodes of VT since may requiring ATP and shocks  weaned off lidocaine gtt   started on amiodarone 400 bid (loading dose total 7-10 g)  per EP, after load, can decrease to 400 mg qd then eventual transition to 200 mg qd.. now added quindine   -is on home coreg 12.5 bid, switched to lopressor 12.5 bid but held due to soft BPs   monitor lytes and keep K>4 and Mg>2  - f/u EP recs, medical management at this time. As per Ep- will consider ablation if continues to have ectopy with antiarrythmic drugs.   - will hold digoxin at this time. Dig level wnl   Hx CAD with stent 2015 and HFrEF  - 10/2018 [facility]- nonobstructive CAD  - 6/7 TTE: EF 30%. Mild MR. Moderate AS and mild AR. Mod dilated LA. Akinetic basal inferior and inferolateral. Diffuse hypokinesis of the inferior and inferolateral wall, lateral wall. No LV thrombus. Severe diastolic dysfunction. Mild RA enlargement   - continue ASA and statin  - on home lasix 40 bid but will hold given soft BPs  - holding home entresto and jardiance given soft BPs  - completing Amio 10 gram loading (currently 400 bid), then to continue 400 qd  -  coreg 12.5 BID, which is on hold for soft BPs   - home Digoxin on hold given bump in Creatinine   home entresto and jardiance on hold due to soft BPs   f/u EP recs.  CKD, non-oliguric, Baseline Cr 2-2.5  - admission SCr 2.19, now 2.47 likely in setting of mild dehydration (had been putting out about 1 L/shift)  -hold lasix for now, will resume when BPs improve  -Continue to monitor I/Os, BUN/Creatinine, and urine output.   -Replete lytes PRN. Keep K> 4 and Mg >2.   Pt will be discharged to [facility] today.  Baseline systolic blood pressure is  mmhg; his orthostasis has improved.

## 2021-06-14 NOTE — PROGRESS NOTE ADULT - ASSESSMENT
89M CAD s/p stents, low-flow low gradient AS, HFrEF 20% s/p MDT Bi-V ICD, COPD who presents with syncope x 2 found to have multiple episodes of VT on interrogation requiring ATP and shock. He had another episode of VT in the ED that was successfully treated with ATP and another that required shock. He is currently Bi-V paced and hemodynamically stable. Given his age, will opt for medical management to see if this will suppress his VT for now.    Monomorphic VT s/p ATP and defibrillation: No further sustained VT episodes  - Added a Ramp (1) to allow for more ATP therapy after Burst (2)  - continue amiodarone 400 BID for total of 7-10 g load, and then 400 QD (eventually decrease to 200 QD). Tolerating Amiodarone thus far. Patient should have routine LFT, TFTs, yearly eye exam and pulmonary function testing if remains on long term therapy  - s/p lidocaine 100mg bolus, was on lido drip, however discontinued 6/8  - continue metoprolol 25 BID (likely changed from coreg 12.5 BID due to hypotension)   - continue quinidine 324 mg BID - patient with improved ectopy  - likely can be discharged from EP standpoint, however patient reports his discharge is on hold for anemia    Junie Lr MD  Cardiology Fellow, PGY-4  [phone]  For all other Cardiology service contact information, go to [email or web address] and use "[email or web address]" to login. 89M CAD s/p stents, low-flow low gradient AS, HFrEF 20% s/p MDT Bi-V ICD, COPD who presents with syncope x 2 found to have multiple episodes of VT on interrogation requiring ATP and shock. He had another episode of VT in the ED that was successfully treated with ATP and another that required shock. He is currently Bi-V paced and hemodynamically stable. Given his age, will opt for medical management to see if this will suppress his VT for now.    Monomorphic VT s/p ATP and defibrillation: No further sustained VT episodes  - Added a Ramp (1) to allow for more ATP therapy after Burst (2)  - continue amiodarone 400 BID for total of 7-10 g load, and then 400 QD (eventually decrease to 200 QD). Tolerating Amiodarone thus far. Patient should have routine LFT, TFTs, yearly eye exam and pulmonary function testing if remains on long term therapy  - s/p lidocaine 100mg bolus, was on lido drip, however discontinued 6/8  - continue metoprolol 25 BID (likely changed from coreg 12.5 BID due to hypotension)   - continue quinidine 324 mg BID - patient with improved ectopy  - likely can be discharged from EP standpoint, however patient reports his discharge is on hold for anemia    EP will sign off. Please reconsult if needed.     Junie Lr MD  Cardiology Fellow, PGY-4  [phone]  For all other Cardiology service contact information, go to [email or web address] and use "[email or web address]" to login.

## 2021-06-14 NOTE — PROGRESS NOTE ADULT - SUBJECTIVE AND OBJECTIVE BOX
Patient seen and evaluated @820  Chief Complaint: Patient is a 89y old  Male who presents with a chief complaint of syncope (06 Jun 2021 09:36)      HPI:  89 M CAD s/p stents, low-flow low gradient AS, HFrEF 20% s/p MDT Bi-V ICD, COPD who presents with syncope x 2.    Pt reports usual state of health until yesterday night when he experienced an episode of syncope. His wife saw him in bed but he started to slide off the bed onto the floor. He denies any prodrome or feeling any chest pain, sob, palpitations, or shock from his ICD. This AM, the same scenario happened again which prompted the wife to call EMS.     On interrogation, it showed multiple episodes (24) of VT since 5/17 leading up to today that required ATP and shocks. The last one was 6/6/21 at 617AM which did not break with 2 ATPs and ultimately led to shock. (06 Jun 2021 11:40)    CARDIAC HISTORY: I have known the patient since 2006.  He had an MI and angioplasty in 1994.  Had a cath in 2011 with a 40% aneurysmal left main normal LAD and circumflex with a 95% right lesion and an akinetic inferior wall.  Treated medically and did well other than an admission for cellulitis in 2013 with positive blood cultures.  No endocarditis.  Issues with shortness of breath both from COPD and CHF.  Able to undergo knee replacement in 2014 and then later endovascular repair of an abdominal aortic aneurysm in October 2014.  Stress echo in 2015 showed an ejection fraction of 27%.  Cath revealed unchanged coronaries but LVEF 35%.  The right coronary was stented but did not change his LVEF so we had a defibrillator and biventricular pacemaker placed by Dr. Russell in August 2015.  I changed his Avapro to Entresto and the patient's CHF seem to improve.  August 25 of 2017 had a syncopal episode in the bathroom.  He had no idea what happened but interrogation showed V. tach followed by V. fib which led to a defibrillator shock.  No recurrence and no AICD shocks since then until this admission.  2019 had a couple of runs of ventricular tachycardia that were paced terminated.  98% of the time biventricular pacing.  2021 episode of atrial fibrillation on interrogation that lasted an hour and 48 minutes.  Progressive heart failure and he was worked up for possible TAVR with low gradient aortic stenosis including a dobutamine echo and was found not to be a candidate.  Has had progressive shortness of breath and LV dysfunction with adjusting of his medications and recently adding Jardiance and then increasing it from 10-25.  Issues with creatinine running between 2 and 2.5.  BNP as high as 18,000.  Most recent echo was March 23 of this year with an aortic gradient of 31 peak and 19 mean, dimensionless index 0.20 and no AI.  Severe segmental LV systolic dysfunction with LVEF 23 to 25%.  Mild LVH.  Only mild MR and mild to moderate TR with RVSP 54.    PMH:   HTN (hypertension)    HLD (hyperlipidemia)    CAD (coronary artery disease)    BPH (Benign Prostatic Hyperplasia)    CHF (congestive heart failure)    Hypothyroid    GERD (gastroesophageal reflux disease)    Gout    COPD (chronic obstructive pulmonary disease)    MI (myocardial infarction)      PSH:   Cataract    Hernia, inguinal, bilateral    Achilles rupture    S/P knee replacement, right    AAA (abdominal aortic aneurysm)    H/O repair of rotator cuff    S/P angioplasty    OUTPATIENT CARDIAC MEDS: Carvedilol 12.5 mg twice daily, Entresto 97/103 twice daily, digoxin 0.125 daily, Jardiance 25 mg daily, furosemide 40 mg daily, Synthroid 0.137 daily, simvastatin 10 daily.    Medications:   acetaminophen   Tablet .. 650 milliGRAM(s) Oral every 6 hours PRN  allopurinol 100 milliGRAM(s) Oral daily  aMIOdarone    Tablet 400 milliGRAM(s) Oral every 12 hours  ferrous    sulfate 325 milliGRAM(s) Oral daily  levothyroxine 125 MICROGram(s) Oral daily  metoprolol tartrate 25 milliGRAM(s) Oral every 12 hours  pantoprazole    Tablet 40 milliGRAM(s) Oral before breakfast  polyethylene glycol 3350 17 Gram(s) Oral daily  quiNIDine gluconate  milliGRAM(s) Oral every 12 hours  sacubitril 24 mG/valsartan 26 mG 1 Tablet(s) Oral two times a day  senna 2 Tablet(s) Oral at bedtime  simvastatin 10 milliGRAM(s) Oral at bedtime  tamsulosin 0.8 milliGRAM(s) Oral at bedtime        Allergies:  No Known Allergies    FAMILY HISTORY:  Family history of hemolytic uremic syndrome    Family history of CHF (congestive heart failure)      Social History: , fairly active despite restrictions from CHF and COPD  Smoking: Remote  Alcohol: No  Drugs: No    Review of Systems:  Constitutional: no recent weight loss  HEENT: no scleral icterus. Normal mucosa.  Respiratory: (+) COPD followed by Dr. Morris  Cardiovascular: see HPI  Gastrointestinal: No Abdominal Pain, no Diarrhea, no Constipation, no Nausea or Vomiting  Genitourinary: No Nocturia, Dysuria, or Incontinence. No hematuria.  BPH  Extremities: (+) edema. No cyanosis.  Neurologic: No Focal deficit. No Paresthesias. No Syncope. No seizures  Lymphatic: No Swelling. No Lymphadenopathy   Skin: No Rash,  Ecchymoses, Wounds, or Lesions  Psychiatry: No Depression. No Anxiety.    10 point review of systems is otherwise negative except as mentioned above            [ ]Unable to obtain    Physical Exam:  Vital Signs Last 24 Hrs  T(C): 36.9 (14 Jun 2021 04:07), Max: 36.9 (14 Jun 2021 04:07)  T(F): 98.4 (14 Jun 2021 04:07), Max: 98.4 (14 Jun 2021 04:07)  HR: 72 (14 Jun 2021 04:07) (70 - 72)  BP: 93/60 (14 Jun 2021 04:07) (88/57 - 98/62)  BP(mean): --  RR: 18 (14 Jun 2021 04:07) (14 - 18)  SpO2: 94% (14 Jun 2021 04:07) (94% - 96%)Appearance: WD, WN, NAD. No more runs of  VT.  Eyes:  No scleral icterus. PERRL, EOMI  HENT: Normal oral mucosa.]NC/AT  Neck:  JVD 1/3 up at 90 degrees. Normal carotid upstrokes without bruits or murmurs.  Cardiovascular: Normal PMI. Normal S1, S2 physiologically split. No S3, (+) S4. 2/6 RODRÍGUEZ, 2-3/6 HSM apex.  Respiratory: Clear to auscultation bilaterally. No wheezes. No rales.  Gastrointestinal: Soft.  Non-tender. No hepatosplenomegally.  Extremities: No clubbing. No edema today. Pulses 2+ bilaterally symmetric except diminished pedal.  Musculoskeletal:  No joint deformity   Neurologic: Non-focal  Lymphatic:  No lymphadenopathy  Psychiatry: [+ ] AAOx3 [ +] Mood & affect appropriate  Skin: No rashes. No ecchymoses. No cyanosis    Cardiovascular Diagnostic Testing:  ECG:< from: 12 Lead ECG (06.07.21 @ 07:34) >  Ventricular Rate 69 BPM    Atrial Rate 69 BPM    QRS Duration 138 ms    Q-T Interval 421 ms    QTC Calculation(Bazett) 451 ms    P Axis 35 degrees    R Axis -74 degrees    T Axis 119 degrees    Diagnosis Line ELECTRONIC VENTRICULAR PACEMAKER  WHEN COMPARED WITH ECG OF `6/6/21 14:50  NO SIGNIFICANT CHANGE WAS FOUND  Confirmed by KAM Woodward Zlata ([id number]) on 6/7/2021 10:45:59 AM    < end of copied text >    < from: TTE with Doppler (w/Cont) (06.07.21 @ 07:37) >  YOB: 1925   Age: 89 (M)   MR#: [id number]  Study Date: 6/7/2021  [facility] Bhupendra Keller RDCS  Study quality: Technically fair  Referring Physician: Yvonne Brown MD  Blood Pressure: 106/55 mmHg  Height: 175 cm  Weight: 70 kg  BSA: 1.9 m2  ------------------------------------------------------------------------  PROCEDURE: Transthoracic echocardiogram with 2-D, M-Mode  and complete spectral and color flow Doppler. Verbal  consent was obtained for injection of  Ultrasonic Enhancing  Agent following a discussion of risks and benefits.  Following intravenous injection of Ultrasonic Enhancing  Agent , harmonic imaging was performed.  INDICATION: Syncope and collapse (R55)  ------------------------------------------------------------------------  Dimensions:    Normal Values:  LA:     5.2    2.0 - 4.0 cm  Ao:     3.4    2.0 - 3.8 cm  SEPTUM: 1.4    0.6 - 1.2 cm  PWT:    0.7    0.6 - 1.1 cm  LVIDd:  5.2    3.0 - 5.6 cm  LVIDs:  4.7    1.8 - 4.0 cm  Derived variables:  LVMI: 122 g/m2  RWT: 0.28  Fractional short: 10 %  EF (Visual Estimate): 30 %  Doppler Peak Velocity (m/sec): MV=1.4 AoV=2.5  ------------------------------------------------------------------------  Observations:  Mitral Valve: Tethered mitral valve leaflets with normal  opening. Mild mitral regurgitation.  Aortic Valve/Aorta: Calcified aortic valve with decreased  opening. Peak transaortic valve gradient equals 24 mm Hg,  mean transaortic valve gradient equals 12 mm Hg, estimated  aortic valve area equals 1 sqcm (by continuity equation),  aortic valve velocity time integral equals 47 cm,  consistent with moderate aortic stenosis. Mild aortic  regurgitation.  Peak left ventricular outflow tract  gradient equals 1 mm Hg, mean gradient is equal to 1 mm Hg,  LVOT velocity time integral equals 9 cm.  Aortic Root: 3.4 cm.  LVOT diameter: 2.6 cm.  Left Atrium: Moderately dilated left atrium.  LA volume  index = 48 cc/m2.  Left Ventricle: Severe segmental left ventricular systolic  dysfunction.  Akinetic basal inferior and inferolateral.  Diffuse hypokinesis of the inferior and inferolateral wall,  lateral wall.   Endocardial visualization enhanced with  intravenous injection of Ultrasonic Enhancing Agent  (Definity). No left ventricular thrombus. Mild left  ventricular enlargement. Severe  diastolic dysfunction  (Stage III).  Right Heart: Mild right atrial enlargement. Right  ventricular enlargement with decreased right ventricular  systolicfunction. A device wire is noted in the right  heart. Normal tricuspid valve. Minimal tricuspid  regurgitation. Normal pulmonic valve.  Pericardium/Pleura: Normal pericardium with no pericardial  effusion.  Hemodynamic: Estimated right atrial pressure is 8 mm Hg.  Estimated right ventricular systolic pressure equals 31 mm  Hg, assuming right atrial pressure equals 8 mm Hg,  consistent with normal pulmonary pressures.  ------------------------------------------------------------------------  Conclusions:  1. Calcified aortic valve with decreased opening. Peak  transaortic valve gradient equals 24 mm Hg, mean  transaortic valve gradient equals 12 mm Hg, estimated  aortic valve area equals 1 sqcm (by continuity equation),  aortic valve velocity time integral equals 47 cm,  consistent with moderate aortic stenosis.  2. Moderately dilated left atrium.  LA volume index = 48  cc/m2.  3. Mild left ventricular enlargement.  4. Severe segmental left ventricular systolic dysfunction.  Akinetic basalinferior and inferolateral.  Diffuse  hypokinesis of the inferior and inferolateral wall, lateral  wall.   Endocardial visualization enhanced with intravenous  injection of Ultrasonic Enhancing Agent (Definity). No left  ventricular thrombus.  5. Severe  diastolic dysfunction (Stage III).  6. Mild right atrial enlargement.  7. Right ventricular enlargement with decreased right  ventricular systolic function. A device wire is noted in  the right heart.  ------------------------------------------------------------------------  Confirmed on  6/7/2021 - 14:32:38 by TIERRA Ovalles  ------------------------------------------------------------------------    < end of copied text >    Echo: 3/23/2021 MAC with only mild MR.  Calcified aortic valve with decreased opening.  Peak gradient 31, mean 19, dimensionless index 0.20 and no AI noted this time.  Severe LAE.  Severe segmental LV systolic dysfunction with LVEF 23 to 25%.  Mild LVH.  Normal RV size and function with mild to moderate TR.  RVSP 54.  No pericardial effusion.       Stress Testing:< from: Stress Echocardiogram-Pharmacologic (10.09.18 @ 17:10) >  Observations:  Mitral Valve: Mitral annular calcification. Moderate mitral  regurgitation.  Aortic Valve/Aorta: Calcified trileaflet aortic valve with  decreased opening. Peak transaortic valve gradient equals  25 mm Hg, mean transaortic valve gradient equals 12 mm Hg,  estimated aortic valve area equals 0.9 sqcm (by continuity  equation), consistent with severe aortic stenosis. Mild  aortic regurgitation.  Peak left ventricular outflow tract  gradient equals 1 mm Hg, mean gradient is equal to 1 mm Hg.  Aortic Root: 3.6 cm.  LVOT diameter: 2.4 cm.  Left Atrium: Severely dilated left atrium.  LA volume index  = 51 cc/m2.  Left Ventricle: Severe segmental left ventricular systolic  dysfunction.  Severe hypokinesis/akinesis of the inferior  and inferolateral wall, basal inferoseptum, anterolateral  wall. Mild left ventricular enlargement.  Right Heart: Normal right atrium. A device wire is noted in  the right heart. Decreased right ventricular systolic  function. Normal tricuspid valve. Minimal tricuspid  regurgitation. Normal pulmonic valve.  Pericardium/Pleura: Normal pericardium with no pericardial  effusion.  Hemodynamic: Estimated right atrial pressure is 8 mm Hg.  Estimated right ventricular systolic pressure equals 33 mm  Hg, assuming right atrial pressure equals 8 mm Hg,  consistent with normal pulmonary pressures.  ------------------------------------------------------------------------  Pharmacologic Stress Test:  Agent:  Dobutamine Dose: 5  mcg/Kg/min over 0 min.  Baseline HR: 71 bpm  Peak HR: 74 bpm  % MPHR: 58 %  Baseline BP: 121/68 mmHg  Peak BP: 134/77 mmHg  Peak RPP: 9,916 (Rate Pressure Product)  Test terminated: Completion of test  Baseline EKG: Paced rhythm  EKG changes: Non diagnostic ECG.  Arrhythmia: None  Heart Rhythm: Paced  HR Response: HR failed to increase appropriately.  BP Response: Appropriate  Symptoms: None  ------------------------------------------------------------------------  Echocardiographic Study:  (A) Baseline echocardiogram reveals:  1. Moderate mitral regurgitation.  2. Calcified trileaflet aortic valve with decreased  opening. Peak transaortic valve gradient equals 25 mm Hg,  mean transaortic valve gradient equals 12 mm Hg, estimated  aortic valve area equals 0.9 sqcm (by continuity equation),  consistent with severe aortic stenosis. Mild aortic  regurgitation.  3. Left ventricular enlargement.  4. Severe segmental left ventricular systolic dysfunction.  Severe hypokinesis/akinesis of the inferior and  inferolateral wall, basal inferoseptum, anterolateral wall.  5. A device wire is noted in the right heart. Decreased  right ventricular systolic function.  (B) Stress echocardiogram reveals:  No significant change in left ventricular systolic  function.  ------------------------------------------------------------------------  Conclusions:  1. Normal hemodynamic response.  2. Non Diagnostic electrocardiographic response.  3. No significant change in left ventricular systolic  function.  4. HR failed to increase appropriately.  5. 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  Discussed with Dr. Kay.  ------------------------------------------------------------------------  Confirmed on  10/10/2018 - 17:48:23 by Jerome Johnson M.D.  ------------------------------------------------------------------------    < end of copied text >      Cath:< from: Cardiac Cath Lab - Adult (10.08.18 @ 15:55) >  CORONARY VESSELS: The coronary circulation is right dominant.  LM:   --  Proximal left main: There was a 40 % stenosis. 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  --  Distal left main: There was a stenosis. The lesion was eccentric.  LAD:   --  LAD: Angiography showed moderate atherosclerosis.  CX:   --  Circumflex: Angiography showed moderate atherosclerosis.  RCA:   --  RCA: Angiography showed moderate atherosclerosis.  --  Proximal RCA: There was a diffuse 30 % stenosis at the site of a prior  stent.  LEFT LOWER EXTREMITY VESSELS: Left external iliac: Angiography showed  aneurysmal dilatation. Left common femoral: The vessel was tortuous.  RIGHT LOWER EXTREMITY VESSELS: Right external iliac: Angiography showed  aneurysmal dilatation. Right common femoral: The vessel was excessively  totuos  < end of copied text >      Interpretation of Telemetry:    Imaging:   < from: Xray Chest 1 View- PORTABLE-Urgent (06.06.21 @ 08:38) >  COMPARISON: Chest x-ray from 5/14/2018.    FINDINGS:    The lungs are clear.  There is no pneumothorax or large pleural effusion.  Heart size cannot be accurately assessed in this projection. Dual lead pacemaker overlying the left chest wall.  No acute rib fractures or other osseous abnormality. Suture anchors noted in the left humeral head.    IMPRESSION:    Clear lungs.        < end of copied text >    Labs:                                                                            7.2    7.18  )-----------( 84       ( 14 Jun 2021 06:58 )             22.1     06-14    136  |  103  |  69<H>  ----------------------------<  91  4.5   |  20<L>  |  2.49<H>    Ca    8.3<L>      14 Jun 2021 06:58  Mg     2.3     06-14    TPro  5.8<L>  /  Alb  3.2<L>  /  TBili  0.7  /  DBili  0.2  /  AST  20  /  ALT  14  /  AlkPhos  65  06-14                                                                        Patient seen and evaluated @840  Chief Complaint: Patient is a 89y old  Male who presents with a chief complaint of syncope (06 Jun 2021 09:36)      HPI:  89 M CAD s/p stents, low-flow low gradient AS, HFrEF 20% s/p MDT Bi-V ICD, COPD who presents with syncope x 2.    Pt reports usual state of health until yesterday night when he experienced an episode of syncope. His wife saw him in bed but he started to slide off the bed onto the floor. He denies any prodrome or feeling any chest pain, sob, palpitations, or shock from his ICD. This AM, the same scenario happened again which prompted the wife to call EMS.     On interrogation, it showed multiple episodes (24) of VT since 5/17 leading up to today that required ATP and shocks. The last one was 6/6/21 at 617AM which did not break with 2 ATPs and ultimately led to shock. (06 Jun 2021 11:40)    CARDIAC HISTORY: I have known the patient since 2006.  He had an MI and angioplasty in 1994.  Had a cath in 2011 with a 40% aneurysmal left main normal LAD and circumflex with a 95% right lesion and an akinetic inferior wall.  Treated medically and did well other than an admission for cellulitis in 2013 with positive blood cultures.  No endocarditis.  Issues with shortness of breath both from COPD and CHF.  Able to undergo knee replacement in 2014 and then later endovascular repair of an abdominal aortic aneurysm in October 2014.  Stress echo in 2015 showed an ejection fraction of 27%.  Cath revealed unchanged coronaries but LVEF 35%.  The right coronary was stented but did not change his LVEF so we had a defibrillator and biventricular pacemaker placed by Dr. Russell in August 2015.  I changed his Avapro to Entresto and the patient's CHF seem to improve.  August 25 of 2017 had a syncopal episode in the bathroom.  He had no idea what happened but interrogation showed V. tach followed by V. fib which led to a defibrillator shock.  No recurrence and no AICD shocks since then until this admission.  2019 had a couple of runs of ventricular tachycardia that were paced terminated.  98% of the time biventricular pacing.  2021 episode of atrial fibrillation on interrogation that lasted an hour and 48 minutes.  Progressive heart failure and he was worked up for possible TAVR with low gradient aortic stenosis including a dobutamine echo and was found not to be a candidate.  Has had progressive shortness of breath and LV dysfunction with adjusting of his medications and recently adding Jardiance and then increasing it from 10-25.  Issues with creatinine running between 2 and 2.5.  BNP as high as 18,000.  Most recent echo was March 23 of this year with an aortic gradient of 31 peak and 19 mean, dimensionless index 0.20 and no AI.  Severe segmental LV systolic dysfunction with LVEF 23 to 25%.  Mild LVH.  Only mild MR and mild to moderate TR with RVSP 54.    PMH:   HTN (hypertension)    HLD (hyperlipidemia)    CAD (coronary artery disease)    BPH (Benign Prostatic Hyperplasia)    CHF (congestive heart failure)    Hypothyroid    GERD (gastroesophageal reflux disease)    Gout    COPD (chronic obstructive pulmonary disease)    MI (myocardial infarction)      PSH:   Cataract    Hernia, inguinal, bilateral    Achilles rupture    S/P knee replacement, right    AAA (abdominal aortic aneurysm)    H/O repair of rotator cuff    S/P angioplasty    OUTPATIENT CARDIAC MEDS: Carvedilol 12.5 mg twice daily, Entresto 97/103 twice daily, digoxin 0.125 daily, Jardiance 25 mg daily, furosemide 40 mg daily, Synthroid 0.137 daily, simvastatin 10 daily.    Medications:   acetaminophen   Tablet .. 650 milliGRAM(s) Oral every 6 hours PRN  allopurinol 100 milliGRAM(s) Oral daily  aMIOdarone    Tablet 400 milliGRAM(s) Oral every 12 hours  ferrous    sulfate 325 milliGRAM(s) Oral daily  levothyroxine 125 MICROGram(s) Oral daily  metoprolol tartrate 25 milliGRAM(s) Oral every 12 hours  pantoprazole    Tablet 40 milliGRAM(s) Oral before breakfast  polyethylene glycol 3350 17 Gram(s) Oral daily  quiNIDine gluconate  milliGRAM(s) Oral every 12 hours  sacubitril 24 mG/valsartan 26 mG 1 Tablet(s) Oral two times a day  senna 2 Tablet(s) Oral at bedtime  simvastatin 10 milliGRAM(s) Oral at bedtime  tamsulosin 0.8 milliGRAM(s) Oral at bedtime        Allergies:  No Known Allergies    FAMILY HISTORY:  Family history of hemolytic uremic syndrome    Family history of CHF (congestive heart failure)      Social History: , fairly active despite restrictions from CHF and COPD  Smoking: Remote  Alcohol: No  Drugs: No    Review of Systems:  Constitutional: no recent weight loss  HEENT: no scleral icterus. Normal mucosa.  Respiratory: (+) COPD followed by Dr. Morris  Cardiovascular: see HPI  Gastrointestinal: No Abdominal Pain, no Diarrhea, no Constipation, no Nausea or Vomiting  Genitourinary: No Nocturia, Dysuria, or Incontinence. No hematuria.  BPH  Extremities: (+) edema. No cyanosis.  Neurologic: No Focal deficit. No Paresthesias. No Syncope. No seizures  Lymphatic: No Swelling. No Lymphadenopathy   Skin: No Rash,  Ecchymoses, Wounds, or Lesions  Psychiatry: No Depression. No Anxiety.    10 point review of systems is otherwise negative except as mentioned above            [ ]Unable to obtain    Physical Exam:  Vital Signs Last 24 Hrs  T(C): 36.9 (14 Jun 2021 04:07), Max: 36.9 (14 Jun 2021 04:07)  T(F): 98.4 (14 Jun 2021 04:07), Max: 98.4 (14 Jun 2021 04:07)  HR: 72 (14 Jun 2021 04:07) (70 - 72)  BP: 93/60 (14 Jun 2021 04:07) (88/57 - 98/62)  BP(mean): --  RR: 18 (14 Jun 2021 04:07) (14 - 18)  SpO2: 94% (14 Jun 2021 04:07) (94% - 96%)Appearance: WD, WN, NAD. No more runs of  VT.  Eyes:  No scleral icterus. PERRL, EOMI  HENT: Normal oral mucosa.]NC/AT  Neck:  No JVD at 90 degrees. Normal carotid upstrokes without bruits or murmurs.  Cardiovascular: Normal PMI. Normal S1, S2 physiologically split. No S3, (+) S4. 2/6 RODRÍGUEZ, 2-3/6 HSM apex.  Respiratory: Clear to auscultation bilaterally. No wheezes. No rales.  Gastrointestinal: Soft.  Non-tender. No hepatosplenomegally.  Extremities: No clubbing. No edema today. Pulses 2+ bilaterally symmetric except diminished pedal.  Musculoskeletal:  No joint deformity   Neurologic: Non-focal  Lymphatic:  No lymphadenopathy  Psychiatry: [+ ] AAOx3 [ +] Mood & affect appropriate  Skin: No rashes. No ecchymoses. No cyanosis    Cardiovascular Diagnostic Testing:  ECG:< from: 12 Lead ECG (06.07.21 @ 07:34) >  Ventricular Rate 69 BPM    Atrial Rate 69 BPM    QRS Duration 138 ms    Q-T Interval 421 ms    QTC Calculation(Bazett) 451 ms    P Axis 35 degrees    R Axis -74 degrees    T Axis 119 degrees    Diagnosis Line ELECTRONIC VENTRICULAR PACEMAKER  WHEN COMPARED WITH ECG OF `6/6/21 14:50  NO SIGNIFICANT CHANGE WAS FOUND  Confirmed by KAM Woodward Zlata ([id number]) on 6/7/2021 10:45:59 AM    < end of copied text >    < from: TTE with Doppler (w/Cont) (06.07.21 @ 07:37) >  YOB: 1925   Age: 89 (M)   MR#: [id number]  Study Date: 6/7/2021  [facility] Bhupendra Keller RDCS  Study quality: Technically fair  Referring Physician: Yvonne Brown MD  Blood Pressure: 106/55 mmHg  Height: 175 cm  Weight: 70 kg  BSA: 1.9 m2  ------------------------------------------------------------------------  PROCEDURE: Transthoracic echocardiogram with 2-D, M-Mode  and complete spectral and color flow Doppler. Verbal  consent was obtained for injection of  Ultrasonic Enhancing  Agent following a discussion of risks and benefits.  Following intravenous injection of Ultrasonic Enhancing  Agent , harmonic imaging was performed.  INDICATION: Syncope and collapse (R55)  ------------------------------------------------------------------------  Dimensions:    Normal Values:  LA:     5.2    2.0 - 4.0 cm  Ao:     3.4    2.0 - 3.8 cm  SEPTUM: 1.4    0.6 - 1.2 cm  PWT:    0.7    0.6 - 1.1 cm  LVIDd:  5.2    3.0 - 5.6 cm  LVIDs:  4.7    1.8 - 4.0 cm  Derived variables:  LVMI: 122 g/m2  RWT: 0.28  Fractional short: 10 %  EF (Visual Estimate): 30 %  Doppler Peak Velocity (m/sec): MV=1.4 AoV=2.5  ------------------------------------------------------------------------  Observations:  Mitral Valve: Tethered mitral valve leaflets with normal  opening. Mild mitral regurgitation.  Aortic Valve/Aorta: Calcified aortic valve with decreased  opening. Peak transaortic valve gradient equals 24 mm Hg,  mean transaortic valve gradient equals 12 mm Hg, estimated  aortic valve area equals 1 sqcm (by continuity equation),  aortic valve velocity time integral equals 47 cm,  consistent with moderate aortic stenosis. Mild aortic  regurgitation.  Peak left ventricular outflow tract  gradient equals 1 mm Hg, mean gradient is equal to 1 mm Hg,  LVOT velocity time integral equals 9 cm.  Aortic Root: 3.4 cm.  LVOT diameter: 2.6 cm.  Left Atrium: Moderately dilated left atrium.  LA volume  index = 48 cc/m2.  Left Ventricle: Severe segmental left ventricular systolic  dysfunction.  Akinetic basal inferior and inferolateral.  Diffuse hypokinesis of the inferior and inferolateral wall,  lateral wall.   Endocardial visualization enhanced with  intravenous injection of Ultrasonic Enhancing Agent  (Definity). No left ventricular thrombus. Mild left  ventricular enlargement. Severe  diastolic dysfunction  (Stage III).  Right Heart: Mild right atrial enlargement. Right  ventricular enlargement with decreased right ventricular  systolicfunction. A device wire is noted in the right  heart. Normal tricuspid valve. Minimal tricuspid  regurgitation. Normal pulmonic valve.  Pericardium/Pleura: Normal pericardium with no pericardial  effusion.  Hemodynamic: Estimated right atrial pressure is 8 mm Hg.  Estimated right ventricular systolic pressure equals 31 mm  Hg, assuming right atrial pressure equals 8 mm Hg,  consistent with normal pulmonary pressures.  ------------------------------------------------------------------------  Conclusions:  1. Calcified aortic valve with decreased opening. Peak  transaortic valve gradient equals 24 mm Hg, mean  transaortic valve gradient equals 12 mm Hg, estimated  aortic valve area equals 1 sqcm (by continuity equation),  aortic valve velocity time integral equals 47 cm,  consistent with moderate aortic stenosis.  2. Moderately dilated left atrium.  LA volume index = 48  cc/m2.  3. Mild left ventricular enlargement.  4. Severe segmental left ventricular systolic dysfunction.  Akinetic basalinferior and inferolateral.  Diffuse  hypokinesis of the inferior and inferolateral wall, lateral  wall.   Endocardial visualization enhanced with intravenous  injection of Ultrasonic Enhancing Agent (Definity). No left  ventricular thrombus.  5. Severe  diastolic dysfunction (Stage III).  6. Mild right atrial enlargement.  7. Right ventricular enlargement with decreased right  ventricular systolic function. A device wire is noted in  the right heart.  ------------------------------------------------------------------------  Confirmed on  6/7/2021 - 14:32:38 by TIERRA Ovalles  ------------------------------------------------------------------------    < end of copied text >    Echo: 3/23/2021 MAC with only mild MR.  Calcified aortic valve with decreased opening.  Peak gradient 31, mean 19, dimensionless index 0.20 and no AI noted this time.  Severe LAE.  Severe segmental LV systolic dysfunction with LVEF 23 to 25%.  Mild LVH.  Normal RV size and function with mild to moderate TR.  RVSP 54.  No pericardial effusion.       Stress Testing:< from: Stress Echocardiogram-Pharmacologic (10.09.18 @ 17:10) >  Observations:  Mitral Valve: Mitral annular calcification. Moderate mitral  regurgitation.  Aortic Valve/Aorta: Calcified trileaflet aortic valve with  decreased opening. Peak transaortic valve gradient equals  25 mm Hg, mean transaortic valve gradient equals 12 mm Hg,  estimated aortic valve area equals 0.9 sqcm (by continuity  equation), consistent with severe aortic stenosis. Mild  aortic regurgitation.  Peak left ventricular outflow tract  gradient equals 1 mm Hg, mean gradient is equal to 1 mm Hg.  Aortic Root: 3.6 cm.  LVOT diameter: 2.4 cm.  Left Atrium: Severely dilated left atrium.  LA volume index  = 51 cc/m2.  Left Ventricle: Severe segmental left ventricular systolic  dysfunction.  Severe hypokinesis/akinesis of the inferior  and inferolateral wall, basal inferoseptum, anterolateral  wall. Mild left ventricular enlargement.  Right Heart: Normal right atrium. A device wire is noted in  the right heart. Decreased right ventricular systolic  function. Normal tricuspid valve. Minimal tricuspid  regurgitation. Normal pulmonic valve.  Pericardium/Pleura: Normal pericardium with no pericardial  effusion.  Hemodynamic: Estimated right atrial pressure is 8 mm Hg.  Estimated right ventricular systolic pressure equals 33 mm  Hg, assuming right atrial pressure equals 8 mm Hg,  consistent with normal pulmonary pressures.  ------------------------------------------------------------------------  Pharmacologic Stress Test:  Agent:  Dobutamine Dose: 5  mcg/Kg/min over 0 min.  Baseline HR: 71 bpm  Peak HR: 74 bpm  % MPHR: 58 %  Baseline BP: 121/68 mmHg  Peak BP: 134/77 mmHg  Peak RPP: 9,916 (Rate Pressure Product)  Test terminated: Completion of test  Baseline EKG: Paced rhythm  EKG changes: Non diagnostic ECG.  Arrhythmia: None  Heart Rhythm: Paced  HR Response: HR failed to increase appropriately.  BP Response: Appropriate  Symptoms: None  ------------------------------------------------------------------------  Echocardiographic Study:  (A) Baseline echocardiogram reveals:  1. Moderate mitral regurgitation.  2. Calcified trileaflet aortic valve with decreased  opening. Peak transaortic valve gradient equals 25 mm Hg,  mean transaortic valve gradient equals 12 mm Hg, estimated  aortic valve area equals 0.9 sqcm (by continuity equation),  consistent with severe aortic stenosis. Mild aortic  regurgitation.  3. Left ventricular enlargement.  4. Severe segmental left ventricular systolic dysfunction.  Severe hypokinesis/akinesis of the inferior and  inferolateral wall, basal inferoseptum, anterolateral wall.  5. A device wire is noted in the right heart. Decreased  right ventricular systolic function.  (B) Stress echocardiogram reveals:  No significant change in left ventricular systolic  function.  ------------------------------------------------------------------------  Conclusions:  1. Normal hemodynamic response.  2. Non Diagnostic electrocardiographic response.  3. No significant change in left ventricular systolic  function.  4. HR failed to increase appropriately.  5. 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  Discussed with Dr. Kay.  ------------------------------------------------------------------------  Confirmed on  10/10/2018 - 17:48:23 by Jerome Johnson M.D.  ------------------------------------------------------------------------    < end of copied text >      Cath:< from: Cardiac Cath Lab - Adult (10.08.18 @ 15:55) >  CORONARY VESSELS: The coronary circulation is right dominant.  LM:   --  Proximal left main: There was a 40 % stenosis. 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  --  Distal left main: There was a stenosis. The lesion was eccentric.  LAD:   --  LAD: Angiography showed moderate atherosclerosis.  CX:   --  Circumflex: Angiography showed moderate atherosclerosis.  RCA:   --  RCA: Angiography showed moderate atherosclerosis.  --  Proximal RCA: There was a diffuse 30 % stenosis at the site of a prior  stent.  LEFT LOWER EXTREMITY VESSELS: Left external iliac: Angiography showed  aneurysmal dilatation. Left common femoral: The vessel was tortuous.  RIGHT LOWER EXTREMITY VESSELS: Right external iliac: Angiography showed  aneurysmal dilatation. Right common femoral: The vessel was excessively  totuos  < end of copied text >      Interpretation of Telemetry:    Imaging:   < from: Xray Chest 1 View- PORTABLE-Urgent (06.06.21 @ 08:38) >  COMPARISON: Chest x-ray from 5/14/2018.    FINDINGS:    The lungs are clear.  There is no pneumothorax or large pleural effusion.  Heart size cannot be accurately assessed in this projection. Dual lead pacemaker overlying the left chest wall.  No acute rib fractures or other osseous abnormality. Suture anchors noted in the left humeral head.    IMPRESSION:    Clear lungs.        < end of copied text >    Labs:                                                                            7.2    7.18  )-----------( 84       ( 14 Jun 2021 06:58 )             22.1     06-14    136  |  103  |  69<H>  ----------------------------<  91  4.5   |  20<L>  |  2.49<H>    Ca    8.3<L>      14 Jun 2021 06:58  Mg     2.3     06-14    TPro  5.8<L>  /  Alb  3.2<L>  /  TBili  0.7  /  DBili  0.2  /  AST  20  /  ALT  14  /  AlkPhos  65  06-14

## 2021-06-14 NOTE — PROGRESS NOTE ADULT - ASSESSMENT
89 M COPD (no home O2), CAD s/p stent 2015, AAA repair w/ stent, HTN, HLD, HFrEF (20%), Bi-V ICD ([employer]) presented with syncope found to be in VT storm requiring ATP and shocks, started on lido gtt and amio oral load.      VT Storm  - interrogation revealed multiple episodes of VT since may requiring ATP and shocks  -weaned off lidocaine gtt   -c/w monitor for ectopy/VT  -c/w amio 400 bid (loading dose total 7-10 g)   -per EP, after load, can decrease to 400 mg qd then eventual transition to 200 mg qd.. now added quindine   -is on home coreg 12.5 bid, switched to lopressor 12.5 bid but held due to soft BPs  - monitor lytes and keep K>4 and Mg>2  - f/u EP recs, medical management at this time. As per Ep- will consider ablation if continues to have ectopy with antiarrythmic drugs.   - will hold digoxin at this time. Dig level wnl     Hx CAD with stent 2015 and HFrEF  - 10/2018 [facility]- nonobstructive CAD  - 6/7 TTE: EF 30%. Mild MR. Moderate AS and mild AR. Mod dilated LA. Akinetic basal inferior and inferolateral. Diffuse hypokinesis of the inferior and inferolateral wall, lateral wall. No LV thrombus. Severe diastolic dysfunction. Mild RA enlargement   - continue ASA and statin  - on home lasix 40 bid but will hold given soft BPs  - entresto restarted   -  jardiance given soft BPs on hold   - completing Amio 10 gram loading (currently 400 bid), then to continue 400 qd  -  coreg 12.5 BID, which is on hold for soft BPs   - home Digoxin on hold given bump in Creatinine          CKD, non-oliguric, Baseline Cr 2-2.5  - admission SCr 2.19, now 2.47 likely in setting of mild dehydration (had been putting out about 1 L/shift)  -hold lasix for now,      BPH  -pt with orthostasis / symptomatic when standing : decreased flomax to 0.4 qhs       Gout   - c/w home allopurinol for management     hypothyroidism   - c/w home synthroid     acute on chronic anemia :   s/p transfusion   multifactorial including CKD and element of iron def     orthostatic hypotension:  will decrease flomax   compression stockings       consider palliative and GOC

## 2021-06-15 NOTE — PROGRESS NOTE ADULT - SUBJECTIVE AND OBJECTIVE BOX
[facility] [place] [facility]   MD Mani Ayala MD, D.O. Ruoru Wong, PA    From 7 AM - 5 PM:  OFFICE: [phone]  Dr. Shultz cell: [phone]  Dr. Laird cell: [phone]  Dr. Beltran cell: [phone]  LANA Ellis cell: [phone]    From 5 PM - 7 AM: Answering Service: [phone]  Date of service: 06-15-21 @ 11:27    RENAL FOLLOW UP NOTE  --------------------------------------------------------------------------------  HPI:  Pt seen and examined at bedside.   Luisies SOB, chest pain     PAST HISTORY  --------------------------------------------------------------------------------  No significant changes to PMH, PSH, FHx, SHx, unless otherwise noted    ALLERGIES & MEDICATIONS  --------------------------------------------------------------------------------  Allergies    No Known Allergies    Intolerances      Standing Inpatient Medications  allopurinol 100 milliGRAM(s) Oral daily  aMIOdarone    Tablet 400 milliGRAM(s) Oral every 12 hours  ferrous    sulfate 325 milliGRAM(s) Oral daily  levothyroxine 125 MICROGram(s) Oral daily  metoprolol tartrate 12.5 milliGRAM(s) Oral two times a day  pantoprazole    Tablet 40 milliGRAM(s) Oral before breakfast  polyethylene glycol 3350 17 Gram(s) Oral daily  quiNIDine gluconate  milliGRAM(s) Oral every 12 hours  sacubitril 24 mG/valsartan 26 mG 1 Tablet(s) Oral two times a day  senna 2 Tablet(s) Oral at bedtime  simvastatin 10 milliGRAM(s) Oral at bedtime  tamsulosin 0.4 milliGRAM(s) Oral at bedtime    PRN Inpatient Medications  acetaminophen   Tablet .. 650 milliGRAM(s) Oral every 6 hours PRN      REVIEW OF SYSTEMS  --------------------------------------------------------------------------------  General: no fever  CVS: no chest pain  RESP: no sob, no cough  ABD: no abdominal pain  : no dysuria,  MSK: no edema     VITALS/PHYSICAL EXAM  --------------------------------------------------------------------------------  T(C): 36.7 (06-15-21 @ 10:22), Max: 36.7 (06-14-21 @ 14:48)  HR: 60 (06-15-21 @ 10:22) (60 - 75)  BP: 88/55 (06-15-21 @ 10:22) (82/51 - 106/66)  RR: 18 (06-15-21 @ 10:22) (18 - 18)  SpO2: 98% (06-15-21 @ 10:22) (91% - 98%)  Wt(kg): --    06-14-21 @ 07:01  -  06-15-21 @ 07:00  --------------------------------------------------------  IN: 420 mL / OUT: 200 mL / NET: 220 mL    06-15-21 @ 07:01  -  06-15-21 @ 11:27  --------------------------------------------------------  IN: 0 mL / OUT: 500 mL / NET: -500 mL      Physical Exam:  	Gen: NAD  	HEENT: MMM  	Pulm: CTA B/L  	CV: S1S2  	Abd: Soft, +BS  	Ext: No LE edema B/L                      Neuro: Awake   	Skin: Warm and Dry   	    LABS/STUDIES  --------------------------------------------------------------------------------              8.0    6.78  >-----------<  94       [06-15-21 @ 07:11]              24.6     135  |  103  |  78  ----------------------------<  82      [06-15-21 @ 07:10]  4.5   |  20  |  2.71        Ca     8.4     [06-15-21 @ 07:10]      Mg     2.4     [06-15-21 @ 07:10]    TPro  5.8  /  Alb  3.2  /  TBili  0.7  /  DBili  0.2  /  AST  20  /  ALT  14  /  AlkPhos  65  [06-14-21 @ 06:58]    Creatinine Trend:  SCr 2.71 [06-15 @ 07:10]  SCr 2.49 [06-14 @ 06:58]  SCr 2.21 [06-13 @ 06:51]  SCr 2.08 [06-12 @ 07:49]  SCr 2.19 [06-11 @ 07:09]    Urinalysis - [06-06-21 @ 07:58]      Color Light Yellow / Appearance Clear / SG 1.018 / pH 6.0      Gluc 500 mg/dL / Ketone Negative  / Bili Negative / Urobili Negative       Blood Negative / Protein Trace / Leuk Est Negative / Nitrite Negative      RBC 3 / WBC 2 / Hyaline 1 / Gran  / Sq Epi  / Non Sq Epi 0 / Bacteria Negative      Iron 55, TIBC 232, %sat 24      [06-10-21 @ 11:59]  Ferritin 110      [06-10-21 @ 10:36]  HbA1c 5.2      [05-15-18 @ 07:37]  TSH 3.27      [06-15-21 @ 10:15]  Lipid: chol 81, TG 48, HDL 42, LDL --      [06-06-21 @ 21:54]

## 2021-06-15 NOTE — PROGRESS NOTE ADULT - SUBJECTIVE AND OBJECTIVE BOX
Patient seen and evaluated @840  Chief Complaint: Patient is a 89y old  Male who presents with a chief complaint of syncope (06 Jun 2021 09:36)      HPI:  89 M CAD s/p stents, low-flow low gradient AS, HFrEF 20% s/p MDT Bi-V ICD, COPD who presents with syncope x 2.    Pt reports usual state of health until yesterday night when he experienced an episode of syncope. His wife saw him in bed but he started to slide off the bed onto the floor. He denies any prodrome or feeling any chest pain, sob, palpitations, or shock from his ICD. This AM, the same scenario happened again which prompted the wife to call EMS.     On interrogation, it showed multiple episodes (24) of VT since 5/17 leading up to today that required ATP and shocks. The last one was 6/6/21 at 617AM which did not break with 2 ATPs and ultimately led to shock. (06 Jun 2021 11:40)    CARDIAC HISTORY: I have known the patient since 2006.  He had an MI and angioplasty in 1994.  Had a cath in 2011 with a 40% aneurysmal left main normal LAD and circumflex with a 95% right lesion and an akinetic inferior wall.  Treated medically and did well other than an admission for cellulitis in 2013 with positive blood cultures.  No endocarditis.  Issues with shortness of breath both from COPD and CHF.  Able to undergo knee replacement in 2014 and then later endovascular repair of an abdominal aortic aneurysm in October 2014.  Stress echo in 2015 showed an ejection fraction of 27%.  Cath revealed unchanged coronaries but LVEF 35%.  The right coronary was stented but did not change his LVEF so we had a defibrillator and biventricular pacemaker placed by Dr. Russell in August 2015.  I changed his Avapro to Entresto and the patient's CHF seem to improve.  August 25 of 2017 had a syncopal episode in the bathroom.  He had no idea what happened but interrogation showed V. tach followed by V. fib which led to a defibrillator shock.  No recurrence and no AICD shocks since then until this admission.  2019 had a couple of runs of ventricular tachycardia that were paced terminated.  98% of the time biventricular pacing.  2021 episode of atrial fibrillation on interrogation that lasted an hour and 48 minutes.  Progressive heart failure and he was worked up for possible TAVR with low gradient aortic stenosis including a dobutamine echo and was found not to be a candidate.  Has had progressive shortness of breath and LV dysfunction with adjusting of his medications and recently adding Jardiance and then increasing it from 10-25.  Issues with creatinine running between 2 and 2.5.  BNP as high as 18,000.  Most recent echo was March 23 of this year with an aortic gradient of 31 peak and 19 mean, dimensionless index 0.20 and no AI.  Severe segmental LV systolic dysfunction with LVEF 23 to 25%.  Mild LVH.  Only mild MR and mild to moderate TR with RVSP 54.    PMH:   HTN (hypertension)    HLD (hyperlipidemia)    CAD (coronary artery disease)    BPH (Benign Prostatic Hyperplasia)    CHF (congestive heart failure)    Hypothyroid    GERD (gastroesophageal reflux disease)    Gout    COPD (chronic obstructive pulmonary disease)    MI (myocardial infarction)      PSH:   Cataract    Hernia, inguinal, bilateral    Achilles rupture    S/P knee replacement, right    AAA (abdominal aortic aneurysm)    H/O repair of rotator cuff    S/P angioplasty    OUTPATIENT CARDIAC MEDS: Carvedilol 12.5 mg twice daily, Entresto 97/103 twice daily, digoxin 0.125 daily, Jardiance 25 mg daily, furosemide 40 mg daily, Synthroid 0.137 daily, simvastatin 10 daily.    Medications:   acetaminophen   Tablet .. 650 milliGRAM(s) Oral every 6 hours PRN  allopurinol 100 milliGRAM(s) Oral daily  aMIOdarone    Tablet 400 milliGRAM(s) Oral every 12 hours  ferrous    sulfate 325 milliGRAM(s) Oral daily  levothyroxine 125 MICROGram(s) Oral daily  metoprolol tartrate 12.5 milliGRAM(s) Oral two times a day  pantoprazole    Tablet 40 milliGRAM(s) Oral before breakfast  polyethylene glycol 3350 17 Gram(s) Oral daily  quiNIDine gluconate  milliGRAM(s) Oral every 12 hours  sacubitril 24 mG/valsartan 26 mG 1 Tablet(s) Oral two times a day  senna 2 Tablet(s) Oral at bedtime  simvastatin 10 milliGRAM(s) Oral at bedtime  tamsulosin 0.4 milliGRAM(s) Oral at bedtime      Allergies:  No Known Allergies    FAMILY HISTORY:  Family history of hemolytic uremic syndrome    Family history of CHF (congestive heart failure)      Social History: , fairly active despite restrictions from CHF and COPD  Smoking: Remote  Alcohol: No  Drugs: No    Review of Systems:  Constitutional: no recent weight loss  HEENT: no scleral icterus. Normal mucosa.  Respiratory: (+) COPD followed by Dr. Morris  Cardiovascular: see HPI  Gastrointestinal: No Abdominal Pain, no Diarrhea, no Constipation, no Nausea or Vomiting  Genitourinary: No Nocturia, Dysuria, or Incontinence. No hematuria.  BPH  Extremities: (+) edema. No cyanosis.  Neurologic: No Focal deficit. No Paresthesias. No Syncope. No seizures  Lymphatic: No Swelling. No Lymphadenopathy   Skin: No Rash,  Ecchymoses, Wounds, or Lesions  Psychiatry: No Depression. No Anxiety.    10 point review of systems is otherwise negative except as mentioned above            [ ]Unable to obtain    Physical Exam:  Vital Signs Last 24 Hrs  T(C): 36.4 (15 Yobani 2021 04:48), Max: 36.7 (14 Jun 2021 14:48)  T(F): 97.6 (15 Yobani 2021 04:48), Max: 98 (14 Jun 2021 14:48)  HR: 70 (15 Yobani 2021 04:48) (70 - 75)  BP: 82/51 (15 Yobani 2021 04:48) (82/51 - 106/66)  BP(mean): --  RR: 18 (15 Yobani 2021 04:48) (18 - 18)  SpO2: 91% (15 Yobani 2021 04:48) (91% - 96%)  Appearance: WD, WN, NAD. No more runs of  VT.  Eyes:  No scleral icterus. PERRL, EOMI  HENT: Normal oral mucosa.]NC/AT  Neck:  No JVD at 90 degrees. Normal carotid upstrokes without bruits or murmurs.  Cardiovascular: Normal PMI. Normal S1, S2 physiologically split. No S3, (+) S4. 2/6 RODRÍGUEZ, 2-3/6 HSM apex.  Respiratory: Clear to auscultation bilaterally. No wheezes. No rales.  Gastrointestinal: Soft.  Non-tender. No hepatosplenomegally.  Extremities: No clubbing. No edema today. Pulses 2+ bilaterally symmetric except diminished pedal.  Musculoskeletal:  No joint deformity   Neurologic: Non-focal  Lymphatic:  No lymphadenopathy  Psychiatry: [+ ] AAOx3 [ +] Mood & affect appropriate  Skin: No rashes. No ecchymoses. No cyanosis    Cardiovascular Diagnostic Testing:  ECG:< from: 12 Lead ECG (06.07.21 @ 07:34) >  Ventricular Rate 69 BPM    Atrial Rate 69 BPM    QRS Duration 138 ms    Q-T Interval 421 ms    QTC Calculation(Bazett) 451 ms    P Axis 35 degrees    R Axis -74 degrees    T Axis 119 degrees    Diagnosis Line ELECTRONIC VENTRICULAR PACEMAKER  WHEN COMPARED WITH ECG OF `6/6/21 14:50  NO SIGNIFICANT CHANGE WAS FOUND  Confirmed by AKM Woodward Zlata ([id number]) on 6/7/2021 10:45:59 AM    < end of copied text >    < from: TTE with Doppler (w/Cont) (06.07.21 @ 07:37) >  YOB: 1925   Age: 89 (M)   MR#: [id number]  Study Date: 6/7/2021  [facility]upendra [facility] RDCS  Study quality: Technically fair  Referring Physician: Yvonne Brown MD  Blood Pressure: 106/55 mmHg  Height: 175 cm  Weight: 70 kg  BSA: 1.9 m2  ------------------------------------------------------------------------  PROCEDURE: Transthoracic echocardiogram with 2-D, M-Mode  and complete spectral and color flow Doppler. Verbal  consent was obtained for injection of  Ultrasonic Enhancing  Agent following a discussion of risks and benefits.  Following intravenous injection of Ultrasonic Enhancing  Agent , harmonic imaging was performed.  INDICATION: Syncope and collapse (R55)  ------------------------------------------------------------------------  Dimensions:    Normal Values:  LA:     5.2    2.0 - 4.0 cm  Ao:     3.4    2.0 - 3.8 cm  SEPTUM: 1.4    0.6 - 1.2 cm  PWT:    0.7    0.6 - 1.1 cm  LVIDd:  5.2    3.0 - 5.6 cm  LVIDs:  4.7    1.8 - 4.0 cm  Derived variables:  LVMI: 122 g/m2  RWT: 0.28  Fractional short: 10 %  EF (Visual Estimate): 30 %  Doppler Peak Velocity (m/sec): MV=1.4 AoV=2.5  ------------------------------------------------------------------------  Observations:  Mitral Valve: Tethered mitral valve leaflets with normal  opening. Mild mitral regurgitation.  Aortic Valve/Aorta: Calcified aortic valve with decreased  opening. Peak transaortic valve gradient equals 24 mm Hg,  mean transaortic valve gradient equals 12 mm Hg, estimated  aortic valve area equals 1 sqcm (by continuity equation),  aortic valve velocity time integral equals 47 cm,  consistent with moderate aortic stenosis. Mild aortic  regurgitation.  Peak left ventricular outflow tract  gradient equals 1 mm Hg, mean gradient is equal to 1 mm Hg,  LVOT velocity time integral equals 9 cm.  Aortic Root: 3.4 cm.  LVOT diameter: 2.6 cm.  Left Atrium: Moderately dilated left atrium.  LA volume  index = 48 cc/m2.  Left Ventricle: Severe segmental left ventricular systolic  dysfunction.  Akinetic basal inferior and inferolateral.  Diffuse hypokinesis of the inferior and inferolateral wall,  lateral wall.   Endocardial visualization enhanced with  intravenous injection of Ultrasonic Enhancing Agent  (Definity). No left ventricular thrombus. Mild left  ventricular enlargement. Severe  diastolic dysfunction  (Stage III).  Right Heart: Mild right atrial enlargement. Right  ventricular enlargement with decreased right ventricular  systolicfunction. A device wire is noted in the right  heart. Normal tricuspid valve. Minimal tricuspid  regurgitation. Normal pulmonic valve.  Pericardium/Pleura: Normal pericardium with no pericardial  effusion.  Hemodynamic: Estimated right atrial pressure is 8 mm Hg.  Estimated right ventricular systolic pressure equals 31 mm  Hg, assuming right atrial pressure equals 8 mm Hg,  consistent with normal pulmonary pressures.  ------------------------------------------------------------------------  Conclusions:  1. Calcified aortic valve with decreased opening. Peak  transaortic valve gradient equals 24 mm Hg, mean  transaortic valve gradient equals 12 mm Hg, estimated  aortic valve area equals 1 sqcm (by continuity equation),  aortic valve velocity time integral equals 47 cm,  consistent with moderate aortic stenosis.  2. Moderately dilated left atrium.  LA volume index = 48  cc/m2.  3. Mild left ventricular enlargement.  4. Severe segmental left ventricular systolic dysfunction.  Akinetic basalinferior and inferolateral.  Diffuse  hypokinesis of the inferior and inferolateral wall, lateral  wall.   Endocardial visualization enhanced with intravenous  injection of Ultrasonic Enhancing Agent (Definity). No left  ventricular thrombus.  5. Severe  diastolic dysfunction (Stage III).  6. Mild right atrial enlargement.  7. Right ventricular enlargement with decreased right  ventricular systolic function. A device wire is noted in  the right heart.  ------------------------------------------------------------------------  Confirmed on  6/7/2021 - 14:32:38 by TIERRA Ovalles  ------------------------------------------------------------------------    < end of copied text >    Echo: 3/23/2021 MAC with only mild MR.  Calcified aortic valve with decreased opening.  Peak gradient 31, mean 19, dimensionless index 0.20 and no AI noted this time.  Severe LAE.  Severe segmental LV systolic dysfunction with LVEF 23 to 25%.  Mild LVH.  Normal RV size and function with mild to moderate TR.  RVSP 54.  No pericardial effusion.       Stress Testing:< from: Stress Echocardiogram-Pharmacologic (10.09.18 @ 17:10) >  Observations:  Mitral Valve: Mitral annular calcification. Moderate mitral  regurgitation.  Aortic Valve/Aorta: Calcified trileaflet aortic valve with  decreased opening. Peak transaortic valve gradient equals  25 mm Hg, mean transaortic valve gradient equals 12 mm Hg,  estimated aortic valve area equals 0.9 sqcm (by continuity  equation), consistent with severe aortic stenosis. Mild  aortic regurgitation.  Peak left ventricular outflow tract  gradient equals 1 mm Hg, mean gradient is equal to 1 mm Hg.  Aortic Root: 3.6 cm.  LVOT diameter: 2.4 cm.  Left Atrium: Severely dilated left atrium.  LA volume index  = 51 cc/m2.  Left Ventricle: Severe segmental left ventricular systolic  dysfunction.  Severe hypokinesis/akinesis of the inferior  and inferolateral wall, basal inferoseptum, anterolateral  wall. Mild left ventricular enlargement.  Right Heart: Normal right atrium. A device wire is noted in  the right heart. Decreased right ventricular systolic  function. Normal tricuspid valve. Minimal tricuspid  regurgitation. Normal pulmonic valve.  Pericardium/Pleura: Normal pericardium with no pericardial  effusion.  Hemodynamic: Estimated right atrial pressure is 8 mm Hg.  Estimated right ventricular systolic pressure equals 33 mm  Hg, assuming right atrial pressure equals 8 mm Hg,  consistent with normal pulmonary pressures.  ------------------------------------------------------------------------  Pharmacologic Stress Test:  Agent:  Dobutamine Dose: 5  mcg/Kg/min over 0 min.  Baseline HR: 71 bpm  Peak HR: 74 bpm  % MPHR: 58 %  Baseline BP: 121/68 mmHg  Peak BP: 134/77 mmHg  Peak RPP: 9,916 (Rate Pressure Product)  Test terminated: Completion of test  Baseline EKG: Paced rhythm  EKG changes: Non diagnostic ECG.  Arrhythmia: None  Heart Rhythm: Paced  HR Response: HR failed to increase appropriately.  BP Response: Appropriate  Symptoms: None  ------------------------------------------------------------------------  Echocardiographic Study:  (A) Baseline echocardiogram reveals:  1. Moderate mitral regurgitation.  2. Calcified trileaflet aortic valve with decreased  opening. Peak transaortic valve gradient equals 25 mm Hg,  mean transaortic valve gradient equals 12 mm Hg, estimated  aortic valve area equals 0.9 sqcm (by continuity equation),  consistent with severe aortic stenosis. Mild aortic  regurgitation.  3. Left ventricular enlargement.  4. Severe segmental left ventricular systolic dysfunction.  Severe hypokinesis/akinesis of the inferior and  inferolateral wall, basal inferoseptum, anterolateral wall.  5. A device wire is noted in the right heart. Decreased  right ventricular systolic function.  (B) Stress echocardiogram reveals:  No significant change in left ventricular systolic  function.  ------------------------------------------------------------------------  Conclusions:  1. Normal hemodynamic response.  2. Non Diagnostic electrocardiographic response.  3. No significant change in left ventricular systolic  function.  4. HR failed to increase appropriately.  5. 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  Discussed with Dr. Kay.  ------------------------------------------------------------------------  Confirmed on  10/10/2018 - 17:48:23 by Jerome Johnson M.D.  ------------------------------------------------------------------------    < end of copied text >      Cath:< from: Cardiac Cath Lab - Adult (10.08.18 @ 15:55) >  CORONARY VESSELS: The coronary circulation is right dominant.  LM:   --  Proximal left main: There was a 40 % stenosis. 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  --  Distal left main: There was a stenosis. The lesion was eccentric.  LAD:   --  LAD: Angiography showed moderate atherosclerosis.  CX:   --  Circumflex: Angiography showed moderate atherosclerosis.  RCA:   --  RCA: Angiography showed moderate atherosclerosis.  --  Proximal RCA: There was a diffuse 30 % stenosis at the site of a prior  stent.  LEFT LOWER EXTREMITY VESSELS: Left external iliac: Angiography showed  aneurysmal dilatation. Left common femoral: The vessel was tortuous.  RIGHT LOWER EXTREMITY VESSELS: Right external iliac: Angiography showed  aneurysmal dilatation. Right common femoral: The vessel was excessively  totuos  < end of copied text >      Interpretation of Telemetry:    Imaging:   < from: Xray Chest 1 View- PORTABLE-Urgent (06.06.21 @ 08:38) >  COMPARISON: Chest x-ray from 5/14/2018.    FINDINGS:    The lungs are clear.  There is no pneumothorax or large pleural effusion.  Heart size cannot be accurately assessed in this projection. Dual lead pacemaker overlying the left chest wall.  No acute rib fractures or other osseous abnormality. Suture anchors noted in the left humeral head.    IMPRESSION:    Clear lungs.        < end of copied text >    Labs:                                                       8.0    6.78  )-----------( 94       ( 15 Yobani 2021 07:11 )             24.6     06-14    136  |  103  |  69<H>  ----------------------------<  91  4.5   |  20<L>  |  2.49<H>    Ca    8.3<L>      14 Jun 2021 06:58  Mg     2.3     06-14    TPro  5.8<L>  /  Alb  3.2<L>  /  TBili  0.7  /  DBili  0.2  /  AST  20  /  ALT  14  /  AlkPhos  65  06-14                                                                                                       Patient seen and evaluated @840  Chief Complaint: Patient is a 89y old  Male who presents with a chief complaint of syncope (06 Jun 2021 09:36)      HPI:  89 M CAD s/p stents, low-flow low gradient AS, HFrEF 20% s/p MDT Bi-V ICD, COPD who presents with syncope x 2.    Pt reports usual state of health until yesterday night when he experienced an episode of syncope. His wife saw him in bed but he started to slide off the bed onto the floor. He denies any prodrome or feeling any chest pain, sob, palpitations, or shock from his ICD. This AM, the same scenario happened again which prompted the wife to call EMS.     On interrogation, it showed multiple episodes (24) of VT since 5/17 leading up to today that required ATP and shocks. The last one was 6/6/21 at 617AM which did not break with 2 ATPs and ultimately led to shock. (06 Jun 2021 11:40)    CARDIAC HISTORY: I have known the patient since 2006.  He had an MI and angioplasty in 1994.  Had a cath in 2011 with a 40% aneurysmal left main normal LAD and circumflex with a 95% right lesion and an akinetic inferior wall.  Treated medically and did well other than an admission for cellulitis in 2013 with positive blood cultures.  No endocarditis.  Issues with shortness of breath both from COPD and CHF.  Able to undergo knee replacement in 2014 and then later endovascular repair of an abdominal aortic aneurysm in October 2014.  Stress echo in 2015 showed an ejection fraction of 27%.  Cath revealed unchanged coronaries but LVEF 35%.  The right coronary was stented but did not change his LVEF so we had a defibrillator and biventricular pacemaker placed by Dr. Russell in August 2015.  I changed his Avapro to Entresto and the patient's CHF seem to improve.  August 25 of 2017 had a syncopal episode in the bathroom.  He had no idea what happened but interrogation showed V. tach followed by V. fib which led to a defibrillator shock.  No recurrence and no AICD shocks since then until this admission.  2019 had a couple of runs of ventricular tachycardia that were paced terminated.  98% of the time biventricular pacing.  2021 episode of atrial fibrillation on interrogation that lasted an hour and 48 minutes.  Progressive heart failure and he was worked up for possible TAVR with low gradient aortic stenosis including a dobutamine echo and was found not to be a candidate.  Has had progressive shortness of breath and LV dysfunction with adjusting of his medications and recently adding Jardiance and then increasing it from 10-25.  Issues with creatinine running between 2 and 2.5.  BNP as high as 18,000.  Most recent echo was March 23 of this year with an aortic gradient of 31 peak and 19 mean, dimensionless index 0.20 and no AI.  Severe segmental LV systolic dysfunction with LVEF 23 to 25%.  Mild LVH.  Only mild MR and mild to moderate TR with RVSP 54.    PMH:   HTN (hypertension)    HLD (hyperlipidemia)    CAD (coronary artery disease)    BPH (Benign Prostatic Hyperplasia)    CHF (congestive heart failure)    Hypothyroid    GERD (gastroesophageal reflux disease)    Gout    COPD (chronic obstructive pulmonary disease)    MI (myocardial infarction)      PSH:   Cataract    Hernia, inguinal, bilateral    Achilles rupture    S/P knee replacement, right    AAA (abdominal aortic aneurysm)    H/O repair of rotator cuff    S/P angioplasty    OUTPATIENT CARDIAC MEDS: Carvedilol 12.5 mg twice daily, Entresto 97/103 twice daily, digoxin 0.125 daily, Jardiance 25 mg daily, furosemide 40 mg daily, Synthroid 0.137 daily, simvastatin 10 daily.    Medications:   acetaminophen   Tablet .. 650 milliGRAM(s) Oral every 6 hours PRN  allopurinol 100 milliGRAM(s) Oral daily  aMIOdarone    Tablet 400 milliGRAM(s) Oral every 12 hours  ferrous    sulfate 325 milliGRAM(s) Oral daily  levothyroxine 125 MICROGram(s) Oral daily  metoprolol tartrate 12.5 milliGRAM(s) Oral two times a day  pantoprazole    Tablet 40 milliGRAM(s) Oral before breakfast  polyethylene glycol 3350 17 Gram(s) Oral daily  quiNIDine gluconate  milliGRAM(s) Oral every 12 hours  sacubitril 24 mG/valsartan 26 mG 1 Tablet(s) Oral two times a day  senna 2 Tablet(s) Oral at bedtime  simvastatin 10 milliGRAM(s) Oral at bedtime  tamsulosin 0.4 milliGRAM(s) Oral at bedtime      Allergies:  No Known Allergies    FAMILY HISTORY:  Family history of hemolytic uremic syndrome    Family history of CHF (congestive heart failure)      Social History: , fairly active despite restrictions from CHF and COPD  Smoking: Remote  Alcohol: No  Drugs: No    Review of Systems:  Constitutional: no recent weight loss  HEENT: no scleral icterus. Normal mucosa.  Respiratory: (+) COPD followed by Dr. Morris  Cardiovascular: see HPI  Gastrointestinal: No Abdominal Pain, no Diarrhea, no Constipation, no Nausea or Vomiting  Genitourinary: No Nocturia, Dysuria, or Incontinence. No hematuria.  BPH  Extremities: (+) edema. No cyanosis.  Neurologic: No Focal deficit. No Paresthesias. No Syncope. No seizures  Lymphatic: No Swelling. No Lymphadenopathy   Skin: No Rash,  Ecchymoses, Wounds, or Lesions  Psychiatry: No Depression. No Anxiety.    10 point review of systems is otherwise negative except as mentioned above            [ ]Unable to obtain    Physical Exam:  Vital Signs Last 24 Hrs  T(C): 36.4 (15 Yobani 2021 04:48), Max: 36.7 (14 Jun 2021 14:48)  T(F): 97.6 (15 Yobani 2021 04:48), Max: 98 (14 Jun 2021 14:48)  HR: 70 (15 Yobani 2021 04:48) (70 - 75)  BP: 82/51 (15 Yobani 2021 04:48) (82/51 - 106/66)  BP(mean): --  RR: 18 (15 Yobani 2021 04:48) (18 - 18)  SpO2: 91% (15 Yobani 2021 04:48) (91% - 96%)  Appearance: WD, WN, NAD. No more runs of  VT.  Eyes:  No scleral icterus. PERRL, EOMI  HENT: Normal oral mucosa.]NC/AT  Neck:  No JVD at 90 degrees. Normal carotid upstrokes without bruits or murmurs.  Cardiovascular: Normal PMI. Normal S1, S2 physiologically split. No S3, (+) S4. 2/6 RODRÍGUEZ, 2-3/6 HSM apex.  Respiratory: Clear to auscultation bilaterally. No wheezes. No rales.  Gastrointestinal: Soft.  Non-tender. No hepatosplenomegally.  Extremities: No clubbing. No edema today. Pulses 2+ bilaterally symmetric except diminished pedal.  Musculoskeletal:  No joint deformity   Neurologic: Non-focal  Lymphatic:  No lymphadenopathy  Psychiatry: [+ ] AAOx3 [ +] Mood & affect appropriate  Skin: No rashes. No ecchymoses. No cyanosis    Cardiovascular Diagnostic Testing:  ECG:< from: 12 Lead ECG (06.07.21 @ 07:34) >  Ventricular Rate 69 BPM    Atrial Rate 69 BPM    QRS Duration 138 ms    Q-T Interval 421 ms    QTC Calculation(Bazett) 451 ms    P Axis 35 degrees    R Axis -74 degrees    T Axis 119 degrees    Diagnosis Line ELECTRONIC VENTRICULAR PACEMAKER  WHEN COMPARED WITH ECG OF `6/6/21 14:50  NO SIGNIFICANT CHANGE WAS FOUND  Confirmed by KAM Woodward Zlata ([id number]) on 6/7/2021 10:45:59 AM    < end of copied text >    < from: TTE with Doppler (w/Cont) (06.07.21 @ 07:37) >  YOB: 1925   Age: 89 (M)   MR#: [id number]  Study Date: 6/7/2021  [facility]up[facility] RDCS  Study quality: Technically fair  Referring Physician: Yvonne Brown MD  Blood Pressure: 106/55 mmHg  Height: 175 cm  Weight: 70 kg  BSA: 1.9 m2  ------------------------------------------------------------------------  PROCEDURE: Transthoracic echocardiogram with 2-D, M-Mode  and complete spectral and color flow Doppler. Verbal  consent was obtained for injection of  Ultrasonic Enhancing  Agent following a discussion of risks and benefits.  Following intravenous injection of Ultrasonic Enhancing  Agent , harmonic imaging was performed.  INDICATION: Syncope and collapse (R55)  ------------------------------------------------------------------------  Dimensions:    Normal Values:  LA:     5.2    2.0 - 4.0 cm  Ao:     3.4    2.0 - 3.8 cm  SEPTUM: 1.4    0.6 - 1.2 cm  PWT:    0.7    0.6 - 1.1 cm  LVIDd:  5.2    3.0 - 5.6 cm  LVIDs:  4.7    1.8 - 4.0 cm  Derived variables:  LVMI: 122 g/m2  RWT: 0.28  Fractional short: 10 %  EF (Visual Estimate): 30 %  Doppler Peak Velocity (m/sec): MV=1.4 AoV=2.5  ------------------------------------------------------------------------  Observations:  Mitral Valve: Tethered mitral valve leaflets with normal  opening. Mild mitral regurgitation.  Aortic Valve/Aorta: Calcified aortic valve with decreased  opening. Peak transaortic valve gradient equals 24 mm Hg,  mean transaortic valve gradient equals 12 mm Hg, estimated  aortic valve area equals 1 sqcm (by continuity equation),  aortic valve velocity time integral equals 47 cm,  consistent with moderate aortic stenosis. Mild aortic  regurgitation.  Peak left ventricular outflow tract  gradient equals 1 mm Hg, mean gradient is equal to 1 mm Hg,  LVOT velocity time integral equals 9 cm.  Aortic Root: 3.4 cm.  LVOT diameter: 2.6 cm.  Left Atrium: Moderately dilated left atrium.  LA volume  index = 48 cc/m2.  Left Ventricle: Severe segmental left ventricular systolic  dysfunction.  Akinetic basal inferior and inferolateral.  Diffuse hypokinesis of the inferior and inferolateral wall,  lateral wall.   Endocardial visualization enhanced with  intravenous injection of Ultrasonic Enhancing Agent  (Definity). No left ventricular thrombus. Mild left  ventricular enlargement. Severe  diastolic dysfunction  (Stage III).  Right Heart: Mild right atrial enlargement. Right  ventricular enlargement with decreased right ventricular  systolicfunction. A device wire is noted in the right  heart. Normal tricuspid valve. Minimal tricuspid  regurgitation. Normal pulmonic valve.  Pericardium/Pleura: Normal pericardium with no pericardial  effusion.  Hemodynamic: Estimated right atrial pressure is 8 mm Hg.  Estimated right ventricular systolic pressure equals 31 mm  Hg, assuming right atrial pressure equals 8 mm Hg,  consistent with normal pulmonary pressures.  ------------------------------------------------------------------------  Conclusions:  1. Calcified aortic valve with decreased opening. Peak  transaortic valve gradient equals 24 mm Hg, mean  transaortic valve gradient equals 12 mm Hg, estimated  aortic valve area equals 1 sqcm (by continuity equation),  aortic valve velocity time integral equals 47 cm,  consistent with moderate aortic stenosis.  2. Moderately dilated left atrium.  LA volume index = 48  cc/m2.  3. Mild left ventricular enlargement.  4. Severe segmental left ventricular systolic dysfunction.  Akinetic basalinferior and inferolateral.  Diffuse  hypokinesis of the inferior and inferolateral wall, lateral  wall.   Endocardial visualization enhanced with intravenous  injection of Ultrasonic Enhancing Agent (Definity). No left  ventricular thrombus.  5. Severe  diastolic dysfunction (Stage III).  6. Mild right atrial enlargement.  7. Right ventricular enlargement with decreased right  ventricular systolic function. A device wire is noted in  the right heart.  ------------------------------------------------------------------------  Confirmed on  6/7/2021 - 14:32:38 by TIERRA Ovalles  ------------------------------------------------------------------------    < end of copied text >    Echo: 3/23/2021 MAC with only mild MR.  Calcified aortic valve with decreased opening.  Peak gradient 31, mean 19, dimensionless index 0.20 and no AI noted this time.  Severe LAE.  Severe segmental LV systolic dysfunction with LVEF 23 to 25%.  Mild LVH.  Normal RV size and function with mild to moderate TR.  RVSP 54.  No pericardial effusion.       Stress Testing:< from: Stress Echocardiogram-Pharmacologic (10.09.18 @ 17:10) >  Observations:  Mitral Valve: Mitral annular calcification. Moderate mitral  regurgitation.  Aortic Valve/Aorta: Calcified trileaflet aortic valve with  decreased opening. Peak transaortic valve gradient equals  25 mm Hg, mean transaortic valve gradient equals 12 mm Hg,  estimated aortic valve area equals 0.9 sqcm (by continuity  equation), consistent with severe aortic stenosis. Mild  aortic regurgitation.  Peak left ventricular outflow tract  gradient equals 1 mm Hg, mean gradient is equal to 1 mm Hg.  Aortic Root: 3.6 cm.  LVOT diameter: 2.4 cm.  Left Atrium: Severely dilated left atrium.  LA volume index  = 51 cc/m2.  Left Ventricle: Severe segmental left ventricular systolic  dysfunction.  Severe hypokinesis/akinesis of the inferior  and inferolateral wall, basal inferoseptum, anterolateral  wall. Mild left ventricular enlargement.  Right Heart: Normal right atrium. A device wire is noted in  the right heart. Decreased right ventricular systolic  function. Normal tricuspid valve. Minimal tricuspid  regurgitation. Normal pulmonic valve.  Pericardium/Pleura: Normal pericardium with no pericardial  effusion.  Hemodynamic: Estimated right atrial pressure is 8 mm Hg.  Estimated right ventricular systolic pressure equals 33 mm  Hg, assuming right atrial pressure equals 8 mm Hg,  consistent with normal pulmonary pressures.  ------------------------------------------------------------------------  Pharmacologic Stress Test:  Agent:  Dobutamine Dose: 5  mcg/Kg/min over 0 min.  Baseline HR: 71 bpm  Peak HR: 74 bpm  % MPHR: 58 %  Baseline BP: 121/68 mmHg  Peak BP: 134/77 mmHg  Peak RPP: 9,916 (Rate Pressure Product)  Test terminated: Completion of test  Baseline EKG: Paced rhythm  EKG changes: Non diagnostic ECG.  Arrhythmia: None  Heart Rhythm: Paced  HR Response: HR failed to increase appropriately.  BP Response: Appropriate  Symptoms: None  ------------------------------------------------------------------------  Echocardiographic Study:  (A) Baseline echocardiogram reveals:  1. Moderate mitral regurgitation.  2. Calcified trileaflet aortic valve with decreased  opening. Peak transaortic valve gradient equals 25 mm Hg,  mean transaortic valve gradient equals 12 mm Hg, estimated  aortic valve area equals 0.9 sqcm (by continuity equation),  consistent with severe aortic stenosis. Mild aortic  regurgitation.  3. Left ventricular enlargement.  4. Severe segmental left ventricular systolic dysfunction.  Severe hypokinesis/akinesis of the inferior and  inferolateral wall, basal inferoseptum, anterolateral wall.  5. A device wire is noted in the right heart. Decreased  right ventricular systolic function.  (B) Stress echocardiogram reveals:  No significant change in left ventricular systolic  function.  ------------------------------------------------------------------------  Conclusions:  1. Normal hemodynamic response.  2. Non Diagnostic electrocardiographic response.  3. No significant change in left ventricular systolic  function.  4. HR failed to increase appropriately.  5. 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  Discussed with Dr. Kay.  ------------------------------------------------------------------------  Confirmed on  10/10/2018 - 17:48:23 by Jerome Johnson M.D.  ------------------------------------------------------------------------    < end of copied text >      Cath:< from: Cardiac Cath Lab - Adult (10.08.18 @ 15:55) >  CORONARY VESSELS: The coronary circulation is right dominant.  LM:   --  Proximal left main: There was a 40 % stenosis. 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  --  Distal left main: There was a stenosis. The lesion was eccentric.  LAD:   --  LAD: Angiography showed moderate atherosclerosis.  CX:   --  Circumflex: Angiography showed moderate atherosclerosis.  RCA:   --  RCA: Angiography showed moderate atherosclerosis.  --  Proximal RCA: There was a diffuse 30 % stenosis at the site of a prior  stent.  LEFT LOWER EXTREMITY VESSELS: Left external iliac: Angiography showed  aneurysmal dilatation. Left common femoral: The vessel was tortuous.  RIGHT LOWER EXTREMITY VESSELS: Right external iliac: Angiography showed  aneurysmal dilatation. Right common femoral: The vessel was excessively  totuos  < end of copied text >      Interpretation of Telemetry:    Imaging:   < from: Xray Chest 1 View- PORTABLE-Urgent (06.06.21 @ 08:38) >  COMPARISON: Chest x-ray from 5/14/2018.    FINDINGS:    The lungs are clear.  There is no pneumothorax or large pleural effusion.  Heart size cannot be accurately assessed in this projection. Dual lead pacemaker overlying the left chest wall.  No acute rib fractures or other osseous abnormality. Suture anchors noted in the left humeral head.    IMPRESSION:    Clear lungs.        < end of copied text >    Labs:                                                     8.0    6.78  )-----------( 94       ( 15 Yobani 2021 07:11 )             24.6     06-15    135  |  103  |  78<H>  ----------------------------<  82  4.5   |  20<L>  |  2.71<H>    Ca    8.4      15 Yobani 2021 07:10  Mg     2.4     06-15    TPro  5.8<L>  /  Alb  3.2<L>  /  TBili  0.7  /  DBili  0.2  /  AST  20  /  ALT  14  /  AlkPhos  65  06-14

## 2021-06-15 NOTE — PROGRESS NOTE ADULT - ASSESSMENT
24 episodes of VT since 5/17, most pace-terminated with mild sx, 2 episodes syncope with shock.   Renal function and BNP only slightly worse than recent baseline.  No chest pain--troponin 90s maybe from shock--will trend.    Discussed with EPS  1. Oral loading with amiodarone. patient would consider VT ablation if fails meds. Back on beta-blocker (prefer change back to Carvedilol). Transfused-Hb 8.2. (Down to 7.2) Checked with EP- continue Amio, Quinaglute for now  2. Resume outpatient cardiac meds--being held now as overdiuresed plus anemic.-- Transfused, now slowly reintroduced Entresto (24/26 q12h). Holding Jardiance and ? add Vericiguat. On metoprolol in place of carvedilol for now-consider change back.  No role for structural heart here; reviewed echo in detail.  Holding diuretic and digoxin.  3. Heme consult appreciated.  4. COPD--continue outpatient meds.  (Followed by Dr. Ning Morris.)  5. OOB to chair. PT. Agrees to rehab.      Continue po load with amiodarone plus quinaglute--moniter for more VT.   Await heme w/u.  ? when dispo to rehab.      Wolf Paiz MD, FACC  (O) [phone]  (C) [phone] 24 episodes of VT since 5/17, most pace-terminated with mild sx, 2 episodes syncope with shock.   Renal function and BNP only slightly worse than recent baseline.  No chest pain--troponin 90s maybe from shock--will trend.    Discussed with EPS  1. Oral loading with amiodarone. patient would consider VT ablation if fails meds. Back on beta-blocker (prefer change back to Carvedilol). Transfused again yesterday, Hb 8.0.    Checked with EP- continue Amio, Quinaglute for now  2. Resume outpatient cardiac meds in future--being held now as overdiuresed plus anemic.-- Transfused, now reintroduced Entresto (only 24/26 q12h). Holding Jardiance and ? add Vericiguat. On metoprolol in place of carvedilol for now-consider change back in future.  No role for structural heart here; reviewed echo in detail.  Holding diuretic and digoxin.  3. Heme consult appreciated. Transfused. No bleeding.  4. COPD--continue outpatient meds.  (Followed by Dr. Ning Morris.)  5. Creatinine up and ? orthostasis. Can give fluid bolus IV and then recheck creatinine.  5. OOB to chair. PT. Agrees to rehab. Should be okay for rehab later today if creatinine stable or improves and BP okay.        Wolf Paiz MD, FACC  (O) [phone]  (C) [phone]

## 2021-06-15 NOTE — PROGRESS NOTE ADULT - ASSESSMENT
89 M CAD s/p stents, low-flow low gradient AS, HFrEF 20% s/p MDT Bi-V ICD, COPD who presents with syncope x 2. AICD shock during his stay for VT. Nephrology consulted for INGRID on CKD     INGRID on CKD IV  SCr 1.4 in 2018  SCr fluctuating at present  INGRID likely from hypotension + ARB vs. Anemia  consider stopping entresto or metoprolol vs. adding midodrine  meds per cardio. optimize hemodyanmics. BP's remain low   Check urine electrolytes   Pt non oliguric at present   UA bland   Check Bladder scan   CBC w/ Diff in AM   avoid nephrotoxins     Anemia  no iron def  monitor hb     Hypotension  optimize hemodynamics   consider stopping entresto or metoprolol vs. adding midodrine  meds per cardio. optimize hemodyanmics. BP's remain low

## 2021-06-15 NOTE — PROGRESS NOTE ADULT - ASSESSMENT
89 M COPD (no home O2), CAD s/p stent 2015, AAA repair w/ stent, HTN, HLD, HFrEF (20%), Bi-V ICD (Medtronic) presented with syncope found to be in VT storm requiring ATP and shocks, started on lido gtt and amio oral load.      VT Storm  - interrogation revealed multiple episodes of VT since may requiring ATP and shocks  -weaned off lidocaine gtt   -c/w monitor for ectopy/VT  -c/w amio 400 bid (loading dose total 7-10 g)   -per EP, after load, can decrease to 400 mg qd then eventual transition to 200 mg qd.. now added quindine   -is on home coreg 12.5 bid, switched to lopressor 12.5 bid but held due to soft BPs  - monitor lytes and keep K>4 and Mg>2  - f/u EP recs, medical management at this time. As per Ep- will consider ablation if continues to have ectopy with antiarrythmic drugs.   - will hold digoxin at this time. Dig level wnl     Hx CAD with stent 2015 and HFrEF  - 10/2018 [facility]- nonobstructive CAD  - 6/7 TTE: EF 30%. Mild MR. Moderate AS and mild AR. Mod dilated LA. Akinetic basal inferior and inferolateral. Diffuse hypokinesis of the inferior and inferolateral wall, lateral wall. No LV thrombus. Severe diastolic dysfunction. Mild RA enlargement   - continue ASA and statin  - on home lasix 40 bid but will hold given soft BPs  - entresto restarted   -  jardiance given soft BPs on hold   - completing Amio 10 gram loading (currently 400 bid), then to continue 400 qd  -change metoprolol to coreg 3.125   - home Digoxin on hold given bump in Creatinine          INGRID on CKD :   - hold lasix   will give gentle hydration       BPH  -pt with orthostasis / symptomatic when standing : decreased flomax to 0.4 qhs       Gout   - c/w home allopurinol for management     hypothyroidism   - c/w home synthroid     acute on chronic anemia :   s/p transfusion   multifactorial including CKD and element of iron def     orthostatic hypotension:  will decrease flomax   compression stockings       consider palliative and GOC

## 2021-06-16 NOTE — PROGRESS NOTE ADULT - REASON FOR ADMISSION
ICD fired with VT

## 2021-06-16 NOTE — PROGRESS NOTE ADULT - TIME BILLING
pt , wife , np and Dr. Paiz
a/p
CCU team and pt
assessment/plan and coordinating care
pt , wife , CCU team
pt , wife ,NP , CM
pt, wife , np

## 2021-06-16 NOTE — DISCHARGE NOTE NURSING/CASE MANAGEMENT/SOCIAL WORK - PATIENT PORTAL LINK FT
You can access the FollowMyHealth Patient Portal offered by [facility] by registering at the following website: [email or web address]. By joining [employer]’s FollowMyHealth portal, you will also be able to view your health information using other applications (apps) compatible with our system.

## 2021-06-16 NOTE — DISCHARGE NOTE NURSING/CASE MANAGEMENT/SOCIAL WORK - NSDCFUADDAPPT_GEN_ALL_CORE_FT
. Patient should have routine LFT, TFTs, yearly eye exam and pulmonary function testing if remains on long term therapy    Follow up with Dr. Wolf Paiz to determine when to resume lasix and jardiance.

## 2021-06-16 NOTE — PROGRESS NOTE ADULT - ASSESSMENT
89 yo M PMHx COPD (not home O2 dependent), CAD s/p stent 2015 on ASA, AAA repair with stent, HTN, HLD, chronic systolic HF (EF 20%), GERD, hypothyroidism, Bi-V ICD (Medtronic), who presented to [facility] ED on 6/6/21 for an episode of syncope yesterday night. His wife saw him in bed but then he started sliding off the bed onto the floor. He had denied any prodrome, feeling CP, sob, palpitations, or shock from his ICD. THis morning, a similar episode happened again, which prompted the wife to call EMS.     In the ED, VS: T 97.8, HR 90, /76, RR 18, 95% on RA.   Labs: WBC 6.7, H&H 9.6/30.0, Plt 82, Na 141, K 4.5, Cl 102, HCO3 26, BUN/Cr 50/2.19, Ca 8.7, TNI 95->90, BNP 85411, CK 59, Mg 2.6, Ph 3.1, Digoxin 1.1  CT head negative. Xrays of chest, pelvis, and R knee negative for acute pathology.    EP was consulted and his device was interrogated. It showed multiple episodes of VT since 5/17/21 requiring ATP and shocks (last was 6/6/21 at 6:17AM). Pt was initially to be admitted to telemetry after being given oral amiodarone 400 mg however, while in the ED, pt had an episode of VT w/ unresponsiveness and AICD terminated the rhythm. Pt was awake and responsive afterwards. Pt was given a lidocaine bolus and was started on a lidocaine gtt and was admitted to the CCU for further monitoring.     (06 Jun 2021 11:40)    Hematology/Oncology consulted for patient  presenting with anemia    Anemia, Thrombocytopenia  --Patient does have renal disease  --Very possibly patient has MDS  --Patient  was anemic on presentation at [facility]  --Peripheral smear reviewed by Dr. Enriquez. no shistocytes but some dysplastic RBC changes were present.  --Have ordered flow cytometry - please make sure it gets drawn  --Please transfuse PRBCs for Hgb <7.0 grams or <8.0 grams if required by cardiology  --Please transfuse platelets for <10K or <50K with bleeding  --Would consider giving erythropoietin supplements after discharge as outpatient.    VT, HF  --Management per Cardiology, primary team    After discharge patient may follow with Dr. William Enriquez    Thank you for the opportunity to participate in Mr. Oakley's care.    Rajinder Luevano PA-C  Hematology/Oncology  [facility]   [phone] (cell)  [phone] (office)  [phone] (alt office)  Evenings and weekends please call MD on call or office

## 2021-06-16 NOTE — PROGRESS NOTE ADULT - PROVIDER SPECIALTY LIST ADULT
CCU
CCU
Cardiology
Cardiology
Electrophysiology
Internal Medicine
Internal Medicine
CCU
CCU
Cardiology
Cardiology
Electrophysiology
Internal Medicine
SATYA
CCU
Cardiology
Internal Medicine
Nephrology
Cardiology
Electrophysiology
Internal Medicine
Nephrology
Cardiology
Electrophysiology
Heme/Onc
Internal Medicine
Internal Medicine
Cardiology
Internal Medicine

## 2021-06-16 NOTE — PROGRESS NOTE ADULT - ASSESSMENT
24 episodes of VT since 5/17, most pace-terminated with mild sx, 2 episodes syncope with shock.   Renal function and BNP only slightly worse than recent baseline.  No chest pain--troponin 90s maybe from shock--will trend.    Discussed with EPS  1. Oral loading with amiodarone. patient would consider VT ablation if fails meds. Back on low dose Carvedilol). Transfused again 6/14, Hb 8.4.    Checked with EP- continue Amio, Quinaglute for now  2. Resume outpatient cardiac meds in future--being held now as overdiuresed plus anemic.-- Transfused, now reintroduced Entresto (only 24/26 q12h). Holding Jardiance and ? add Vericiguat. On carvedilol 3.125 q12h.  No role for structural heart here; reviewed echo in detail.  Holding diuretic and digoxin.  3. Heme consult appreciated. Transfused. No bleeding.  4. COPD--continue outpatient meds.  (Followed by Dr. Ning Morris.)  5. Creatinine coming back down. Encourage po hydration.  5. OOB to chair. PT. Agrees to rehab. Should be okay for rehab today.        Wolf Paiz MD, FACC  (O) [phone]  (C) [phone] 24 episodes of VT since 5/17, most pace-terminated with mild sx, 2 episodes syncope with shock.   Renal function and BNP only slightly worse than recent baseline.  No chest pain--troponin 90s maybe from shock--will trend.    Discussed with EPS  1. Oral loading with amiodarone. patient would consider VT ablation if fails meds. Back on low dose Carvedilol). Transfused again 6/14, Hb 8.4.    Checked with EP- continue Amio, Quinaglute for now  2. Resume outpatient cardiac meds in future--being held now as overdiuresed plus anemic.-- Transfused, now reintroduced Entresto (only 24/26 q12h). Holding Jardiance and ? add Vericiguat. On carvedilol 3.125 q12h.  No role for structural heart here; reviewed echo in detail.  Holding diuretic and digoxin.  3. Heme consult appreciated. Transfused. No bleeding.  4. COPD--continue outpatient meds.  (Followed by Dr. Ning Morris.)  5. Creatinine coming back down. Encourage po hydration.  5. OOB to chair. PT. Agrees to rehab.   Should be okay for rehab today.        Wolf Paiz MD, FACC  (O) [phone]  (C) [phone]

## 2021-06-16 NOTE — PROGRESS NOTE ADULT - ASSESSMENT
89 M COPD (no home O2), CAD s/p stent 2015, AAA repair w/ stent, HTN, HLD, HFrEF (20%), Bi-V ICD (Medtronic) presented with syncope found to be in VT storm requiring ATP and shocks, started on lido gtt and amio oral load.      VT Storm  - interrogation revealed multiple episodes of VT since may requiring ATP and shocks  -weaned off lidocaine gtt   -c/w monitor for ectopy/VT  -c/w amio 400 bid (loading dose total 7-10 g)   -per EP, after load, can decrease to 400 mg qd then eventual transition to 200 mg qd.. now added quindine   -is on home coreg 12.5 bid, switched to lopressor 12.5 bid but held due to soft BPs  - monitor lytes and keep K>4 and Mg>2  - f/u EP recs, medical management at this time. As per Ep- will consider ablation if continues to have ectopy with antiarrythmic drugs.   - will hold digoxin at this time. Dig level wnl     Hx CAD with stent 2015 and HFrEF  - 10/2018 [facility]- nonobstructive CAD  - 6/7 TTE: EF 30%. Mild MR. Moderate AS and mild AR. Mod dilated LA. Akinetic basal inferior and inferolateral. Diffuse hypokinesis of the inferior and inferolateral wall, lateral wall. No LV thrombus. Severe diastolic dysfunction. Mild RA enlargement   - continue ASA and statin  - on home lasix 40 bid but will hold given soft BPs  - entresto restarted   -  jardiance given soft BPs on hold   - completing Amio 10 gram loading (currently 400 bid), then to continue 400 qd  -change metoprolol to coreg 3.125   - home Digoxin on hold given bump in Creatinine          INGRID on CKD :   - hold lasix   will give gentle hydration       BPH  -pt with orthostasis / symptomatic when standing : decreased flomax to 0.4 qhs       Gout   - c/w home allopurinol for management     hypothyroidism   - c/w home synthroid     acute on chronic anemia :   s/p transfusion   multifactorial including CKD and element of iron def   fu with heme as op .. possible underlying MDS ?     orthostatic hypotension:  will decrease flomax   compression stockings   overall improved     discussed GOC : wife and pt would like to cont discussion as op   full code

## 2021-06-16 NOTE — PROGRESS NOTE ADULT - SUBJECTIVE AND OBJECTIVE BOX
Dr. Shultz  Office [phone]  Cell [phone]  Marlena SCHWARTZ  Cell [phone]    RENAL PROGRESS NOTE: DATE OF SERVICE 06-16-21 @ 16:49    Patient is a 89y old  Male who presents with a chief complaint of ICD fired with VT (16 Jun 2021 11:05)      Patient seen and examined at bedside. No chest pain/sob    VITALS:  T(F): 97.4 (06-16-21 @ 12:52), Max: 97.7 (06-15-21 @ 17:00)  HR: 98 (06-16-21 @ 12:52)  BP: 103/63 (06-16-21 @ 12:52)  RR: 17 (06-16-21 @ 12:52)  SpO2: 100% (06-16-21 @ 12:52)  Wt(kg): --    06-15 @ 07:01  -  06-16 @ 07:00  --------------------------------------------------------  IN: 700 mL / OUT: 1100 mL / NET: -400 mL    06-16 @ 07:01  -  06-16 @ 16:49  --------------------------------------------------------  IN: 240 mL / OUT: 250 mL / NET: -10 mL          PHYSICAL EXAM:  Constitutional: NAD  Neck: No JVD  Respiratory: CTAB, no wheezes, rales or rhonchi  Cardiovascular: S1, S2, RRR  Gastrointestinal: BS+, soft, NT/ND  Extremities: No peripheral edema    Hospital Medications:   MEDICATIONS  (STANDING):  allopurinol 100 milliGRAM(s) Oral daily  aMIOdarone    Tablet 400 milliGRAM(s) Oral every 12 hours  carvedilol 3.125 milliGRAM(s) Oral every 12 hours  ferrous    sulfate 325 milliGRAM(s) Oral daily  levothyroxine 125 MICROGram(s) Oral daily  pantoprazole    Tablet 40 milliGRAM(s) Oral before breakfast  polyethylene glycol 3350 17 Gram(s) Oral daily  quiNIDine gluconate  milliGRAM(s) Oral every 12 hours  sacubitril 24 mG/valsartan 26 mG 1 Tablet(s) Oral two times a day  senna 2 Tablet(s) Oral at bedtime  simvastatin 10 milliGRAM(s) Oral at bedtime  sodium chloride 0.9%. 1000 milliLiter(s) (60 mL/Hr) IV Continuous <Continuous>  tamsulosin 0.4 milliGRAM(s) Oral at bedtime      LABS:  06-16    135  |  103  |  79<H>  ----------------------------<  76  4.8   |  20<L>  |  2.59<H>    Ca    8.1<L>      16 Jun 2021 06:47  Mg     2.5     06-16    TPro  5.4<L>  /  Alb      /  TBili      /  DBili      /  AST      /  ALT      /  AlkPhos      06-15    Creatinine Trend: 2.59 <--, 2.71 <--, 2.49 <--, 2.21 <--, 2.08 <--, 2.19 <--, 2.27 <--                            8.4    6.70  )-----------( 96       ( 16 Jun 2021 06:47 )             25.9     Urine Studies:  Urinalysis - [06-06-21 @ 07:58]      Color Light Yellow / Appearance Clear / SG 1.018 / pH 6.0      Gluc 500 mg/dL / Ketone Negative  / Bili Negative / Urobili Negative       Blood Negative / Protein Trace / Leuk Est Negative / Nitrite Negative      RBC 3 / WBC 2 / Hyaline 1 / Gran  / Sq Epi  / Non Sq Epi 0 / Bacteria Negative      Iron 55, TIBC 232, %sat 24      [06-10-21 @ 11:59]  Ferritin 110      [06-10-21 @ 10:36]  HbA1c 5.2      [05-15-18 @ 07:37]  TSH 3.27      [06-15-21 @ 10:15]  Lipid: chol 81, TG 48, HDL 42, LDL --      [06-06-21 @ 21:54]    HBsAg Nonreact      [06-15-21 @ 10:52]  HCV 0.15, Nonreact      [06-15-21 @ 10:52]    NAHOMI: titer Negative, pattern --      [06-15-21 @ 08:46]  Rheumatoid Factor 17      [06-15-21 @ 11:59]  Free Light Chains: kappa 9.97, lambda 9.41, ratio = 1.06      [06-15 @ 10:46]    RADIOLOGY & ADDITIONAL STUDIES:

## 2021-06-16 NOTE — PROGRESS NOTE ADULT - SUBJECTIVE AND OBJECTIVE BOX
Patient seen and evaluated @840  Chief Complaint: Patient is a 89y old  Male who presents with a chief complaint of syncope (06 Jun 2021 09:36)      HPI:  89 M CAD s/p stents, low-flow low gradient AS, HFrEF 20% s/p MDT Bi-V ICD, COPD who presents with syncope x 2.    Pt reports usual state of health until yesterday night when he experienced an episode of syncope. His wife saw him in bed but he started to slide off the bed onto the floor. He denies any prodrome or feeling any chest pain, sob, palpitations, or shock from his ICD. This AM, the same scenario happened again which prompted the wife to call EMS.     On interrogation, it showed multiple episodes (24) of VT since 5/17 leading up to today that required ATP and shocks. The last one was 6/6/21 at 617AM which did not break with 2 ATPs and ultimately led to shock. (06 Jun 2021 11:40)    CARDIAC HISTORY: I have known the patient since 2006.  He had an MI and angioplasty in 1994.  Had a cath in 2011 with a 40% aneurysmal left main normal LAD and circumflex with a 95% right lesion and an akinetic inferior wall.  Treated medically and did well other than an admission for cellulitis in 2013 with positive blood cultures.  No endocarditis.  Issues with shortness of breath both from COPD and CHF.  Able to undergo knee replacement in 2014 and then later endovascular repair of an abdominal aortic aneurysm in October 2014.  Stress echo in 2015 showed an ejection fraction of 27%.  Cath revealed unchanged coronaries but LVEF 35%.  The right coronary was stented but did not change his LVEF so we had a defibrillator and biventricular pacemaker placed by Dr. Russell in August 2015.  I changed his Avapro to Entresto and the patient's CHF seem to improve.  August 25 of 2017 had a syncopal episode in the bathroom.  He had no idea what happened but interrogation showed V. tach followed by V. fib which led to a defibrillator shock.  No recurrence and no AICD shocks since then until this admission.  2019 had a couple of runs of ventricular tachycardia that were paced terminated.  98% of the time biventricular pacing.  2021 episode of atrial fibrillation on interrogation that lasted an hour and 48 minutes.  Progressive heart failure and he was worked up for possible TAVR with low gradient aortic stenosis including a dobutamine echo and was found not to be a candidate.  Has had progressive shortness of breath and LV dysfunction with adjusting of his medications and recently adding Jardiance and then increasing it from 10-25.  Issues with creatinine running between 2 and 2.5.  BNP as high as 18,000.  Most recent echo was March 23 of this year with an aortic gradient of 31 peak and 19 mean, dimensionless index 0.20 and no AI.  Severe segmental LV systolic dysfunction with LVEF 23 to 25%.  Mild LVH.  Only mild MR and mild to moderate TR with RVSP 54.    PMH:   HTN (hypertension)    HLD (hyperlipidemia)    CAD (coronary artery disease)    BPH (Benign Prostatic Hyperplasia)    CHF (congestive heart failure)    Hypothyroid    GERD (gastroesophageal reflux disease)    Gout    COPD (chronic obstructive pulmonary disease)    MI (myocardial infarction)      PSH:   Cataract    Hernia, inguinal, bilateral    Achilles rupture    S/P knee replacement, right    AAA (abdominal aortic aneurysm)    H/O repair of rotator cuff    S/P angioplasty    OUTPATIENT CARDIAC MEDS: Carvedilol 12.5 mg twice daily, Entresto 97/103 twice daily, digoxin 0.125 daily, Jardiance 25 mg daily, furosemide 40 mg daily, Synthroid 0.137 daily, simvastatin 10 daily.    Medications:   acetaminophen   Tablet .. 650 milliGRAM(s) Oral every 6 hours PRN  allopurinol 100 milliGRAM(s) Oral daily  aMIOdarone    Tablet 400 milliGRAM(s) Oral every 12 hours  carvedilol 3.125 milliGRAM(s) Oral every 12 hours  ferrous    sulfate 325 milliGRAM(s) Oral daily  levothyroxine 125 MICROGram(s) Oral daily  pantoprazole    Tablet 40 milliGRAM(s) Oral before breakfast  polyethylene glycol 3350 17 Gram(s) Oral daily  quiNIDine gluconate  milliGRAM(s) Oral every 12 hours  sacubitril 24 mG/valsartan 26 mG 1 Tablet(s) Oral two times a day  senna 2 Tablet(s) Oral at bedtime  simvastatin 10 milliGRAM(s) Oral at bedtime  sodium chloride 0.9%. 1000 milliLiter(s) IV Continuous <Continuous>  tamsulosin 0.4 milliGRAM(s) Oral at bedtime      Allergies:  No Known Allergies    FAMILY HISTORY:  Family history of hemolytic uremic syndrome    Family history of CHF (congestive heart failure)      Social History: , fairly active despite restrictions from CHF and COPD  Smoking: Remote  Alcohol: No  Drugs: No    Review of Systems:  Constitutional: no recent weight loss  HEENT: no scleral icterus. Normal mucosa.  Respiratory: (+) COPD followed by Dr. Morris  Cardiovascular: see HPI  Gastrointestinal: No Abdominal Pain, no Diarrhea, no Constipation, no Nausea or Vomiting  Genitourinary: No Nocturia, Dysuria, or Incontinence. No hematuria.  BPH  Extremities: (+) edema. No cyanosis.  Neurologic: No Focal deficit. No Paresthesias. No Syncope. No seizures  Lymphatic: No Swelling. No Lymphadenopathy   Skin: No Rash,  Ecchymoses, Wounds, or Lesions  Psychiatry: No Depression. No Anxiety.    10 point review of systems is otherwise negative except as mentioned above            [ ]Unable to obtain    Physical Exam:  Vital Signs Last 24 Hrs  T(C): 36.4 (16 Jun 2021 04:48), Max: 36.7 (15 Yobani 2021 10:22)  T(F): 97.5 (16 Jun 2021 04:48), Max: 98 (15 Yobani 2021 10:22)  HR: 70 (16 Jun 2021 04:48) (60 - 72)  BP: 97/62 (16 Jun 2021 04:48) (88/55 - 97/62)  BP(mean): --  RR: 18 (16 Jun 2021 04:48) (18 - 18)  SpO2: 94% (16 Jun 2021 04:48) (94% - 98%)  Appearance: WD, WN, NAD. No more runs of  VT.  Eyes:  No scleral icterus. PERRL, EOMI  HENT: Normal oral mucosa.]NC/AT  Neck:  No JVD at 90 degrees. Normal carotid upstrokes without bruits or murmurs.  Cardiovascular: Normal PMI. Normal S1, S2 physiologically split. No S3, (+) S4. 2/6 RODRÍGUEZ, 2-3/6 HSM apex.  Respiratory: Clear to auscultation bilaterally. No wheezes. No rales.  Gastrointestinal: Soft.  Non-tender. No hepatosplenomegally.  Extremities: No clubbing. No edema today. Pulses 2+ bilaterally symmetric except diminished pedal.  Musculoskeletal:  No joint deformity   Neurologic: Non-focal  Lymphatic:  No lymphadenopathy  Psychiatry: [+ ] AAOx3 [ +] Mood & affect appropriate  Skin: No rashes. No ecchymoses. No cyanosis    Cardiovascular Diagnostic Testing:  ECG:< from: 12 Lead ECG (06.07.21 @ 07:34) >  Ventricular Rate 69 BPM    Atrial Rate 69 BPM    QRS Duration 138 ms    Q-T Interval 421 ms    QTC Calculation(Bazett) 451 ms    P Axis 35 degrees    R Axis -74 degrees    T Axis 119 degrees    Diagnosis Line ELECTRONIC VENTRICULAR PACEMAKER  WHEN COMPARED WITH ECG OF `6/6/21 14:50  NO SIGNIFICANT CHANGE WAS FOUND  Confirmed by KAM Woodward Zlata ([id number]) on 6/7/2021 10:45:59 AM    < end of copied text >    < from: TTE with Doppler (w/Cont) (06.07.21 @ 07:37) >  YOB: 1925   Age: 89 (M)   MR#: [id number]  Study Date: 6/7/2021  [facility] Bhupendra Keller RDCS  Study quality: Technically fair  Referring Physician: Yvonne Brown MD  Blood Pressure: 106/55 mmHg  Height: 175 cm  Weight: 70 kg  BSA: 1.9 m2  ------------------------------------------------------------------------  PROCEDURE: Transthoracic echocardiogram with 2-D, M-Mode  and complete spectral and color flow Doppler. Verbal  consent was obtained for injection of  Ultrasonic Enhancing  Agent following a discussion of risks and benefits.  Following intravenous injection of Ultrasonic Enhancing  Agent , harmonic imaging was performed.  INDICATION: Syncope and collapse (R55)  ------------------------------------------------------------------------  Dimensions:    Normal Values:  LA:     5.2    2.0 - 4.0 cm  Ao:     3.4    2.0 - 3.8 cm  SEPTUM: 1.4    0.6 - 1.2 cm  PWT:    0.7    0.6 - 1.1 cm  LVIDd:  5.2    3.0 - 5.6 cm  LVIDs:  4.7    1.8 - 4.0 cm  Derived variables:  LVMI: 122 g/m2  RWT: 0.28  Fractional short: 10 %  EF (Visual Estimate): 30 %  Doppler Peak Velocity (m/sec): MV=1.4 AoV=2.5  ------------------------------------------------------------------------  Observations:  Mitral Valve: Tethered mitral valve leaflets with normal  opening. Mild mitral regurgitation.  Aortic Valve/Aorta: Calcified aortic valve with decreased  opening. Peak transaortic valve gradient equals 24 mm Hg,  mean transaortic valve gradient equals 12 mm Hg, estimated  aortic valve area equals 1 sqcm (by continuity equation),  aortic valve velocity time integral equals 47 cm,  consistent with moderate aortic stenosis. Mild aortic  regurgitation.  Peak left ventricular outflow tract  gradient equals 1 mm Hg, mean gradient is equal to 1 mm Hg,  LVOT velocity time integral equals 9 cm.  Aortic Root: 3.4 cm.  LVOT diameter: 2.6 cm.  Left Atrium: Moderately dilated left atrium.  LA volume  index = 48 cc/m2.  Left Ventricle: Severe segmental left ventricular systolic  dysfunction.  Akinetic basal inferior and inferolateral.  Diffuse hypokinesis of the inferior and inferolateral wall,  lateral wall.   Endocardial visualization enhanced with  intravenous injection of Ultrasonic Enhancing Agent  (Definity). No left ventricular thrombus. Mild left  ventricular enlargement. Severe  diastolic dysfunction  (Stage III).  Right Heart: Mild right atrial enlargement. Right  ventricular enlargement with decreased right ventricular  systolicfunction. A device wire is noted in the right  heart. Normal tricuspid valve. Minimal tricuspid  regurgitation. Normal pulmonic valve.  Pericardium/Pleura: Normal pericardium with no pericardial  effusion.  Hemodynamic: Estimated right atrial pressure is 8 mm Hg.  Estimated right ventricular systolic pressure equals 31 mm  Hg, assuming right atrial pressure equals 8 mm Hg,  consistent with normal pulmonary pressures.  ------------------------------------------------------------------------  Conclusions:  1. Calcified aortic valve with decreased opening. Peak  transaortic valve gradient equals 24 mm Hg, mean  transaortic valve gradient equals 12 mm Hg, estimated  aortic valve area equals 1 sqcm (by continuity equation),  aortic valve velocity time integral equals 47 cm,  consistent with moderate aortic stenosis.  2. Moderately dilated left atrium.  LA volume index = 48  cc/m2.  3. Mild left ventricular enlargement.  4. Severe segmental left ventricular systolic dysfunction.  Akinetic basalinferior and inferolateral.  Diffuse  hypokinesis of the inferior and inferolateral wall, lateral  wall.   Endocardial visualization enhanced with intravenous  injection of Ultrasonic Enhancing Agent (Definity). No left  ventricular thrombus.  5. Severe  diastolic dysfunction (Stage III).  6. Mild right atrial enlargement.  7. Right ventricular enlargement with decreased right  ventricular systolic function. A device wire is noted in  the right heart.  ------------------------------------------------------------------------  Confirmed on  6/7/2021 - 14:32:38 by TIERRA Ovalles  ------------------------------------------------------------------------    < end of copied text >    Echo: 3/23/2021 MAC with only mild MR.  Calcified aortic valve with decreased opening.  Peak gradient 31, mean 19, dimensionless index 0.20 and no AI noted this time.  Severe LAE.  Severe segmental LV systolic dysfunction with LVEF 23 to 25%.  Mild LVH.  Normal RV size and function with mild to moderate TR.  RVSP 54.  No pericardial effusion.       Stress Testing:< from: Stress Echocardiogram-Pharmacologic (10.09.18 @ 17:10) >  Observations:  Mitral Valve: Mitral annular calcification. Moderate mitral  regurgitation.  Aortic Valve/Aorta: Calcified trileaflet aortic valve with  decreased opening. Peak transaortic valve gradient equals  25 mm Hg, mean transaortic valve gradient equals 12 mm Hg,  estimated aortic valve area equals 0.9 sqcm (by continuity  equation), consistent with severe aortic stenosis. Mild  aortic regurgitation.  Peak left ventricular outflow tract  gradient equals 1 mm Hg, mean gradient is equal to 1 mm Hg.  Aortic Root: 3.6 cm.  LVOT diameter: 2.4 cm.  Left Atrium: Severely dilated left atrium.  LA volume index  = 51 cc/m2.  Left Ventricle: Severe segmental left ventricular systolic  dysfunction.  Severe hypokinesis/akinesis of the inferior  and inferolateral wall, basal inferoseptum, anterolateral  wall. Mild left ventricular enlargement.  Right Heart: Normal right atrium. A device wire is noted in  the right heart. Decreased right ventricular systolic  function. Normal tricuspid valve. Minimal tricuspid  regurgitation. Normal pulmonic valve.  Pericardium/Pleura: Normal pericardium with no pericardial  effusion.  Hemodynamic: Estimated right atrial pressure is 8 mm Hg.  Estimated right ventricular systolic pressure equals 33 mm  Hg, assuming right atrial pressure equals 8 mm Hg,  consistent with normal pulmonary pressures.  ------------------------------------------------------------------------  Pharmacologic Stress Test:  Agent:  Dobutamine Dose: 5  mcg/Kg/min over 0 min.  Baseline HR: 71 bpm  Peak HR: 74 bpm  % MPHR: 58 %  Baseline BP: 121/68 mmHg  Peak BP: 134/77 mmHg  Peak RPP: 9,916 (Rate Pressure Product)  Test terminated: Completion of test  Baseline EKG: Paced rhythm  EKG changes: Non diagnostic ECG.  Arrhythmia: None  Heart Rhythm: Paced  HR Response: HR failed to increase appropriately.  BP Response: Appropriate  Symptoms: None  ------------------------------------------------------------------------  Echocardiographic Study:  (A) Baseline echocardiogram reveals:  1. Moderate mitral regurgitation.  2. Calcified trileaflet aortic valve with decreased  opening. Peak transaortic valve gradient equals 25 mm Hg,  mean transaortic valve gradient equals 12 mm Hg, estimated  aortic valve area equals 0.9 sqcm (by continuity equation),  consistent with severe aortic stenosis. Mild aortic  regurgitation.  3. Left ventricular enlargement.  4. Severe segmental left ventricular systolic dysfunction.  Severe hypokinesis/akinesis of the inferior and  inferolateral wall, basal inferoseptum, anterolateral wall.  5. A device wire is noted in the right heart. Decreased  right ventricular systolic function.  (B) Stress echocardiogram reveals:  No significant change in left ventricular systolic  function.  ------------------------------------------------------------------------  Conclusions:  1. Normal hemodynamic response.  2. Non Diagnostic electrocardiographic response.  3. No significant change in left ventricular systolic  function.  4. HR failed to increase appropriately.  5. 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  Discussed with Dr. Kay.  ------------------------------------------------------------------------  Confirmed on  10/10/2018 - 17:48:23 by Jerome Johnson M.D.  ------------------------------------------------------------------------    < end of copied text >      Cath:< from: Cardiac Cath Lab - Adult (10.08.18 @ 15:55) >  CORONARY VESSELS: The coronary circulation is right dominant.  LM:   --  Proximal left main: There was a 40 % stenosis. 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  --  Distal left main: There was a stenosis. The lesion was eccentric.  LAD:   --  LAD: Angiography showed moderate atherosclerosis.  CX:   --  Circumflex: Angiography showed moderate atherosclerosis.  RCA:   --  RCA: Angiography showed moderate atherosclerosis.  --  Proximal RCA: There was a diffuse 30 % stenosis at the site of a prior  stent.  LEFT LOWER EXTREMITY VESSELS: Left external iliac: Angiography showed  aneurysmal dilatation. Left common femoral: The vessel was tortuous.  RIGHT LOWER EXTREMITY VESSELS: Right external iliac: Angiography showed  aneurysmal dilatation. Right common femoral: The vessel was excessively  totuos  < end of copied text >      Interpretation of Telemetry:    Imaging:   < from: Xray Chest 1 View- PORTABLE-Urgent (06.06.21 @ 08:38) >  COMPARISON: Chest x-ray from 5/14/2018.    FINDINGS:    The lungs are clear.  There is no pneumothorax or large pleural effusion.  Heart size cannot be accurately assessed in this projection. Dual lead pacemaker overlying the left chest wall.  No acute rib fractures or other osseous abnormality. Suture anchors noted in the left humeral head.    IMPRESSION:    Clear lungs.        < end of copied text >    Labs:                                  8.4    6.70  )-----------( 96       ( 16 Jun 2021 06:47 )             25.9     06-16    135  |  103  |  79<H>  ----------------------------<  76  4.8   |  20<L>  |  2.59<H>    Ca    8.1<L>      16 Jun 2021 06:47  Mg     2.5     06-16    TPro  5.4<L>  /  Alb  x   /  TBili  x   /  DBili  x   /  AST  x   /  ALT  x   /  AlkPhos  x   06-15

## 2021-06-16 NOTE — PROGRESS NOTE ADULT - SUBJECTIVE AND OBJECTIVE BOX
Patient is a 89y old  Male who presents with a chief complaint of ICD fired with VT (16 Jun 2021 08:06)    Patient seen this morning. No new complaints.     MEDICATIONS  (STANDING):  allopurinol 100 milliGRAM(s) Oral daily  aMIOdarone    Tablet 400 milliGRAM(s) Oral every 12 hours  carvedilol 3.125 milliGRAM(s) Oral every 12 hours  ferrous    sulfate 325 milliGRAM(s) Oral daily  levothyroxine 125 MICROGram(s) Oral daily  pantoprazole    Tablet 40 milliGRAM(s) Oral before breakfast  polyethylene glycol 3350 17 Gram(s) Oral daily  quiNIDine gluconate  milliGRAM(s) Oral every 12 hours  sacubitril 24 mG/valsartan 26 mG 1 Tablet(s) Oral two times a day  senna 2 Tablet(s) Oral at bedtime  simvastatin 10 milliGRAM(s) Oral at bedtime  sodium chloride 0.9%. 1000 milliLiter(s) (60 mL/Hr) IV Continuous <Continuous>  tamsulosin 0.4 milliGRAM(s) Oral at bedtime    MEDICATIONS  (PRN):  acetaminophen   Tablet .. 650 milliGRAM(s) Oral every 6 hours PRN Temp greater or equal to 38C (100.4F), Mild Pain (1 - 3)      Vital Signs Last 24 Hrs  T(C): 36.3 (16 Jun 2021 09:08), Max: 36.6 (15 Yobani 2021 12:32)  T(F): 97.4 (16 Jun 2021 09:08), Max: 97.8 (15 Yobani 2021 12:32)  HR: 70 (16 Jun 2021 09:08) (68 - 72)  BP: 102/66 (16 Jun 2021 09:08) (91/56 - 102/66)  BP(mean): --  RR: 17 (16 Jun 2021 09:08) (17 - 18)  SpO2: 96% (16 Jun 2021 09:08) (94% - 97%)    PE  NAD  Awake, alert  Anicteric  No c/c/e  No rash grossly                            8.4    6.70  )-----------( 96       ( 16 Jun 2021 06:47 )             25.9       06-16    135  |  103  |  79<H>  ----------------------------<  76  4.8   |  20<L>  |  2.59<H>    Ca    8.1<L>      16 Jun 2021 06:47  Mg     2.5     06-16    TPro  5.4<L>  /  Alb  x   /  TBili  x   /  DBili  x   /  AST  x   /  ALT  x   /  AlkPhos  x   06-15

## 2021-06-16 NOTE — PROGRESS NOTE ADULT - SUBJECTIVE AND OBJECTIVE BOX
Date of service: 06-17-21 @ 07:57      Patient is a 89y old  Male who presents with a chief complaint of ICD fired with VT (16 Jun 2021 16:46)                                                               INTERVAL HPI/OVERNIGHT EVENTS:    REVIEW OF SYSTEMS:     CONSTITUTIONAL: No weakness, fevers or chills  RESPIRATORY: No cough, wheezing,  No shortness of breath  CARDIOVASCULAR: No chest pain or palpitations  GASTROINTESTINAL: No abdominal pain  . No nausea, vomiting, or hematemesis; No diarrhea or constipation. No melena or hematochezia.  GENITOURINARY: No dysuria, frequency or hematuria  NEUROLOGICAL: No numbness or weakness                                                                                                                                                                                                                                                                               Medications:  MEDICATIONS  (STANDING):    MEDICATIONS  (PRN):       Allergies    No Known Allergies    Intolerances      Vital Signs Last 24 Hrs  T(C): 36.6 (16 Jun 2021 17:56), Max: 36.6 (16 Jun 2021 17:56)  T(F): 97.8 (16 Jun 2021 17:56), Max: 97.8 (16 Jun 2021 17:56)  HR: 78 (16 Jun 2021 17:56) (70 - 98)  BP: 107/67 (16 Jun 2021 17:56) (102/66 - 107/67)  BP(mean): --  RR: 18 (16 Jun 2021 17:56) (17 - 18)  SpO2: 98% (16 Jun 2021 17:56) (96% - 100%)  CAPILLARY BLOOD GLUCOSE          06-16 @ 07:01  -  06-17 @ 07:00  --------------------------------------------------------  IN: 240 mL / OUT: 250 mL / NET: -10 mL      Physical Exam:    Daily     Daily   General:  cachetic  HEENT:  Nonicteric, PERRLA  CV:  RRR, S1S2   Lungs:  CTA  Abdomen:  Soft, non-tender, no distended, positive BS  Extremities:  no edema   Neuro:  NF                                                                                                                                                                                                                                                                                            LABS:                               8.4    6.70  )-----------( 96       ( 16 Jun 2021 06:47 )             25.9                      06-16    135  |  103  |  79<H>  ----------------------------<  76  4.8   |  20<L>  |  2.59<H>    Ca    8.1<L>      16 Jun 2021 06:47  Mg     2.5     06-16    TPro  5.4<L>  /  Alb  x   /  TBili  x   /  DBili  x   /  AST  x   /  ALT  x   /  AlkPhos  x   06-15                       RADIOLOGY & ADDITIONAL TESTS         I personally reviewed: [  ]EKG   [  ]CXR    [  ] CT      A/P:         Discussed with :     Kamlesh consultants' Notes   Time spent :

## 2021-06-16 NOTE — PROGRESS NOTE ADULT - ASSESSMENT
89 M CAD s/p stents, low-flow low gradient AS, HFrEF 20% s/p MDT Bi-V ICD, COPD who presents with syncope x 2. AICD shock during his stay for [place]. Nephrology consulted for INGRID on CKD     INGRID on CKD IV  SCr 1.4 in 2018  May have a new baseline now  SCr fluctuating at present likely sec to CHF  INGRID likely from hypotension + ARB vs. Anemia  meds per cardio.   optimize hemodynamics   Check urine electrolytes   Pt non oliguric at present   UA bland   Continue to monitor renal function at present  Check Bladder scan   CBC w/ Diff in AM   avoid nephrotoxins     Anemia  no iron def  monitor hb     Hypotension  optimize hemodynamics   meds per cardio

## 2021-06-16 NOTE — PROGRESS NOTE ADULT - NSICDXPILOT_GEN_ALL_CORE
[place]

## 2021-06-28 PROBLEM — I50.43 ACUTE ON CHRONIC COMBINED SYSTOLIC AND DIASTOLIC CHF (CONGESTIVE HEART FAILURE): Status: ACTIVE | Noted: 2021-01-01

## 2021-06-28 PROBLEM — I95.9 HYPOTENSION: Status: ACTIVE | Noted: 2021-01-01

## 2021-06-28 PROBLEM — I47.2 PAROXYSMAL VENTRICULAR TACHYCARDIA: Status: ACTIVE | Noted: 2017-09-12

## 2021-06-28 NOTE — PHYSICAL EXAM
[Normal Appearance] : normal appearance [General Appearance - Well Developed] : well developed [Well Groomed] : well groomed [No Deformities] : no deformities [General Appearance - In No Acute Distress] : no acute distress [Eyelids - No Xanthelasma] : the eyelids demonstrated no xanthelasmas [Normal Conjunctiva] : the conjunctiva exhibited no abnormalities [Normal Oral Mucosa] : normal oral mucosa [No Oral Pallor] : no oral pallor [No Oral Cyanosis] : no oral cyanosis [Normal Jugular Venous A Waves Present] : normal jugular venous A waves present [Normal Jugular Venous V Waves Present] : normal jugular venous V waves present [No Jugular Venous Coley A Waves] : no jugular venous coley A waves [Respiration, Rhythm And Depth] : normal respiratory rhythm and effort [Exaggerated Use Of Accessory Muscles For Inspiration] : no accessory muscle use [Heart Rate And Rhythm] : heart rate and rhythm were normal [Heart Sounds] : normal S1 and S2 [Murmurs] : no murmurs present [Abdomen Soft] : soft [Abdomen Tenderness] : non-tender [Abdomen Mass (___ Cm)] : no abdominal mass palpated [Abnormal Walk] : normal gait [Gait - Sufficient For Exercise Testing] : the gait was sufficient for exercise testing [Nail Clubbing] : no clubbing of the fingernails [Cyanosis, Localized] : no localized cyanosis [Petechial Hemorrhages (___cm)] : no petechial hemorrhages [Skin Color & Pigmentation] : normal skin color and pigmentation [Skin Turgor] : normal skin turgor [] : no rash [No Venous Stasis] : no venous stasis [Skin Lesions] : no skin lesions [No Skin Ulcers] : no skin ulcer [No Xanthoma] : no  xanthoma was observed [FreeTextEntry1] : Skin turgor not diminished [Affect] : the affect was normal [Oriented To Time, Place, And Person] : oriented to person, place, and time [Mood] : the mood was normal

## 2021-06-28 NOTE — HISTORY OF PRESENT ILLNESS
[FreeTextEntry1] : (MED HX) The patient is now 88 years old. I first saw him in 2006. At that time he had a history of a myocardial infarction and angioplasty back in 1994. He had done well for years although his activity level had diminished with age and he began complaining of fatigue. In December of 2011 he had a stress echocardiogram that was abnormal possibly with very abnormal blood pressure response and possibly with new wall motion abnormalities. He underwent a CTA and this was followed up by cardiac catheterization at [facility] in December of 2011. 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 The patient did well after that which is a minor adjustment of his medications. He has a history of hyperlipidemia and had a past history of hypertension. He stopped smoking 43 years ago, no diabetes, no family history of coronary artery disease.\par April 30, 2013 he was admitted to [facility] with left lower extremity cellulitis and altered mental status. He had positive blood cultures for strep pyogenes group A. An echocardiogram showed a possible small echodensity on the ventricular side of the noncoronary cusp. A joint aspiration of his left ankle was negative. He was treated with vancomycin and Zosyn, switched to cefepime. Followed by plastic surgery for wound care. No evidence of endocarditis clinically. A CAT scan of his abdomen and pelvis showed a 3.7 cm infrarenal abdominal aortic aneurysm. There was also bilateral inguinal hernias. 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 He was followed by Dr. Damián Lovelace of infectious disease. There is still a possibility he may need a skin graft for his left ankle. He was in the hospital for a little over 2 weeks and then in rehabilitation for 5 weeks. \par July 3, 2013. Here for followup visit. He had been home now for about 10 days and had already seen Dr. Lovelace and Dr. Jones his internist.  He denies chest pain or shortness of breath. There has been no syncope or near syncope or palpitations. Upon review of his medications the only change is that his Avapro is at a half of a 150 mg pill daily due to low blood pressure in the hospital and his simvastatin has been cut down from 20 mg to 10 mg. He did lose weight during this hospitalization as well. There have been no episodes of fevers, chills, sweats, etc.\par September 3, 2013. Here for followup. He is preparing to go on a trip to [place] in October. He does note shortness of breath with exertion but only with a lot of exertion, no problem going up stairs, and he thinks no different than even before his cellulitis episode. He denies exertional chest pain. We elected to see how he did with increased exercise and if his shortness of breath was not severe he would be okay to go on the trip to [place], obviously using commonsense as we discussed.\par January 6, 2014. The patient is here for followup. He did very well on the trip to [place] with some limitation from his knees but no significant shortness of breath or chest pain. He gets swelling on his left leg where he had the cellulitis but not on the right side. He only had one episode of slight lightheadedness when he got up too fast but otherwise has been tolerating the current medications. He is planning on going to [place] at the end of January. He has a mild cough, nonproductive, no fever chills. He claims he was unable to get an appointment with his internist. Her workup was unrevealing.\par May 5, 2014. The patient is here for followup. He saw Dr. Jones in the interim and had lab work with an excellent lipid profile but a high BNP. He saw vascular surgery because of the abdominal aortic aneurysm which measured 3.8 cm and that will be followed. He had an episode where his balance was off for about one hour. It only happened when he tried to walk around. He denies feeling lightheaded like he might pass out. If he was sitting and not moving he felt perfectly fine. There was no vertigo. He claims it happened him once before and it resolved as well. I advised him to discuss with Dr. Jones and perhaps a neurologist. In addition he brought up again with his wife his symptoms he's had for 40 years where he suddenly for no reason will get chest pain and pressure that radiates up to his jaw. It will actually go away if he does nothing but if he takes Maalox it will go away faster. I explained to him that this is probably esophageal reflux with esophageal spasm but it so infrequent that it is not worth for him to take a PPI on a daily basis. He denies having exertional chest pain or shortness of breath. He is considering possible knee replacement in the near future. He returned in July for preop clearance for knee replacement which he had in the middle of July with no complications Other than needing to be transfused 3 units and having been sent in to [facility] in the [facility] to have that done on 2 occasions.\par October 7, 2014. The patient is here for followup. He is feeling better and is more active but is also noting more shortness of breath with exertion or at least others with him have noticed it. He thinks he needs the other knee replacement done but his wife said she will divorce him if he does it. His TSH was elevated on labs with Dr. Jones last month so his Synthroid was increased from 6 days a week to 7 days a week. His BNP was elevated and he asked "should I worry about heart failure?". In addition Dr. Jones cut back his Avapro 300 to 150 because blood pressure was 110. The patient denies lightheadedness dizziness etc. His blood pressure today on 2 different occasions was 146/70. He also has an abdominal aortic aneurysm measuring 3.8 cm that is being followed. He'll be seeing Dr. Temple tomorrow.\par October 28, 2014. Dr. Jacobs called yesterday that the patient's abdominal aorta had increased in size by 0.9 cm and the patient is to be scheduled for an endovascular repair of his abdominal aortic aneurysm. He came here for his echocardiogram and discussion. The echo for the most part is unchanged from May of 2013 with left ventricular ejection fraction around 40%, mild to moderate MR, mild to moderate AI, segmental LV dysfunction with akinetic inferoposterior wall, and hypokinetic to akinetic inferolateral and mid lateral walls, normal anterior wall. LV size upper limit of normal. Normal RV size and function with an RVSP estimated at 41 mm of mercury.\par April 29, 2015. The patient did well with his endovascular repair of his abdominal aortic aneurysm. He went down to [place] and at one point was found to be short of breath with that was felt to be a pleural effusion. He was set up for a thoracentesis but after taking a diuretic for a few days they found there was no fluid there. His cough did not go away until he took a course of prednisone.The diuretic was stopped and he did well returning home. He took a tour of [place] and noted that any kind of incline made him very short of breath but no chest pressure. He saw Dr. Morris of pulmonary who found his proBNP to be 3400 and in the past it was only 1100. However his chest x-ray was totally clear. He has no PND or orthopnea and no real edema. She did give him another inhaler (Symbicort) and referred him here. His weight has not been up and if anything has gone down a little. His last echo was in October as above and was unchanged for the most part.  There is no chest pain with exertion. \par April 30, 2015. The patient returned for a stress echocardiogram. His baseline LV function looked worse than previously with left ventricular ejection fraction around 27%. With exercise he dropped his blood pressure and there were new wall motion abnormalities. He was scheduled for cardiac catheterization and probable defibrillator with possible biventricular pacemaker.\par May 6, 2015. Cardiac catheterization was done and revealed for the most part unchanged anatomy.  LVEF by V-gram was 35% . Given these findings this time Dr. Reyes did angioplasty and stent the right coronary artery. Electrophysiology felt he should wait at least 2 weeks and come back for another ejection fraction and clinical evaluation to see if things had improved before deciding on a  possible  AICD/biventricular pacemaker.\par May 21, 2015. The patient returns for followup and echocardiogram. He has felt much better over the 2 weeks but he has not been doing his usual activities because he was told not to do it. His echocardiogram to my eye looks unchanged. His exam is unchanged but there is no signs of congestive heart failure. I discussed the results with the patient and his wife. I told him that his improved symptoms could be because he is not that active, it could be a placebo effect of the procedure, it could be that things have improved and we just are unable to measure it on the echocardiogram. A BNP was sent and will be compared to his previous level which had gone up from the 400 range into the 800s. In addition over the weekend the patient will increase his activity and see what his level of symptoms are. If his symptoms are significant and worse than they had been a few months ago, we will schedule the ICD and biventricular pacemaker. The patient will contact me after the weekend.\par Nehal 15, 2015. The patient saw Dr. Russell of [facility] and unfortunately he has to wait 3 months (August 6, 2015) from the angioplasty unless he becomes unstable, has any arrhythmias, or needs a pacemaker for bradycardic reasons.  He did increase the carvedilol to 12.5 mg in the morning and 25 mg in the evening Currently he feels well and is stable. He does get out of breath and some weakness in his legs if he is walking uphill. When he initially lies down he feels some pressure he then says is shortness of breath but it resolves. No PND or orthopnea per se.\par July 14, 2015. The patient called a few weeks ago thinking he was more short of breath and wanted to move up the AICD/biventricular pacemaker. However after speaking with Dr. Russell of [facility] he explained he still was not qualified and must wait unless his symptoms are so severe that he requires hospitalization. I explained this to the patient and he returns today for routine followup.  He is still symptomatic with dyspnea on exertion but no PND or orthopnea. 3 months from his angioplasty will be August 6. He has a trip to [facility] planned with his daughter August 12.\par August 25, 2015. The patient is here for followup after having his AICD/biventricular pacemaker placed on August 7. No complications with the procedure. The patient does feel better in terms of the shortness of breath he would get when he was lying down but with exertion he feels maybe even worse right now in terms of shortness of breath. Of note he was away with a different diet and gained 4 pounds. (By scale here he has gained 9 pounds.) No palpitations, no syncope or near syncope. He is on carvedilol 25 mg the morning and 12.5 in the evening. He is still complaining of left arm pain ever since the procedure.\par September 8, 2015. The patient is finally starting to feel a little bit better. He cut his diuretic back to every other day and has continued to lose weight. His left arm so bothersome just as much since the procedure. We had a discussion about changing his Avapro to Entresto.\par October 8, 2015. Patient is here for followup. In the interim he had an episode of bronchitis treated with antibiotics and steroids which did cause some fluid retention and he was briefly on diuretics with improvement. He is now off the steroids but still on the diuretic. He does feel better on the Entresto. He thinks he is walking better with less shortness of breath and somewhat more stamina. He otherwise has no complaints. He remains in sinus rhythm and has had no AICD shocks. His blood pressure was borderline and his lungs were clear. I elected to stop the diuretic and increase his Entresto. \par October 23, 2015. On October 20 the patient saw Dr. Morris because of shortness of breath and edema. He had gained 9 pounds and he had significant swelling and she put him back on Lasix 40 mg as we discussed together. Patient had much relief and is here now. He lost 8 of the 9 pounds he again and feels much better. He still gets short of breath going up the stairs and often has to stop. He has trace edema still. His blood pressure is in the 90s systolic. He still eats out but not as much as he used to and they do ask for the low salt.\par November 9, 2015. The patient returns for followup. He has been on Lasix 40 mg since his last visit. He looks great, back to himself, with no complaints or symptoms of CHF. Her systolic pressure is only 90 but he has no symptoms of lightheadedness. He is having a lot of gas to the point of it being embarrassing and wondered if it is related to the Entresto. He also seems to have a dry cough. He will be seeing Dr. Russell in followup next week. Based on his complaints we cut back his Lasix to 20 mg daily but then he called back a few days later with more shortness of breath and went back to 40 mg q.d.\par December 7, 2015. Followup for the patient. As noted he had a go back to 40 mg Lasix because he became short of breath. He was still having GI complaints from the Entresto but he can manage it. He is complaining again of a urinating too much so perhaps we could try 30 mg of Lasix daily. He is seeing Dr. Russell next week. Is turning 89 soon and is planning a cruise on January 24.\par January 12, 2016. Patient is here for followup. Still has good spirits and is looking forward to going to [place] in 10 days. He is still having significant cough from the Entresto and we will have to change back to his prior regimen. Had echocardiogram at [facility] which was supposed to be  a dyssynchrony study and will be seeing Dr. Russell of [facility] tomorrow morning to discuss whether or not to try to improve his biventricular pacing. Currently on 80 mg of Lasix daily. Dr. Morris tried a new inhaler but he does not think it helped.\par March 2, 2016. The patient is here in followup. He went on his cruise and did well. His cough was stopped when he stopped the entresto. On the cruise he was able to cut back to 40 mg of Lasix  and even occasionally he didn't take it if he had a busy day. However after a while he did notice more shortness of breath and edema and the last few days he went back to 80 mg of Lasix with improvement. He is here now today, his weight has gone down 8 pounds, blood pressure was 90/60.\par May 3, 2016. Patient looks well and feels well, but continues to lose weight despite a good appetite. Dr. Jones is concerned and placed him on Ensure and if he does not put on some weight, he will be seeing GI. Abdominal CAT scan was unremarkable, except for some gastric distention and some thickening of the apex of the duodenum, which could just be underfilling. Abdominal aortic aneurysm repair was stable with sac being less than 3 cm. 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 He will follow up with vascular again in one year. He remains on 40 mg of Lasix and irbesartan. Not complaining of shortness of breath. He has done much better since Dr. Russell adjusted. His biventricular pacer by adjusting LV and RV timing. Lab work revealed a very suppressed TSH, so his Synthroid was held and he was to return in 2 weeks to reassess weight loss and thyroid status.\par May 18, 2016. Yesterday the patient was dizzy and lightheaded and fell and hit his head. He was evaluated in the emergency room, where everything was negative. His EKG was unchanged and his defibrillator was interrogated and there were no arrhythmias. His TSH was now 29! He is here today. Patient did fall again last night. Unclear if it is from being lightheaded or losing balance. No syncope. Other than when he had the fall, he has not been complaining of feeling lightheaded. His weight did go back up  off  the thyroid medication. I elected to hold his Lasix and have him reassessed in one week.\par May 24, 2016. Patient called yesterday and is short of breath, edema, and had put his weight back on. I told him to resume the Lasix and he is seen today for his followup. Mostly feels a little better and was able to lie flat last night. Still has some swelling. Blood pressure is still borderline low. Weight is up 6 pounds from last visit. We elected to continue Lasix 40 a day and changed his Synthroid to 0.1 mg daily.\par July 12, 2016. Patient is here for followup, as well as AICD interrogation. He seems to have normal biventricular pacing. Absolutely no arrhythmias for the past 4 months. Does get short of breath with exertion still, but not severe. Rarely will skip a day of Lasix if he has too many things to do. Started physical therapy, and was not out of breath at physical therapy today. His weight at home has been stable.\par September 13, 2016. Patient is getting around okay with a walker. His right knee seemed to be limiting him more than his shortness of breath and he has some trouble with his balance. No palpitation, syncope, or near syncope, or shocks. No change in any medication. He was still on Synthroid 0.1 mg. He is still doing physical therapy.\par November 16, 2016. The patient seems to be doing well, although he did have an episode of just suddenly falling backwards and hitting his head against the refrigerator. He denies lightheadedness or near syncope at the time. He denies vertigo, although he had a different episode. He reached up and looked up and had a vertigo-like episode just for a few seconds. There was no event on his defibrillator interrogation, except a 6 beat run of NSVT on September 24, which was asymptomatic. He is orthostatic here. He is also concerned about his weight loss, although his weight seems to be the same as it was last visit, but he claims he was almost 7 pounds less at home. His wife thinks he does not drink enough water. He is on the Lasix every other day. His EKG showed sinus rhythm with atrial tracking and biventricular pacing. He also has a compression fracture in his lumbar vertebra that is giving him pain\par He is also concerned about his thyroid hormone level, given his weight loss. We continued the Lasix on an every other day basis and lowered the Avapro to 150 mg. BNP was up to 4600 from 2600.\par December 21, 2016. The patient has here in followup. No more falls or complaints of orthostasis, although he is still orthostatic here from 124-106-98 standing with no symptoms. Complaining more of dyspnea on exertion and has some edema. His weight is about the same. After discussion, decided to add digoxin 0.125 q.d.\par February 8, 2017. The patient is here in followup. He has not fallen in a while now and he claims his problem is balance not dizziness. He thinks his dyspnea on exertion is unchanged on the digoxin. However his wife thinks he is better because he is not short of breath coming up the stairs and the  does agree. Just recently he started to develop some pedal edema, but he has had more salt because he loves soups. In addition, he does not sit with his legs elevated. (He also saw Dr. Plascencia for URI and was briefly on prednisone until a couple of weeks ago. He did have an SPEP with normal. Electrophoresis pattern.)\par April 4, 2017. Patient here in followup. Had pacemaker, defibrillator interrogation on March 6, with 2 short episodes of NSVT only. Seems to be doing well on the whole. Dyspnea on exertion is there, but unchanged. Weight is down 3 pounds. He occasionally gets numbness and tingling in his fingers, but not in his toes. Occasionally feels some discomfort over the AICD site. He will be going to his daughter and his niece for the [place] nights.\par Nehal 15, 2017. The patient is here in followup. Has been feeling a little more short of breath and does have a little bit of edema. He saw Dr. Valdez on Friday the ninth, who felt he had bronchitis and gave him Augmentin. Otherwise, patient has no complaints and has been doing pretty well. He still complains occasionally of some soreness over his AICD site. No other interim medical issues, and no change in medication.\par September 12, 2017. Patient is here for followup and defibrillator interrogation. As noted on August 25 had a syncopal episode after he had a hot shower and walked out to the other bathroom. Found himself on the floor and was fine. No recurrence since and in fact defibrillator interrogation shows that he had an episode of VT followed by VF after the device tried to pace terminate, which led to a defibrillator shock, and the patient has been fine since. He is still with sinus rhythm and a first-degree AV block underneath his ventricular pacing. No change in any of the symptoms and in fact, while he does get short of breath sometimes getting into bed, and some walking, was able to go to the gym and work out a little bit without shortness of breath. No chest pain, and no change in medications.\par December 26, 2017. Patient here in followup. Has been having some more shortness of breath, although seems to have frequent cough and URI. Now on a nebulizer. Does have worsening shortness of breath with lying down, but no PND. Had a near syncopal event when he got up right after sitting for a long time watching a movie, but otherwise no episodes of dizziness. AICD interrogation shows no episodes since the event in August. Patient thinks his overall stamina and fatigue are worse. Patient willing to retry Entresto.\par January 11, 2018. So far patient tolerating the Entresto, no cough. Maybe a little less short of breath, but not definite. Does have more edema, but weight is the same. No other complaints. Reluctant to increase the diuretic. We'll keep dose of the same for now and followup in one month if labs are okay. Then consider increasing Entresto.\par Comment:  \NING Genao - 21 Mar 2018 3:12 PM \par   TASK CREATED\par Hi-\par Saw Adam.\par He complained of weight gain, edema, SOB.\par Sent BNP and up to over 5300.\par Do you think that the Entresto is an issue for him?\par Asked him to call you.\par Thanks-\par Ning \par Addendum\par I reviewed the labs and spoke with the patient. Actually, weight is down, and labs are all okay except the BNP. If this is natural history of LV dysfunction, he should be on a higher dose of Entresto. I elected to increase the Entresto between now and his visit next week with me and see if things get worse, improve, or stay the same. Patient has a big cruise coming up on April 18 \par March 27, 2018. Patient returns in followup now on the increased dose of Entresto for one week. On his pacemaker interrogation he has normal function with biventricular pacing, no more VT. His "optivol" has been elevated since mid-January and just for the past week it seems to be coming down, which would correlate with increasing his Enteresto. He does admit to not being a strict on his diet, having Chinese food the other night, and has only been staying with the furosemide every other day despite the increased edema and shortness of breath. Depending on labs, I will probably continue the high-dose Entresto, continue furosemide daily through the beginning of [place] this week and then go back to every other day depending on symptoms, weight and edema.\par May 22, 2018. The patient went on his vacation and did extremely well. By the very end before getting on the plane to come back he was somewhat short of breath and his wife considered going to the emergency room. Instead, he took extra Lasix and was doing well on the plane. At one point got up to the bathroom, came back sat in his seat and his wife leaned over to try to wake him up and they could not arouse him for up to 5 minutes. A doctor on the plane checked him, they took him out of his seat and laid him down on the floor at which point he seemed to come to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

## 2021-06-28 NOTE — REVIEW OF SYSTEMS
[Weight Gain (___ Lbs)] : no recent weight gain [SOB] : shortness of breath [Dyspnea on exertion] : dyspnea during exertion [Lower Ext Edema] : lower extremity edema [Chest Discomfort] : no chest discomfort [Leg Claudication] : no intermittent leg claudication [Palpitations] : no palpitations [Orthopnea] : orthopnea [PND] : no PND [Syncope] : no syncope [Negative] : Heme/Lymph

## 2021-06-28 NOTE — REASON FOR VISIT
[FreeTextEntry1] : Follow up visit for patient with known LV dysfunction and CAD along with COPD after angioplasty and after AICD and biventricular pacemaker on August 7, 2017 was on Entresto but not tolerating it because of persisting cough; it was stopped and cough resolved. Issue of weight loss but good appetite found to be hyperthyroid on Synthroid therapy. On digoxin. Had syncopal episode at home with VT- VF and defibrillator shock on August 25, 2017. No recurrence of syncope, with only one near syncopal episode that was clearly orthostatic after a movie. AICD interrogation shows no further episodes since August. Then brief hospitalization upon return from his vacation in August, then  structural heart evaluation including dobutamine echo stress October 2018.  Recent hospital admission for recurrent ventricular tachycardia and defibrillator shocks, now here for reevaluation while at rehab.\par \par \par April 19, 2021.  Patient here in follow-up.  In the interim he had banged his elbow and ended up with olecranon bursitis with I believe a bloody effusion.  Has done well with conservative management.  He had been at the [place] emergency room and labs there had a BUN of 58 with a creatinine of 2.1 and a potassium of 4.7.  Hemoglobin 9.7, hematocrit 30.2, platelets 99,000.  Orthopedics spoke with him on April 16 and seemed satisfied with his progress but did put him on ibuprofen 403 times a day.  Is now on Jardiance 10 mg and seems to be doing a little better.  Labs were sent.  Advised to change to Tylenol if the pain is bearable.\par Kidney numbers slightly worse and BNP higher but patient does look better, less edema etc.  Would keep Jardiance at 10 mg and recheck in 4 weeks again. \par May 19, 2021.  Patient returns in follow-up.  Remains with coarse A. fib and ventricular pacing.  Patient walking slowly with his rolling walker recliner hunched over not that different from baseline.  Here with his wife.  He does feel like it is a little harder to breathe when he is walking and he has gained about 5 pounds.  When he first gets into bed and lies down he feels short of breath for a few minutes but after that it passes and he has no PND or orthopnea.  Depending on the labs I might try to increase the Jardiance from 10 to 25 mg.\par May 20, 2021 Reviewed labs.  Creatinine came down to 2.05, potassium 4.7, BNP down from 17,000-13,000.  Will increase Jardiance to 25 mg daily and follow-up in about 5 weeks \par June 22, 2021.  Patient called from rehab.  Sounds much better and is doing physical therapy but is developing edema.  All his meds were decreased when he was in the hospital and he was discharged on amiodarone, only 3.125 of carvedilol, 0.125 of Synthroid, 324 of Quinaglute every 12 hours, only 24/26 of Entresto twice a day, no simvastatin and no diuretics.  He needs to be restarted on diuretics and depending on blood pressure maybe have his Entresto increased.  He will try to have the professional staff from there called me \par June 28, 2021.  Today patient is here from rehab with his wife.  I was able to speak with Dr. Evans ([phone]) his cardiologist at the [facility].  He had labs June 26 with a hemoglobin of 8, hematocrit 25.3, , platelets 89,000.  Sodium 132, potassium 5.2, BUN 74, creatinine 3.09 (when he left the hospital BUN was 72 and creatinine 2.97) he is currently on amiodarone 200 mg, Quinaglute  twice daily, carvedilol only 3.125 twice daily, Entresto 24/26 twice daily, simvastatin 10, Synthroid 0.125, and no diuretic.  By the end I had spoken with his physician they are, I recommended starting Bumex 1 mg daily and increase to 2 if needed, and to try to add Verquvo starting at 2.5 mg for 2 weeks and then going up to 5 mg.\par \par \par \par \par

## 2021-07-07 ENCOUNTER — NON-APPOINTMENT (OUTPATIENT)
Age: 86
End: 2021-07-07

## 2021-07-13 ENCOUNTER — APPOINTMENT (OUTPATIENT)
Dept: CARDIOLOGY | Facility: CLINIC | Age: 86
End: 2021-07-13

## 2021-09-01 ENCOUNTER — NON-APPOINTMENT (OUTPATIENT)
Age: 86
End: 2021-09-01

## 2022-08-16 NOTE — PHYSICAL THERAPY INITIAL EVALUATION ADULT - SITTING BALANCE: STATIC
Called and notified patient about lab results. He has verbalized an understanding and will call Dr Zhao Flores.
good balance

## 2023-04-27 NOTE — PROGRESS NOTE ADULT - TIME-BASED BILLING (NON-CRITICAL CARE)
Time-based billing (NON-critical care)
none

## 2023-05-04 NOTE — HISTORY OF PRESENT ILLNESS
Patient returning a call for results [Home] : at home, [unfilled] , at the time of the visit. [Medical Office: ([facility])___] : at the medical office located in  [Spouse] : spouse [Verbal consent obtained from patient] : the patient, [unfilled] [FreeTextEntry8] : Telehealth encounter conducted from 3:06PM - 3:27PM.  Patient fell about 8 days ago in his bathroom, as he tried grabbing the bathroom doorknob with his right hand and had his left hand on the sink.  He hit the side of his left leg onto the toilet, and developed a bruise.  It was on the left side, above the knee.  He did not hit his head or hurt any other part of his body.  He had no difficulty bending the knee, and he was able to walk at his normal baseline.  There was no bleeding or open cuts at the site of the bruising.  No loss of consciousness, chest pain, or dyspnea.\par \par Patient is on ASA 3x/week, and no other blood thinners.

## 2023-05-13 NOTE — ED ADULT NURSE NOTE - NSSISCREENINGQ3_ED_A_ED
[de-identified] : cough [FreeTextEntry6] : 2 weeks allergy ssx that respond partly to antihistamiones\par \par Overnight fever and worsening cough\par \par Runny nose mostly clear \par \par \par \par  No

## 2024-07-31 NOTE — H&P ADULT - NSRESEARCHGRANT_MLMHIDDEN_GEN_A_CORE
yes
Render In Strict Bullet Format?: No
Detail Level: Zone
Plan: Will reevaluate at next visit, if not better will consider Humira or Cosentyx. \\nDiscussed with patient that weight loss and smoking cessation can reduce flares.\\n\\nRTC 3 months f/u.
Initiate Treatment: doxycycline hyclate 100 mg capsule Bid\\nTake one capsule by mouth twice daily with a meal and glass of water. Do not recline for 30 mins- 1 hr afterwards\\n\\nclindamycin 1 % lotion Twice daily\\nApply thin layer to affected skin folds once daily prn.\\n\\nbenzoyl peroxide 10 % topical cleanser \\nApply thin layer to affected skin folds and rinse off a few minutes later daily as tolerated.

## 2024-08-05 NOTE — ED ADULT TRIAGE NOTE - BANDS:
Fall Risk; Patient states that he is hoping [facility] has in stock this month. They won't tell the patient if they have in stock with out a current prescription.  Patient states in the [place] for cancer treatment Monday through Friday. Returns to [place] on the weekend.  Darlyn Gamez RN
